# Patient Record
Sex: MALE | Race: WHITE | Employment: OTHER | URBAN - METROPOLITAN AREA
[De-identification: names, ages, dates, MRNs, and addresses within clinical notes are randomized per-mention and may not be internally consistent; named-entity substitution may affect disease eponyms.]

---

## 2017-12-25 PROBLEM — I50.40 COMBINED SYSTOLIC AND DIASTOLIC CONGESTIVE HEART FAILURE (HCC): Status: ACTIVE | Noted: 2017-12-25

## 2017-12-25 PROBLEM — J44.1 COPD EXACERBATION (HCC): Status: ACTIVE | Noted: 2017-12-25

## 2018-09-02 ENCOUNTER — APPOINTMENT (OUTPATIENT)
Dept: GENERAL RADIOLOGY | Age: 78
DRG: 190 | End: 2018-09-02
Payer: MEDICARE

## 2018-09-02 ENCOUNTER — HOSPITAL ENCOUNTER (EMERGENCY)
Age: 78
Discharge: HOME | DRG: 190 | End: 2018-09-02
Attending: EMERGENCY MEDICINE
Payer: MEDICARE

## 2018-09-02 VITALS
DIASTOLIC BLOOD PRESSURE: 57 MMHG | TEMPERATURE: 98.6 F | OXYGEN SATURATION: 90 % | SYSTOLIC BLOOD PRESSURE: 111 MMHG | HEIGHT: 67 IN | WEIGHT: 165 LBS | HEART RATE: 80 BPM | BODY MASS INDEX: 25.9 KG/M2 | RESPIRATION RATE: 20 BRPM

## 2018-09-02 DIAGNOSIS — J44.1 COPD EXACERBATION (HCC): Primary | ICD-10-CM

## 2018-09-02 DIAGNOSIS — I50.9 ACUTE CONGESTIVE HEART FAILURE, UNSPECIFIED HEART FAILURE TYPE (HCC): ICD-10-CM

## 2018-09-02 LAB
ANION GAP SERPL CALCULATED.3IONS-SCNC: 10 MMOL/L (ref 7–16)
BASOPHILS ABSOLUTE: 0.03 E9/L (ref 0–0.2)
BASOPHILS RELATIVE PERCENT: 0.1 % (ref 0–2)
BUN BLDV-MCNC: 15 MG/DL (ref 8–23)
CALCIUM SERPL-MCNC: 9.4 MG/DL (ref 8.6–10.2)
CHLORIDE BLD-SCNC: 96 MMOL/L (ref 98–107)
CO2: 33 MMOL/L (ref 22–29)
CREAT SERPL-MCNC: 0.8 MG/DL (ref 0.7–1.2)
EOSINOPHILS ABSOLUTE: 0.09 E9/L (ref 0.05–0.5)
EOSINOPHILS RELATIVE PERCENT: 0.4 % (ref 0–6)
GFR AFRICAN AMERICAN: >60
GFR NON-AFRICAN AMERICAN: >60 ML/MIN/1.73
GLUCOSE BLD-MCNC: 122 MG/DL (ref 74–109)
HCT VFR BLD CALC: 47.4 % (ref 37–54)
HEMOGLOBIN: 15.6 G/DL (ref 12.5–16.5)
IMMATURE GRANULOCYTES #: 0.21 E9/L
IMMATURE GRANULOCYTES %: 0.8 % (ref 0–5)
INR BLD: 1.5
LYMPHOCYTES ABSOLUTE: 0.93 E9/L (ref 1.5–4)
LYMPHOCYTES RELATIVE PERCENT: 3.7 % (ref 20–42)
MCH RBC QN AUTO: 29.4 PG (ref 26–35)
MCHC RBC AUTO-ENTMCNC: 32.9 % (ref 32–34.5)
MCV RBC AUTO: 89.4 FL (ref 80–99.9)
MONOCYTES ABSOLUTE: 1.78 E9/L (ref 0.1–0.95)
MONOCYTES RELATIVE PERCENT: 7 % (ref 2–12)
NEUTROPHILS ABSOLUTE: 22.24 E9/L (ref 1.8–7.3)
NEUTROPHILS RELATIVE PERCENT: 88 % (ref 43–80)
PDW BLD-RTO: 15.8 FL (ref 11.5–15)
PLATELET # BLD: 177 E9/L (ref 130–450)
PMV BLD AUTO: 9.4 FL (ref 7–12)
POTASSIUM SERPL-SCNC: 3.6 MMOL/L (ref 3.5–5)
PRO-BNP: 1363 PG/ML (ref 0–450)
PROTHROMBIN TIME: 17.1 SEC (ref 9.3–12.4)
RBC # BLD: 5.3 E12/L (ref 3.8–5.8)
SODIUM BLD-SCNC: 139 MMOL/L (ref 132–146)
TROPONIN: <0.01 NG/ML (ref 0–0.03)
WBC # BLD: 25.3 E9/L (ref 4.5–11.5)

## 2018-09-02 PROCEDURE — 84484 ASSAY OF TROPONIN QUANT: CPT

## 2018-09-02 PROCEDURE — 71045 X-RAY EXAM CHEST 1 VIEW: CPT

## 2018-09-02 PROCEDURE — 93005 ELECTROCARDIOGRAM TRACING: CPT

## 2018-09-02 PROCEDURE — 85610 PROTHROMBIN TIME: CPT

## 2018-09-02 PROCEDURE — 85025 COMPLETE CBC W/AUTO DIFF WBC: CPT

## 2018-09-02 PROCEDURE — 83880 ASSAY OF NATRIURETIC PEPTIDE: CPT

## 2018-09-02 PROCEDURE — 99285 EMERGENCY DEPT VISIT HI MDM: CPT

## 2018-09-02 PROCEDURE — 36415 COLL VENOUS BLD VENIPUNCTURE: CPT

## 2018-09-02 PROCEDURE — 96374 THER/PROPH/DIAG INJ IV PUSH: CPT

## 2018-09-02 PROCEDURE — 6360000002 HC RX W HCPCS: Performed by: EMERGENCY MEDICINE

## 2018-09-02 PROCEDURE — 80048 BASIC METABOLIC PNL TOTAL CA: CPT

## 2018-09-02 PROCEDURE — 6370000000 HC RX 637 (ALT 250 FOR IP): Performed by: EMERGENCY MEDICINE

## 2018-09-02 RX ORDER — PREDNISONE 10 MG/1
TABLET ORAL
Qty: 10 TABLET | Refills: 0 | Status: SHIPPED | OUTPATIENT
Start: 2018-09-02 | End: 2018-09-04 | Stop reason: ALTCHOICE

## 2018-09-02 RX ORDER — PREDNISONE 1 MG/1
5 TABLET ORAL DAILY
Status: ON HOLD | COMMUNITY
End: 2018-09-07 | Stop reason: HOSPADM

## 2018-09-02 RX ORDER — IPRATROPIUM BROMIDE AND ALBUTEROL SULFATE 2.5; .5 MG/3ML; MG/3ML
3 SOLUTION RESPIRATORY (INHALATION) ONCE
Status: COMPLETED | OUTPATIENT
Start: 2018-09-02 | End: 2018-09-02

## 2018-09-02 RX ORDER — METHYLPREDNISOLONE SODIUM SUCCINATE 125 MG/2ML
125 INJECTION, POWDER, LYOPHILIZED, FOR SOLUTION INTRAMUSCULAR; INTRAVENOUS ONCE
Status: COMPLETED | OUTPATIENT
Start: 2018-09-02 | End: 2018-09-02

## 2018-09-02 RX ORDER — FUROSEMIDE 10 MG/ML
40 INJECTION INTRAMUSCULAR; INTRAVENOUS ONCE
Status: COMPLETED | OUTPATIENT
Start: 2018-09-02 | End: 2018-09-02

## 2018-09-02 RX ADMIN — IPRATROPIUM BROMIDE AND ALBUTEROL SULFATE 3 AMPULE: .5; 3 SOLUTION RESPIRATORY (INHALATION) at 15:20

## 2018-09-02 RX ADMIN — FUROSEMIDE 40 MG: 10 INJECTION, SOLUTION INTRAMUSCULAR; INTRAVENOUS at 16:36

## 2018-09-02 RX ADMIN — METHYLPREDNISOLONE SODIUM SUCCINATE 125 MG: 125 INJECTION, POWDER, FOR SOLUTION INTRAMUSCULAR; INTRAVENOUS at 15:28

## 2018-09-02 ASSESSMENT — ENCOUNTER SYMPTOMS
RHINORRHEA: 0
SORE THROAT: 0
CONSTIPATION: 0
PHOTOPHOBIA: 0
SINUS PAIN: 0
SHORTNESS OF BREATH: 1
COUGH: 1
ABDOMINAL PAIN: 0
SINUS PRESSURE: 0
WHEEZING: 0
NAUSEA: 0
DIARRHEA: 0
BACK PAIN: 0
VOMITING: 0

## 2018-09-02 NOTE — ED NOTES
Home going instructions with verbalized understanding script given x 111 [facility], RN  09/02/18 2293

## 2018-09-02 NOTE — ED PROVIDER NOTES
Patient is a 21-year-old male who presents the chief complaint of cough and shortness of breath. Patient states that he had onset of shortness of breath 3 days ago, worse with exertion. He admits to history of COPD and CHF. Patient wears nasal cannula oxygen, 3 L as needed. He states that he has had increased requirements for supplemental oxygen. He admits to a nonproductive cough. He denies any lightheadedness, dizziness, chest pain, abdominal pain, nausea, vomiting. He denies any weight gain. He has been trying to take more Lasix at home, but is not getting all the fluid out. Patient is very adamant that he does not want to be admitted, he wants to go home after being examined. Patient states that he has been on steroids recently for his COPD. The history is provided by the patient. No  was used. Review of Systems   Constitutional: Negative for chills, fatigue and fever. HENT: Negative for congestion, rhinorrhea, sinus pain, sinus pressure, sneezing and sore throat. Eyes: Negative for photophobia and visual disturbance. Respiratory: Positive for cough and shortness of breath. Negative for wheezing. Cardiovascular: Negative for chest pain and palpitations. Gastrointestinal: Negative for abdominal pain, constipation, diarrhea, nausea and vomiting. Genitourinary: Negative for dysuria, frequency and hematuria. Musculoskeletal: Negative for back pain, neck pain and neck stiffness. Skin: Negative for rash and wound. Neurological: Negative for dizziness, light-headedness and headaches. Psychiatric/Behavioral: Negative for agitation, behavioral problems and confusion. Physical Exam   Constitutional: He is oriented to person, place, and time. He appears well-developed and well-nourished. No distress. HENT:   Head: Normocephalic and atraumatic. Mouth/Throat: Oropharynx is clear and moist. No oropharyngeal exudate.    Eyes: Pupils are equal, round, and reactive to light. Conjunctivae are normal. Right eye exhibits no discharge. Left eye exhibits no discharge. No scleral icterus. Neck: Normal range of motion. Neck supple. Cardiovascular: Normal rate, regular rhythm and normal heart sounds. Exam reveals no gallop. No murmur heard. Pulmonary/Chest: Effort normal. No respiratory distress. He has decreased breath sounds in the right lower field and the left lower field. He has wheezes. Diminished breath sounds in bilateral lung bases. Scattered expiratory wheezes. Coarse breath sounds in all lung fields. Pulse ox is 95% on 3 L. no acute respiratory distress. Abdominal: Soft. Bowel sounds are normal. He exhibits no distension. There is no tenderness. Musculoskeletal: Normal range of motion. He exhibits no edema or tenderness. No lower extremity edema. Neurological: He is alert and oriented to person, place, and time. No cranial nerve deficit. Coordination normal.   Skin: Skin is warm and dry. No rash noted. He is not diaphoretic. No erythema. Psychiatric: He has a normal mood and affect. His behavior is normal.       Procedures    MDM            EKG Interpretation    Interpreted by emergency department physician    Rhythm: dysrhythmia, irregular  Rate: 100  Axis: normal  Ectopy: premature ventricular contractions (multifocal)  Conduction: right bundle branch block (complete)  ST Segments: nonspecific changes  T Waves: non specific changes  Q Waves: none    Clinical Impression: Irregular rhythm. Complete right bundle-branch block. Diffuse nonspecific ST changes. No previous comparison EKG. Joseph Rodriguez    --------------------------------------------- PAST HISTORY ---------------------------------------------  Past Medical History:  has a past medical history of Amblyopia; Bilateral carotid artery stenosis; CHF (congestive heart failure) ([facility] [address] Colon polyp; COPD (chronic obstructive pulmonary disease) ([facility] Emphysema of lung ([facility]  Hyperlipidemia; Myopia; time the patient is without objective evidence of an acute process requiring hospitalization or inpatient management. They have remained hemodynamically stable throughout their entire ED visit and are stable for discharge with outpatient follow-up. The plan has been discussed in detail and they are aware of the specific conditions for emergent return, as well as the importance of follow-up. Discharge Medication List as of 9/2/2018  4:28 PM      START taking these medications    Details   ! ! predniSONE (DELTASONE) 10 MG tablet Two tablets qd, Disp-10 tablet, R-0Print       !! - Potential duplicate medications found. Please discuss with provider. Diagnosis:  1. COPD exacerbation ([facility]    2. Acute congestive heart failure, unspecified heart failure type ([facility]        Disposition:  Patient's disposition: Discharge to home  Patient's condition is stable.              Viktoria Martino DO  Resident  09/02/18 3555

## 2018-09-04 ENCOUNTER — HOSPITAL ENCOUNTER (INPATIENT)
Age: 78
LOS: 3 days | Discharge: HOME | DRG: 190 | End: 2018-09-07
Attending: EMERGENCY MEDICINE | Admitting: INTERNAL MEDICINE
Payer: MEDICARE

## 2018-09-04 ENCOUNTER — APPOINTMENT (OUTPATIENT)
Dept: GENERAL RADIOLOGY | Age: 78
DRG: 190 | End: 2018-09-04
Payer: MEDICARE

## 2018-09-04 DIAGNOSIS — I47.20 V TACH: ICD-10-CM

## 2018-09-04 DIAGNOSIS — R07.9 CHEST PAIN, UNSPECIFIED TYPE: Primary | ICD-10-CM

## 2018-09-04 DIAGNOSIS — R79.1 SUBTHERAPEUTIC INTERNATIONAL NORMALIZED RATIO (INR): ICD-10-CM

## 2018-09-04 DIAGNOSIS — I50.9 ACUTE ON CHRONIC CONGESTIVE HEART FAILURE, UNSPECIFIED HEART FAILURE TYPE (HCC): ICD-10-CM

## 2018-09-04 DIAGNOSIS — R09.02 HYPOXIA: ICD-10-CM

## 2018-09-04 LAB
ALBUMIN SERPL-MCNC: 3.8 G/DL (ref 3.5–5.2)
ALP BLD-CCNC: 108 U/L (ref 40–129)
ALT SERPL-CCNC: 25 U/L (ref 0–40)
ANION GAP SERPL CALCULATED.3IONS-SCNC: 12 MMOL/L (ref 7–16)
APTT: 43 SEC (ref 24.5–35.1)
AST SERPL-CCNC: 19 U/L (ref 0–39)
BASOPHILS ABSOLUTE: 0 E9/L (ref 0–0.2)
BASOPHILS RELATIVE PERCENT: 0 % (ref 0–2)
BILIRUB SERPL-MCNC: 1.1 MG/DL (ref 0–1.2)
BUN BLDV-MCNC: 18 MG/DL (ref 8–23)
CALCIUM SERPL-MCNC: 8.9 MG/DL (ref 8.6–10.2)
CHLORIDE BLD-SCNC: 96 MMOL/L (ref 98–107)
CO2: 31 MMOL/L (ref 22–29)
CREAT SERPL-MCNC: 0.8 MG/DL (ref 0.7–1.2)
EOSINOPHILS ABSOLUTE: 0.35 E9/L (ref 0.05–0.5)
EOSINOPHILS RELATIVE PERCENT: 1.7 % (ref 0–6)
GFR AFRICAN AMERICAN: >60
GFR NON-AFRICAN AMERICAN: >60 ML/MIN/1.73
GLUCOSE BLD-MCNC: 87 MG/DL (ref 74–109)
HCT VFR BLD CALC: 44.9 % (ref 37–54)
HEMOGLOBIN: 14.6 G/DL (ref 12.5–16.5)
INR BLD: 1.2
LYMPHOCYTES ABSOLUTE: 0.62 E9/L (ref 1.5–4)
LYMPHOCYTES RELATIVE PERCENT: 2.6 % (ref 20–42)
MAGNESIUM: 2.2 MG/DL (ref 1.6–2.6)
MCH RBC QN AUTO: 28.6 PG (ref 26–35)
MCHC RBC AUTO-ENTMCNC: 32.5 % (ref 32–34.5)
MCV RBC AUTO: 88 FL (ref 80–99.9)
MONOCYTES ABSOLUTE: 1.64 E9/L (ref 0.1–0.95)
MONOCYTES RELATIVE PERCENT: 7.8 % (ref 2–12)
NEUTROPHILS ABSOLUTE: 18.04 E9/L (ref 1.8–7.3)
NEUTROPHILS RELATIVE PERCENT: 87.8 % (ref 43–80)
PDW BLD-RTO: 15.6 FL (ref 11.5–15)
PLATELET # BLD: 208 E9/L (ref 130–450)
PMV BLD AUTO: 9.9 FL (ref 7–12)
POTASSIUM SERPL-SCNC: 3.5 MMOL/L (ref 3.5–5)
PRO-BNP: 1739 PG/ML (ref 0–450)
PROTHROMBIN TIME: 14 SEC (ref 9.3–12.4)
RBC # BLD: 5.1 E12/L (ref 3.8–5.8)
SCHISTOCYTES: ABNORMAL
SODIUM BLD-SCNC: 139 MMOL/L (ref 132–146)
TOTAL PROTEIN: 6.3 G/DL (ref 6.4–8.3)
TROPONIN: <0.01 NG/ML (ref 0–0.03)
TROPONIN: <0.01 NG/ML (ref 0–0.03)
WBC # BLD: 20.5 E9/L (ref 4.5–11.5)

## 2018-09-04 PROCEDURE — 83735 ASSAY OF MAGNESIUM: CPT

## 2018-09-04 PROCEDURE — 6370000000 HC RX 637 (ALT 250 FOR IP): Performed by: INTERNAL MEDICINE

## 2018-09-04 PROCEDURE — 71045 X-RAY EXAM CHEST 1 VIEW: CPT

## 2018-09-04 PROCEDURE — 2060000000 HC ICU INTERMEDIATE R&B

## 2018-09-04 PROCEDURE — 6370000000 HC RX 637 (ALT 250 FOR IP): Performed by: EMERGENCY MEDICINE

## 2018-09-04 PROCEDURE — 94640 AIRWAY INHALATION TREATMENT: CPT

## 2018-09-04 PROCEDURE — 94760 N-INVAS EAR/PLS OXIMETRY 1: CPT

## 2018-09-04 PROCEDURE — 87205 SMEAR GRAM STAIN: CPT

## 2018-09-04 PROCEDURE — 99285 EMERGENCY DEPT VISIT HI MDM: CPT

## 2018-09-04 PROCEDURE — 96375 TX/PRO/DX INJ NEW DRUG ADDON: CPT

## 2018-09-04 PROCEDURE — 87040 BLOOD CULTURE FOR BACTERIA: CPT

## 2018-09-04 PROCEDURE — 87070 CULTURE OTHR SPECIMN AEROBIC: CPT

## 2018-09-04 PROCEDURE — 85610 PROTHROMBIN TIME: CPT

## 2018-09-04 PROCEDURE — 6360000002 HC RX W HCPCS: Performed by: EMERGENCY MEDICINE

## 2018-09-04 PROCEDURE — 85025 COMPLETE CBC W/AUTO DIFF WBC: CPT

## 2018-09-04 PROCEDURE — 94664 DEMO&/EVAL PT USE INHALER: CPT

## 2018-09-04 PROCEDURE — 84484 ASSAY OF TROPONIN QUANT: CPT

## 2018-09-04 PROCEDURE — 96372 THER/PROPH/DIAG INJ SC/IM: CPT

## 2018-09-04 PROCEDURE — 96366 THER/PROPH/DIAG IV INF ADDON: CPT

## 2018-09-04 PROCEDURE — 36415 COLL VENOUS BLD VENIPUNCTURE: CPT

## 2018-09-04 PROCEDURE — 83880 ASSAY OF NATRIURETIC PEPTIDE: CPT

## 2018-09-04 PROCEDURE — 2580000003 HC RX 258: Performed by: INTERNAL MEDICINE

## 2018-09-04 PROCEDURE — 80053 COMPREHEN METABOLIC PANEL: CPT

## 2018-09-04 PROCEDURE — 94761 N-INVAS EAR/PLS OXIMETRY MLT: CPT

## 2018-09-04 PROCEDURE — 96365 THER/PROPH/DIAG IV INF INIT: CPT

## 2018-09-04 PROCEDURE — 6360000002 HC RX W HCPCS: Performed by: INTERNAL MEDICINE

## 2018-09-04 PROCEDURE — 85730 THROMBOPLASTIN TIME PARTIAL: CPT

## 2018-09-04 RX ORDER — LEVOFLOXACIN 5 MG/ML
750 INJECTION, SOLUTION INTRAVENOUS EVERY 24 HOURS
Status: DISCONTINUED | OUTPATIENT
Start: 2018-09-04 | End: 2018-09-07 | Stop reason: HOSPADM

## 2018-09-04 RX ORDER — METHYLPREDNISOLONE SODIUM SUCCINATE 40 MG/ML
40 INJECTION, POWDER, LYOPHILIZED, FOR SOLUTION INTRAMUSCULAR; INTRAVENOUS EVERY 6 HOURS
Status: DISCONTINUED | OUTPATIENT
Start: 2018-09-04 | End: 2018-09-05

## 2018-09-04 RX ORDER — ATENOLOL 50 MG/1
50 TABLET ORAL DAILY
Status: DISCONTINUED | OUTPATIENT
Start: 2018-09-04 | End: 2018-09-04 | Stop reason: SDUPTHER

## 2018-09-04 RX ORDER — ZOLPIDEM TARTRATE 5 MG/1
5 TABLET ORAL NIGHTLY
Status: DISCONTINUED | OUTPATIENT
Start: 2018-09-04 | End: 2018-09-07 | Stop reason: HOSPADM

## 2018-09-04 RX ORDER — IPRATROPIUM BROMIDE AND ALBUTEROL SULFATE 2.5; .5 MG/3ML; MG/3ML
1 SOLUTION RESPIRATORY (INHALATION)
Status: COMPLETED | OUTPATIENT
Start: 2018-09-04 | End: 2018-09-04

## 2018-09-04 RX ORDER — SODIUM CHLORIDE 0.9 % (FLUSH) 0.9 %
10 SYRINGE (ML) INJECTION EVERY 12 HOURS SCHEDULED
Status: DISCONTINUED | OUTPATIENT
Start: 2018-09-04 | End: 2018-09-07 | Stop reason: HOSPADM

## 2018-09-04 RX ORDER — IPRATROPIUM BROMIDE AND ALBUTEROL SULFATE 2.5; .5 MG/3ML; MG/3ML
1 SOLUTION RESPIRATORY (INHALATION)
Status: DISCONTINUED | OUTPATIENT
Start: 2018-09-04 | End: 2018-09-06

## 2018-09-04 RX ORDER — FUROSEMIDE 10 MG/ML
40 INJECTION INTRAMUSCULAR; INTRAVENOUS ONCE
Status: COMPLETED | OUTPATIENT
Start: 2018-09-04 | End: 2018-09-04

## 2018-09-04 RX ORDER — SODIUM CHLORIDE 9 MG/ML
INJECTION, SOLUTION INTRAVENOUS CONTINUOUS
Status: DISCONTINUED | OUTPATIENT
Start: 2018-09-04 | End: 2018-09-05

## 2018-09-04 RX ORDER — SODIUM CHLORIDE 0.9 % (FLUSH) 0.9 %
10 SYRINGE (ML) INJECTION PRN
Status: DISCONTINUED | OUTPATIENT
Start: 2018-09-04 | End: 2018-09-07 | Stop reason: HOSPADM

## 2018-09-04 RX ORDER — POTASSIUM CHLORIDE 20 MEQ/1
20 TABLET, EXTENDED RELEASE ORAL 2 TIMES DAILY WITH MEALS
Status: DISCONTINUED | OUTPATIENT
Start: 2018-09-04 | End: 2018-09-07 | Stop reason: HOSPADM

## 2018-09-04 RX ORDER — CLOPIDOGREL BISULFATE 75 MG/1
75 TABLET ORAL DAILY
Status: DISCONTINUED | OUTPATIENT
Start: 2018-09-05 | End: 2018-09-07

## 2018-09-04 RX ORDER — METHYLPREDNISOLONE SODIUM SUCCINATE 125 MG/2ML
125 INJECTION, POWDER, LYOPHILIZED, FOR SOLUTION INTRAMUSCULAR; INTRAVENOUS ONCE
Status: COMPLETED | OUTPATIENT
Start: 2018-09-04 | End: 2018-09-04

## 2018-09-04 RX ORDER — ATENOLOL 25 MG/1
50 TABLET ORAL DAILY
Status: DISCONTINUED | OUTPATIENT
Start: 2018-09-04 | End: 2018-09-05

## 2018-09-04 RX ORDER — WARFARIN SODIUM 4 MG/1
4 TABLET ORAL DAILY
Status: DISCONTINUED | OUTPATIENT
Start: 2018-09-05 | End: 2018-09-07 | Stop reason: HOSPADM

## 2018-09-04 RX ORDER — LIDOCAINE HYDROCHLORIDE ANHYDROUS AND DEXTROSE MONOHYDRATE .4; 5 G/100ML; G/100ML
2 INJECTION, SOLUTION INTRAVENOUS CONTINUOUS
Status: DISCONTINUED | OUTPATIENT
Start: 2018-09-04 | End: 2018-09-04

## 2018-09-04 RX ORDER — ONDANSETRON 2 MG/ML
4 INJECTION INTRAMUSCULAR; INTRAVENOUS EVERY 6 HOURS PRN
Status: DISCONTINUED | OUTPATIENT
Start: 2018-09-04 | End: 2018-09-07 | Stop reason: HOSPADM

## 2018-09-04 RX ORDER — MAGNESIUM SULFATE IN WATER 40 MG/ML
2 INJECTION, SOLUTION INTRAVENOUS ONCE
Status: COMPLETED | OUTPATIENT
Start: 2018-09-04 | End: 2018-09-04

## 2018-09-04 RX ORDER — ASPIRIN 325 MG
325 TABLET ORAL ONCE
Status: COMPLETED | OUTPATIENT
Start: 2018-09-04 | End: 2018-09-04

## 2018-09-04 RX ORDER — ASPIRIN 81 MG/1
81 TABLET ORAL DAILY
Status: DISCONTINUED | OUTPATIENT
Start: 2018-09-05 | End: 2018-09-07 | Stop reason: HOSPADM

## 2018-09-04 RX ADMIN — METHYLPREDNISOLONE SODIUM SUCCINATE 125 MG: 125 INJECTION, POWDER, FOR SOLUTION INTRAMUSCULAR; INTRAVENOUS at 14:05

## 2018-09-04 RX ADMIN — IPRATROPIUM BROMIDE AND ALBUTEROL SULFATE 1 AMPULE: .5; 3 SOLUTION RESPIRATORY (INHALATION) at 20:07

## 2018-09-04 RX ADMIN — IPRATROPIUM BROMIDE AND ALBUTEROL SULFATE 1 AMPULE: 2.5; .5 SOLUTION RESPIRATORY (INHALATION) at 14:10

## 2018-09-04 RX ADMIN — ASPIRIN 325 MG: 325 TABLET, COATED ORAL at 14:04

## 2018-09-04 RX ADMIN — ENOXAPARIN SODIUM 80 MG: 80 INJECTION SUBCUTANEOUS at 15:18

## 2018-09-04 RX ADMIN — MAGNESIUM SULFATE 2 G: 2 INJECTION INTRAVENOUS at 13:22

## 2018-09-04 RX ADMIN — FUROSEMIDE 40 MG: 10 INJECTION, SOLUTION INTRAVENOUS at 15:18

## 2018-09-04 RX ADMIN — LEVOFLOXACIN 750 MG: 5 INJECTION, SOLUTION INTRAVENOUS at 20:38

## 2018-09-04 RX ADMIN — METHYLPREDNISOLONE SODIUM SUCCINATE 40 MG: 40 INJECTION, POWDER, LYOPHILIZED, FOR SOLUTION INTRAMUSCULAR; INTRAVENOUS at 20:36

## 2018-09-04 RX ADMIN — IPRATROPIUM BROMIDE AND ALBUTEROL SULFATE 1 AMPULE: 2.5; .5 SOLUTION RESPIRATORY (INHALATION) at 14:15

## 2018-09-04 RX ADMIN — POTASSIUM CHLORIDE 20 MEQ: 20 TABLET, EXTENDED RELEASE ORAL at 17:28

## 2018-09-04 RX ADMIN — LIDOCAINE HYDROCHLORIDE 100 MG: 20 INJECTION, SOLUTION INTRAVENOUS at 13:20

## 2018-09-04 RX ADMIN — ZOLPIDEM TARTRATE 5 MG: 5 TABLET ORAL at 21:43

## 2018-09-04 RX ADMIN — SODIUM CHLORIDE: 9 INJECTION, SOLUTION INTRAVENOUS at 20:39

## 2018-09-04 RX ADMIN — LIDOCAINE HYDROCHLORIDE ANHYDROUS AND DEXTROSE MONOHYDRATE 2 MG/MIN: .4; 5 INJECTION, SOLUTION INTRAVENOUS at 13:21

## 2018-09-04 RX ADMIN — ATENOLOL 50 MG: 25 TABLET ORAL at 17:28

## 2018-09-04 RX ADMIN — IPRATROPIUM BROMIDE AND ALBUTEROL SULFATE 1 AMPULE: 2.5; .5 SOLUTION RESPIRATORY (INHALATION) at 14:18

## 2018-09-04 ASSESSMENT — PAIN SCALES - GENERAL
PAINLEVEL_OUTOF10: 0

## 2018-09-04 NOTE — ED NOTES
Pt went into vtach at 1319-pt aaox4-lidocaine given and pt converted back to afib. aaox4 at this time. No c/o's or distress noted.       Rony Hutchison RN  09/04/18 20201 [facility] Geraldine Mathias RN  09/04/18 9013

## 2018-09-04 NOTE — CONSULTS
[facility] [address] [place] [address]    Name: Andrea Triana    Age: 78 y.o. Date of Admission: 9/4/2018  1:10 PM    Date of Service: 9/4/2018    Reason for Consultation: VT, CAD SOB, CP    Referring Physician: Reuben Choi  Primary Care Physician: Jessica Gonzales MD    History of Present Illness: The patient is a 78y.o. year old male with severe COPD on home O2 and  extensive cardiac history with chronic RBBB,  CABG 80 L>LAD, redo 92 VG. LAD , VG >RCA, ZELDA ramus and Cx 2005 ZELDA Cx 2009 last echo 3/16 EF 50-55% DDII, LAE, trivial AI,MR, PAF Last seen in office 4/17 - was sinus at the time. Came into [place] ED 9/2 for severe SOB , cough and  Congestion, was producing white and yellow phlegm, but refused admission. He is  back now to [facility] for worsening SOB as well as some chest pain- states that only started today and he did use some NTG at home. He was quite hypoxic mid 80\"s and developed some sustained monomorphic VT treated with lidocaine and magnesium. Now back in atrial fibrillation. Denies CP at this time. Trying to negotiate going home. States has been using O2 at night. Smoking less than a pack /day(>120 pack years Hx). Past Medical History:   Past Medical History:   Diagnosis Date    Amblyopia 1961    corrected    Bilateral carotid artery stenosis 7/3/2014    CHF (congestive heart failure) (HCC)     Colon polyp     COPD (chronic obstructive pulmonary disease) (HCC)     Emphysema of lung (HCC)     Hyperlipidemia     Myopia     Squamous cell carcinoma, face     skin    Tobacco dependence 7/3/2014       Review of Systems:   Constitutional: No fever, chills, sweats  Cardiac: As per HPI  Pulmonary: + cough, wheeze, -hemoptysis  HEENT: No visual disturbances, difficult swallowing  GI: No nausea, vomiting, diarrhea, abdominal pain, rectal bleeding  : No dysuria or hematuria  Endocrine: No excessive thirst, heat or cold intolerance.    Musculoskeletal: No joint chronically ill appearing man. HEENT: Normocephalic and atraumatic, extraocular movements intact, pupils equal and round, moist mucus membranes, sclera anicteric  Neck: No JVD, no carotid bruits, no thyromegaly, no adenopathy  Cardiac: irreg irreg rhythm,  normal S1 and physiologically split S2, no S3, no S4. Apical impulse is nondisplaced. No murmurs, no pericardial rubs, no clicks. Carotid upstrokes brisk. Respiratory:poor air movement, expiratory wheezing  Abdomen: Soft, nontender, nondistended, bowel sounds+, no hepatomegaly, no masses, no abdominal bruits  Extremities: No edema, no cyanosis, no clubbing. Skin: Intact, warm and dry, no rashes, no breakdown, smells of cigarette smoke  Musculoskeletal: normal tone and strength in the upper and lower extremities bilaterally  Neuro: No focal deficits. Moves all extremities appropriately to command. Normal sensation in the upper and lower extremities bilaterally  Psychiatric: Cooperative, and normal affect    Intake/Output:  No intake or output data in the 24 hours ending 09/04/18 1533  No intake/output data recorded.     Laboratory Tests:  Last 3 CBC:  Recent Labs      09/02/18   1535 09/04/18   1320   WBC  25.3*  20.5*   RBC  5.30  5.10   HGB  15.6  14.6   HCT  47.4  44.9   MCV  89.4  88.0   MCH  29.4  28.6   MCHC  32.9  32.5   RDW  15.8*  15.6*   PLT  177  208   MPV  9.4  9.9       Last 3 CMP:    Recent Labs      09/02/18   1535 09/04/18   1320   NA  139  139   K  3.6  3.5   CL  96*  96*   CO2  33*  31*   BUN  15  18   CREATININE  0.8  0.8   GLUCOSE  122*  87   CALCIUM  9.4  8.9   PROT   --   6.3*   LABALBU   --   3.8   BILITOT   --   1.1   ALKPHOS   --   108   AST   --   19   ALT   --   25       Last 3 Mag/Phos:  Recent Labs      09/04/18   1320   MG  2.2       Last 3 CK, CKMB, Troponin  Recent Labs      09/02/18   1535  09/04/18   1320   TROPONINI  <0.01  <0.01       Last 3 Pro-BNP:  Recent Labs      09/02/18   1535  09/04/18   1320   PROBNP  1,363*

## 2018-09-04 NOTE — H&P
10 mg by mouth nightly. Barron Counter aspirin EC 81 MG EC tablet, Take 81 mg by mouth daily. Allergies:    Dye [iodides] and Pcn [penicillins]    Social History:    reports that he has been smoking Cigarettes. He started smoking about 63 years ago. He has a 124.00 pack-year smoking history. He has never used smokeless tobacco. He reports that he does not drink alcohol or use drugs. Family History:   family history includes Cancer in his mother; Heart Disease in his brother and father. REVIEW OF SYSTEMS:  As above in the HPI, otherwise negative    PHYSICAL EXAM:    Vitals:  /64   Pulse 99   Temp 97.8 °F (36.6 °C)   Resp 18   Ht 5' 7\" (1.702 m)   Wt 172 lb 8 oz (78.2 kg)   SpO2 97%   BMI 27.02 kg/m²     General:   Awake, alert, oriented X 3. No acute distress. Moist cough  HEENT:    Normocephalic, atraumatic. Pupils equal, round, reactive to light. No scleral icterus. No conjunctival injection. Oropharynx clear. No nasal discharge. Neck:       Supple. No bruits, adenopathy, masses, neck vein distention. Trachea in the midline. Heart:      irrg, no murmurs, gallops, rubs  Lungs:     Diminished bilaterally, bilat symmetrical expansion, no wheeze, rales, or rhonchi  Abdomen:   Bowel sounds present, soft, nontender, no masses, no organomegaly, no peritoneal signs  Extremities:  No clubbing, cyanosis, or edema  Skin:         Warm and dry, no open lesions or rash  Neuro:     Cranial nerves 2-12 intact, no focal deficits  Rectal:    deferred  Genitalia:  deferred    LABS:    CBC with Differential:    Lab Results   Component Value Date    WBC 12.9 09/05/2018    RBC 4.84 09/05/2018    HGB 13.8 09/05/2018    HCT 43.4 09/05/2018     09/05/2018    MCV 89.7 09/05/2018    MCH 28.5 09/05/2018    MCHC 31.8 09/05/2018    RDW 15.8 09/05/2018    LYMPHOPCT 1.8 09/05/2018    MONOPCT 1.8 09/05/2018    BASOPCT 0.1 09/05/2018    MONOSABS 0.23 09/05/2018    LYMPHSABS 0.23 09/05/2018    EOSABS 0.00 09/05/2018

## 2018-09-05 PROBLEM — I48.91 ATRIAL FIBRILLATION (HCC): Status: ACTIVE | Noted: 2018-09-05

## 2018-09-05 LAB
ANION GAP SERPL CALCULATED.3IONS-SCNC: 8 MMOL/L (ref 7–16)
BASOPHILS ABSOLUTE: 0.01 E9/L (ref 0–0.2)
BASOPHILS RELATIVE PERCENT: 0.1 % (ref 0–2)
BUN BLDV-MCNC: 20 MG/DL (ref 8–23)
BURR CELLS: ABNORMAL
CALCIUM SERPL-MCNC: 8.3 MG/DL (ref 8.6–10.2)
CHLORIDE BLD-SCNC: 101 MMOL/L (ref 98–107)
CO2: 29 MMOL/L (ref 22–29)
CREAT SERPL-MCNC: 0.7 MG/DL (ref 0.7–1.2)
EOSINOPHILS ABSOLUTE: 0 E9/L (ref 0.05–0.5)
EOSINOPHILS RELATIVE PERCENT: 0 % (ref 0–6)
FILM ARRAY ADENOVIRUS: NORMAL
FILM ARRAY BORDETELLA PERTUSSIS: NORMAL
FILM ARRAY CHLAMYDOPHILIA PNEUMONIAE: NORMAL
FILM ARRAY CORONAVIRUS 229E: NORMAL
FILM ARRAY CORONAVIRUS HKU1: NORMAL
FILM ARRAY CORONAVIRUS NL63: NORMAL
FILM ARRAY CORONAVIRUS OC43: NORMAL
FILM ARRAY INFLUENZA A VIRUS 09H1: NORMAL
FILM ARRAY INFLUENZA A VIRUS H1: NORMAL
FILM ARRAY INFLUENZA A VIRUS H3: NORMAL
FILM ARRAY INFLUENZA A VIRUS: NORMAL
FILM ARRAY INFLUENZA B: NORMAL
FILM ARRAY METAPNEUMOVIRUS: NORMAL
FILM ARRAY MYCOPLASMA PNEUMONIAE: NORMAL
FILM ARRAY PARAINFLUENZA VIRUS 1: NORMAL
FILM ARRAY PARAINFLUENZA VIRUS 2: NORMAL
FILM ARRAY PARAINFLUENZA VIRUS 3: NORMAL
FILM ARRAY PARAINFLUENZA VIRUS 4: NORMAL
FILM ARRAY RESPIRATORY SYNCITIAL VIRUS: NORMAL
FILM ARRAY RHINOVIRUS/ENTEROVIRUS: NORMAL
GFR AFRICAN AMERICAN: >60
GFR NON-AFRICAN AMERICAN: >60 ML/MIN/1.73
GLUCOSE BLD-MCNC: 179 MG/DL (ref 74–109)
HCT VFR BLD CALC: 43.4 % (ref 37–54)
HEMOGLOBIN: 13.8 G/DL (ref 12.5–16.5)
IMMATURE GRANULOCYTES #: 0.08 E9/L
IMMATURE GRANULOCYTES %: 0.6 % (ref 0–5)
INR BLD: 1.8
L. PNEUMOPHILA SEROGP 1 UR AG: NORMAL
LV EF: 53 %
LVEF MODALITY: NORMAL
LYMPHOCYTES ABSOLUTE: 0.23 E9/L (ref 1.5–4)
LYMPHOCYTES RELATIVE PERCENT: 1.8 % (ref 20–42)
MAGNESIUM: 2.6 MG/DL (ref 1.6–2.6)
MCH RBC QN AUTO: 28.5 PG (ref 26–35)
MCHC RBC AUTO-ENTMCNC: 31.8 % (ref 32–34.5)
MCV RBC AUTO: 89.7 FL (ref 80–99.9)
MONOCYTES ABSOLUTE: 0.23 E9/L (ref 0.1–0.95)
MONOCYTES RELATIVE PERCENT: 1.8 % (ref 2–12)
NEUTROPHILS ABSOLUTE: 12.33 E9/L (ref 1.8–7.3)
NEUTROPHILS RELATIVE PERCENT: 95.7 % (ref 43–80)
PDW BLD-RTO: 15.8 FL (ref 11.5–15)
PLATELET # BLD: 162 E9/L (ref 130–450)
PMV BLD AUTO: 9.9 FL (ref 7–12)
POIKILOCYTES: ABNORMAL
POTASSIUM REFLEX MAGNESIUM: 3.8 MMOL/L (ref 3.5–5)
POTASSIUM SERPL-SCNC: 3.8 MMOL/L (ref 3.5–5)
PROTHROMBIN TIME: 20 SEC (ref 9.3–12.4)
RBC # BLD: 4.84 E12/L (ref 3.8–5.8)
SODIUM BLD-SCNC: 138 MMOL/L (ref 132–146)
STREP PNEUMONIAE ANTIGEN, URINE: NORMAL
WBC # BLD: 12.9 E9/L (ref 4.5–11.5)

## 2018-09-05 PROCEDURE — 6360000002 HC RX W HCPCS: Performed by: INTERNAL MEDICINE

## 2018-09-05 PROCEDURE — 6370000000 HC RX 637 (ALT 250 FOR IP): Performed by: INTERNAL MEDICINE

## 2018-09-05 PROCEDURE — 87486 CHLMYD PNEUM DNA AMP PROBE: CPT

## 2018-09-05 PROCEDURE — 87503 INFLUENZA DNA AMP PROB ADDL: CPT

## 2018-09-05 PROCEDURE — 87450 HC DIRECT STREP B ANTIGEN: CPT

## 2018-09-05 PROCEDURE — 6360000004 HC RX CONTRAST MEDICATION: Performed by: INTERNAL MEDICINE

## 2018-09-05 PROCEDURE — 93306 TTE W/DOPPLER COMPLETE: CPT

## 2018-09-05 PROCEDURE — 83735 ASSAY OF MAGNESIUM: CPT

## 2018-09-05 PROCEDURE — 93005 ELECTROCARDIOGRAM TRACING: CPT | Performed by: INTERNAL MEDICINE

## 2018-09-05 PROCEDURE — 80048 BASIC METABOLIC PNL TOTAL CA: CPT

## 2018-09-05 PROCEDURE — 2580000003 HC RX 258: Performed by: INTERNAL MEDICINE

## 2018-09-05 PROCEDURE — 2060000000 HC ICU INTERMEDIATE R&B

## 2018-09-05 PROCEDURE — 87502 INFLUENZA DNA AMP PROBE: CPT

## 2018-09-05 PROCEDURE — 85610 PROTHROMBIN TIME: CPT

## 2018-09-05 PROCEDURE — 36415 COLL VENOUS BLD VENIPUNCTURE: CPT

## 2018-09-05 PROCEDURE — 2700000000 HC OXYGEN THERAPY PER DAY

## 2018-09-05 PROCEDURE — 94640 AIRWAY INHALATION TREATMENT: CPT

## 2018-09-05 PROCEDURE — 87581 M.PNEUMON DNA AMP PROBE: CPT

## 2018-09-05 PROCEDURE — 87798 DETECT AGENT NOS DNA AMP: CPT

## 2018-09-05 PROCEDURE — 94760 N-INVAS EAR/PLS OXIMETRY 1: CPT

## 2018-09-05 PROCEDURE — 2500000003 HC RX 250 WO HCPCS: Performed by: INTERNAL MEDICINE

## 2018-09-05 PROCEDURE — 85025 COMPLETE CBC W/AUTO DIFF WBC: CPT

## 2018-09-05 RX ORDER — DILTIAZEM HYDROCHLORIDE 5 MG/ML
10 INJECTION INTRAVENOUS ONCE
Status: COMPLETED | OUTPATIENT
Start: 2018-09-05 | End: 2018-09-05

## 2018-09-05 RX ORDER — METHYLPREDNISOLONE SODIUM SUCCINATE 40 MG/ML
40 INJECTION, POWDER, LYOPHILIZED, FOR SOLUTION INTRAMUSCULAR; INTRAVENOUS EVERY 8 HOURS
Status: COMPLETED | OUTPATIENT
Start: 2018-09-05 | End: 2018-09-06

## 2018-09-05 RX ORDER — FUROSEMIDE 10 MG/ML
40 INJECTION INTRAMUSCULAR; INTRAVENOUS 2 TIMES DAILY
Status: DISCONTINUED | OUTPATIENT
Start: 2018-09-05 | End: 2018-09-07

## 2018-09-05 RX ADMIN — Medication 10 ML: at 20:41

## 2018-09-05 RX ADMIN — METHYLPREDNISOLONE SODIUM SUCCINATE 40 MG: 40 INJECTION, POWDER, LYOPHILIZED, FOR SOLUTION INTRAMUSCULAR; INTRAVENOUS at 01:46

## 2018-09-05 RX ADMIN — Medication 10 ML: at 08:54

## 2018-09-05 RX ADMIN — METHYLPREDNISOLONE SODIUM SUCCINATE 40 MG: 40 INJECTION, POWDER, LYOPHILIZED, FOR SOLUTION INTRAMUSCULAR; INTRAVENOUS at 13:43

## 2018-09-05 RX ADMIN — POTASSIUM CHLORIDE 20 MEQ: 20 TABLET, EXTENDED RELEASE ORAL at 17:16

## 2018-09-05 RX ADMIN — MOMETASONE FUROATE AND FORMOTEROL FUMARATE DIHYDRATE 2 PUFF: 200; 5 AEROSOL RESPIRATORY (INHALATION) at 20:40

## 2018-09-05 RX ADMIN — ATORVASTATIN CALCIUM 60 MG: 40 TABLET, FILM COATED ORAL at 08:54

## 2018-09-05 RX ADMIN — METHYLPREDNISOLONE SODIUM SUCCINATE 40 MG: 40 INJECTION, POWDER, LYOPHILIZED, FOR SOLUTION INTRAMUSCULAR; INTRAVENOUS at 06:07

## 2018-09-05 RX ADMIN — Medication 10 ML: at 18:56

## 2018-09-05 RX ADMIN — ENOXAPARIN SODIUM 80 MG: 80 INJECTION SUBCUTANEOUS at 13:48

## 2018-09-05 RX ADMIN — LEVOFLOXACIN 750 MG: 5 INJECTION, SOLUTION INTRAVENOUS at 18:55

## 2018-09-05 RX ADMIN — IPRATROPIUM BROMIDE AND ALBUTEROL SULFATE 1 AMPULE: .5; 3 SOLUTION RESPIRATORY (INHALATION) at 13:32

## 2018-09-05 RX ADMIN — WARFARIN SODIUM 4 MG: 4 TABLET ORAL at 18:55

## 2018-09-05 RX ADMIN — PERFLUTREN 1.65 MG: 6.52 INJECTION, SUSPENSION INTRAVENOUS at 11:53

## 2018-09-05 RX ADMIN — DILTIAZEM HYDROCHLORIDE 10 MG: 5 INJECTION INTRAVENOUS at 22:28

## 2018-09-05 RX ADMIN — FUROSEMIDE 40 MG: 10 INJECTION, SOLUTION INTRAMUSCULAR; INTRAVENOUS at 18:56

## 2018-09-05 RX ADMIN — IPRATROPIUM BROMIDE AND ALBUTEROL SULFATE 1 AMPULE: .5; 3 SOLUTION RESPIRATORY (INHALATION) at 16:25

## 2018-09-05 RX ADMIN — ZOLPIDEM TARTRATE 5 MG: 5 TABLET ORAL at 20:42

## 2018-09-05 RX ADMIN — METOPROLOL TARTRATE 25 MG: 25 TABLET ORAL at 20:42

## 2018-09-05 RX ADMIN — METOPROLOL TARTRATE 25 MG: 25 TABLET ORAL at 08:55

## 2018-09-05 RX ADMIN — IPRATROPIUM BROMIDE AND ALBUTEROL SULFATE 1 AMPULE: .5; 3 SOLUTION RESPIRATORY (INHALATION) at 19:59

## 2018-09-05 RX ADMIN — DEXTROSE MONOHYDRATE 10 MG/HR: 50 INJECTION, SOLUTION INTRAVENOUS at 22:29

## 2018-09-05 RX ADMIN — FUROSEMIDE 40 MG: 10 INJECTION, SOLUTION INTRAMUSCULAR; INTRAVENOUS at 08:55

## 2018-09-05 RX ADMIN — METHYLPREDNISOLONE SODIUM SUCCINATE 40 MG: 40 INJECTION, POWDER, FOR SOLUTION INTRAMUSCULAR; INTRAVENOUS at 20:41

## 2018-09-05 RX ADMIN — TIOTROPIUM BROMIDE 18 MCG: 18 CAPSULE ORAL; RESPIRATORY (INHALATION) at 17:16

## 2018-09-05 RX ADMIN — CLOPIDOGREL 75 MG: 75 TABLET, FILM COATED ORAL at 08:54

## 2018-09-05 RX ADMIN — ENOXAPARIN SODIUM 80 MG: 80 INJECTION SUBCUTANEOUS at 01:46

## 2018-09-05 RX ADMIN — ASPIRIN 81 MG: 81 TABLET ORAL at 08:54

## 2018-09-05 RX ADMIN — POTASSIUM CHLORIDE 20 MEQ: 20 TABLET, EXTENDED RELEASE ORAL at 08:54

## 2018-09-05 ASSESSMENT — PAIN SCALES - GENERAL
PAINLEVEL_OUTOF10: 0

## 2018-09-05 NOTE — VIRTUAL HEALTH
[facility] [address] [facility]  [address] [facility], [address]  [facility], [address] [facility]    Pulmonary Consultation      Reason for Consult:  sob    Requesting Physician:  Abisai Buenrostro MD    CHIEF COMPLAINT:  Sob    History Obtained From:  patient    HISTORY OF PRESENT ILLNESS:                The patient is a 78 y.o. male with significant past medical history of COPD who presents with increased sob for past 1-1.5 weeks. Claims increased cough also, using MDI occasionally but not sure what he's on (?) EDWARDO. Was seeing pulmonologist in past but not in past 2-3 yrs, has own portable oxygen system (inogen) he bough on internet which he uses from time to time daytime, still smoking. Currently feeling some better with breathing and coughing less, close to baseline, raring to go home as feels better. Mention of chest pain too and (?) being scheduled for stress test by Cardiology.     Past Medical History:        Diagnosis Date    Amblyopia 1961    corrected    Atrial fibrillation ([facility] 9/5/2018    Bilateral carotid artery stenosis 7/3/2014    CHF (congestive heart failure) (HCC)     Colon polyp     COPD (chronic obstructive pulmonary disease) (HCC)     Emphysema of lung (HCC)     Hyperlipidemia     Myopia     Squamous cell carcinoma, face     skin    Tobacco dependence 7/3/2014       Past Surgical History:        Procedure Laterality Date    APPENDECTOMY      BACK SURGERY      lumbar    CARDIAC CATHETERIZATION      heart cath x4, cabg twice    COLONOSCOPY      CORONARY ARTERY BYPASS GRAFT      two     EYE SURGERY  1961    amblyoplia     SKIN CANCER EXCISION         Current Medications:    Current Facility-Administered Medications: metoprolol tartrate (LOPRESSOR) tablet 25 mg, 25 mg, Oral, BID  furosemide (LASIX) injection 40 mg, 40 mg, Intravenous, BID  aspirin EC tablet 81 mg, 81 mg, Oral, Daily  atorvastatin (LIPITOR) tablet 60 mg, 60 mg, Oral, Daily  clopidogrel (PLAVIX) tablet 75 mg, 75 mg, Oral, Daily  warfarin (COUMADIN) tablet 4 mg, 4 mg, Oral, Daily  zolpidem (AMBIEN) tablet 5 mg, 5 mg, Oral, Nightly  sodium chloride flush 0.9 % injection 10 mL, 10 mL, Intravenous, 2 times per day  sodium chloride flush 0.9 % injection 10 mL, 10 mL, Intravenous, PRN  ondansetron (ZOFRAN) injection 4 mg, 4 mg, Intravenous, Q6H PRN  ipratropium-albuterol (DUONEB) nebulizer solution 1 ampule, 1 ampule, Inhalation, Q4H WA  levofloxacin (LEVAQUIN) 750 MG/150ML infusion 750 mg, 750 mg, Intravenous, Q24H  methylPREDNISolone sodium (SOLU-MEDROL) injection 40 mg, 40 mg, Intravenous, Q6H  enoxaparin (LOVENOX) injection 80 mg, 1 mg/kg, Subcutaneous, BID  potassium chloride (KLOR-CON M) extended release tablet 20 mEq, 20 mEq, Oral, BID WC    Allergies:  Dye [iodides] and Pcn [penicillins]    Social History:    TOBACCO:   reports that he has been smoking Cigarettes. He started smoking about 63 years ago. He has a 124.00 pack-year smoking history. He has never used smokeless tobacco.    Family History:   Family History   Problem Relation Age of Onset    Cancer Mother     Heart Disease Father     Heart Disease Brother        REVIEW OF SYSTEMS:    RESPIRATORY:  Sob and cough  Remainder of complete ROS is negative. PHYSICAL EXAM:      Vitals:    /64   Pulse 99   Temp 97.8 °F (36.6 °C)   Resp 18   Ht 5' 7\" (1.702 m)   Wt 172 lb 8 oz (78.2 kg)   SpO2 97%   BMI 27.02 kg/m²     EYES:  Lids and lashes normal, pupils equal, round and reactive to light, extra ocular muscles intact, sclera clear, conjunctiva normal  ENT:  Normocephalic, without obvious abnormality, atraumatic, sinuses nontender on palpation, external ears without lesions, oral pharynx with moist mucus membranes, tonsils without erythema or exudates, gums normal and good dentition.   NECK:  Supple, symmetrical, trachea midline, no adenopathy, thyroid symmetric, not enlarged and no tenderness, skin normal  LUNGS:  Bilateral ronchi and minimal wheezing with cough  CARDIOVASCULAR:  Normal apical impulse, regular rate and rhythm, normal S1 and S2, no S3 or S4, and no murmur noted  ABDOMEN:  No scars, normal bowel sounds, soft, non-distended, non-tender, no masses palpated, no hepatosplenomegally  MUSCULOSKELETAL:  There is no redness, warmth, or swelling of the joints. Full range of motion noted. Motor strength is 5 out of 5 all extremities bilaterally. Tone is normal.  NEUROLOGIC:  Awake, alert, oriented to name, place and time. Cranial nerves II-XII are grossly intact. DATA:    CBC:   Recent Labs      09/02/18 1535 09/04/18   1320 09/05/18   0433   WBC  25.3*  20.5*  12.9*   HGB  15.6  14.6  13.8   HCT  47.4  44.9  43.4   MCV  89.4  88.0  89.7   PLT  177  208  162       BMP:  Recent Labs      09/02/18 1535 09/04/18 1320 09/05/18   0433   NA  139  139  138   K  3.6  3.5  3.8  3.8   CL  96*  96*  101   CO2  33*  31*  29   BUN  15  18  20   CREATININE  0.8  0.8  0.7    ALB:3,BILIDIR:3,BILITOT:3,ALKPHOS:3)@    PT/INR:   Recent Labs      09/02/18 1535 09/04/18   1320 09/05/18   0433   PROTIME  17.1*  14.0*  20.0*   INR  1.5  1.2  1.8       ABG:   No results for input(s): PH, PO2, PCO2, HCO3, BE, O2SAT, METHB, O2HB, COHB, O2CON, HHB, THB in the last 72 hours. Radiology Review:  CXR reviewed with patchy interstitial opacity right lower lung field    IMPRESSION/RECOMMENDATIONS:      AECOPD  Pneumonia  Acute on chronic hypoxic respiratory failure  Tobacco abuse    1. Cont with steroids, taper as tolerated  2. Cont with antibiotics for now, infectious and viral titers (-). May need for about 1 week only if sputum also (-)  3. dulera bid and spiriva daily  4. Cont with oxygen, will need to qualify for home supplementation prior to discharge  5. Ambulate as tolerated  6. Will need PFT and ffup as out-pt          Thanks for letting us see this patient in consultation. Please contact us with any questions.  Office ()

## 2018-09-05 NOTE — PROGRESS NOTES
DAILY PROGRESS NOTE - [facility]    SUBJECTIVE:    The patient is being followed for increasing dyspnea, coronary disease, nonsustained ventricular tachycardia, an atrial fibrillation. 68-year-old male with severe COPD on home oxygen and CABG in 1983 with vein graft to the LAD. Redo CABG 1992 with separate vein grafts to the LAD and right coronary artery. Drug-eluting stents to the ramus and circumflex in 2005 and a drug-eluting stent again to the circumflex in 2009. Preserved ejection fraction and paroxysmal atrial fibrillation. Admitted with increasing dyspnea productive of phlegm, hypoxia, and nonsustained ventricular tachycardia which was monomorphic. Was given lidocaine infusion and is now in borderline rapid atrial fibrillation. Obviously short of breath today with a productive cough, but states that he wants to go home and is trying to get me to agree to let him go home. Chest x-ray shows pulmonary edema and questionable bilateral pneumonia. OBJECTIVE:    His vital signs were reviewed today. Vitals:    09/05/18 0748   BP: 112/64   Pulse: 99   Resp: 18   Temp: 97.8 °F (36.6 °C)   SpO2: 98%       Scheduled Meds:   aspirin EC  81 mg Oral Daily    atorvastatin  60 mg Oral Daily    clopidogrel  75 mg Oral Daily    warfarin  4 mg Oral Daily    zolpidem  5 mg Oral Nightly    sodium chloride flush  10 mL Intravenous 2 times per day    ipratropium-albuterol  1 ampule Inhalation Q4H WA    levofloxacin  750 mg Intravenous Q24H    methylPREDNISolone  40 mg Intravenous Q6H    enoxaparin  1 mg/kg Subcutaneous BID    potassium chloride  20 mEq Oral BID WC    atenolol  50 mg Oral Daily     Continuous Infusions:  PRN Meds:.sodium chloride flush, ondansetron, perflutren lipid microspheres      PHYSICAL EXAM:    General Appearance:  awake, alert, oriented, mildly to moderately dyspneic with intermittent productive cough  Neck:  no bruits  Lungs:  Normal expansion.   Diffuse coarse breath sounds

## 2018-09-06 ENCOUNTER — APPOINTMENT (OUTPATIENT)
Dept: GENERAL RADIOLOGY | Age: 78
DRG: 190 | End: 2018-09-06
Payer: MEDICARE

## 2018-09-06 LAB
ANION GAP SERPL CALCULATED.3IONS-SCNC: 8 MMOL/L (ref 7–16)
BUN BLDV-MCNC: 23 MG/DL (ref 8–23)
CALCIUM SERPL-MCNC: 8.4 MG/DL (ref 8.6–10.2)
CHLORIDE BLD-SCNC: 104 MMOL/L (ref 98–107)
CO2: 29 MMOL/L (ref 22–29)
CREAT SERPL-MCNC: 0.7 MG/DL (ref 0.7–1.2)
EKG ATRIAL RATE: 150 BPM
EKG Q-T INTERVAL: 342 MS
EKG QRS DURATION: 156 MS
EKG QTC CALCULATION (BAZETT): 489 MS
EKG R AXIS: 117 DEGREES
EKG T AXIS: -15 DEGREES
EKG VENTRICULAR RATE: 123 BPM
GFR AFRICAN AMERICAN: >60
GFR NON-AFRICAN AMERICAN: >60 ML/MIN/1.73
GLUCOSE BLD-MCNC: 136 MG/DL (ref 74–109)
INR BLD: 1.5
POTASSIUM SERPL-SCNC: 4.2 MMOL/L (ref 3.5–5)
PROTHROMBIN TIME: 17.4 SEC (ref 9.3–12.4)
SODIUM BLD-SCNC: 141 MMOL/L (ref 132–146)

## 2018-09-06 PROCEDURE — 80048 BASIC METABOLIC PNL TOTAL CA: CPT

## 2018-09-06 PROCEDURE — 94760 N-INVAS EAR/PLS OXIMETRY 1: CPT

## 2018-09-06 PROCEDURE — 71045 X-RAY EXAM CHEST 1 VIEW: CPT

## 2018-09-06 PROCEDURE — 85610 PROTHROMBIN TIME: CPT

## 2018-09-06 PROCEDURE — 2060000000 HC ICU INTERMEDIATE R&B

## 2018-09-06 PROCEDURE — 2700000000 HC OXYGEN THERAPY PER DAY

## 2018-09-06 PROCEDURE — 6360000002 HC RX W HCPCS: Performed by: INTERNAL MEDICINE

## 2018-09-06 PROCEDURE — 2580000003 HC RX 258: Performed by: INTERNAL MEDICINE

## 2018-09-06 PROCEDURE — 36415 COLL VENOUS BLD VENIPUNCTURE: CPT

## 2018-09-06 PROCEDURE — 6370000000 HC RX 637 (ALT 250 FOR IP): Performed by: INTERNAL MEDICINE

## 2018-09-06 PROCEDURE — 2500000003 HC RX 250 WO HCPCS: Performed by: INTERNAL MEDICINE

## 2018-09-06 PROCEDURE — 94640 AIRWAY INHALATION TREATMENT: CPT

## 2018-09-06 RX ORDER — PREDNISONE 10 MG/1
30 TABLET ORAL DAILY
Status: DISCONTINUED | OUTPATIENT
Start: 2018-09-07 | End: 2018-09-07 | Stop reason: HOSPADM

## 2018-09-06 RX ORDER — DIGOXIN 0.25 MG/ML
250 INJECTION INTRAMUSCULAR; INTRAVENOUS
Status: COMPLETED | OUTPATIENT
Start: 2018-09-06 | End: 2018-09-06

## 2018-09-06 RX ORDER — DIGOXIN 0.25 MG/ML
500 INJECTION INTRAMUSCULAR; INTRAVENOUS ONCE
Status: COMPLETED | OUTPATIENT
Start: 2018-09-06 | End: 2018-09-06

## 2018-09-06 RX ORDER — DIGOXIN 0.25 MG/ML
INJECTION INTRAMUSCULAR; INTRAVENOUS
Status: DISPENSED
Start: 2018-09-06 | End: 2018-09-06

## 2018-09-06 RX ORDER — LEVALBUTEROL INHALATION SOLUTION 0.63 MG/3ML
0.63 SOLUTION RESPIRATORY (INHALATION) 4 TIMES DAILY
Status: DISCONTINUED | OUTPATIENT
Start: 2018-09-06 | End: 2018-09-07 | Stop reason: HOSPADM

## 2018-09-06 RX ORDER — SODIUM CHLORIDE 9 MG/ML
INJECTION, SOLUTION INTRAVENOUS CONTINUOUS
Status: ACTIVE | OUTPATIENT
Start: 2018-09-06 | End: 2018-09-06

## 2018-09-06 RX ADMIN — DIGOXIN 500 MCG: 0.25 INJECTION INTRAMUSCULAR; INTRAVENOUS at 06:26

## 2018-09-06 RX ADMIN — LEVOFLOXACIN 750 MG: 5 INJECTION, SOLUTION INTRAVENOUS at 20:03

## 2018-09-06 RX ADMIN — FUROSEMIDE 40 MG: 10 INJECTION, SOLUTION INTRAMUSCULAR; INTRAVENOUS at 18:00

## 2018-09-06 RX ADMIN — MOMETASONE FUROATE AND FORMOTEROL FUMARATE DIHYDRATE 2 PUFF: 200; 5 AEROSOL RESPIRATORY (INHALATION) at 09:59

## 2018-09-06 RX ADMIN — DIGOXIN 250 MCG: 0.25 INJECTION INTRAMUSCULAR; INTRAVENOUS at 02:50

## 2018-09-06 RX ADMIN — METHYLPREDNISOLONE SODIUM SUCCINATE 40 MG: 40 INJECTION, POWDER, FOR SOLUTION INTRAMUSCULAR; INTRAVENOUS at 13:58

## 2018-09-06 RX ADMIN — FUROSEMIDE 40 MG: 10 INJECTION, SOLUTION INTRAMUSCULAR; INTRAVENOUS at 09:55

## 2018-09-06 RX ADMIN — DEXTROSE MONOHYDRATE 10 MG/HR: 50 INJECTION, SOLUTION INTRAVENOUS at 21:48

## 2018-09-06 RX ADMIN — METHYLPREDNISOLONE SODIUM SUCCINATE 40 MG: 40 INJECTION, POWDER, FOR SOLUTION INTRAMUSCULAR; INTRAVENOUS at 21:18

## 2018-09-06 RX ADMIN — DEXTROSE MONOHYDRATE 10 MG/HR: 50 INJECTION, SOLUTION INTRAVENOUS at 11:38

## 2018-09-06 RX ADMIN — SODIUM CHLORIDE: 9 INJECTION, SOLUTION INTRAVENOUS at 00:49

## 2018-09-06 RX ADMIN — LEVALBUTEROL 0.63 MG: 0.63 SOLUTION RESPIRATORY (INHALATION) at 16:34

## 2018-09-06 RX ADMIN — METHYLPREDNISOLONE SODIUM SUCCINATE 40 MG: 40 INJECTION, POWDER, FOR SOLUTION INTRAMUSCULAR; INTRAVENOUS at 05:01

## 2018-09-06 RX ADMIN — DIGOXIN 250 MCG: 0.25 INJECTION INTRAMUSCULAR; INTRAVENOUS at 00:48

## 2018-09-06 RX ADMIN — MOMETASONE FUROATE AND FORMOTEROL FUMARATE DIHYDRATE 2 PUFF: 200; 5 AEROSOL RESPIRATORY (INHALATION) at 21:17

## 2018-09-06 RX ADMIN — METOPROLOL TARTRATE 25 MG: 25 TABLET ORAL at 09:54

## 2018-09-06 RX ADMIN — Medication 10 ML: at 20:03

## 2018-09-06 RX ADMIN — METOPROLOL TARTRATE 25 MG: 25 TABLET ORAL at 21:17

## 2018-09-06 RX ADMIN — POTASSIUM CHLORIDE 20 MEQ: 20 TABLET, EXTENDED RELEASE ORAL at 09:59

## 2018-09-06 RX ADMIN — Medication 10 ML: at 21:18

## 2018-09-06 RX ADMIN — Medication 10 ML: at 00:49

## 2018-09-06 RX ADMIN — CLOPIDOGREL 75 MG: 75 TABLET, FILM COATED ORAL at 09:54

## 2018-09-06 RX ADMIN — ENOXAPARIN SODIUM 80 MG: 80 INJECTION SUBCUTANEOUS at 02:48

## 2018-09-06 RX ADMIN — ATORVASTATIN CALCIUM 60 MG: 40 TABLET, FILM COATED ORAL at 09:54

## 2018-09-06 RX ADMIN — IPRATROPIUM BROMIDE AND ALBUTEROL SULFATE 1 AMPULE: .5; 3 SOLUTION RESPIRATORY (INHALATION) at 09:40

## 2018-09-06 RX ADMIN — ASPIRIN 81 MG: 81 TABLET ORAL at 09:54

## 2018-09-06 RX ADMIN — ENOXAPARIN SODIUM 80 MG: 80 INJECTION SUBCUTANEOUS at 13:59

## 2018-09-06 RX ADMIN — ZOLPIDEM TARTRATE 5 MG: 5 TABLET ORAL at 21:17

## 2018-09-06 RX ADMIN — Medication 10 ML: at 10:00

## 2018-09-06 RX ADMIN — TIOTROPIUM BROMIDE 18 MCG: 18 CAPSULE ORAL; RESPIRATORY (INHALATION) at 09:59

## 2018-09-06 RX ADMIN — IPRATROPIUM BROMIDE AND ALBUTEROL SULFATE 1 AMPULE: .5; 3 SOLUTION RESPIRATORY (INHALATION) at 11:51

## 2018-09-06 RX ADMIN — LEVALBUTEROL 0.63 MG: 0.63 SOLUTION RESPIRATORY (INHALATION) at 21:05

## 2018-09-06 RX ADMIN — POTASSIUM CHLORIDE 20 MEQ: 20 TABLET, EXTENDED RELEASE ORAL at 18:00

## 2018-09-06 RX ADMIN — WARFARIN SODIUM 4 MG: 4 TABLET ORAL at 18:00

## 2018-09-06 ASSESSMENT — PAIN SCALES - GENERAL
PAINLEVEL_OUTOF10: 0
PAINLEVEL_OUTOF10: 0

## 2018-09-06 NOTE — PROGRESS NOTES
Chief Complaint:        Subjective:  Adamant about going home  The patient is alert. No problems overnight. Denies chest pain & less SOB . Denies abdominal pain. Tolerating diet. No nausea or vomiting. Objective:    Vitals:    18 0954   BP: 118/62   Pulse: 95   Resp:    Temp:    SpO2:      A&O x's 3  Heart:  Irrg, no murmurs, gallops, or rubs. Lungs:  CTA bilaterally, no wheeze, rales or rhonchi  Abd: bowel sounds present, nontender, nondistended, no masses  Extrem:  No clubbing, cyanosis, or edema  Tele: AF/RVR    Lab Results   Component Value Date     2018    K 4.2 2018    K 3.8 2018     2018    CO2 29 2018    BUN 23 2018    CREATININE 0.7 2018    CALCIUM 8.4 2018      Lab Results   Component Value Date    WBC 12.9 2018    RBC 4.84 2018    HGB 13.8 2018    HCT 43.4 2018    MCV 89.7 2018    MCH 28.5 2018    MCHC 31.8 2018    RDW 15.8 2018     2018    MPV 9.9 2018     PT/INR:    Lab Results   Component Value Date    PROTIME 17.4 2018    INR 1.5 2018     No results for input(s): POCGLU in the last 72 hours. Recent Labs      18   1320  18   0433  18   0443   NA  139  138  141   K  3.5  3.8  3.8  4.2   CL  96*  101  104   CO2  31*  29  29   BUN  18  20  23   LABALBU  3.8   --    --    CREATININE  0.8  0.7  0.7   CALCIUM  8.9  8.3*  8.4*   GFRAA  >60  >60  >60   LABGLOM  >60  >60  >60   GLUCOSE  87  179*  136*       Xr Chest Portable    Result Date: 2018  Patient MRN:  [id number] : 1940 Age: 78 years Gender: Male Order Date:  2018 1:30 PM EXAM: XR CHEST PORTABLE NUMBER OF IMAGES:  2, VIEWS:  2 INDICATION: Chest Pain COMPARISON: Chest x-ray dated 2018. Findings: There is enlargement of the cardiac silhouette with pulmonary vascular indistinctness and diffuse interstitial infiltrates, particularly over the lower lung zones. Postmedian sternotomy changes and calcific atherosclerotic disease of the thoracic aorta noted. CHF pattern. A superimposed pneumonic infiltrate would be difficult to exclude and should be considered in the appropriate clinical context. Scheduled Meds:   digoxin        metoprolol tartrate  25 mg Oral BID    furosemide  40 mg Intravenous BID    tiotropium  18 mcg Inhalation Daily    mometasone-formoterol  2 puff Inhalation BID    methylPREDNISolone  40 mg Intravenous Q8H    aspirin EC  81 mg Oral Daily    atorvastatin  60 mg Oral Daily    clopidogrel  75 mg Oral Daily    warfarin  4 mg Oral Daily    zolpidem  5 mg Oral Nightly    sodium chloride flush  10 mL Intravenous 2 times per day    ipratropium-albuterol  1 ampule Inhalation Q4H WA    levofloxacin  750 mg Intravenous Q24H    enoxaparin  1 mg/kg Subcutaneous BID    potassium chloride  20 mEq Oral BID WC     Continuous Infusions:   diltiazem 5 mg/hr (09/06/18 0044)     PRN Meds:.sodium chloride flush, ondansetron    I/O last 3 completed shifts: In: 1810.5 [P.O.:940; I.V.:870.5]  Out: 2525 [Urine:2525]  No intake/output data recorded.     Intake/Output Summary (Last 24 hours) at 09/06/18 1018  Last data filed at 09/06/18 5778   Gross per 24 hour   Intake          1810.46 ml   Output             2525 ml   Net          -714.54 ml       Assessment:    Active Hospital Problems    Diagnosis    Atrial fibrillation ([facility] [I48.91]    Hypoxia [R09.02]    Chronic obstructive pulmonary disease with acute exacerbation (HCC) [J44.1]    Combined systolic and diastolic congestive heart failure ([address] [I50.40]    Tobacco dependence [F17.200]       Plan:  Cardiac consult reviewed with tx             Continue diuretics, O2, ATB and anticoagulation              Respiratory panel neg, cultures pending              Condition remains serious but improved        Adele Camarillo DO  10:18 AM  9/6/2018

## 2018-09-06 NOTE — PROGRESS NOTES
Oral BID WC     Continuous Infusions:   sodium chloride 125 mL/hr at 09/06/18 0049    diltiazem 5 mg/hr (09/06/18 0044)     PRN Meds:.sodium chloride flush, ondansetron      PHYSICAL EXAM:    General Appearance:  alert, oriented, a little less dyspneic than yesterday 9/5. Lungs:  Normal expansion. Diffuse coarse breath sounds throughout with decreased breath sounds at the bases bilaterally. No wheezing, accessory muscle use, or retraction. Heart:  Heart sounds are normal.  Irregular and tachycardic and rhythm without murmur, gallop or rub. Abdomen:  Soft, non-tender. Extremities: Extremities warm to touch, pink, with trace bilateral lower extremity edema. Neuro/musculosketal:  Unremarkable. LABS:    Recent Labs      09/04/18   1320  09/05/18   0433  09/06/18   0443   NA  139  138  141   CREATININE  0.8  0.7  0.7       Recent Labs      09/04/18   1320  09/05/18   0433   HGB  14.6  13.8       Recent Labs      09/04/18   1320  09/05/18   0433  09/06/18   0443   INR  1.2  1.8  1.5         IMPRESSION:    1. Nonsustained ventricular tachycardiamay have been caused by profound hypoxia but also should consider underlying obstructive coronary disease. Recommend optimization of pulmonary status prior to considering stress testing. No further nonsustained ventricular tachycardia noted on telemetry. 2. Coronary diseasecontinue dual antiplatelet therapy at this time. 3. Acute on chronic diastolic heart failureContinue intravenous furosemide but would decrease intravenous fluid rate at this time. 4. Persistent atrial fibrillationrate remains rapid despite intravenous diltiazem infusion and intravenous digoxin loading. Have given 1 more dose of intravenous digoxin 0.5 mg this morning, as he received 0.5 mg intravenously overnight. Have recommended to him that he not leave the hospital today as he risks significant danger to his health if he leaves against medical advice.     5. Anticoagulation statusINR is subtherapeutic and he is currently on twice a day Lovenox. 6. Hyperlipidemiacontinue indefinite statin. 7. Continue to follow.

## 2018-09-06 NOTE — PROGRESS NOTES
[facility]  [address] [facility], [address], [address] [facility]      Pulmonary Progress Note      SUBJECTIVE:  Claims doing better with breathing, not much cough/congestion, very insistent on going home prior to weekend, still on cardizem drip    OBJECTIVE    Medications    Continuous Infusions:   diltiazem 10 mg/hr (18 1138)       Scheduled Meds:   metoprolol tartrate  25 mg Oral BID    furosemide  40 mg Intravenous BID    tiotropium  18 mcg Inhalation Daily    mometasone-formoterol  2 puff Inhalation BID    methylPREDNISolone  40 mg Intravenous Q8H    aspirin EC  81 mg Oral Daily    atorvastatin  60 mg Oral Daily    clopidogrel  75 mg Oral Daily    warfarin  4 mg Oral Daily    zolpidem  5 mg Oral Nightly    sodium chloride flush  10 mL Intravenous 2 times per day    ipratropium-albuterol  1 ampule Inhalation Q4H WA    levofloxacin  750 mg Intravenous Q24H    enoxaparin  1 mg/kg Subcutaneous BID    potassium chloride  20 mEq Oral BID WC       PRN Meds:sodium chloride flush, ondansetron    Physical    VITALS:  /62   Pulse 95   Temp 98.7 °F (37.1 °C) (Temporal)   Resp 18   Ht 5' 7\" (1.702 m)   Wt 173 lb 14.4 oz (78.9 kg)   SpO2 98%   BMI 27.24 kg/m²     24HR INTAKE/OUTPUT:      Intake/Output Summary (Last 24 hours) at 18 1534  Last data filed at 18 1401   Gross per 24 hour   Intake          1797.46 ml   Output             3175 ml   Net         -1377.54 ml       24HR PULSE OXIMETRY RANGE:    SpO2  Av.4 %  Min: 97 %  Max: 98 %    General appearance: alert, appears stated age and cooperative  Lungs: diminished breath sounds bilaterally and rhonchi bibasilar  Heart: regular rate and rhythm, S1, S2 normal, no murmur, click, rub or gallop  Abdomen: soft, non-tender; bowel sounds normal; no masses,  no organomegaly  Extremities: extremities normal, atraumatic, no cyanosis or edema  Neurologic: Mental status:

## 2018-09-07 ENCOUNTER — APPOINTMENT (OUTPATIENT)
Dept: GENERAL RADIOLOGY | Age: 78
DRG: 190 | End: 2018-09-07
Payer: MEDICARE

## 2018-09-07 VITALS
DIASTOLIC BLOOD PRESSURE: 50 MMHG | OXYGEN SATURATION: 92 % | RESPIRATION RATE: 27 BRPM | HEART RATE: 128 BPM | TEMPERATURE: 98.2 F | BODY MASS INDEX: 27.23 KG/M2 | HEIGHT: 67 IN | WEIGHT: 173.5 LBS | SYSTOLIC BLOOD PRESSURE: 130 MMHG

## 2018-09-07 LAB
ANION GAP SERPL CALCULATED.3IONS-SCNC: 11 MMOL/L (ref 7–16)
BUN BLDV-MCNC: 24 MG/DL (ref 8–23)
CALCIUM SERPL-MCNC: 8.5 MG/DL (ref 8.6–10.2)
CHLORIDE BLD-SCNC: 102 MMOL/L (ref 98–107)
CO2: 29 MMOL/L (ref 22–29)
CREAT SERPL-MCNC: 0.7 MG/DL (ref 0.7–1.2)
CULTURE, RESPIRATORY: NORMAL
GFR AFRICAN AMERICAN: >60
GFR NON-AFRICAN AMERICAN: >60 ML/MIN/1.73
GLUCOSE BLD-MCNC: 170 MG/DL (ref 74–109)
INR BLD: 1.9
POTASSIUM SERPL-SCNC: 4.5 MMOL/L (ref 3.5–5)
PROTHROMBIN TIME: 20.8 SEC (ref 9.3–12.4)
SMEAR, RESPIRATORY: NORMAL
SODIUM BLD-SCNC: 142 MMOL/L (ref 132–146)

## 2018-09-07 PROCEDURE — 80048 BASIC METABOLIC PNL TOTAL CA: CPT

## 2018-09-07 PROCEDURE — 36415 COLL VENOUS BLD VENIPUNCTURE: CPT

## 2018-09-07 PROCEDURE — 6370000000 HC RX 637 (ALT 250 FOR IP): Performed by: INTERNAL MEDICINE

## 2018-09-07 PROCEDURE — 2700000000 HC OXYGEN THERAPY PER DAY

## 2018-09-07 PROCEDURE — 6360000002 HC RX W HCPCS: Performed by: INTERNAL MEDICINE

## 2018-09-07 PROCEDURE — 71046 X-RAY EXAM CHEST 2 VIEWS: CPT

## 2018-09-07 PROCEDURE — 85610 PROTHROMBIN TIME: CPT

## 2018-09-07 PROCEDURE — 94640 AIRWAY INHALATION TREATMENT: CPT

## 2018-09-07 PROCEDURE — 2580000003 HC RX 258: Performed by: INTERNAL MEDICINE

## 2018-09-07 RX ORDER — FUROSEMIDE 40 MG/1
40 TABLET ORAL 2 TIMES DAILY
Status: DISCONTINUED | OUTPATIENT
Start: 2018-09-07 | End: 2018-09-07 | Stop reason: HOSPADM

## 2018-09-07 RX ORDER — POTASSIUM CHLORIDE 20 MEQ/1
20 TABLET, EXTENDED RELEASE ORAL 2 TIMES DAILY WITH MEALS
Qty: 60 TABLET | Refills: 3 | Status: SHIPPED | OUTPATIENT
Start: 2018-09-07 | End: 2018-09-07

## 2018-09-07 RX ORDER — POTASSIUM CHLORIDE 20 MEQ/1
20 TABLET, EXTENDED RELEASE ORAL 2 TIMES DAILY WITH MEALS
Qty: 60 TABLET | Refills: 3 | Status: SHIPPED | OUTPATIENT
Start: 2018-09-07

## 2018-09-07 RX ORDER — LEVOFLOXACIN 500 MG/1
500 TABLET, FILM COATED ORAL DAILY
Qty: 7 TABLET | Refills: 0 | Status: SHIPPED | OUTPATIENT
Start: 2018-09-07 | End: 2018-09-17

## 2018-09-07 RX ORDER — METOPROLOL TARTRATE 75 MG/1
75 TABLET, FILM COATED ORAL 2 TIMES DAILY
Qty: 60 TABLET | Refills: 0 | Status: SHIPPED | OUTPATIENT
Start: 2018-09-07 | End: 2018-09-07

## 2018-09-07 RX ORDER — PREDNISONE 10 MG/1
TABLET ORAL
Qty: 18 TABLET | Refills: 0 | Status: SHIPPED | OUTPATIENT
Start: 2018-09-07 | End: 2019-06-24 | Stop reason: ALTCHOICE

## 2018-09-07 RX ORDER — METOPROLOL TARTRATE 75 MG/1
75 TABLET, FILM COATED ORAL 2 TIMES DAILY
Qty: 60 TABLET | Refills: 0 | Status: SHIPPED | OUTPATIENT
Start: 2018-09-07 | End: 2019-06-24 | Stop reason: DRUGHIGH

## 2018-09-07 RX ORDER — METOPROLOL TARTRATE 50 MG/1
50 TABLET, FILM COATED ORAL ONCE
Status: COMPLETED | OUTPATIENT
Start: 2018-09-07 | End: 2018-09-07

## 2018-09-07 RX ORDER — LEVOFLOXACIN 500 MG/1
500 TABLET, FILM COATED ORAL DAILY
Qty: 7 TABLET | Refills: 0 | Status: SHIPPED | OUTPATIENT
Start: 2018-09-07 | End: 2018-09-07

## 2018-09-07 RX ADMIN — LEVALBUTEROL 0.63 MG: 0.63 SOLUTION RESPIRATORY (INHALATION) at 09:34

## 2018-09-07 RX ADMIN — METOPROLOL TARTRATE 50 MG: 50 TABLET, FILM COATED ORAL at 10:37

## 2018-09-07 RX ADMIN — ASPIRIN 81 MG: 81 TABLET ORAL at 09:04

## 2018-09-07 RX ADMIN — FUROSEMIDE 40 MG: 10 INJECTION, SOLUTION INTRAMUSCULAR; INTRAVENOUS at 09:00

## 2018-09-07 RX ADMIN — Medication 10 ML: at 09:04

## 2018-09-07 RX ADMIN — METOPROLOL TARTRATE 25 MG: 25 TABLET ORAL at 09:00

## 2018-09-07 RX ADMIN — MOMETASONE FUROATE AND FORMOTEROL FUMARATE DIHYDRATE 2 PUFF: 200; 5 AEROSOL RESPIRATORY (INHALATION) at 09:00

## 2018-09-07 RX ADMIN — ATORVASTATIN CALCIUM 60 MG: 40 TABLET, FILM COATED ORAL at 09:01

## 2018-09-07 RX ADMIN — PREDNISONE 30 MG: 10 TABLET ORAL at 09:00

## 2018-09-07 RX ADMIN — ENOXAPARIN SODIUM 80 MG: 80 INJECTION SUBCUTANEOUS at 06:19

## 2018-09-07 RX ADMIN — TIOTROPIUM BROMIDE 18 MCG: 18 CAPSULE ORAL; RESPIRATORY (INHALATION) at 09:04

## 2018-09-07 RX ADMIN — CLOPIDOGREL 75 MG: 75 TABLET, FILM COATED ORAL at 09:01

## 2018-09-07 RX ADMIN — POTASSIUM CHLORIDE 20 MEQ: 20 TABLET, EXTENDED RELEASE ORAL at 09:00

## 2018-09-07 ASSESSMENT — PAIN SCALES - GENERAL
PAINLEVEL_OUTOF10: 0
PAINLEVEL_OUTOF10: 0

## 2018-09-07 NOTE — PROGRESS NOTES
Additional dose of 50 mg Lopressor given to make up for the increase in dose ordered this AM.  Cardizem drip currently running at 5 mg/hr. Patient heart rate anywhere from 108-124. Will continue to monitor. Patient is insisting on leaving today.

## 2018-09-07 NOTE — PROGRESS NOTES
Anticoagulation statusINR is subtherapeutic and he is currently on twice a day Lovenox. Would recommend discharging him home on his previous dose of warfarin or can change to Eliquis 5 mg twice daily for immediate anticoagulation and the day of discharge and no need for regular INR monitoring. 6. Hyperlipidemiacontinue indefinite statin. 7. Above recommendations to be implemented and will sign off at this time. Please call with questions.

## 2018-09-07 NOTE — PLAN OF CARE
Problem:  Activity:  Goal: Ability to tolerate increased activity will improve  Ability to tolerate increased activity will improve  Outcome: Ongoing      Problem: Gas Exchange - Impaired:  Goal: Levels of oxygenation will improve  Levels of oxygenation will improve  Outcome: Ongoing

## 2018-09-07 NOTE — DISCHARGE SUMMARY
Physician Discharge Summary     Patient ID:  Jackelyn Garcia  [id number]  78 y.o.  1940    Admit date: 9/4/2018    Discharge date and time: 9/7/2018     Admission Diagnoses: Hypoxia [R09.02]  Hypoxia [R09.02]    Discharge Diagnoses: Active Hospital Problems    Diagnosis    Atrial fibrillation ([facility] [I48.91]    Hypoxia [R09.02]    Chronic obstructive pulmonary disease with acute exacerbation (HCC) [J44.1]    Combined systolic and diastolic congestive heart failure ([facility] [I50.40]    Tobacco dependence [F17.200]       Consults: cardiology(Tony) and pulmonary/intensive care(Andre)    Procedures: none    Hospital Course:   Admitted and treated for AECOPD, pneumonia, hypoxemia and rapid AF. The patient responded well to medical tx. He threatened discharge AMA on at least 2 occasions to myself and medical staff. The patient condition is stable on discharge with close f/u with his PCP with ongoing ATB, O2, aerosol tx's, coumadin anticoagulation and tapering steroids. OP f/u as directed.     CBC with Differential:    Lab Results   Component Value Date    WBC 12.9 09/05/2018    RBC 4.84 09/05/2018    HGB 13.8 09/05/2018    HCT 43.4 09/05/2018     09/05/2018    MCV 89.7 09/05/2018    MCH 28.5 09/05/2018    MCHC 31.8 09/05/2018    RDW 15.8 09/05/2018    LYMPHOPCT 1.8 09/05/2018    MONOPCT 1.8 09/05/2018    BASOPCT 0.1 09/05/2018    MONOSABS 0.23 09/05/2018    LYMPHSABS 0.23 09/05/2018    EOSABS 0.00 09/05/2018    BASOSABS 0.01 09/05/2018     CMP:    Lab Results   Component Value Date     09/07/2018    K 4.5 09/07/2018    K 3.8 09/05/2018     09/07/2018    CO2 29 09/07/2018    BUN 24 09/07/2018    CREATININE 0.7 09/07/2018    GFRAA >60 09/07/2018    LABGLOM >60 09/07/2018    GLUCOSE 170 09/07/2018    PROT 6.3 09/04/2018    LABALBU 3.8 09/04/2018    CALCIUM 8.5 09/07/2018    BILITOT 1.1 09/04/2018    ALKPHOS 108 09/04/2018    AST 19 09/04/2018    ALT 25 09/04/2018     BMP:    Lab Results   Component Value Date     09/07/2018    K 4.5 09/07/2018    K 3.8 09/05/2018     09/07/2018    CO2 29 09/07/2018    BUN 24 09/07/2018    LABALBU 3.8 09/04/2018    CREATININE 0.7 09/07/2018    CALCIUM 8.5 09/07/2018    GFRAA >60 09/07/2018    LABGLOM >60 09/07/2018    GLUCOSE 170 09/07/2018     PT/INR:    Lab Results   Component Value Date    PROTIME 20.8 09/07/2018    INR 1.9 09/07/2018       Disposition: home    Patient Instructions:     Medication List      START taking these medications    levofloxacin 500 MG tablet  Commonly known as:  LEVAQUIN  Take 1 tablet by mouth daily for 10 days     Metoprolol Tartrate 75 MG Tabs  Take 75 mg by mouth 2 times daily     potassium chloride 20 MEQ extended release tablet  Commonly known as:  KLOR-CON M  Take 1 tablet by mouth 2 times daily (with meals)        CHANGE how you take these medications    predniSONE 10 MG tablet  Commonly known as:  DELTASONE  Tabs 3 qd for 3 days Tabs 2 qd for 3 days Tabs 1 qd for 3 days  What changed:  · medication strength  · how much to take  · how to take this  · when to take this  · additional instructions  · Another medication with the same name was removed. Continue taking this medication, and follow the directions you see here.         CONTINUE taking these medications    amLODIPine 5 MG tablet  Commonly known as:  NORVASC     aspirin EC 81 MG EC tablet     atorvastatin 80 MG tablet  Commonly known as:  LIPITOR     clopidogrel 75 MG tablet  Commonly known as:  PLAVIX     furosemide 40 MG tablet  Commonly known as:  LASIX     isosorbide mononitrate 30 MG extended release tablet  Commonly known as:  IMDUR     OXYGEN     warfarin 4 MG tablet  Commonly known as:  COUMADIN     zolpidem 10 MG tablet  Commonly known as:  AMBIEN        STOP taking these medications    atenolol 50 MG tablet  Commonly known as:  TENORMIN     ramipril 2.5 MG capsule  Commonly known as:  ALTACE           Where to Get Your Medications      These medications were sent to [address] [facility], [address] [place] [address] [phone]  [address], [address] [facility] [address]    Phone:  [phone]   · levofloxacin 500 MG tablet  · Metoprolol Tartrate 75 MG Tabs  · potassium chloride 20 MEQ extended release tablet  · predniSONE 10 MG tablet          Activity: activity as tolerated   Diet: cardiac diet    Follow-up with Dr. Barraza Began in 5 days.     Note that over 30 minutes was spent in preparing discharge papers, discussing discharge with patient, medication review, etc.    Signed:  Prabhjot Soria DO  9/7/2018  3:37 PM

## 2018-09-08 LAB
EKG ATRIAL RATE: 133 BPM
EKG ATRIAL RATE: 92 BPM
EKG Q-T INTERVAL: 310 MS
EKG Q-T INTERVAL: 360 MS
EKG QRS DURATION: 142 MS
EKG QRS DURATION: 158 MS
EKG QTC CALCULATION (BAZETT): 445 MS
EKG QTC CALCULATION (BAZETT): 464 MS
EKG R AXIS: -176 DEGREES
EKG R AXIS: 132 DEGREES
EKG T AXIS: -22 DEGREES
EKG T AXIS: 6 DEGREES
EKG VENTRICULAR RATE: 100 BPM
EKG VENTRICULAR RATE: 124 BPM

## 2018-09-09 LAB — CULTURE, BLOOD 2: NORMAL

## 2019-05-23 ENCOUNTER — TELEPHONE (OUTPATIENT)
Dept: ADMINISTRATIVE | Age: 79
End: 2019-05-23

## 2019-05-23 NOTE — TELEPHONE ENCOUNTER
Norma Hayden is calling stating Dr. Connie Mazno of Comprehensive Surgery referred him to see Dr. [address] [facility] [address] for a cyst in his left ear, onset 10/18. He states there is clear drainage \"with a lot of stuff\" and that it is creating its own path to drain and that his ear is starting to get deformed. Appointment scheduled for 9/24/19 at 7:30 am and placed on wait list.  Please review to determine if patient can be seen sooner.

## 2019-05-23 NOTE — TELEPHONE ENCOUNTER
We will speak with Dr. Ramón Salazar for advisement on when he would like scheduled tomorrow due to provider being in surgery all day today.

## 2019-06-19 ENCOUNTER — OFFICE VISIT (OUTPATIENT)
Dept: ENT CLINIC | Age: 79
End: 2019-06-19
Payer: MEDICARE

## 2019-06-19 VITALS
HEIGHT: 68 IN | SYSTOLIC BLOOD PRESSURE: 106 MMHG | HEART RATE: 81 BPM | WEIGHT: 175 LBS | BODY MASS INDEX: 26.52 KG/M2 | DIASTOLIC BLOOD PRESSURE: 63 MMHG

## 2019-06-19 DIAGNOSIS — H60.02 ABSCESS OF LEFT EXTERNAL EAR: Primary | ICD-10-CM

## 2019-06-19 PROCEDURE — 4040F PNEUMOC VAC/ADMIN/RCVD: CPT | Performed by: OTOLARYNGOLOGY

## 2019-06-19 PROCEDURE — G8427 DOCREV CUR MEDS BY ELIG CLIN: HCPCS | Performed by: OTOLARYNGOLOGY

## 2019-06-19 PROCEDURE — 99204 OFFICE O/P NEW MOD 45 MIN: CPT | Performed by: OTOLARYNGOLOGY

## 2019-06-19 PROCEDURE — 4004F PT TOBACCO SCREEN RCVD TLK: CPT | Performed by: OTOLARYNGOLOGY

## 2019-06-19 PROCEDURE — 1123F ACP DISCUSS/DSCN MKR DOCD: CPT | Performed by: OTOLARYNGOLOGY

## 2019-06-19 PROCEDURE — 4130F TOPICAL PREP RX AOE: CPT | Performed by: OTOLARYNGOLOGY

## 2019-06-19 PROCEDURE — G8419 CALC BMI OUT NRM PARAM NOF/U: HCPCS | Performed by: OTOLARYNGOLOGY

## 2019-06-19 PROCEDURE — G8598 ASA/ANTIPLAT THER USED: HCPCS | Performed by: OTOLARYNGOLOGY

## 2019-06-19 ASSESSMENT — ENCOUNTER SYMPTOMS
EYES NEGATIVE: 1
COLOR CHANGE: 0
GASTROINTESTINAL NEGATIVE: 1
SHORTNESS OF BREATH: 0
ABDOMINAL PAIN: 0
RESPIRATORY NEGATIVE: 1

## 2019-06-19 NOTE — PROGRESS NOTES
Subjective:      Patient ID:  Sophia Cordero is a 70 y.o. male. HPI:    Patient presents today for Ear cyst. Condition has been present for 10 month(s). Pt was found to have cyst in the ear and was evaluated at the [facility]. He was told it was a cyst and was going to have it removed but wanted to be more local.    PT states it is painful when swollen and drains constantly. Patient's medications, allergies, past medical, surgical, social and family histories were reviewed and updated as appropriate. Review of Systems   Constitutional: Negative. HENT: Positive for ear discharge. Eyes: Negative. Negative for visual disturbance. Respiratory: Negative. Negative for shortness of breath. Cardiovascular: Negative. Negative for chest pain. Gastrointestinal: Negative. Negative for abdominal pain. Genitourinary: Negative. Musculoskeletal: Negative. Skin: Negative. Negative for color change. Neurological: Negative. Psychiatric/Behavioral: Negative. Negative for behavioral problems and hallucinations. All other systems reviewed and are negative. Objective:   Physical Exam   Constitutional: He is oriented to person, place, and time. He appears well-developed and well-nourished. HENT:   Head: Normocephalic and atraumatic. Right Ear: Hearing, tympanic membrane, external ear and ear canal normal.   Left Ear: Hearing, tympanic membrane, external ear and ear canal normal. There is tenderness. Ears:    Nose: Nose normal.   Mouth/Throat: Uvula is midline, oropharynx is clear and moist and mucous membranes are normal.   In the left helix, there is a swollen edematous tissue which is purulent when palpated. There are two areas of expression in the ear. Eyes: Pupils are equal, round, and reactive to light. Conjunctivae and EOM are normal.   Neck: Normal range of motion. Neck supple. Cardiovascular: Normal rate, regular rhythm and normal heart sounds. Pulmonary/Chest: Effort normal and breath sounds normal.   Abdominal: Soft. Bowel sounds are normal.   Neurological: He is alert and oriented to person, place, and time. Skin: Skin is warm and dry. Nursing note and vitals reviewed. Assessment:       Diagnosis Orders   1. Abscess of left external ear                Plan:      I recommend:     I&D Left ear  pinna    The procedure risks and benefits were discussed with the patient and family. Pt and family understood and decided to proceed with the surgery. Risks of any anesthesia are:  Reactions to medicines   Breathing problems  Risks of any surgery are:  Bleeding   Infection     Specific risks of surgery:       Follow up in 2 week(s)

## 2019-06-19 NOTE — PATIENT INSTRUCTIONS
Thank you for choosing our Reanna Mendieta, or ARIEL PRATHER [facility] practice. We are committed to your medical treatment and  care. To prepare you for surgery, the surgery scheduler will be  calling you to confirm a date for both your surgery and follow up  appointment. Please allow at least 72 hours after your office visit to  receive your appointment dates and times. If you need to reschedule or cancel your surgery or follow up  appointment, please call the surgery scheduler at [phone]. INSTRUCTIONS FOR SURGERY    Nothing to eat or drink after midnight the night before surgery unless surgery is at [facility] or otherwise instructed by the hospital.    DO NOT TAKE ANY ASPIRIN PRODUCTS 7 days prior to surgery. Tylenol only. No Advil, Motrin, Aleve, or Ibuprofen    Any illegal drugs in your system (including Marijuana even if legally prescribed) will result in your surgery being cancelled. Please be sure to check with our office or the hospital on time frame for the drugs to be out of your system. Should your insurance change at any time you must contact our office. Failure to do so may result in your surgery being rescheduled. [address] [facility] will call you a couple of days prior to the surgery and will give you further instructions, if any questions call them at [phone] extension # [phone]. [facility] [facility] will call you a couple of days prior to the surgery and will give you further instructions, if any questions call them [address] [facility], [address] [facility] will call you a couple of days prior to surgery and give you further instructions, if any questions call them at [address] [facility], [address] [facility] will call you a couple days prior to your surgery and give you further instructions, if any questions call them at [phone]

## 2019-06-24 ENCOUNTER — TELEPHONE (OUTPATIENT)
Dept: ENT CLINIC | Age: 79
End: 2019-06-24

## 2019-06-24 RX ORDER — METOPROLOL TARTRATE 100 MG/1
100 TABLET ORAL 2 TIMES DAILY
Status: ON HOLD | COMMUNITY
End: 2020-08-07 | Stop reason: HOSPADM

## 2019-06-24 RX ORDER — FUROSEMIDE 10 MG/ML
INJECTION INTRAMUSCULAR; INTRAVENOUS EVERY OTHER DAY
COMMUNITY
Start: 2019-06-10 | End: 2019-09-19 | Stop reason: ALTCHOICE

## 2019-06-24 RX ORDER — HYDROCODONE BITARTRATE AND ACETAMINOPHEN 5; 325 MG/1; MG/1
TABLET ORAL
Status: ON HOLD | COMMUNITY
Start: 2019-05-11 | End: 2019-06-25 | Stop reason: HOSPADM

## 2019-06-24 RX ORDER — DEXAMETHASONE SODIUM PHOSPHATE 4 MG/ML
INJECTION, SOLUTION INTRA-ARTICULAR; INTRALESIONAL; INTRAMUSCULAR; INTRAVENOUS; SOFT TISSUE
COMMUNITY
Start: 2019-06-10 | End: 2019-12-13

## 2019-06-24 NOTE — PROGRESS NOTES
[facility] PRE-ADMISSION TESTING INSTRUCTIONS    The Preadmission Testing patient is instructed accordingly using the following criteria (check applicable):    ARRIVAL INSTRUCTIONS:  [x] Parking the day of Surgery is located in the Main Entrance lot. Upon entering the door, make an immediate right to the surgery reception desk    [] [phone] is available Monday through Friday 6 am to 6 pm    [x] Bring photo ID and insurance card    [] Bring in a copy of Living will or Durable Power of  papers. [x] Please be sure to arrange for responsible adult to provide transportation to and from the hospital    [x] Please arrange for responsible adult to be with you for the 24 hour period post procedure due to having anesthesia      GENERAL INSTRUCTIONS:    [x] Nothing by mouth after midnight, including gum, candy, mints or water    [x] You may brush your teeth, but do not swallow any water    [x] Take medications as instructed with 1-2 oz of water    [] Stop herbal supplements and vitamins 5 days prior to procedure    [x] Follow preop dosing of blood thinners per physician instructions    [] Take 1/2 dose of evening insulin, but no insulin after midnight    [] No oral diabetic medications after midnight    [] If diabetic and have low blood sugar or feel symptomatic, take 1-2oz apple juice only    [x] Bring inhalers day of surgery    [] Bring C-PAP/ Bi-Pap day of surgery    [] Bring urine specimen day of surgery    [x] Shower or bath with soap, lather and rinse well, AM of Surgery, no lotion, powders or creams to surgical site    [] Follow bowel prep as instructed per surgeon    [x] No tobacco products within 24 hours of surgery     [] No alcohol or illegal drug use within 24 hours of surgery.     [x] Jewelry, body piercing's, eyeglasses, contact lenses and dentures are not permitted into surgery (bring cases)      [] Please do not wear any nail polish, make up or hair

## 2019-06-24 NOTE — H&P
Subjective:      Patient ID:  Milka Jerez is a 70 y.o. male.     HPI:     Patient presents today for Ear cyst. Condition has been present for 10 month(s). Pt was found to have cyst in the ear and was evaluated at the [facility] clinic. He was told it was a cyst and was going to have it removed but wanted to be more local.     PT states it is painful when swollen and drains constantly.         Patient's medications, allergies, past medical, surgical, social and family histories were reviewed and updated as appropriate.     Review of Systems   Constitutional: Negative. HENT: Positive for ear discharge. Eyes: Negative. Negative for visual disturbance. Respiratory: Negative. Negative for shortness of breath. Cardiovascular: Negative. Negative for chest pain. Gastrointestinal: Negative. Negative for abdominal pain. Genitourinary: Negative. Musculoskeletal: Negative. Skin: Negative. Negative for color change. Neurological: Negative. Psychiatric/Behavioral: Negative. Negative for behavioral problems and hallucinations. All other systems reviewed and are negative.                    Objective:   Physical Exam   Constitutional: He is oriented to person, place, and time. He appears well-developed and well-nourished. HENT:   Head: Normocephalic and atraumatic. Right Ear: Hearing, tympanic membrane, external ear and ear canal normal.   Left Ear: Hearing, tympanic membrane, external ear and ear canal normal. There is tenderness. Ears:    Nose: Nose normal.   Mouth/Throat: Uvula is midline, oropharynx is clear and moist and mucous membranes are normal.   In the left helix, there is a swollen edematous tissue which is purulent when palpated. There are two areas of expression in the ear. Eyes: Pupils are equal, round, and reactive to light. Conjunctivae and EOM are normal.   Neck: Normal range of motion. Neck supple.    Cardiovascular: Normal rate, regular rhythm and normal heart sounds. Pulmonary/Chest: Effort normal and breath sounds normal.   Abdominal: Soft. Bowel sounds are normal.   Neurological: He is alert and oriented to person, place, and time. Skin: Skin is warm and dry. Nursing note and vitals reviewed.                    Assessment:        Diagnosis Orders   1. Abscess of left external ear                             Plan:      I recommend:      I&D Left ear  pinna     The procedure risks and benefits were discussed with the patient and family.  Pt and family understood and decided to proceed with the surgery.     Risks of any anesthesia are:  · Reactions to medicines   · Breathing problems  Risks of any surgery are:  · Bleeding   · Infection      Specific risks of surgery:        Follow up in 2 week(s)

## 2019-06-24 NOTE — PROGRESS NOTES
Patient states he stopped his ASA 7 days preop, according to instruction sheet from Dr Mian Hawthorne. Per Epic note today 9137, ASA was to be continued per Dr Melissa Gomez. I spoke with [facility] at Dr [facility] office, she will relay this information to Dr Melissa Gomez. Shannon at Dr Harvey's office informed of above information, states [facility]. Cardiac and pulmonary history, OV note 4/16/2019 from Dr Melissa Gomez reviewed with Dr Rasheeda Leiva, informed him that Dr Harvey's office intends to communicate with Dr [facility] office today, no additional information requested for this surgery.

## 2019-06-25 ENCOUNTER — ANESTHESIA EVENT (OUTPATIENT)
Dept: OPERATING ROOM | Age: 79
End: 2019-06-25
Payer: MEDICARE

## 2019-06-25 ENCOUNTER — HOSPITAL ENCOUNTER (OUTPATIENT)
Age: 79
Setting detail: OUTPATIENT SURGERY
Discharge: HOME | End: 2019-06-25
Attending: OTOLARYNGOLOGY | Admitting: OTOLARYNGOLOGY
Payer: MEDICARE

## 2019-06-25 ENCOUNTER — ANESTHESIA (OUTPATIENT)
Dept: OPERATING ROOM | Age: 79
End: 2019-06-25
Payer: MEDICARE

## 2019-06-25 VITALS
WEIGHT: 175 LBS | OXYGEN SATURATION: 92 % | SYSTOLIC BLOOD PRESSURE: 115 MMHG | RESPIRATION RATE: 20 BRPM | DIASTOLIC BLOOD PRESSURE: 60 MMHG | HEART RATE: 68 BPM | HEIGHT: 68 IN | BODY MASS INDEX: 26.52 KG/M2 | TEMPERATURE: 97.2 F

## 2019-06-25 VITALS — SYSTOLIC BLOOD PRESSURE: 111 MMHG | OXYGEN SATURATION: 94 % | DIASTOLIC BLOOD PRESSURE: 62 MMHG

## 2019-06-25 DIAGNOSIS — G89.18 POST-OP PAIN: Primary | ICD-10-CM

## 2019-06-25 LAB
ALBUMIN SERPL-MCNC: 4.1 G/DL (ref 3.5–5.2)
ALP BLD-CCNC: 152 U/L (ref 40–129)
ALT SERPL-CCNC: 18 U/L (ref 0–40)
ANION GAP SERPL CALCULATED.3IONS-SCNC: 13 MMOL/L (ref 7–16)
AST SERPL-CCNC: 15 U/L (ref 0–39)
BILIRUB SERPL-MCNC: 1 MG/DL (ref 0–1.2)
BUN BLDV-MCNC: 13 MG/DL (ref 8–23)
CALCIUM SERPL-MCNC: 9.4 MG/DL (ref 8.6–10.2)
CHLORIDE BLD-SCNC: 100 MMOL/L (ref 98–107)
CO2: 27 MMOL/L (ref 22–29)
CREAT SERPL-MCNC: 0.9 MG/DL (ref 0.7–1.2)
GFR AFRICAN AMERICAN: >60
GFR NON-AFRICAN AMERICAN: >60 ML/MIN/1.73
GLUCOSE BLD-MCNC: 128 MG/DL (ref 74–99)
INR BLD: 1.2
POTASSIUM SERPL-SCNC: 4 MMOL/L (ref 3.5–5)
PROTHROMBIN TIME: 13.4 SEC (ref 9.3–12.4)
SODIUM BLD-SCNC: 140 MMOL/L (ref 132–146)
TOTAL PROTEIN: 6.5 G/DL (ref 6.4–8.3)

## 2019-06-25 PROCEDURE — 87205 SMEAR GRAM STAIN: CPT

## 2019-06-25 PROCEDURE — 87070 CULTURE OTHR SPECIMN AEROBIC: CPT

## 2019-06-25 PROCEDURE — 88304 TISSUE EXAM BY PATHOLOGIST: CPT

## 2019-06-25 PROCEDURE — 7100000011 HC PHASE II RECOVERY - ADDTL 15 MIN: Performed by: OTOLARYNGOLOGY

## 2019-06-25 PROCEDURE — 3700000001 HC ADD 15 MINUTES (ANESTHESIA): Performed by: OTOLARYNGOLOGY

## 2019-06-25 PROCEDURE — 36415 COLL VENOUS BLD VENIPUNCTURE: CPT

## 2019-06-25 PROCEDURE — 7100000010 HC PHASE II RECOVERY - FIRST 15 MIN: Performed by: OTOLARYNGOLOGY

## 2019-06-25 PROCEDURE — 87075 CULTR BACTERIA EXCEPT BLOOD: CPT

## 2019-06-25 PROCEDURE — 85610 PROTHROMBIN TIME: CPT

## 2019-06-25 PROCEDURE — 3600000002 HC SURGERY LEVEL 2 BASE: Performed by: OTOLARYNGOLOGY

## 2019-06-25 PROCEDURE — 3700000000 HC ANESTHESIA ATTENDED CARE: Performed by: OTOLARYNGOLOGY

## 2019-06-25 PROCEDURE — 2500000003 HC RX 250 WO HCPCS: Performed by: OTOLARYNGOLOGY

## 2019-06-25 PROCEDURE — 6370000000 HC RX 637 (ALT 250 FOR IP): Performed by: OTOLARYNGOLOGY

## 2019-06-25 PROCEDURE — 6360000002 HC RX W HCPCS: Performed by: NURSE ANESTHETIST, CERTIFIED REGISTERED

## 2019-06-25 PROCEDURE — 6370000000 HC RX 637 (ALT 250 FOR IP)

## 2019-06-25 PROCEDURE — 80053 COMPREHEN METABOLIC PANEL: CPT

## 2019-06-25 PROCEDURE — 69005 DRG XTRNL EAR ABSC/HEM COMP: CPT | Performed by: OTOLARYNGOLOGY

## 2019-06-25 PROCEDURE — 87186 SC STD MICRODIL/AGAR DIL: CPT

## 2019-06-25 PROCEDURE — 2580000003 HC RX 258: Performed by: NURSE ANESTHETIST, CERTIFIED REGISTERED

## 2019-06-25 PROCEDURE — 2709999900 HC NON-CHARGEABLE SUPPLY: Performed by: OTOLARYNGOLOGY

## 2019-06-25 PROCEDURE — 3600000012 HC SURGERY LEVEL 2 ADDTL 15MIN: Performed by: OTOLARYNGOLOGY

## 2019-06-25 RX ORDER — CLINDAMYCIN HYDROCHLORIDE 300 MG/1
300 CAPSULE ORAL 2 TIMES DAILY
Qty: 14 CAPSULE | Refills: 0 | Status: SHIPPED | OUTPATIENT
Start: 2019-06-25 | End: 2019-07-02

## 2019-06-25 RX ORDER — ONDANSETRON 2 MG/ML
INJECTION INTRAMUSCULAR; INTRAVENOUS PRN
Status: DISCONTINUED | OUTPATIENT
Start: 2019-06-25 | End: 2019-06-25 | Stop reason: SDUPTHER

## 2019-06-25 RX ORDER — HYDROCODONE BITARTRATE AND ACETAMINOPHEN 5; 325 MG/1; MG/1
1 TABLET ORAL EVERY 6 HOURS PRN
Qty: 12 TABLET | Refills: 0 | Status: SHIPPED | OUTPATIENT
Start: 2019-06-25 | End: 2019-06-28

## 2019-06-25 RX ORDER — PROPOFOL 10 MG/ML
INJECTION, EMULSION INTRAVENOUS CONTINUOUS PRN
Status: DISCONTINUED | OUTPATIENT
Start: 2019-06-25 | End: 2019-06-25 | Stop reason: SDUPTHER

## 2019-06-25 RX ORDER — PHENYLEPHRINE HYDROCHLORIDE 10 MG/ML
INJECTION INTRAVENOUS PRN
Status: DISCONTINUED | OUTPATIENT
Start: 2019-06-25 | End: 2019-06-25 | Stop reason: SDUPTHER

## 2019-06-25 RX ORDER — PROPOFOL 10 MG/ML
INJECTION, EMULSION INTRAVENOUS PRN
Status: DISCONTINUED | OUTPATIENT
Start: 2019-06-25 | End: 2019-06-25 | Stop reason: SDUPTHER

## 2019-06-25 RX ORDER — HYDROCODONE BITARTRATE AND ACETAMINOPHEN 5; 325 MG/1; MG/1
1 TABLET ORAL
Status: DISCONTINUED | OUTPATIENT
Start: 2019-06-25 | End: 2019-06-25 | Stop reason: HOSPADM

## 2019-06-25 RX ORDER — BACITRACIN 500 [USP'U]/G
OINTMENT OPHTHALMIC PRN
Status: DISCONTINUED | OUTPATIENT
Start: 2019-06-25 | End: 2019-06-25 | Stop reason: ALTCHOICE

## 2019-06-25 RX ORDER — BACITRACIN 500 [USP'U]/G
OINTMENT OPHTHALMIC ONCE
Status: DISCONTINUED | OUTPATIENT
Start: 2019-06-25 | End: 2019-06-25 | Stop reason: HOSPADM

## 2019-06-25 RX ORDER — SODIUM CHLORIDE 9 MG/ML
INJECTION, SOLUTION INTRAVENOUS CONTINUOUS PRN
Status: DISCONTINUED | OUTPATIENT
Start: 2019-06-25 | End: 2019-06-25 | Stop reason: SDUPTHER

## 2019-06-25 RX ORDER — SODIUM CHLORIDE 0.9 % (FLUSH) 0.9 %
10 SYRINGE (ML) INJECTION EVERY 12 HOURS SCHEDULED
Status: DISCONTINUED | OUTPATIENT
Start: 2019-06-25 | End: 2019-06-25 | Stop reason: HOSPADM

## 2019-06-25 RX ORDER — LIDOCAINE HYDROCHLORIDE AND EPINEPHRINE 10; 10 MG/ML; UG/ML
INJECTION, SOLUTION INFILTRATION; PERINEURAL PRN
Status: DISCONTINUED | OUTPATIENT
Start: 2019-06-25 | End: 2019-06-25 | Stop reason: ALTCHOICE

## 2019-06-25 RX ORDER — SODIUM CHLORIDE 0.9 % (FLUSH) 0.9 %
10 SYRINGE (ML) INJECTION PRN
Status: DISCONTINUED | OUTPATIENT
Start: 2019-06-25 | End: 2019-06-25 | Stop reason: HOSPADM

## 2019-06-25 RX ADMIN — PROPOFOL 50 MCG/KG/MIN: 10 INJECTION, EMULSION INTRAVENOUS at 12:16

## 2019-06-25 RX ADMIN — PHENYLEPHRINE HYDROCHLORIDE 100 MCG: 10 INJECTION INTRAVENOUS at 12:20

## 2019-06-25 RX ADMIN — PROPOFOL 100 MG: 10 INJECTION, EMULSION INTRAVENOUS at 12:16

## 2019-06-25 RX ADMIN — PROPOFOL 50 MG: 10 INJECTION, EMULSION INTRAVENOUS at 12:20

## 2019-06-25 RX ADMIN — SODIUM CHLORIDE: 9 INJECTION, SOLUTION INTRAVENOUS at 12:06

## 2019-06-25 RX ADMIN — PROPOFOL 50 MG: 10 INJECTION, EMULSION INTRAVENOUS at 12:45

## 2019-06-25 RX ADMIN — ONDANSETRON HYDROCHLORIDE 4 MG: 2 INJECTION, SOLUTION INTRAMUSCULAR; INTRAVENOUS at 12:20

## 2019-06-25 ASSESSMENT — PAIN SCALES - GENERAL
PAINLEVEL_OUTOF10: 0

## 2019-06-25 ASSESSMENT — PAIN DESCRIPTION - LOCATION
LOCATION: EAR

## 2019-06-25 ASSESSMENT — PAIN DESCRIPTION - PAIN TYPE
TYPE: SURGICAL PAIN

## 2019-06-25 ASSESSMENT — PULMONARY FUNCTION TESTS
PIF_VALUE: 0
PIF_VALUE: 1
PIF_VALUE: 1

## 2019-06-25 ASSESSMENT — PAIN DESCRIPTION - ORIENTATION
ORIENTATION: LEFT

## 2019-06-25 ASSESSMENT — PAIN - FUNCTIONAL ASSESSMENT: PAIN_FUNCTIONAL_ASSESSMENT: 0-10

## 2019-06-25 ASSESSMENT — LIFESTYLE VARIABLES: SMOKING_STATUS: 1

## 2019-06-25 NOTE — OP NOTE
DATE OF PROCEDURE: 6/25/2019    SURGEON: Wes    ASSISTANT: Deedee     PREOPERATIVE DIAGNOSES: left ear helix abscess     POSTOPERATIVE DIAGNOSES: Same      OPERATION: left ear helix abscess incision and drainage     ANESTHESIA: LMAC    ESTIMATED BLOOD LOSS: 5 mL. COMPLICATIONS: None. Procedure: The patient was then taken to the operating room and positioned supine on the operating table. Pneumatic compression devices were placed on the lower extremities bilaterally. The patient was placed under [facility]. They were prepped with ophthalmic bacitracin and draped in the usual sterile fashion. An oblique excision over the left helical fluctuance was made with a  #15-blade scalpel. Sharp dissection with electrocautery and hemostat and was used to dissect anterior, superior and inferior margins. Demond purulence was encountered, about 5 cc in total. Cultures were taken. Dissection was carried down toe the cartilage. Meticulous hemostasis was achieved at the surgical site using careful electrocautery. Specimen was taken from the middle of the abscess and sent for path. The surgical site was irrigated with sterile saline. A rubber band drain was placed in the inferior portion of the wound and sewed with 4-0 prolene. There was no further bleeding. Deep dermal sutures using 3-0 Vicryl were then placed to close the deep space and to reapproximate the skin. A 4-0 prolenewith a running inturrupted stitch was place to close the skin. The skin was cleaned and dried. The incision was the dressed in ophthalmic bacitracin and covered with a dry sterile dressing. The patient tolerated the procedure well. There were no complications. The patient will be observed in the postanesthesia care unit and then discharged home when criteria are met.      Electronically signed by Evan Rodriguez DO on 6/25/2019 at 3:48 PM

## 2019-06-25 NOTE — PROGRESS NOTES
1350 admitted to stage 2 pacu  Up to recliner chair, tolerating well and taking po well   1430 family member Shanique Isabelle here   [phone] discharge instructions given to pt and indra and they both verbalized understanding of instructions  1440 discharged into the care of indra

## 2019-06-25 NOTE — BRIEF OP NOTE
Brief Postoperative Note  ______________________________________________________________    Patient: Kiah Brown  YOB: 1940  MRN: [id number]  Date of Procedure: 6/25/2019    Pre-Op Diagnosis: LEFT EAR ABSCESS    Post-Op Diagnosis: Same       Procedure(s):  I & D LEFT EAR ABSCESS   +++IODINE ALLERGY+++    Anesthesia: Monitor Anesthesia Care    Surgeon(s):  Rosemarie Bates DO    Assistant: Iza Up     Estimated Blood Loss (mL): less than 50     Complications: None    Specimens:   ID Type Source Tests Collected by Time Destination   1 : LEFT EAR ABSCESS CULTURES Specimen Ear ANAEROBIC CULTURE, GRAM STAIN, SURGICAL CULTURE Andressa Harvey DO 6/25/2019 1229    A : LEFT INNER EAR CONTENTS Specimen Ear SURGICAL PATHOLOGY Andressa Harvey DO 6/25/2019 1236    B : LEFT EAR HELIX SKIN Specimen Ear SURGICAL PATHOLOGY Andressa Harvey DO 6/25/2019 1244        Implants:  * No implants in log *      Drains:   Open Drain Left Other (Comment) (Active)       Findings: see op report     Adia Crawford DO  Date: 6/25/2019  Time: 1:16 PM

## 2019-06-25 NOTE — ANESTHESIA PRE PROCEDURE
Dr Martin Booker. 6/11/14  Yes Historical Provider, MD   atorvastatin (LIPITOR) 80 MG tablet Take 60 mg by mouth daily  6/11/14  Yes Historical Provider, MD   amLODIPine (NORVASC) 5 MG tablet Take 5 mg by mouth daily Instructed to take with sip water am of procedure 6/11/14  Yes Historical Provider, MD   aspirin EC 81 MG EC tablet Take 81 mg by mouth daily. Yes Historical Provider, MD       Current medications:    Current Facility-Administered Medications   Medication Dose Route Frequency Provider Last Rate Last Dose    sodium chloride flush 0.9 % injection 10 mL  10 mL Intravenous 2 times per day Deette Anchors, DO        sodium chloride flush 0.9 % injection 10 mL  10 mL Intravenous PRN Deette Anchors, DO           Allergies:     Allergies   Allergen Reactions    Dye [Iodides]      Heart and kidney dye    Pcn [Penicillins]        Problem List:    Patient Active Problem List   Diagnosis Code    Bilateral carotid artery stenosis I65.23    Tobacco dependence F17.200    Chronic obstructive pulmonary disease with acute exacerbation (HCC) J44.1    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    Hypoxia R09.02    Atrial fibrillation ([facility]) I48.91       Past Medical History:        Diagnosis Date    Amblyopia 1961    corrected    Atrial fibrillation ([facility] 9/5/2018    Bilateral carotid artery stenosis 7/3/2014    CAD (coronary artery disease)     CHF (congestive heart failure) ([facility])     Colon polyp     COPD (chronic obstructive pulmonary disease) [facility]     Emphysema of lung ([facility]     Hyperlipidemia     Myopia     Squamous cell carcinoma, face     skin    Tobacco dependence 7/3/2014       Past Surgical History:        Procedure Laterality Date    APPENDECTOMY      BACK SURGERY      lumbar    CARDIAC CATHETERIZATION      heart cath x4, cabg twice    COLONOSCOPY      CORONARY ANGIOPLASTY WITH STENT PLACEMENT      2000, 2002, 2005, 2009   total of 8 stents    CORONARY ARTERY POCGLU, POCNA, POCK, POCCL, POCBUN, POCHEMO, POCHCT in the last 72 hours. Coags:   Lab Results   Component Value Date    PROTIME 13.4 06/25/2019    INR 1.2 06/25/2019    APTT 43.0 09/04/2018       HCG (If Applicable): No results found for: PREGTESTUR, PREGSERUM, HCG, HCGQUANT     ABGs: No results found for: PHART, PO2ART, AEA0KMH, VKE0AGY, BEART, C3TXYSGO     Type & Screen (If Applicable):  No results found for: LABABO, [address]    Anesthesia Evaluation  Patient summary reviewed no history of anesthetic complications:   Airway: Mallampati: III  TM distance: >3 FB   Neck ROM: full  Mouth opening: > = 3 FB Dental:    (+) upper dentures and lower dentures      Pulmonary:   (+) COPD:  decreased breath sounds,  current smoker                          ROS comment: Chronic cough   Cardiovascular:    (+) CAD:, CABG/stent:, dysrhythmias: atrial fibrillation, CHF:,         Rhythm: regular                      Neuro/Psych:   Negative Neuro/Psych ROS              GI/Hepatic/Renal: Neg GI/Hepatic/Renal ROS            Endo/Other:    (+) malignancy/cancer (skin). Abdominal:           Vascular: negative vascular ROS. Anesthesia Plan      MAC     ASA 3       Induction: intravenous. MIPS: Postoperative opioids intended and Prophylactic antiemetics administered. Anesthetic plan and risks discussed with patient. Plan discussed with CRNA.             [address],    6/25/2019

## 2019-06-25 NOTE — H&P
Lisa Spine was seen and re-examined preoperatively today, June 25, 2019. There was no substantial change in his physical and medical status. Patient is fit for the proposed surgical procedure. All questions were appropriately addressed and had no further questions regarding the risks, benefits, and alternatives of the procedure. Lisa Spine and family wished to proceed.     Sarah Petersen DO  Resident Physician  [facility])  Otolaryngology Residency  6/25/2019  11:53 AM

## 2019-06-26 LAB — GRAM STAIN ORDERABLE: NORMAL

## 2019-06-26 NOTE — ANESTHESIA POSTPROCEDURE EVALUATION
Department of Anesthesiology  Postprocedure Note    Patient: Ada Mancilla  MRN: [id number]  YOB: 1940  Date of evaluation: 6/26/2019  Time:  6:54 AM     Procedure Summary     Date:  06/25/19 Room / Location:  [facility] OR 03 / [facility] OR    Anesthesia Start:  1206 Anesthesia Stop:  1320    Procedure:  I & D LEFT EAR ABSCESS (Left Ear) Diagnosis:  (LEFT EAR ABSCESS)    Surgeon:  Lucie Moreno DO Responsible Provider:  Siomara Hill DO    Anesthesia Type:  MAC ASA Status:  3          Anesthesia Type: MAC    Madhu Phase I: Madhu Score: 10    Madhu Phase II: Madhu Score: 9    Last vitals: Reviewed and per EMR flowsheets.        Anesthesia Post Evaluation    Patient location during evaluation: PACU  Patient participation: complete - patient participated  Level of consciousness: awake and alert  Airway patency: patent  Nausea & Vomiting: no nausea and no vomiting  Complications: no  Cardiovascular status: hemodynamically stable  Respiratory status: acceptable  Hydration status: euvolemic

## 2019-06-27 LAB
ANAEROBIC CULTURE: NORMAL
CULTURE SURGICAL: ABNORMAL
CULTURE SURGICAL: ABNORMAL
GRAM STAIN RESULT: ABNORMAL
ORGANISM: ABNORMAL

## 2019-07-02 ENCOUNTER — OFFICE VISIT (OUTPATIENT)
Dept: ENT CLINIC | Age: 79
End: 2019-07-02

## 2019-07-02 VITALS
SYSTOLIC BLOOD PRESSURE: 88 MMHG | HEART RATE: 59 BPM | HEIGHT: 68 IN | DIASTOLIC BLOOD PRESSURE: 46 MMHG | WEIGHT: 180 LBS | BODY MASS INDEX: 27.28 KG/M2

## 2019-07-02 DIAGNOSIS — H60.02 ABSCESS OF LEFT EXTERNAL EAR: Primary | ICD-10-CM

## 2019-07-02 PROCEDURE — 99024 POSTOP FOLLOW-UP VISIT: CPT | Performed by: OTOLARYNGOLOGY

## 2019-07-02 RX ORDER — SULFAMETHOXAZOLE AND TRIMETHOPRIM 800; 160 MG/1; MG/1
1 TABLET ORAL 2 TIMES DAILY
Qty: 14 TABLET | Refills: 0 | Status: SHIPPED | OUTPATIENT
Start: 2019-07-02 | End: 2019-07-09

## 2019-07-02 ASSESSMENT — ENCOUNTER SYMPTOMS
ABDOMINAL PAIN: 0
COLOR CHANGE: 0
GASTROINTESTINAL NEGATIVE: 1
SHORTNESS OF BREATH: 0
EYES NEGATIVE: 1
RESPIRATORY NEGATIVE: 1

## 2019-07-02 NOTE — PROGRESS NOTES
dry.   Nursing note and vitals reviewed. Culture:  Coagulase negative Staphylococcus (1)     Antibiotic Interpretation JOVAN Status    clindamycin Resistant >=^4 mcg/mL     doxycycline Sensitive =^1 mcg/mL     erythromycin Resistant >=^8 mcg/mL     gentamicin Sensitive <=^0.5 mcg/mL     oxacillin Resistant >=^4 mcg/mL     trimethoprim-sulfamethoxazole Sensitive <=^10 mcg/mL     vancomycin Sensitive =^1 mcg/mL             Assessment:       Diagnosis Orders   1. Abscess of left external ear                Plan:      I will leave the stiches in and start the patient on the Bactrim to help her.    Follow up in 1 week(s)

## 2019-07-09 ENCOUNTER — OFFICE VISIT (OUTPATIENT)
Dept: ENT CLINIC | Age: 79
End: 2019-07-09

## 2019-07-09 VITALS
OXYGEN SATURATION: 92 % | WEIGHT: 178 LBS | BODY MASS INDEX: 26.98 KG/M2 | DIASTOLIC BLOOD PRESSURE: 58 MMHG | HEART RATE: 80 BPM | SYSTOLIC BLOOD PRESSURE: 97 MMHG | HEIGHT: 68 IN

## 2019-07-09 DIAGNOSIS — H60.02 ABSCESS OF LEFT EXTERNAL EAR: Primary | ICD-10-CM

## 2019-07-09 PROCEDURE — 99024 POSTOP FOLLOW-UP VISIT: CPT | Performed by: OTOLARYNGOLOGY

## 2019-07-09 ASSESSMENT — ENCOUNTER SYMPTOMS
COLOR CHANGE: 0
GASTROINTESTINAL NEGATIVE: 1
SHORTNESS OF BREATH: 0
EYES NEGATIVE: 1
RESPIRATORY NEGATIVE: 1
ABDOMINAL PAIN: 0

## 2019-07-30 ENCOUNTER — OFFICE VISIT (OUTPATIENT)
Dept: ENT CLINIC | Age: 79
End: 2019-07-30
Payer: MEDICARE

## 2019-07-30 VITALS
WEIGHT: 180 LBS | OXYGEN SATURATION: 93 % | BODY MASS INDEX: 27.28 KG/M2 | SYSTOLIC BLOOD PRESSURE: 100 MMHG | DIASTOLIC BLOOD PRESSURE: 57 MMHG | HEART RATE: 87 BPM | HEIGHT: 68 IN

## 2019-07-30 DIAGNOSIS — H60.02 ABSCESS OF LEFT EXTERNAL EAR: Primary | ICD-10-CM

## 2019-07-30 PROCEDURE — G8419 CALC BMI OUT NRM PARAM NOF/U: HCPCS | Performed by: OTOLARYNGOLOGY

## 2019-07-30 PROCEDURE — G8427 DOCREV CUR MEDS BY ELIG CLIN: HCPCS | Performed by: OTOLARYNGOLOGY

## 2019-07-30 PROCEDURE — 1123F ACP DISCUSS/DSCN MKR DOCD: CPT | Performed by: OTOLARYNGOLOGY

## 2019-07-30 PROCEDURE — 4130F TOPICAL PREP RX AOE: CPT | Performed by: OTOLARYNGOLOGY

## 2019-07-30 PROCEDURE — 4004F PT TOBACCO SCREEN RCVD TLK: CPT | Performed by: OTOLARYNGOLOGY

## 2019-07-30 PROCEDURE — G8598 ASA/ANTIPLAT THER USED: HCPCS | Performed by: OTOLARYNGOLOGY

## 2019-07-30 PROCEDURE — 99213 OFFICE O/P EST LOW 20 MIN: CPT | Performed by: OTOLARYNGOLOGY

## 2019-07-30 PROCEDURE — 4040F PNEUMOC VAC/ADMIN/RCVD: CPT | Performed by: OTOLARYNGOLOGY

## 2019-07-30 RX ORDER — DOXYCYCLINE HYCLATE 100 MG
100 TABLET ORAL 2 TIMES DAILY
Qty: 28 TABLET | Refills: 1 | Status: SHIPPED | OUTPATIENT
Start: 2019-07-30 | End: 2019-08-13

## 2019-07-30 RX ORDER — DOXYCYCLINE HYCLATE 100 MG
100 TABLET ORAL 2 TIMES DAILY
Qty: 28 TABLET | Refills: 0 | Status: SHIPPED | OUTPATIENT
Start: 2019-07-30 | End: 2019-08-06

## 2019-07-30 ASSESSMENT — ENCOUNTER SYMPTOMS
ABDOMINAL PAIN: 0
COLOR CHANGE: 0
EYES NEGATIVE: 1
GASTROINTESTINAL NEGATIVE: 1
SHORTNESS OF BREATH: 0
RESPIRATORY NEGATIVE: 1

## 2019-07-30 NOTE — PROGRESS NOTES
Subjective:      Patient ID:  Aki Vance is a 70 y.o. male. HPI:    Patient presents today for left ear recheck. He underwent I&D of left ear abscess on 6/25/2019. Pt states the ear is still draining. He was last on Bactrim a couple weeks ago. In review of C&S, it showed sensitivity to doxycycline. He has not tried doxy yet. Patient's medications, allergies, past medical, surgical, social and family histories were reviewed and updated as appropriate. Review of Systems   Constitutional: Negative. HENT: Positive for ear discharge. Eyes: Negative. Negative for visual disturbance. Respiratory: Negative. Negative for shortness of breath. Cardiovascular: Negative. Negative for chest pain. Gastrointestinal: Negative. Negative for abdominal pain. Genitourinary: Negative. Musculoskeletal: Negative. Skin: Negative. Negative for color change. Neurological: Negative. Psychiatric/Behavioral: Negative. Negative for behavioral problems and hallucinations. All other systems reviewed and are negative. Objective:   Physical Exam   Constitutional: He is oriented to person, place, and time. He appears well-developed and well-nourished. HENT:   Head: Normocephalic and atraumatic. Right Ear: Hearing, tympanic membrane, external ear and ear canal normal.   Left Ear: Hearing, tympanic membrane, external ear and ear canal normal. There is tenderness. Ears:    Nose: Nose normal.   Mouth/Throat: Uvula is midline, oropharynx is clear and moist and mucous membranes are normal.   Incision healing well, small area of drainage in the mid portion of the incision when pressure is applied. Mild inflammation. Eyes: Pupils are equal, round, and reactive to light. Conjunctivae and EOM are normal.   Neck: Normal range of motion. Neck supple. Cardiovascular: Normal rate, regular rhythm and normal heart sounds.    Pulmonary/Chest: Effort normal and breath sounds normal.   Abdominal:

## 2019-07-31 ENCOUNTER — TELEPHONE (OUTPATIENT)
Dept: ENT CLINIC | Age: 79
End: 2019-07-31

## 2019-07-31 RX ORDER — DOXYCYCLINE HYCLATE 100 MG
100 TABLET ORAL 2 TIMES DAILY
Qty: 20 TABLET | Refills: 0 | Status: SHIPPED | OUTPATIENT
Start: 2019-07-31 | End: 2019-08-14

## 2019-08-13 ENCOUNTER — OFFICE VISIT (OUTPATIENT)
Dept: ENT CLINIC | Age: 79
End: 2019-08-13
Payer: MEDICARE

## 2019-08-13 VITALS
HEIGHT: 68 IN | DIASTOLIC BLOOD PRESSURE: 62 MMHG | OXYGEN SATURATION: 92 % | SYSTOLIC BLOOD PRESSURE: 105 MMHG | BODY MASS INDEX: 27.74 KG/M2 | HEART RATE: 63 BPM | WEIGHT: 183 LBS

## 2019-08-13 DIAGNOSIS — H60.02 ABSCESS OF LEFT EXTERNAL EAR: Primary | ICD-10-CM

## 2019-08-13 PROCEDURE — 4130F TOPICAL PREP RX AOE: CPT | Performed by: OTOLARYNGOLOGY

## 2019-08-13 PROCEDURE — G8598 ASA/ANTIPLAT THER USED: HCPCS | Performed by: OTOLARYNGOLOGY

## 2019-08-13 PROCEDURE — 1123F ACP DISCUSS/DSCN MKR DOCD: CPT | Performed by: OTOLARYNGOLOGY

## 2019-08-13 PROCEDURE — 4040F PNEUMOC VAC/ADMIN/RCVD: CPT | Performed by: OTOLARYNGOLOGY

## 2019-08-13 PROCEDURE — 4004F PT TOBACCO SCREEN RCVD TLK: CPT | Performed by: OTOLARYNGOLOGY

## 2019-08-13 PROCEDURE — 99213 OFFICE O/P EST LOW 20 MIN: CPT | Performed by: OTOLARYNGOLOGY

## 2019-08-13 PROCEDURE — G8419 CALC BMI OUT NRM PARAM NOF/U: HCPCS | Performed by: OTOLARYNGOLOGY

## 2019-08-13 PROCEDURE — G8427 DOCREV CUR MEDS BY ELIG CLIN: HCPCS | Performed by: OTOLARYNGOLOGY

## 2019-08-13 ASSESSMENT — ENCOUNTER SYMPTOMS
COLOR CHANGE: 0
SHORTNESS OF BREATH: 0
RESPIRATORY NEGATIVE: 1
GASTROINTESTINAL NEGATIVE: 1
ABDOMINAL PAIN: 0
EYES NEGATIVE: 1

## 2019-08-13 NOTE — PROGRESS NOTES
Nursing note and vitals reviewed. Ear was injected today with 70% isopropyl alcohol to the draining space. Pt tolerated well. Assessment:       Diagnosis Orders   1. Abscess of left external ear                Plan:      I will recheck the ear in 1 week. No massage of the ear until then.    Follow up in 1 week(s)

## 2019-08-16 ENCOUNTER — OFFICE VISIT (OUTPATIENT)
Dept: ENT CLINIC | Age: 79
End: 2019-08-16
Payer: MEDICARE

## 2019-08-16 VITALS
SYSTOLIC BLOOD PRESSURE: 95 MMHG | HEIGHT: 68 IN | HEART RATE: 62 BPM | BODY MASS INDEX: 28.64 KG/M2 | WEIGHT: 189 LBS | DIASTOLIC BLOOD PRESSURE: 59 MMHG

## 2019-08-16 DIAGNOSIS — H60.02 ABSCESS OF LEFT EXTERNAL EAR: Primary | ICD-10-CM

## 2019-08-16 PROCEDURE — 4004F PT TOBACCO SCREEN RCVD TLK: CPT | Performed by: OTOLARYNGOLOGY

## 2019-08-16 PROCEDURE — 4130F TOPICAL PREP RX AOE: CPT | Performed by: OTOLARYNGOLOGY

## 2019-08-16 PROCEDURE — 4040F PNEUMOC VAC/ADMIN/RCVD: CPT | Performed by: OTOLARYNGOLOGY

## 2019-08-16 PROCEDURE — G8598 ASA/ANTIPLAT THER USED: HCPCS | Performed by: OTOLARYNGOLOGY

## 2019-08-16 PROCEDURE — G8419 CALC BMI OUT NRM PARAM NOF/U: HCPCS | Performed by: OTOLARYNGOLOGY

## 2019-08-16 PROCEDURE — 99213 OFFICE O/P EST LOW 20 MIN: CPT | Performed by: OTOLARYNGOLOGY

## 2019-08-16 PROCEDURE — 1123F ACP DISCUSS/DSCN MKR DOCD: CPT | Performed by: OTOLARYNGOLOGY

## 2019-08-16 PROCEDURE — G8427 DOCREV CUR MEDS BY ELIG CLIN: HCPCS | Performed by: OTOLARYNGOLOGY

## 2019-08-16 ASSESSMENT — ENCOUNTER SYMPTOMS
COLOR CHANGE: 0
GASTROINTESTINAL NEGATIVE: 1
ABDOMINAL PAIN: 0
RESPIRATORY NEGATIVE: 1
SHORTNESS OF BREATH: 0
EYES NEGATIVE: 1

## 2019-08-20 ENCOUNTER — TELEPHONE (OUTPATIENT)
Dept: ENT CLINIC | Age: 79
End: 2019-08-20

## 2019-08-20 ENCOUNTER — OFFICE VISIT (OUTPATIENT)
Dept: ENT CLINIC | Age: 79
End: 2019-08-20
Payer: MEDICARE

## 2019-08-20 VITALS
DIASTOLIC BLOOD PRESSURE: 64 MMHG | SYSTOLIC BLOOD PRESSURE: 120 MMHG | HEIGHT: 68 IN | WEIGHT: 189 LBS | OXYGEN SATURATION: 93 % | BODY MASS INDEX: 28.64 KG/M2 | HEART RATE: 82 BPM

## 2019-08-20 DIAGNOSIS — H60.02 ABSCESS OF LEFT EXTERNAL EAR: Primary | ICD-10-CM

## 2019-08-20 PROCEDURE — 99213 OFFICE O/P EST LOW 20 MIN: CPT | Performed by: OTOLARYNGOLOGY

## 2019-08-20 PROCEDURE — 4040F PNEUMOC VAC/ADMIN/RCVD: CPT | Performed by: OTOLARYNGOLOGY

## 2019-08-20 PROCEDURE — G8427 DOCREV CUR MEDS BY ELIG CLIN: HCPCS | Performed by: OTOLARYNGOLOGY

## 2019-08-20 PROCEDURE — 4130F TOPICAL PREP RX AOE: CPT | Performed by: OTOLARYNGOLOGY

## 2019-08-20 PROCEDURE — 1123F ACP DISCUSS/DSCN MKR DOCD: CPT | Performed by: OTOLARYNGOLOGY

## 2019-08-20 PROCEDURE — 4004F PT TOBACCO SCREEN RCVD TLK: CPT | Performed by: OTOLARYNGOLOGY

## 2019-08-20 PROCEDURE — G8419 CALC BMI OUT NRM PARAM NOF/U: HCPCS | Performed by: OTOLARYNGOLOGY

## 2019-08-20 PROCEDURE — G8598 ASA/ANTIPLAT THER USED: HCPCS | Performed by: OTOLARYNGOLOGY

## 2019-08-20 ASSESSMENT — ENCOUNTER SYMPTOMS
COLOR CHANGE: 0
GASTROINTESTINAL NEGATIVE: 1
EYES NEGATIVE: 1
RESPIRATORY NEGATIVE: 1
SHORTNESS OF BREATH: 0
ABDOMINAL PAIN: 0

## 2019-08-20 NOTE — PATIENT INSTRUCTIONS
Thank you for choosing our [facility] or ARI[facility] PRATHER [facility] practice. We are committed to your medical treatment and  care. If you need to reschedule or cancel your surgery or follow up  appointment, please call the surgery scheduler at [phone] option 3. INSTRUCTIONS FOR SURGERY 08/26/19    Nothing to eat or drink after midnight the night before surgery unless surgery is at [facility] or otherwise instructed by the hospital.    DO NOT TAKE ANY ASPIRIN PRODUCTS 7 days prior to surgery-unless required by your cardiologist or primary care physician. Tylenol only. No Advil, Motrin, Aleve, or Ibuprofen    Any illegal drugs in your system (including Marijuana even if legally prescribed) will result in your surgery being cancelled. Please be sure to check with our office or the hospital on time frame for the drugs to be out of your system. Should your insurance change at any time you must contact our office. Failure to do so may result in your surgery being rescheduled. If you need paperwork filled out for work, you must give the office 2 weeks to complete and submit the forms.     [address], [address], [facility] will call you a couple days prior to your surgery and give you further instructions, if any questions call them at [phone]

## 2019-08-22 NOTE — PROGRESS NOTES
Reviewed all patient's health hx with Dr. Emre Keane special attention toCad with stents, Copd on O2 at home]. Last office visit with Dr. Belkis Pierre was 8/16/2019, will obtain notes from that appointment,IAware that patient had same procedure 6/2019.   Ok to proceed with surgery, no further needs

## 2019-08-26 ENCOUNTER — ANESTHESIA (OUTPATIENT)
Dept: OPERATING ROOM | Age: 79
End: 2019-08-26
Payer: MEDICARE

## 2019-08-26 ENCOUNTER — ANESTHESIA EVENT (OUTPATIENT)
Dept: OPERATING ROOM | Age: 79
End: 2019-08-26
Payer: MEDICARE

## 2019-08-26 ENCOUNTER — HOSPITAL ENCOUNTER (OUTPATIENT)
Age: 79
Setting detail: OUTPATIENT SURGERY
Discharge: HOME | End: 2019-08-26
Attending: OTOLARYNGOLOGY | Admitting: OTOLARYNGOLOGY
Payer: MEDICARE

## 2019-08-26 VITALS
SYSTOLIC BLOOD PRESSURE: 112 MMHG | HEART RATE: 92 BPM | BODY MASS INDEX: 28.64 KG/M2 | OXYGEN SATURATION: 92 % | WEIGHT: 189 LBS | TEMPERATURE: 98 F | DIASTOLIC BLOOD PRESSURE: 65 MMHG | HEIGHT: 68 IN | RESPIRATION RATE: 21 BRPM

## 2019-08-26 VITALS — DIASTOLIC BLOOD PRESSURE: 58 MMHG | OXYGEN SATURATION: 98 % | TEMPERATURE: 96.8 F | SYSTOLIC BLOOD PRESSURE: 94 MMHG

## 2019-08-26 DIAGNOSIS — H60.02 ABSCESS OF EXTERNAL EAR, LEFT: ICD-10-CM

## 2019-08-26 DIAGNOSIS — G89.18 POST-OP PAIN: Primary | ICD-10-CM

## 2019-08-26 LAB — METER GLUCOSE: 101 MG/DL (ref 74–99)

## 2019-08-26 PROCEDURE — 3700000000 HC ANESTHESIA ATTENDED CARE: Performed by: OTOLARYNGOLOGY

## 2019-08-26 PROCEDURE — 94664 DEMO&/EVAL PT USE INHALER: CPT

## 2019-08-26 PROCEDURE — 6370000000 HC RX 637 (ALT 250 FOR IP)

## 2019-08-26 PROCEDURE — 6360000002 HC RX W HCPCS: Performed by: NURSE ANESTHETIST, CERTIFIED REGISTERED

## 2019-08-26 PROCEDURE — 6360000002 HC RX W HCPCS: Performed by: ANESTHESIOLOGY

## 2019-08-26 PROCEDURE — 3600000012 HC SURGERY LEVEL 2 ADDTL 15MIN: Performed by: OTOLARYNGOLOGY

## 2019-08-26 PROCEDURE — 7100000010 HC PHASE II RECOVERY - FIRST 15 MIN: Performed by: OTOLARYNGOLOGY

## 2019-08-26 PROCEDURE — 6370000000 HC RX 637 (ALT 250 FOR IP): Performed by: OTOLARYNGOLOGY

## 2019-08-26 PROCEDURE — 7100000001 HC PACU RECOVERY - ADDTL 15 MIN: Performed by: OTOLARYNGOLOGY

## 2019-08-26 PROCEDURE — 7100000011 HC PHASE II RECOVERY - ADDTL 15 MIN: Performed by: OTOLARYNGOLOGY

## 2019-08-26 PROCEDURE — 88305 TISSUE EXAM BY PATHOLOGIST: CPT

## 2019-08-26 PROCEDURE — 2580000003 HC RX 258: Performed by: NURSE ANESTHETIST, CERTIFIED REGISTERED

## 2019-08-26 PROCEDURE — 2500000003 HC RX 250 WO HCPCS: Performed by: NURSE ANESTHETIST, CERTIFIED REGISTERED

## 2019-08-26 PROCEDURE — 2500000003 HC RX 250 WO HCPCS: Performed by: OTOLARYNGOLOGY

## 2019-08-26 PROCEDURE — 82962 GLUCOSE BLOOD TEST: CPT

## 2019-08-26 PROCEDURE — 3700000001 HC ADD 15 MINUTES (ANESTHESIA): Performed by: OTOLARYNGOLOGY

## 2019-08-26 PROCEDURE — 7100000000 HC PACU RECOVERY - FIRST 15 MIN: Performed by: OTOLARYNGOLOGY

## 2019-08-26 PROCEDURE — 3600000002 HC SURGERY LEVEL 2 BASE: Performed by: OTOLARYNGOLOGY

## 2019-08-26 PROCEDURE — 2709999900 HC NON-CHARGEABLE SUPPLY: Performed by: OTOLARYNGOLOGY

## 2019-08-26 PROCEDURE — 69110 REMOVE EXTERNAL EAR PARTIAL: CPT | Performed by: OTOLARYNGOLOGY

## 2019-08-26 RX ORDER — DEXAMETHASONE SODIUM PHOSPHATE 4 MG/ML
INJECTION, SOLUTION INTRA-ARTICULAR; INTRALESIONAL; INTRAMUSCULAR; INTRAVENOUS; SOFT TISSUE PRN
Status: DISCONTINUED | OUTPATIENT
Start: 2019-08-26 | End: 2019-08-26 | Stop reason: SDUPTHER

## 2019-08-26 RX ORDER — CLINDAMYCIN HYDROCHLORIDE 300 MG/1
300 CAPSULE ORAL 3 TIMES DAILY
Qty: 21 CAPSULE | Refills: 0 | Status: SHIPPED | OUTPATIENT
Start: 2019-08-26 | End: 2019-09-02

## 2019-08-26 RX ORDER — SODIUM CHLORIDE 9 MG/ML
INJECTION, SOLUTION INTRAVENOUS CONTINUOUS PRN
Status: DISCONTINUED | OUTPATIENT
Start: 2019-08-26 | End: 2019-08-26 | Stop reason: SDUPTHER

## 2019-08-26 RX ORDER — LIDOCAINE HYDROCHLORIDE 20 MG/ML
INJECTION, SOLUTION EPIDURAL; INFILTRATION; INTRACAUDAL; PERINEURAL PRN
Status: DISCONTINUED | OUTPATIENT
Start: 2019-08-26 | End: 2019-08-26 | Stop reason: SDUPTHER

## 2019-08-26 RX ORDER — ONDANSETRON 2 MG/ML
INJECTION INTRAMUSCULAR; INTRAVENOUS PRN
Status: DISCONTINUED | OUTPATIENT
Start: 2019-08-26 | End: 2019-08-26 | Stop reason: SDUPTHER

## 2019-08-26 RX ORDER — GLYCOPYRROLATE 1 MG/5 ML
SYRINGE (ML) INTRAVENOUS PRN
Status: DISCONTINUED | OUTPATIENT
Start: 2019-08-26 | End: 2019-08-26 | Stop reason: SDUPTHER

## 2019-08-26 RX ORDER — FENTANYL CITRATE 50 UG/ML
50 INJECTION, SOLUTION INTRAMUSCULAR; INTRAVENOUS EVERY 5 MIN PRN
Status: DISCONTINUED | OUTPATIENT
Start: 2019-08-26 | End: 2019-08-26 | Stop reason: HOSPADM

## 2019-08-26 RX ORDER — PROPOFOL 10 MG/ML
INJECTION, EMULSION INTRAVENOUS PRN
Status: DISCONTINUED | OUTPATIENT
Start: 2019-08-26 | End: 2019-08-26 | Stop reason: SDUPTHER

## 2019-08-26 RX ORDER — SODIUM CHLORIDE 0.9 % (FLUSH) 0.9 %
10 SYRINGE (ML) INJECTION EVERY 12 HOURS SCHEDULED
Status: DISCONTINUED | OUTPATIENT
Start: 2019-08-26 | End: 2019-08-26 | Stop reason: HOSPADM

## 2019-08-26 RX ORDER — HYDROCODONE BITARTRATE AND ACETAMINOPHEN 5; 325 MG/1; MG/1
1 TABLET ORAL EVERY 6 HOURS PRN
Qty: 12 TABLET | Refills: 0 | Status: SHIPPED | OUTPATIENT
Start: 2019-08-26 | End: 2019-08-29

## 2019-08-26 RX ORDER — FENTANYL CITRATE 50 UG/ML
INJECTION, SOLUTION INTRAMUSCULAR; INTRAVENOUS PRN
Status: DISCONTINUED | OUTPATIENT
Start: 2019-08-26 | End: 2019-08-26 | Stop reason: SDUPTHER

## 2019-08-26 RX ORDER — SODIUM CHLORIDE 0.9 % (FLUSH) 0.9 %
10 SYRINGE (ML) INJECTION PRN
Status: DISCONTINUED | OUTPATIENT
Start: 2019-08-26 | End: 2019-08-26 | Stop reason: HOSPADM

## 2019-08-26 RX ORDER — NEOSTIGMINE METHYLSULFATE 1 MG/ML
INJECTION, SOLUTION INTRAVENOUS PRN
Status: DISCONTINUED | OUTPATIENT
Start: 2019-08-26 | End: 2019-08-26 | Stop reason: SDUPTHER

## 2019-08-26 RX ORDER — ALBUTEROL SULFATE 2.5 MG/3ML
2.5 SOLUTION RESPIRATORY (INHALATION) ONCE
Status: COMPLETED | OUTPATIENT
Start: 2019-08-26 | End: 2019-08-26

## 2019-08-26 RX ORDER — LIDOCAINE HYDROCHLORIDE AND EPINEPHRINE 10; 10 MG/ML; UG/ML
INJECTION, SOLUTION INFILTRATION; PERINEURAL PRN
Status: DISCONTINUED | OUTPATIENT
Start: 2019-08-26 | End: 2019-08-26 | Stop reason: ALTCHOICE

## 2019-08-26 RX ORDER — PHENYLEPHRINE HYDROCHLORIDE 10 MG/ML
INJECTION INTRAVENOUS PRN
Status: DISCONTINUED | OUTPATIENT
Start: 2019-08-26 | End: 2019-08-26 | Stop reason: SDUPTHER

## 2019-08-26 RX ORDER — OXYCODONE HYDROCHLORIDE AND ACETAMINOPHEN 5; 325 MG/1; MG/1
1 TABLET ORAL
Status: DISCONTINUED | OUTPATIENT
Start: 2019-08-26 | End: 2019-08-26 | Stop reason: HOSPADM

## 2019-08-26 RX ORDER — ROCURONIUM BROMIDE 10 MG/ML
INJECTION, SOLUTION INTRAVENOUS PRN
Status: DISCONTINUED | OUTPATIENT
Start: 2019-08-26 | End: 2019-08-26 | Stop reason: SDUPTHER

## 2019-08-26 RX ORDER — GINSENG 100 MG
CAPSULE ORAL PRN
Status: DISCONTINUED | OUTPATIENT
Start: 2019-08-26 | End: 2019-08-26 | Stop reason: ALTCHOICE

## 2019-08-26 RX ORDER — MEPERIDINE HYDROCHLORIDE 25 MG/ML
12.5 INJECTION INTRAMUSCULAR; INTRAVENOUS; SUBCUTANEOUS EVERY 10 MIN PRN
Status: DISCONTINUED | OUTPATIENT
Start: 2019-08-26 | End: 2019-08-26 | Stop reason: HOSPADM

## 2019-08-26 RX ORDER — PROMETHAZINE HYDROCHLORIDE 25 MG/ML
6.25 INJECTION, SOLUTION INTRAMUSCULAR; INTRAVENOUS PRN
Status: DISCONTINUED | OUTPATIENT
Start: 2019-08-26 | End: 2019-08-26 | Stop reason: HOSPADM

## 2019-08-26 RX ADMIN — Medication 0.6 MG: at 16:51

## 2019-08-26 RX ADMIN — PROPOFOL 150 MG: 10 INJECTION, EMULSION INTRAVENOUS at 15:24

## 2019-08-26 RX ADMIN — LIDOCAINE HYDROCHLORIDE 40 MG: 20 INJECTION, SOLUTION EPIDURAL; INFILTRATION; INTRACAUDAL; PERINEURAL at 15:24

## 2019-08-26 RX ADMIN — Medication 3 MG: at 16:51

## 2019-08-26 RX ADMIN — FENTANYL CITRATE 100 MCG: 50 INJECTION, SOLUTION INTRAMUSCULAR; INTRAVENOUS at 15:24

## 2019-08-26 RX ADMIN — ROCURONIUM BROMIDE 40 MG: 10 SOLUTION INTRAVENOUS at 15:24

## 2019-08-26 RX ADMIN — PHENYLEPHRINE HYDROCHLORIDE 100 MCG: 10 INJECTION INTRAVENOUS at 16:02

## 2019-08-26 RX ADMIN — SODIUM CHLORIDE: 9 INJECTION, SOLUTION INTRAVENOUS at 16:17

## 2019-08-26 RX ADMIN — ALBUTEROL SULFATE 2.5 MG: 2.5 SOLUTION RESPIRATORY (INHALATION) at 12:43

## 2019-08-26 RX ADMIN — PHENYLEPHRINE HYDROCHLORIDE 200 MCG: 10 INJECTION INTRAVENOUS at 15:32

## 2019-08-26 RX ADMIN — ONDANSETRON HYDROCHLORIDE 4 MG: 2 INJECTION, SOLUTION INTRAMUSCULAR; INTRAVENOUS at 15:30

## 2019-08-26 RX ADMIN — DEXAMETHASONE SODIUM PHOSPHATE 10 MG: 4 INJECTION, SOLUTION INTRAMUSCULAR; INTRAVENOUS at 15:30

## 2019-08-26 RX ADMIN — PHENYLEPHRINE HYDROCHLORIDE 100 MCG: 10 INJECTION INTRAVENOUS at 15:46

## 2019-08-26 RX ADMIN — SODIUM CHLORIDE: 9 INJECTION, SOLUTION INTRAVENOUS at 15:17

## 2019-08-26 ASSESSMENT — PULMONARY FUNCTION TESTS
PIF_VALUE: 24
PIF_VALUE: 41
PIF_VALUE: 2
PIF_VALUE: 24
PIF_VALUE: 0
PIF_VALUE: 10
PIF_VALUE: 23
PIF_VALUE: 26
PIF_VALUE: 3
PIF_VALUE: 24
PIF_VALUE: 23
PIF_VALUE: 24
PIF_VALUE: 25
PIF_VALUE: 1
PIF_VALUE: 10
PIF_VALUE: 2
PIF_VALUE: 24
PIF_VALUE: 25
PIF_VALUE: 24
PIF_VALUE: 28
PIF_VALUE: 25
PIF_VALUE: 0
PIF_VALUE: 8
PIF_VALUE: 25
PIF_VALUE: 24
PIF_VALUE: 23
PIF_VALUE: 24
PIF_VALUE: 25
PIF_VALUE: 33
PIF_VALUE: 24
PIF_VALUE: 23
PIF_VALUE: 24
PIF_VALUE: 23
PIF_VALUE: 22
PIF_VALUE: 1
PIF_VALUE: 26
PIF_VALUE: 24
PIF_VALUE: 23
PIF_VALUE: 22
PIF_VALUE: 9
PIF_VALUE: 25
PIF_VALUE: 25
PIF_VALUE: 24
PIF_VALUE: 21
PIF_VALUE: 25
PIF_VALUE: 23
PIF_VALUE: 21
PIF_VALUE: 24
PIF_VALUE: 25
PIF_VALUE: 10
PIF_VALUE: 24
PIF_VALUE: 28
PIF_VALUE: 21
PIF_VALUE: 24
PIF_VALUE: 23
PIF_VALUE: 24
PIF_VALUE: 26
PIF_VALUE: 10
PIF_VALUE: 29
PIF_VALUE: 26
PIF_VALUE: 23
PIF_VALUE: 27
PIF_VALUE: 24
PIF_VALUE: 1
PIF_VALUE: 24
PIF_VALUE: 28
PIF_VALUE: 24
PIF_VALUE: 24
PIF_VALUE: 10
PIF_VALUE: 31
PIF_VALUE: 1
PIF_VALUE: 24
PIF_VALUE: 24
PIF_VALUE: 23
PIF_VALUE: 5
PIF_VALUE: 26
PIF_VALUE: 10
PIF_VALUE: 24
PIF_VALUE: 25
PIF_VALUE: 27
PIF_VALUE: 22
PIF_VALUE: 7
PIF_VALUE: 24
PIF_VALUE: 24
PIF_VALUE: 27
PIF_VALUE: 25
PIF_VALUE: 27
PIF_VALUE: 24
PIF_VALUE: 22
PIF_VALUE: 10
PIF_VALUE: 21
PIF_VALUE: 31
PIF_VALUE: 3
PIF_VALUE: 26
PIF_VALUE: 23
PIF_VALUE: 24
PIF_VALUE: 23
PIF_VALUE: 25
PIF_VALUE: 24
PIF_VALUE: 24
PIF_VALUE: 25
PIF_VALUE: 26
PIF_VALUE: 26

## 2019-08-26 ASSESSMENT — PAIN - FUNCTIONAL ASSESSMENT: PAIN_FUNCTIONAL_ASSESSMENT: 0-10

## 2019-08-26 ASSESSMENT — PAIN SCALES - GENERAL: PAINLEVEL_OUTOF10: 0

## 2019-08-26 ASSESSMENT — PAIN DESCRIPTION - ORIENTATION: ORIENTATION: LEFT

## 2019-08-26 ASSESSMENT — PAIN DESCRIPTION - LOCATION: LOCATION: EAR

## 2019-08-26 NOTE — ANESTHESIA PRE PROCEDURE
 CAD (coronary artery disease)     follows with / Zoya Oh every 6 months    CHF (congestive heart failure) ([facility]     last episode 9/2018 ?  Colon polyp     COPD (chronic obstructive pulmonary disease) (HCC)     follows with DR. Andre    Emphysema of lung ([facility]     Full dentures     H/O asbestosis     Hyperlipidemia     Hypertension     Myopia     On home O2     2 Liters/NC at night and during day prn    NICHOLAS (obstructive sleep apnea)     no use cpap    Squamous cell carcinoma, face     skin    Tobacco dependence 7/3/2014       Past Surgical History:        Procedure Laterality Date    APPENDECTOMY      BACK SURGERY  years ago    lumbar    CARDIAC CATHETERIZATION      heart cath x4, cabg twice    COLONOSCOPY     [address]      2000, 2002, 2005, 2009   total of 8 stents   [address]    two     EAR SURGERY Left 6/25/2019    I & D LEFT EAR ABSCESS performed by Franchesca Tinoco DO at [address] [place] [address]    amblyoplia  / multiple surgeries    SKIN CANCER EXCISION         Social History:    Social History     Tobacco Use    Smoking status: Current Every Day Smoker     Packs/day: 1.00     Years: 62.00     Pack years: 62.00     Types: Cigarettes     Start date: 9/5/1955    Smokeless tobacco: Never Used   Substance Use Topics    Alcohol use:  No                                Ready to quit: Not Answered  Counseling given: Not Answered      Vital Signs (Current):   Vitals:    08/22/19 1008   Weight: 189 lb (85.7 kg)   Height: 5' 8\" (1.727 m)                                              BP Readings from Last 3 Encounters:   08/20/19 120/64   08/16/19 (!) 95/59   08/13/19 105/62       NPO Status:                                                                                 BMI:   Wt Readings from Last 3 Encounters:   08/22/19 189 lb (85.7 kg)   08/20/19 189 lb (85.7 kg)   08/16/19 189 lb (85.7 kg)     Body mass index therapy:., .                 Abdominal:           Vascular:   + PE. Department of Anesthesiology  Preprocedure Note       Name:  June Berger   Age:  70 y.o.  :  1940                                          MRN:  [id number]         Date:  2019      Surgeon: Lorena Hope):  Elisa Harvey DO    Procedure: LEFT EAR ABSCESS EXCISION   +++IODINE ALLERGY+++ (Left Ear)    Medications prior to admission:   Prior to Admission medications    Medication Sig Start Date End Date Taking? Authorizing Provider   ALBUTEROL IN Inhale into the lungs as needed Instructed to use am of surgery if needs and bring in    Historical Provider, MD   metoprolol (LOPRESSOR) 100 MG tablet Take 100 mg by mouth 2 times daily Instructed to take with sip water am of procedure    Historical Provider, MD   furosemide (LASIX) 10 MG/ML injection Inject into the muscle every other day  6/10/19   Historical Provider, MD   fluticasone-salmeterol (ADVAIR DISKUS) 500-50 MCG/DOSE diskus inhaler Inhale 1 puff into the lungs 2 times daily Use AM DOS.     Historical Provider, MD   dexamethasone (DECADRON) 4 MG/ML injection Inject into the muscle every 3 days  6/10/19   Historical Provider, MD   potassium chloride (KLOR-CON M) 20 MEQ extended release tablet Take 1 tablet by mouth 2 times daily (with meals) 18   Ritchie Camarillo DO   albuterol (PROVENTIL) (5 MG/ML) 0.5% nebulizer solution Take 1 mL by nebulization 3 times daily as needed for Wheezing 18   Sarahi Osorio MD   OXYGEN Inhale 2 L/min into the lungs nightly Uses prn during day    Historical Provider, MD   furosemide (LASIX) 40 MG tablet Take 80 mg by mouth daily     Historical Provider, MD   warfarin (COUMADIN) 4 MG tablet Take by mouth daily Alternates 3 mg and 4 mg daily  LD 2019 per Dr. Mirtha Reeves Provider, MD   isosorbide mononitrate (IMDUR) 30 MG CR tablet Take 30 mg by mouth daily Instructed to take with sip water am of procedure    Historical Provider, MD   clopidogrel (PLAVIX) 75 MG tablet Take 75 mg by mouth daily LD 8/20/2019 per Dr. Gonzalez/Wes 6/11/14   Historical Provider, MD   atorvastatin (LIPITOR) 80 MG tablet Take 60 mg by mouth daily  6/11/14   Historical Provider, MD   amLODIPine (NORVASC) 5 MG tablet Take 5 mg by mouth daily Instructed to take with sip water am of procedure 6/11/14   Historical Provider, MD   aspirin EC 81 MG EC tablet Take 81 mg by mouth daily. Historical Provider, MD       Current medications:    No current facility-administered medications for this visit. No current outpatient medications on file. Facility-Administered Medications Ordered in Other Visits   Medication Dose Route Frequency Provider Last Rate Last Dose    sodium chloride flush 0.9 % injection 10 mL  10 mL Intravenous 2 times per day Cang T Junnie Gutting        sodium chloride flush 0.9 % injection 10 mL  10 mL Intravenous PRN Cang T Perea        fentaNYL (SUBLIMAZE) injection 50 mcg  50 mcg Intravenous Q5 Min PRN Cassidy Koch MD        HYDROmorphone (DILAUDID) injection 0.25 mg  0.25 mg Intravenous Q5 Min PRN Cassidy Koch MD        HYDROmorphone (DILAUDID) injection 0.5 mg  0.5 mg Intravenous Q5 Min PRN Cassidy Koch MD        oxyCODONE-acetaminophen (PERCOCET) 5-325 MG per tablet 1 tablet  1 tablet Oral Once PRN Cassidy Koch MD        promethazine [facility]) injection 6.25 mg  6.25 mg Intramuscular PRN Cassidy Koch MD        meperidine (DEMEROL) injection 12.5 mg  12.5 mg Intravenous Q10 Min PRN Cassidy Koch MD           Allergies:     Allergies   Allergen Reactions    Pcn [Penicillins] Swelling    Dye [Iodides]      Heart and kidney dye / reaction unknown       Problem List:    Patient Active Problem List   Diagnosis Code    Bilateral carotid artery stenosis I65.23    Tobacco dependence F17.200    [facility] J44.1    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  Colon polyp     COPD (chronic obstructive pulmonary disease) ([facility])     follows with DR. Anrde    Emphysema [facility] lung ([facility]     Full dentures     H/O asbestosis     Hyperlipidemia     Hypertension     Myopia     On home O2     2 Liters/NC at night and during day prn    NICHOLAS (obstructive sleep apnea)     no use cpap    Squamous cell carcinoma, face     skin    Tobacco dependence 7/3/2014       Past Surgical History:        Procedure Laterality Date    APPENDECTOMY      BACK SURGERY  years ago    lumbar    CARDIAC CATHETERIZATION      heart cath x4, cabg twice    [facility]     [facility]      [facility], 2002, 2005, 2009   total of 8 st[facility]   [facility]    [facility]     EAR SURGERY Left 6/25/2019    I & D LEFT EAR ABSCESS performed by Kyle Mireles DO at [address] [place] [address]    amblyoplia  / multiple surgeries    SKIN CANCER EXCISION         Social History:    Social History     Tobacco Use    Smoking status: Current Every Day Smoker     Packs/day: 1.00     Years: 62.00     Pack years: 62.00     Types: Cigarettes     Start date: 9/5/1955    Smokeless tobacco: Never Used   Substance Use Topics    Alcohol use: No                                Ready to quit: Not Answered  Counseling given: Not Answered      Vital Signs (Current):    There were no vitals filed for this fibrillation, CHF:,         Rhythm: regular               Department of Anesthesiology  Preprocedure Note       Name:  Hema Duggan   Age:  70 y.o.  :  1940                                          MRN:  [id number]         Date:  2019      Surgeon: Christinia Bloch):  Josie Harvey DO    Procedure: LEFT EAR ABSCESS EXCISION   +++IODINE ALLERGY+++ (Left Ear)    Medications prior to admission:   Prior to Admission medications    Medication Sig Start Date End Date Taking? Authorizing Provider   ALBUTEROL IN Inhale into the lungs as needed Instructed to use am of surgery if needs and bring in    Historical Provider, MD   metoprolol (LOPRESSOR) 100 MG tablet Take 100 mg by mouth 2 times daily Instructed to take with sip water am of procedure    Historical Provider, MD   furosemide (LASIX) 10 MG/ML injection Inject into the muscle every other day  6/10/19   Historical Provider, MD   fluticasone-salmeterol (ADVAIR DISKUS) 500-50 MCG/DOSE diskus inhaler Inhale 1 puff into the lungs 2 times daily Use AM DOS.     Historical Provider, MD   dexamethasone (DECADRON) 4 MG/ML injection Inject into the muscle every 3 days  6/10/19   Historical Provider, MD   potassium chloride (KLOR-CON M) 20 MEQ extended release tablet Take 1 tablet by mouth 2 times daily (with meals) 18   Ritchie Camarillo DO   albuterol (PROVENTIL) (5 MG/ML) 0.5% nebulizer solution Take 1 mL by nebulization 3 times daily as needed for Wheezing 18   Graeme Marilyns, MD   OXYGEN Inhale 2 L/min into the lungs nightly Uses prn during day    Historical Provider, MD   furosemide (LASIX) 40 MG tablet Take 80 mg by mouth daily     Historical Provider, MD   warfarin (COUMADIN) 4 MG tablet Take by mouth daily Alternates 3 mg and 4 mg daily  LD 2019 per Dr. Anika Blevins Provider, MD   isosorbide mononitrate (IMDUR) 30 MG CR tablet Take 30 mg by mouth daily Instructed to take with sip water am of procedure    Historical Provider,

## 2019-09-03 ENCOUNTER — OFFICE VISIT (OUTPATIENT)
Dept: ENT CLINIC | Age: 79
End: 2019-09-03

## 2019-09-03 VITALS
DIASTOLIC BLOOD PRESSURE: 64 MMHG | HEART RATE: 62 BPM | SYSTOLIC BLOOD PRESSURE: 96 MMHG | OXYGEN SATURATION: 93 % | WEIGHT: 190 LBS | BODY MASS INDEX: 28.79 KG/M2 | HEIGHT: 68 IN

## 2019-09-03 DIAGNOSIS — H60.02 ABSCESS OF LEFT EXTERNAL EAR: Primary | ICD-10-CM

## 2019-09-03 PROCEDURE — 99024 POSTOP FOLLOW-UP VISIT: CPT | Performed by: OTOLARYNGOLOGY

## 2019-09-03 ASSESSMENT — ENCOUNTER SYMPTOMS
RESPIRATORY NEGATIVE: 1
GASTROINTESTINAL NEGATIVE: 1
ALLERGIC/IMMUNOLOGIC NEGATIVE: 1
EYES NEGATIVE: 1

## 2019-09-05 ENCOUNTER — TELEPHONE (OUTPATIENT)
Dept: ENT CLINIC | Age: 79
End: 2019-09-05

## 2019-09-06 ENCOUNTER — OFFICE VISIT (OUTPATIENT)
Dept: ENT CLINIC | Age: 79
End: 2019-09-06

## 2019-09-06 VITALS
DIASTOLIC BLOOD PRESSURE: 62 MMHG | HEART RATE: 80 BPM | HEIGHT: 68 IN | OXYGEN SATURATION: 95 % | SYSTOLIC BLOOD PRESSURE: 105 MMHG | BODY MASS INDEX: 30.16 KG/M2 | WEIGHT: 199 LBS

## 2019-09-06 DIAGNOSIS — H60.02 ABSCESS OF LEFT EXTERNAL EAR: Primary | ICD-10-CM

## 2019-09-06 PROCEDURE — 99024 POSTOP FOLLOW-UP VISIT: CPT | Performed by: OTOLARYNGOLOGY

## 2019-09-06 ASSESSMENT — ENCOUNTER SYMPTOMS
RESPIRATORY NEGATIVE: 1
ALLERGIC/IMMUNOLOGIC NEGATIVE: 1
EYES NEGATIVE: 1
GASTROINTESTINAL NEGATIVE: 1

## 2019-09-11 ENCOUNTER — HOSPITAL ENCOUNTER (OUTPATIENT)
Dept: CT IMAGING | Age: 79
Discharge: HOME | End: 2019-09-13
Payer: MEDICARE

## 2019-09-11 DIAGNOSIS — F17.210 CIGARETTE SMOKER: ICD-10-CM

## 2019-09-11 DIAGNOSIS — F17.200 SMOKER: ICD-10-CM

## 2019-09-11 PROCEDURE — G0297 LDCT FOR LUNG CA SCREEN: HCPCS

## 2019-09-19 PROBLEM — J41.0 SIMPLE CHRONIC BRONCHITIS (HCC): Status: ACTIVE | Noted: 2019-09-19

## 2019-09-25 ENCOUNTER — OFFICE VISIT (OUTPATIENT)
Dept: ENT CLINIC | Age: 79
End: 2019-09-25

## 2019-09-25 VITALS
BODY MASS INDEX: 27.43 KG/M2 | HEART RATE: 82 BPM | SYSTOLIC BLOOD PRESSURE: 106 MMHG | DIASTOLIC BLOOD PRESSURE: 62 MMHG | HEIGHT: 68 IN | WEIGHT: 181 LBS

## 2019-09-25 DIAGNOSIS — H60.02 ABSCESS OF LEFT EXTERNAL EAR: Primary | ICD-10-CM

## 2019-09-25 PROCEDURE — 99024 POSTOP FOLLOW-UP VISIT: CPT | Performed by: OTOLARYNGOLOGY

## 2019-09-25 RX ORDER — CLINDAMYCIN HYDROCHLORIDE 300 MG/1
300 CAPSULE ORAL 3 TIMES DAILY
Qty: 42 CAPSULE | Refills: 0 | Status: SHIPPED | OUTPATIENT
Start: 2019-09-25 | End: 2019-10-09

## 2019-09-25 ASSESSMENT — ENCOUNTER SYMPTOMS
RESPIRATORY NEGATIVE: 1
ALLERGIC/IMMUNOLOGIC NEGATIVE: 1
GASTROINTESTINAL NEGATIVE: 1
EYES NEGATIVE: 1

## 2019-09-25 NOTE — PROGRESS NOTES
Subjective:      Patient ID:  Gio Berry is a 70 y.o. male. HPI:    Patient presents today for recheck left ear. Condition has been present for 3 month(s). Pt is continuing to have drainage from that ear. Patient's medications, allergies, past medical, surgical, social and family histories were reviewed and updated as appropriate. Review of Systems   Constitutional: Negative. HENT: Positive for ear discharge. Eyes: Negative. Respiratory: Negative. Cardiovascular: Negative. Gastrointestinal: Negative. Endocrine: Negative. Musculoskeletal: Negative. Skin: Negative. Allergic/Immunologic: Negative. Neurological: Negative. Hematological: Negative. Psychiatric/Behavioral: Negative. All other systems reviewed and are negative. Objective:     Vitals:    09/25/19 1132   BP: 106/62   Pulse: 82     Physical Exam   Constitutional: He is oriented to person, place, and time. No distress. HENT:   Head: Normocephalic and atraumatic. Right Ear: External ear normal.   Left Ear: External ear normal.   Nose: Nose normal.   Mouth/Throat: Oropharynx is clear and moist. No oropharyngeal exudate. Left ear is swollen and has more purulence coming from the ear. Cultured today   Eyes: Pupils are equal, round, and reactive to light. Conjunctivae and EOM are normal.   Neck: Normal range of motion. Neck supple. Cardiovascular: Normal rate. Pulmonary/Chest: Effort normal. No respiratory distress. Abdominal: He exhibits no distension. Musculoskeletal: Normal range of motion. Lymphadenopathy:     He has no cervical adenopathy. Neurological: He is alert and oriented to person, place, and time. Skin: Skin is warm and dry. He is not diaphoretic. Assessment:       Diagnosis Orders   1. Abscess of left external ear                Plan:      Await culture and start Abx today.  clinda due to swelilng history on PCN  Follow up in 2 week(s)

## 2019-09-26 ENCOUNTER — HOSPITAL ENCOUNTER (OUTPATIENT)
Age: 79
Discharge: HOME | End: 2019-09-28
Payer: MEDICARE

## 2019-09-26 DIAGNOSIS — H60.02 ABSCESS OF LEFT EXTERNAL EAR: ICD-10-CM

## 2019-09-26 PROCEDURE — 87186 SC STD MICRODIL/AGAR DIL: CPT

## 2019-09-26 PROCEDURE — 87070 CULTURE OTHR SPECIMN AEROBIC: CPT

## 2019-09-28 LAB
ORGANISM: ABNORMAL
ORGANISM: ABNORMAL
WOUND/ABSCESS: ABNORMAL
WOUND/ABSCESS: ABNORMAL

## 2019-10-03 ENCOUNTER — TELEPHONE (OUTPATIENT)
Dept: ENT CLINIC | Age: 79
End: 2019-10-03

## 2019-10-03 ENCOUNTER — HOSPITAL ENCOUNTER (OUTPATIENT)
Age: 79
Discharge: HOME | End: 2019-10-03
Payer: MEDICARE

## 2019-10-03 LAB
ALBUMIN SERPL-MCNC: 4.3 G/DL (ref 3.5–5.2)
ALP BLD-CCNC: 165 U/L (ref 40–129)
ALT SERPL-CCNC: 20 U/L (ref 0–40)
ANION GAP SERPL CALCULATED.3IONS-SCNC: 15 MMOL/L (ref 7–16)
AST SERPL-CCNC: 20 U/L (ref 0–39)
BILIRUB SERPL-MCNC: 0.5 MG/DL (ref 0–1.2)
BUN BLDV-MCNC: 12 MG/DL (ref 8–23)
CALCIUM SERPL-MCNC: 10 MG/DL (ref 8.6–10.2)
CHLORIDE BLD-SCNC: 97 MMOL/L (ref 98–107)
CO2: 28 MMOL/L (ref 22–29)
CREAT SERPL-MCNC: 1.1 MG/DL (ref 0.7–1.2)
GFR AFRICAN AMERICAN: >60
GFR NON-AFRICAN AMERICAN: >60 ML/MIN/1.73
GLUCOSE BLD-MCNC: 145 MG/DL (ref 74–99)
HCT VFR BLD CALC: 46.2 % (ref 37–54)
HEMOGLOBIN: 14.8 G/DL (ref 12.5–16.5)
INR BLD: 2.3
MCH RBC QN AUTO: 28.4 PG (ref 26–35)
MCHC RBC AUTO-ENTMCNC: 32 % (ref 32–34.5)
MCV RBC AUTO: 88.7 FL (ref 80–99.9)
PDW BLD-RTO: 17.9 FL (ref 11.5–15)
PLATELET # BLD: 353 E9/L (ref 130–450)
PMV BLD AUTO: 9.1 FL (ref 7–12)
POTASSIUM SERPL-SCNC: 4.5 MMOL/L (ref 3.5–5)
PROTHROMBIN TIME: 28.3 SEC (ref 9.3–12.4)
RBC # BLD: 5.21 E12/L (ref 3.8–5.8)
SODIUM BLD-SCNC: 140 MMOL/L (ref 132–146)
TOTAL PROTEIN: 7.4 G/DL (ref 6.4–8.3)
TROPONIN: <0.01 NG/ML (ref 0–0.03)
WBC # BLD: 13.1 E9/L (ref 4.5–11.5)

## 2019-10-03 PROCEDURE — 80053 COMPREHEN METABOLIC PANEL: CPT

## 2019-10-03 PROCEDURE — 36415 COLL VENOUS BLD VENIPUNCTURE: CPT

## 2019-10-03 PROCEDURE — 85610 PROTHROMBIN TIME: CPT

## 2019-10-03 PROCEDURE — 85027 COMPLETE CBC AUTOMATED: CPT

## 2019-10-03 PROCEDURE — 84484 ASSAY OF TROPONIN QUANT: CPT

## 2019-10-03 RX ORDER — DOXYCYCLINE HYCLATE 100 MG
100 TABLET ORAL 2 TIMES DAILY
Qty: 28 TABLET | Refills: 0 | Status: SHIPPED | OUTPATIENT
Start: 2019-10-03 | End: 2019-10-17

## 2019-10-08 ENCOUNTER — APPOINTMENT (OUTPATIENT)
Dept: GENERAL RADIOLOGY | Age: 79
End: 2019-10-08
Payer: MEDICARE

## 2019-10-08 ENCOUNTER — HOSPITAL ENCOUNTER (EMERGENCY)
Age: 79
Discharge: HOME | End: 2019-10-09
Attending: EMERGENCY MEDICINE
Payer: MEDICARE

## 2019-10-08 DIAGNOSIS — M54.6 ACUTE LEFT-SIDED THORACIC BACK PAIN: Primary | ICD-10-CM

## 2019-10-08 LAB
ALBUMIN SERPL-MCNC: 3.7 G/DL (ref 3.5–5.2)
ALP BLD-CCNC: 145 U/L (ref 40–129)
ALT SERPL-CCNC: 16 U/L (ref 0–40)
ANION GAP SERPL CALCULATED.3IONS-SCNC: 12 MMOL/L (ref 7–16)
APTT: 55.9 SEC (ref 24.5–35.1)
AST SERPL-CCNC: 29 U/L (ref 0–39)
BASOPHILS ABSOLUTE: 0.05 E9/L (ref 0–0.2)
BASOPHILS RELATIVE PERCENT: 0.4 % (ref 0–2)
BILIRUB SERPL-MCNC: 0.6 MG/DL (ref 0–1.2)
BUN BLDV-MCNC: 14 MG/DL (ref 8–23)
CALCIUM SERPL-MCNC: 9.8 MG/DL (ref 8.6–10.2)
CHLORIDE BLD-SCNC: 101 MMOL/L (ref 98–107)
CO2: 31 MMOL/L (ref 22–29)
CREAT SERPL-MCNC: 0.8 MG/DL (ref 0.7–1.2)
EOSINOPHILS ABSOLUTE: 0.08 E9/L (ref 0.05–0.5)
EOSINOPHILS RELATIVE PERCENT: 0.7 % (ref 0–6)
GFR AFRICAN AMERICAN: >60
GFR NON-AFRICAN AMERICAN: >60 ML/MIN/1.73
GLUCOSE BLD-MCNC: 106 MG/DL (ref 74–99)
HCT VFR BLD CALC: 45.8 % (ref 37–54)
HEMOGLOBIN: 14.8 G/DL (ref 12.5–16.5)
IMMATURE GRANULOCYTES #: 0.09 E9/L
IMMATURE GRANULOCYTES %: 0.8 % (ref 0–5)
INR BLD: 3.1
LYMPHOCYTES ABSOLUTE: 1.21 E9/L (ref 1.5–4)
LYMPHOCYTES RELATIVE PERCENT: 10.3 % (ref 20–42)
MCH RBC QN AUTO: 28.4 PG (ref 26–35)
MCHC RBC AUTO-ENTMCNC: 32.3 % (ref 32–34.5)
MCV RBC AUTO: 87.9 FL (ref 80–99.9)
MONOCYTES ABSOLUTE: 1.09 E9/L (ref 0.1–0.95)
MONOCYTES RELATIVE PERCENT: 9.3 % (ref 2–12)
NEUTROPHILS ABSOLUTE: 9.24 E9/L (ref 1.8–7.3)
NEUTROPHILS RELATIVE PERCENT: 78.5 % (ref 43–80)
PDW BLD-RTO: 17.4 FL (ref 11.5–15)
PLATELET # BLD: 323 E9/L (ref 130–450)
PMV BLD AUTO: 9.6 FL (ref 7–12)
POTASSIUM REFLEX MAGNESIUM: 4.9 MMOL/L (ref 3.5–5)
PROTHROMBIN TIME: 35.9 SEC (ref 9.3–12.4)
RBC # BLD: 5.21 E12/L (ref 3.8–5.8)
SODIUM BLD-SCNC: 144 MMOL/L (ref 132–146)
TOTAL PROTEIN: 7 G/DL (ref 6.4–8.3)
TROPONIN: <0.01 NG/ML (ref 0–0.03)
WBC # BLD: 11.8 E9/L (ref 4.5–11.5)

## 2019-10-08 PROCEDURE — 84484 ASSAY OF TROPONIN QUANT: CPT

## 2019-10-08 PROCEDURE — 36415 COLL VENOUS BLD VENIPUNCTURE: CPT

## 2019-10-08 PROCEDURE — 93005 ELECTROCARDIOGRAM TRACING: CPT | Performed by: EMERGENCY MEDICINE

## 2019-10-08 PROCEDURE — 80053 COMPREHEN METABOLIC PANEL: CPT

## 2019-10-08 PROCEDURE — 85025 COMPLETE CBC W/AUTO DIFF WBC: CPT

## 2019-10-08 PROCEDURE — 99284 EMERGENCY DEPT VISIT MOD MDM: CPT

## 2019-10-08 PROCEDURE — 96374 THER/PROPH/DIAG INJ IV PUSH: CPT

## 2019-10-08 PROCEDURE — 71045 X-RAY EXAM CHEST 1 VIEW: CPT

## 2019-10-08 PROCEDURE — 6360000002 HC RX W HCPCS: Performed by: EMERGENCY MEDICINE

## 2019-10-08 PROCEDURE — 85730 THROMBOPLASTIN TIME PARTIAL: CPT

## 2019-10-08 PROCEDURE — 85610 PROTHROMBIN TIME: CPT

## 2019-10-08 RX ORDER — KETOROLAC TROMETHAMINE 30 MG/ML
15 INJECTION, SOLUTION INTRAMUSCULAR; INTRAVENOUS ONCE
Status: COMPLETED | OUTPATIENT
Start: 2019-10-08 | End: 2019-10-08

## 2019-10-08 RX ADMIN — KETOROLAC TROMETHAMINE 15 MG: 30 INJECTION, SOLUTION INTRAMUSCULAR at 22:00

## 2019-10-08 ASSESSMENT — PAIN SCALES - GENERAL
PAINLEVEL_OUTOF10: 8
PAINLEVEL_OUTOF10: 8

## 2019-10-08 ASSESSMENT — PAIN DESCRIPTION - FREQUENCY: FREQUENCY: CONTINUOUS

## 2019-10-08 ASSESSMENT — PAIN DESCRIPTION - LOCATION: LOCATION: BACK

## 2019-10-08 ASSESSMENT — PAIN DESCRIPTION - DESCRIPTORS: DESCRIPTORS: CONSTANT

## 2019-10-08 ASSESSMENT — PAIN DESCRIPTION - PAIN TYPE: TYPE: ACUTE PAIN

## 2019-10-09 VITALS
RESPIRATION RATE: 18 BRPM | OXYGEN SATURATION: 93 % | BODY MASS INDEX: 27.43 KG/M2 | HEART RATE: 94 BPM | HEIGHT: 68 IN | WEIGHT: 181 LBS | DIASTOLIC BLOOD PRESSURE: 84 MMHG | SYSTOLIC BLOOD PRESSURE: 138 MMHG | TEMPERATURE: 97.8 F

## 2019-10-09 LAB
EKG ATRIAL RATE: 83 BPM
EKG Q-T INTERVAL: 380 MS
EKG QRS DURATION: 170 MS
EKG QTC CALCULATION (BAZETT): 477 MS
EKG R AXIS: 126 DEGREES
EKG T AXIS: 5 DEGREES
EKG VENTRICULAR RATE: 95 BPM
TROPONIN: <0.01 NG/ML (ref 0–0.03)

## 2019-10-09 PROCEDURE — 36415 COLL VENOUS BLD VENIPUNCTURE: CPT

## 2019-10-09 PROCEDURE — 96376 TX/PRO/DX INJ SAME DRUG ADON: CPT

## 2019-10-09 PROCEDURE — 6370000000 HC RX 637 (ALT 250 FOR IP): Performed by: EMERGENCY MEDICINE

## 2019-10-09 PROCEDURE — 6360000002 HC RX W HCPCS: Performed by: EMERGENCY MEDICINE

## 2019-10-09 PROCEDURE — 93010 ELECTROCARDIOGRAM REPORT: CPT | Performed by: INTERNAL MEDICINE

## 2019-10-09 PROCEDURE — 84484 ASSAY OF TROPONIN QUANT: CPT

## 2019-10-09 RX ORDER — KETOROLAC TROMETHAMINE 30 MG/ML
15 INJECTION, SOLUTION INTRAMUSCULAR; INTRAVENOUS ONCE
Status: COMPLETED | OUTPATIENT
Start: 2019-10-09 | End: 2019-10-09

## 2019-10-09 RX ORDER — IBUPROFEN 600 MG/1
600 TABLET ORAL EVERY 6 HOURS PRN
Qty: 20 TABLET | Refills: 0 | Status: SHIPPED | OUTPATIENT
Start: 2019-10-09 | End: 2019-12-13

## 2019-10-09 RX ORDER — HYDROCODONE BITARTRATE AND ACETAMINOPHEN 5; 325 MG/1; MG/1
2 TABLET ORAL ONCE
Status: COMPLETED | OUTPATIENT
Start: 2019-10-09 | End: 2019-10-09

## 2019-10-09 RX ADMIN — HYDROCODONE BITARTRATE AND ACETAMINOPHEN 2 TABLET: 5; 325 TABLET ORAL at 03:50

## 2019-10-09 RX ADMIN — KETOROLAC TROMETHAMINE 15 MG: 30 INJECTION, SOLUTION INTRAMUSCULAR at 02:25

## 2019-10-09 ASSESSMENT — PAIN DESCRIPTION - PROGRESSION
CLINICAL_PROGRESSION: NOT CHANGED
CLINICAL_PROGRESSION: GRADUALLY IMPROVING

## 2019-10-09 ASSESSMENT — PAIN DESCRIPTION - DESCRIPTORS: DESCRIPTORS: PATIENT UNABLE TO DESCRIBE

## 2019-10-09 ASSESSMENT — PAIN DESCRIPTION - FREQUENCY: FREQUENCY: CONTINUOUS

## 2019-10-09 ASSESSMENT — PAIN SCALES - GENERAL
PAINLEVEL_OUTOF10: 7
PAINLEVEL_OUTOF10: 6

## 2019-10-09 ASSESSMENT — PAIN DESCRIPTION - PAIN TYPE: TYPE: ACUTE PAIN

## 2019-10-09 ASSESSMENT — PAIN DESCRIPTION - ORIENTATION: ORIENTATION: MID

## 2019-10-09 ASSESSMENT — PAIN DESCRIPTION - LOCATION: LOCATION: BACK

## 2019-10-14 ENCOUNTER — TELEPHONE (OUTPATIENT)
Dept: ENT CLINIC | Age: 79
End: 2019-10-14

## 2019-10-24 ENCOUNTER — OFFICE VISIT (OUTPATIENT)
Dept: ENT CLINIC | Age: 79
End: 2019-10-24

## 2019-10-24 VITALS
HEART RATE: 58 BPM | SYSTOLIC BLOOD PRESSURE: 91 MMHG | DIASTOLIC BLOOD PRESSURE: 57 MMHG | WEIGHT: 175 LBS | BODY MASS INDEX: 26.61 KG/M2

## 2019-10-24 DIAGNOSIS — H60.02 ABSCESS OF LEFT EXTERNAL EAR: Primary | ICD-10-CM

## 2019-10-24 PROCEDURE — G8598 ASA/ANTIPLAT THER USED: HCPCS | Performed by: OTOLARYNGOLOGY

## 2019-10-24 PROCEDURE — G8484 FLU IMMUNIZE NO ADMIN: HCPCS | Performed by: OTOLARYNGOLOGY

## 2019-10-24 PROCEDURE — 4130F TOPICAL PREP RX AOE: CPT | Performed by: OTOLARYNGOLOGY

## 2019-10-24 PROCEDURE — G8417 CALC BMI ABV UP PARAM F/U: HCPCS | Performed by: OTOLARYNGOLOGY

## 2019-10-24 PROCEDURE — 1123F ACP DISCUSS/DSCN MKR DOCD: CPT | Performed by: OTOLARYNGOLOGY

## 2019-10-24 PROCEDURE — G8427 DOCREV CUR MEDS BY ELIG CLIN: HCPCS | Performed by: OTOLARYNGOLOGY

## 2019-10-24 PROCEDURE — 99024 POSTOP FOLLOW-UP VISIT: CPT | Performed by: OTOLARYNGOLOGY

## 2019-10-24 PROCEDURE — 4040F PNEUMOC VAC/ADMIN/RCVD: CPT | Performed by: OTOLARYNGOLOGY

## 2019-10-24 PROCEDURE — 4004F PT TOBACCO SCREEN RCVD TLK: CPT | Performed by: OTOLARYNGOLOGY

## 2019-10-24 RX ORDER — DOXYCYCLINE HYCLATE 100 MG
100 TABLET ORAL 2 TIMES DAILY
Qty: 42 TABLET | Refills: 0 | Status: SHIPPED | OUTPATIENT
Start: 2019-10-24 | End: 2019-11-14

## 2019-10-24 ASSESSMENT — ENCOUNTER SYMPTOMS
GASTROINTESTINAL NEGATIVE: 1
ALLERGIC/IMMUNOLOGIC NEGATIVE: 1
RESPIRATORY NEGATIVE: 1
EYES NEGATIVE: 1

## 2019-12-02 ENCOUNTER — HOSPITAL ENCOUNTER (OUTPATIENT)
Age: 79
Discharge: HOME | End: 2019-12-04
Payer: MEDICARE

## 2019-12-02 DIAGNOSIS — H61.002 PERICHONDRITIS AND CHONDRITIS OF LEFT PINNA: ICD-10-CM

## 2019-12-02 DIAGNOSIS — H61.032 PERICHONDRITIS AND CHONDRITIS OF LEFT PINNA: ICD-10-CM

## 2019-12-02 PROCEDURE — 87205 SMEAR GRAM STAIN: CPT

## 2019-12-02 PROCEDURE — 87070 CULTURE OTHR SPECIMN AEROBIC: CPT

## 2019-12-03 LAB — GRAM STAIN ORDERABLE: NORMAL

## 2019-12-04 LAB
GRAM STAIN RESULT: ABNORMAL
ORGANISM: ABNORMAL
WOUND/ABSCESS: ABNORMAL

## 2019-12-12 DIAGNOSIS — H61.032 PERICHONDRITIS AND CHONDRITIS OF PINNA, LEFT: ICD-10-CM

## 2019-12-12 DIAGNOSIS — H61.002 PERICHONDRITIS AND CHONDRITIS OF PINNA, LEFT: ICD-10-CM

## 2019-12-12 RX ORDER — SODIUM CHLORIDE 0.9 % (FLUSH) 0.9 %
5 SYRINGE (ML) INJECTION PRN
Status: CANCELLED | OUTPATIENT
Start: 2019-12-12

## 2019-12-12 RX ORDER — SODIUM CHLORIDE 0.9 % (FLUSH) 0.9 %
10 SYRINGE (ML) INJECTION PRN
Status: CANCELLED | OUTPATIENT
Start: 2019-12-12

## 2019-12-12 RX ORDER — HEPARIN SODIUM (PORCINE) LOCK FLUSH IV SOLN 100 UNIT/ML 100 UNIT/ML
500 SOLUTION INTRAVENOUS PRN
Status: CANCELLED | OUTPATIENT
Start: 2019-12-12

## 2019-12-13 ENCOUNTER — HOSPITAL ENCOUNTER (OUTPATIENT)
Dept: INFUSION THERAPY | Age: 79
Setting detail: INFUSION SERIES
Discharge: HOME | End: 2019-12-13
Payer: MEDICARE

## 2019-12-13 ENCOUNTER — HOSPITAL ENCOUNTER (OUTPATIENT)
Dept: GENERAL RADIOLOGY | Age: 79
Discharge: HOME | End: 2019-12-15
Payer: MEDICARE

## 2019-12-13 VITALS
OXYGEN SATURATION: 95 % | HEART RATE: 92 BPM | TEMPERATURE: 98.4 F | DIASTOLIC BLOOD PRESSURE: 66 MMHG | SYSTOLIC BLOOD PRESSURE: 94 MMHG | RESPIRATION RATE: 20 BRPM

## 2019-12-13 DIAGNOSIS — H61.032 PERICHONDRITIS AND CHONDRITIS OF PINNA, LEFT: Primary | ICD-10-CM

## 2019-12-13 DIAGNOSIS — H61.002 PERICHONDRITIS AND CHONDRITIS OF PINNA, LEFT: Primary | ICD-10-CM

## 2019-12-13 PROCEDURE — 76937 US GUIDE VASCULAR ACCESS: CPT

## 2019-12-13 PROCEDURE — 6360000002 HC RX W HCPCS: Performed by: CLINICAL NURSE SPECIALIST

## 2019-12-13 PROCEDURE — C1751 CATH, INF, PER/CENT/MIDLINE: HCPCS

## 2019-12-13 PROCEDURE — 96365 THER/PROPH/DIAG IV INF INIT: CPT

## 2019-12-13 PROCEDURE — 71045 X-RAY EXAM CHEST 1 VIEW: CPT

## 2019-12-13 PROCEDURE — 96366 THER/PROPH/DIAG IV INF ADDON: CPT

## 2019-12-13 PROCEDURE — 36569 INSJ PICC 5 YR+ W/O IMAGING: CPT

## 2019-12-13 PROCEDURE — 2580000003 HC RX 258: Performed by: CLINICAL NURSE SPECIALIST

## 2019-12-13 RX ORDER — HEPARIN SODIUM (PORCINE) LOCK FLUSH IV SOLN 100 UNIT/ML 100 UNIT/ML
500 SOLUTION INTRAVENOUS PRN
Status: DISCONTINUED | OUTPATIENT
Start: 2019-12-13 | End: 2019-12-14 | Stop reason: HOSPADM

## 2019-12-13 RX ORDER — SODIUM CHLORIDE 0.9 % (FLUSH) 0.9 %
10 SYRINGE (ML) INJECTION PRN
Status: DISCONTINUED | OUTPATIENT
Start: 2019-12-13 | End: 2019-12-14 | Stop reason: HOSPADM

## 2019-12-13 RX ORDER — SODIUM CHLORIDE 0.9 % (FLUSH) 0.9 %
10 SYRINGE (ML) INJECTION PRN
Status: CANCELLED | OUTPATIENT
Start: 2019-12-13

## 2019-12-13 RX ORDER — HEPARIN SODIUM (PORCINE) LOCK FLUSH IV SOLN 100 UNIT/ML 100 UNIT/ML
500 SOLUTION INTRAVENOUS PRN
Status: CANCELLED | OUTPATIENT
Start: 2019-12-13

## 2019-12-13 RX ORDER — SODIUM CHLORIDE 0.9 % (FLUSH) 0.9 %
5 SYRINGE (ML) INJECTION PRN
Status: CANCELLED | OUTPATIENT
Start: 2019-12-13

## 2019-12-13 RX ORDER — HYDROCODONE BITARTRATE AND ACETAMINOPHEN 5; 325 MG/1; MG/1
1 TABLET ORAL 2 TIMES DAILY PRN
COMMUNITY
End: 2020-03-23 | Stop reason: ALTCHOICE

## 2019-12-13 RX ORDER — SODIUM CHLORIDE 0.9 % (FLUSH) 0.9 %
5 SYRINGE (ML) INJECTION PRN
Status: DISCONTINUED | OUTPATIENT
Start: 2019-12-13 | End: 2019-12-14 | Stop reason: HOSPADM

## 2019-12-13 RX ADMIN — VANCOMYCIN HYDROCHLORIDE 1250 MG: 10 INJECTION, POWDER, LYOPHILIZED, FOR SOLUTION INTRAVENOUS at 14:28

## 2019-12-13 RX ADMIN — HEPARIN 500 UNITS: 100 SYRINGE at 16:06

## 2019-12-13 RX ADMIN — SODIUM CHLORIDE, PRESERVATIVE FREE 10 ML: 5 INJECTION INTRAVENOUS at 16:05

## 2019-12-13 RX ADMIN — SODIUM CHLORIDE, PRESERVATIVE FREE 10 ML: 5 INJECTION INTRAVENOUS at 16:06

## 2019-12-13 RX ADMIN — HEPARIN 500 UNITS: 100 SYRINGE at 16:05

## 2019-12-13 NOTE — PROGRESS NOTES
Pt received his first dose of Vancomycin 1250mg IV. Pt had no issues or symptoms of allergic reaction during the 90min infusion. Pt complained that the infusion was taking too long. I explained to him that we could not run it any faster & he would need to stay for 15-20min post infusion to be monitored. Pt stated that he was not willing to stay to be monitored after the infusion and that he needed to leave. I educated the pt on importance but he declined to stay. He was given d/c instructions and he left without issue.   Hector Shah RN

## 2019-12-13 NOTE — PROGRESS NOTES
POWER PICC LINE  Placement 12/13/2019    Product number: [id number]   Lot Number: [id number]      Ultrasound: YES WITH VPS   Anatomy; iv placement site: RIGHT BRACHIAL VEIN      Upper Arm Circumference: 28 CM    Size: 5 FR DL    Exposed Length: 1 CM    Internal Length: 37 CM   Cut: 12 CM   Vein Measurement: 0.65 CM    Caleb Small  12/13/2019  1:23 PM    PICC PLACED WITH VPS. Amparo Hernandez PT AFIB . Amparo Hernandez CHEST XRAY ORDERED.

## 2019-12-18 ENCOUNTER — HOSPITAL ENCOUNTER (OUTPATIENT)
Age: 79
Discharge: HOME | End: 2019-12-20
Payer: MEDICARE

## 2019-12-18 LAB
ALBUMIN SERPL-MCNC: 3.9 G/DL (ref 3.5–5.2)
ALP BLD-CCNC: 127 U/L (ref 40–129)
ALT SERPL-CCNC: 13 U/L (ref 0–40)
ANION GAP SERPL CALCULATED.3IONS-SCNC: 15 MMOL/L (ref 7–16)
AST SERPL-CCNC: 19 U/L (ref 0–39)
BASOPHILS ABSOLUTE: 0.05 E9/L (ref 0–0.2)
BASOPHILS RELATIVE PERCENT: 0.5 % (ref 0–2)
BILIRUB SERPL-MCNC: 0.6 MG/DL (ref 0–1.2)
BUN BLDV-MCNC: 12 MG/DL (ref 8–23)
C-REACTIVE PROTEIN: 0.6 MG/DL (ref 0–0.4)
CALCIUM SERPL-MCNC: 9.5 MG/DL (ref 8.6–10.2)
CHLORIDE BLD-SCNC: 96 MMOL/L (ref 98–107)
CO2: 30 MMOL/L (ref 22–29)
CREAT SERPL-MCNC: 0.8 MG/DL (ref 0.7–1.2)
EOSINOPHILS ABSOLUTE: 0.21 E9/L (ref 0.05–0.5)
EOSINOPHILS RELATIVE PERCENT: 2.2 % (ref 0–6)
GFR AFRICAN AMERICAN: >60
GFR NON-AFRICAN AMERICAN: >60 ML/MIN/1.73
GLUCOSE BLD-MCNC: 99 MG/DL (ref 74–99)
HCT VFR BLD CALC: 46.8 % (ref 37–54)
HEMOGLOBIN: 14.3 G/DL (ref 12.5–16.5)
IMMATURE GRANULOCYTES #: 0.04 E9/L
IMMATURE GRANULOCYTES %: 0.4 % (ref 0–5)
LYMPHOCYTES ABSOLUTE: 1.34 E9/L (ref 1.5–4)
LYMPHOCYTES RELATIVE PERCENT: 13.8 % (ref 20–42)
MCH RBC QN AUTO: 29.2 PG (ref 26–35)
MCHC RBC AUTO-ENTMCNC: 30.6 % (ref 32–34.5)
MCV RBC AUTO: 95.7 FL (ref 80–99.9)
MONOCYTES ABSOLUTE: 0.78 E9/L (ref 0.1–0.95)
MONOCYTES RELATIVE PERCENT: 8 % (ref 2–12)
NEUTROPHILS ABSOLUTE: 7.29 E9/L (ref 1.8–7.3)
NEUTROPHILS RELATIVE PERCENT: 75.1 % (ref 43–80)
PDW BLD-RTO: 15.4 FL (ref 11.5–15)
PLATELET # BLD: 207 E9/L (ref 130–450)
PMV BLD AUTO: 10 FL (ref 7–12)
POTASSIUM SERPL-SCNC: 3.6 MMOL/L (ref 3.5–5)
RBC # BLD: 4.89 E12/L (ref 3.8–5.8)
SEDIMENTATION RATE, ERYTHROCYTE: 3 MM/HR (ref 0–15)
SODIUM BLD-SCNC: 141 MMOL/L (ref 132–146)
TOTAL PROTEIN: 6.3 G/DL (ref 6.4–8.3)
VANCOMYCIN TROUGH: 13.4 MCG/ML (ref 5–16)
WBC # BLD: 9.7 E9/L (ref 4.5–11.5)

## 2019-12-18 PROCEDURE — 85025 COMPLETE CBC W/AUTO DIFF WBC: CPT

## 2019-12-18 PROCEDURE — 80053 COMPREHEN METABOLIC PANEL: CPT

## 2019-12-18 PROCEDURE — 80202 ASSAY OF VANCOMYCIN: CPT

## 2019-12-18 PROCEDURE — 85651 RBC SED RATE NONAUTOMATED: CPT

## 2019-12-18 PROCEDURE — 86140 C-REACTIVE PROTEIN: CPT

## 2019-12-18 PROCEDURE — 36415 COLL VENOUS BLD VENIPUNCTURE: CPT

## 2019-12-20 ENCOUNTER — HOSPITAL ENCOUNTER (OUTPATIENT)
Age: 79
Discharge: HOME | End: 2019-12-22
Payer: MEDICARE

## 2019-12-20 LAB
ALBUMIN SERPL-MCNC: 3.8 G/DL (ref 3.5–5.2)
ALP BLD-CCNC: 125 U/L (ref 40–129)
ALT SERPL-CCNC: 9 U/L (ref 0–40)
ANION GAP SERPL CALCULATED.3IONS-SCNC: 13 MMOL/L (ref 7–16)
AST SERPL-CCNC: 17 U/L (ref 0–39)
BASOPHILS ABSOLUTE: 0.07 E9/L (ref 0–0.2)
BASOPHILS RELATIVE PERCENT: 0.7 % (ref 0–2)
BILIRUB SERPL-MCNC: 0.4 MG/DL (ref 0–1.2)
BUN BLDV-MCNC: 14 MG/DL (ref 8–23)
CALCIUM SERPL-MCNC: 9.5 MG/DL (ref 8.6–10.2)
CHLORIDE BLD-SCNC: 98 MMOL/L (ref 98–107)
CO2: 30 MMOL/L (ref 22–29)
CREAT SERPL-MCNC: 0.9 MG/DL (ref 0.7–1.2)
EOSINOPHILS ABSOLUTE: 0.19 E9/L (ref 0.05–0.5)
EOSINOPHILS RELATIVE PERCENT: 1.9 % (ref 0–6)
GFR AFRICAN AMERICAN: >60
GFR NON-AFRICAN AMERICAN: >60 ML/MIN/1.73
GLUCOSE BLD-MCNC: 114 MG/DL (ref 74–99)
HCT VFR BLD CALC: 45 % (ref 37–54)
HEMOGLOBIN: 13.8 G/DL (ref 12.5–16.5)
IMMATURE GRANULOCYTES #: 0.04 E9/L
IMMATURE GRANULOCYTES %: 0.4 % (ref 0–5)
LYMPHOCYTES ABSOLUTE: 1.19 E9/L (ref 1.5–4)
LYMPHOCYTES RELATIVE PERCENT: 12.1 % (ref 20–42)
MCH RBC QN AUTO: 29.5 PG (ref 26–35)
MCHC RBC AUTO-ENTMCNC: 30.7 % (ref 32–34.5)
MCV RBC AUTO: 96.2 FL (ref 80–99.9)
MONOCYTES ABSOLUTE: 0.81 E9/L (ref 0.1–0.95)
MONOCYTES RELATIVE PERCENT: 8.3 % (ref 2–12)
NEUTROPHILS ABSOLUTE: 7.51 E9/L (ref 1.8–7.3)
NEUTROPHILS RELATIVE PERCENT: 76.6 % (ref 43–80)
PDW BLD-RTO: 15.4 FL (ref 11.5–15)
PLATELET # BLD: 196 E9/L (ref 130–450)
PMV BLD AUTO: 10 FL (ref 7–12)
POTASSIUM SERPL-SCNC: 3.5 MMOL/L (ref 3.5–5)
RBC # BLD: 4.68 E12/L (ref 3.8–5.8)
SODIUM BLD-SCNC: 141 MMOL/L (ref 132–146)
TOTAL PROTEIN: 6.5 G/DL (ref 6.4–8.3)
VANCOMYCIN TROUGH: 14.3 MCG/ML (ref 5–16)
WBC # BLD: 9.8 E9/L (ref 4.5–11.5)

## 2019-12-20 PROCEDURE — 80053 COMPREHEN METABOLIC PANEL: CPT

## 2019-12-20 PROCEDURE — 85025 COMPLETE CBC W/AUTO DIFF WBC: CPT

## 2019-12-20 PROCEDURE — 80202 ASSAY OF VANCOMYCIN: CPT

## 2019-12-20 PROCEDURE — 36415 COLL VENOUS BLD VENIPUNCTURE: CPT

## 2019-12-23 ENCOUNTER — HOSPITAL ENCOUNTER (OUTPATIENT)
Age: 79
Discharge: HOME | End: 2019-12-25
Payer: MEDICARE

## 2019-12-23 LAB
ALBUMIN SERPL-MCNC: 3.9 G/DL (ref 3.5–5.2)
ALP BLD-CCNC: 128 U/L (ref 40–129)
ALT SERPL-CCNC: 11 U/L (ref 0–40)
ANION GAP SERPL CALCULATED.3IONS-SCNC: 16 MMOL/L (ref 7–16)
AST SERPL-CCNC: 18 U/L (ref 0–39)
BASOPHILS ABSOLUTE: 0.07 E9/L (ref 0–0.2)
BASOPHILS RELATIVE PERCENT: 0.8 % (ref 0–2)
BILIRUB SERPL-MCNC: 0.6 MG/DL (ref 0–1.2)
BUN BLDV-MCNC: 11 MG/DL (ref 8–23)
C-REACTIVE PROTEIN: 1.1 MG/DL (ref 0–0.4)
CALCIUM SERPL-MCNC: 9.5 MG/DL (ref 8.6–10.2)
CHLORIDE BLD-SCNC: 97 MMOL/L (ref 98–107)
CO2: 26 MMOL/L (ref 22–29)
CREAT SERPL-MCNC: 1 MG/DL (ref 0.7–1.2)
EOSINOPHILS ABSOLUTE: 0.18 E9/L (ref 0.05–0.5)
EOSINOPHILS RELATIVE PERCENT: 1.9 % (ref 0–6)
GFR AFRICAN AMERICAN: >60
GFR NON-AFRICAN AMERICAN: >60 ML/MIN/1.73
GLUCOSE BLD-MCNC: 138 MG/DL (ref 74–99)
HCT VFR BLD CALC: 44 % (ref 37–54)
HEMOGLOBIN: 13.5 G/DL (ref 12.5–16.5)
IMMATURE GRANULOCYTES #: 0.03 E9/L
IMMATURE GRANULOCYTES %: 0.3 % (ref 0–5)
LYMPHOCYTES ABSOLUTE: 1.18 E9/L (ref 1.5–4)
LYMPHOCYTES RELATIVE PERCENT: 12.7 % (ref 20–42)
MCH RBC QN AUTO: 29.5 PG (ref 26–35)
MCHC RBC AUTO-ENTMCNC: 30.7 % (ref 32–34.5)
MCV RBC AUTO: 96.3 FL (ref 80–99.9)
MONOCYTES ABSOLUTE: 0.99 E9/L (ref 0.1–0.95)
MONOCYTES RELATIVE PERCENT: 10.7 % (ref 2–12)
NEUTROPHILS ABSOLUTE: 6.81 E9/L (ref 1.8–7.3)
NEUTROPHILS RELATIVE PERCENT: 73.6 % (ref 43–80)
PDW BLD-RTO: 15.5 FL (ref 11.5–15)
PLATELET # BLD: 203 E9/L (ref 130–450)
PMV BLD AUTO: 10.2 FL (ref 7–12)
POTASSIUM SERPL-SCNC: 3.3 MMOL/L (ref 3.5–5)
RBC # BLD: 4.57 E12/L (ref 3.8–5.8)
SEDIMENTATION RATE, ERYTHROCYTE: 4 MM/HR (ref 0–15)
SODIUM BLD-SCNC: 139 MMOL/L (ref 132–146)
TOTAL PROTEIN: 6.6 G/DL (ref 6.4–8.3)
WBC # BLD: 9.3 E9/L (ref 4.5–11.5)

## 2019-12-23 PROCEDURE — 85651 RBC SED RATE NONAUTOMATED: CPT

## 2019-12-23 PROCEDURE — 80053 COMPREHEN METABOLIC PANEL: CPT

## 2019-12-23 PROCEDURE — 85025 COMPLETE CBC W/AUTO DIFF WBC: CPT

## 2019-12-23 PROCEDURE — 36415 COLL VENOUS BLD VENIPUNCTURE: CPT

## 2019-12-23 PROCEDURE — 86140 C-REACTIVE PROTEIN: CPT

## 2019-12-26 ENCOUNTER — HOSPITAL ENCOUNTER (OUTPATIENT)
Age: 79
Discharge: HOME | End: 2019-12-28
Payer: MEDICARE

## 2019-12-26 LAB
ALBUMIN SERPL-MCNC: 3.9 G/DL (ref 3.5–5.2)
ALP BLD-CCNC: 136 U/L (ref 40–129)
ALT SERPL-CCNC: 11 U/L (ref 0–40)
ANION GAP SERPL CALCULATED.3IONS-SCNC: 17 MMOL/L (ref 7–16)
AST SERPL-CCNC: 18 U/L (ref 0–39)
BASOPHILS ABSOLUTE: 0.06 E9/L (ref 0–0.2)
BASOPHILS RELATIVE PERCENT: 0.8 % (ref 0–2)
BILIRUB SERPL-MCNC: 0.5 MG/DL (ref 0–1.2)
BUN BLDV-MCNC: 9 MG/DL (ref 8–23)
CALCIUM SERPL-MCNC: 9.2 MG/DL (ref 8.6–10.2)
CHLORIDE BLD-SCNC: 99 MMOL/L (ref 98–107)
CO2: 28 MMOL/L (ref 22–29)
CREAT SERPL-MCNC: 1 MG/DL (ref 0.7–1.2)
EOSINOPHILS ABSOLUTE: 0.14 E9/L (ref 0.05–0.5)
EOSINOPHILS RELATIVE PERCENT: 1.8 % (ref 0–6)
GFR AFRICAN AMERICAN: >60
GFR NON-AFRICAN AMERICAN: >60 ML/MIN/1.73
GLUCOSE BLD-MCNC: 107 MG/DL (ref 74–99)
HCT VFR BLD CALC: 43.5 % (ref 37–54)
HEMOGLOBIN: 13.7 G/DL (ref 12.5–16.5)
IMMATURE GRANULOCYTES #: 0.03 E9/L
IMMATURE GRANULOCYTES %: 0.4 % (ref 0–5)
LYMPHOCYTES ABSOLUTE: 1.08 E9/L (ref 1.5–4)
LYMPHOCYTES RELATIVE PERCENT: 13.6 % (ref 20–42)
MCH RBC QN AUTO: 29.6 PG (ref 26–35)
MCHC RBC AUTO-ENTMCNC: 31.5 % (ref 32–34.5)
MCV RBC AUTO: 94 FL (ref 80–99.9)
MONOCYTES ABSOLUTE: 0.77 E9/L (ref 0.1–0.95)
MONOCYTES RELATIVE PERCENT: 9.7 % (ref 2–12)
NEUTROPHILS ABSOLUTE: 5.88 E9/L (ref 1.8–7.3)
NEUTROPHILS RELATIVE PERCENT: 73.7 % (ref 43–80)
PDW BLD-RTO: 15.4 FL (ref 11.5–15)
PLATELET # BLD: 206 E9/L (ref 130–450)
PMV BLD AUTO: 10.1 FL (ref 7–12)
POTASSIUM SERPL-SCNC: 2.9 MMOL/L (ref 3.5–5)
RBC # BLD: 4.63 E12/L (ref 3.8–5.8)
SODIUM BLD-SCNC: 144 MMOL/L (ref 132–146)
TOTAL PROTEIN: 6.6 G/DL (ref 6.4–8.3)
WBC # BLD: 8 E9/L (ref 4.5–11.5)

## 2019-12-26 PROCEDURE — 85025 COMPLETE CBC W/AUTO DIFF WBC: CPT

## 2019-12-26 PROCEDURE — 80053 COMPREHEN METABOLIC PANEL: CPT

## 2019-12-26 PROCEDURE — 36415 COLL VENOUS BLD VENIPUNCTURE: CPT

## 2019-12-30 ENCOUNTER — HOSPITAL ENCOUNTER (OUTPATIENT)
Age: 79
Discharge: HOME | End: 2020-01-01
Payer: MEDICARE

## 2019-12-30 LAB
ALBUMIN SERPL-MCNC: 3.6 G/DL (ref 3.5–5.2)
ALP BLD-CCNC: 135 U/L (ref 40–129)
ALT SERPL-CCNC: 6 U/L (ref 0–40)
ANION GAP SERPL CALCULATED.3IONS-SCNC: 14 MMOL/L (ref 7–16)
AST SERPL-CCNC: 16 U/L (ref 0–39)
BASOPHILS ABSOLUTE: 0.06 E9/L (ref 0–0.2)
BASOPHILS RELATIVE PERCENT: 0.7 % (ref 0–2)
BILIRUB SERPL-MCNC: 0.4 MG/DL (ref 0–1.2)
BUN BLDV-MCNC: 14 MG/DL (ref 8–23)
C-REACTIVE PROTEIN: 2.1 MG/DL (ref 0–0.4)
CALCIUM SERPL-MCNC: 8.8 MG/DL (ref 8.6–10.2)
CHLORIDE BLD-SCNC: 100 MMOL/L (ref 98–107)
CO2: 25 MMOL/L (ref 22–29)
CREAT SERPL-MCNC: 2 MG/DL (ref 0.7–1.2)
EOSINOPHILS ABSOLUTE: 0.16 E9/L (ref 0.05–0.5)
EOSINOPHILS RELATIVE PERCENT: 1.9 % (ref 0–6)
GFR AFRICAN AMERICAN: 39
GFR NON-AFRICAN AMERICAN: 33 ML/MIN/1.73
GLUCOSE BLD-MCNC: 98 MG/DL (ref 74–99)
HCT VFR BLD CALC: 41.8 % (ref 37–54)
HEMOGLOBIN: 12.9 G/DL (ref 12.5–16.5)
IMMATURE GRANULOCYTES #: 0.05 E9/L
IMMATURE GRANULOCYTES %: 0.6 % (ref 0–5)
LYMPHOCYTES ABSOLUTE: 1 E9/L (ref 1.5–4)
LYMPHOCYTES RELATIVE PERCENT: 11.7 % (ref 20–42)
MCH RBC QN AUTO: 29.5 PG (ref 26–35)
MCHC RBC AUTO-ENTMCNC: 30.9 % (ref 32–34.5)
MCV RBC AUTO: 95.7 FL (ref 80–99.9)
MONOCYTES ABSOLUTE: 0.97 E9/L (ref 0.1–0.95)
MONOCYTES RELATIVE PERCENT: 11.4 % (ref 2–12)
NEUTROPHILS ABSOLUTE: 6.29 E9/L (ref 1.8–7.3)
NEUTROPHILS RELATIVE PERCENT: 73.7 % (ref 43–80)
PDW BLD-RTO: 15.4 FL (ref 11.5–15)
PLATELET # BLD: 232 E9/L (ref 130–450)
PMV BLD AUTO: 10 FL (ref 7–12)
POTASSIUM SERPL-SCNC: 3.5 MMOL/L (ref 3.5–5)
RBC # BLD: 4.37 E12/L (ref 3.8–5.8)
SEDIMENTATION RATE, ERYTHROCYTE: 11 MM/HR (ref 0–15)
SODIUM BLD-SCNC: 139 MMOL/L (ref 132–146)
TOTAL PROTEIN: 5.9 G/DL (ref 6.4–8.3)
VANCOMYCIN TROUGH: 28.1 MCG/ML (ref 5–16)
WBC # BLD: 8.5 E9/L (ref 4.5–11.5)

## 2019-12-30 PROCEDURE — 85025 COMPLETE CBC W/AUTO DIFF WBC: CPT

## 2019-12-30 PROCEDURE — 36415 COLL VENOUS BLD VENIPUNCTURE: CPT

## 2019-12-30 PROCEDURE — 80202 ASSAY OF VANCOMYCIN: CPT

## 2019-12-30 PROCEDURE — 80053 COMPREHEN METABOLIC PANEL: CPT

## 2019-12-30 PROCEDURE — 86140 C-REACTIVE PROTEIN: CPT

## 2019-12-30 PROCEDURE — 85651 RBC SED RATE NONAUTOMATED: CPT

## 2020-01-01 ENCOUNTER — HOSPITAL ENCOUNTER (OUTPATIENT)
Age: 80
Discharge: HOME | End: 2020-01-03
Payer: MEDICARE

## 2020-01-01 LAB
ALBUMIN SERPL-MCNC: 3.8 G/DL (ref 3.5–5.2)
ALP BLD-CCNC: 162 U/L (ref 40–129)
ALT SERPL-CCNC: 10 U/L (ref 0–40)
ANION GAP SERPL CALCULATED.3IONS-SCNC: 17 MMOL/L (ref 7–16)
AST SERPL-CCNC: 17 U/L (ref 0–39)
BASOPHILS ABSOLUTE: 0.06 E9/L (ref 0–0.2)
BASOPHILS RELATIVE PERCENT: 0.7 % (ref 0–2)
BILIRUB SERPL-MCNC: 0.4 MG/DL (ref 0–1.2)
BUN BLDV-MCNC: 16 MG/DL (ref 8–23)
CALCIUM SERPL-MCNC: 9.1 MG/DL (ref 8.6–10.2)
CHLORIDE BLD-SCNC: 101 MMOL/L (ref 98–107)
CO2: 26 MMOL/L (ref 22–29)
CREAT SERPL-MCNC: 2 MG/DL (ref 0.7–1.2)
EOSINOPHILS ABSOLUTE: 0.17 E9/L (ref 0.05–0.5)
EOSINOPHILS RELATIVE PERCENT: 2 % (ref 0–6)
GFR AFRICAN AMERICAN: 39
GFR NON-AFRICAN AMERICAN: 33 ML/MIN/1.73
GLUCOSE BLD-MCNC: 131 MG/DL (ref 74–99)
HCT VFR BLD CALC: 41.6 % (ref 37–54)
HEMOGLOBIN: 13.3 G/DL (ref 12.5–16.5)
IMMATURE GRANULOCYTES #: 0.03 E9/L
IMMATURE GRANULOCYTES %: 0.4 % (ref 0–5)
LYMPHOCYTES ABSOLUTE: 1.03 E9/L (ref 1.5–4)
LYMPHOCYTES RELATIVE PERCENT: 12.3 % (ref 20–42)
MCH RBC QN AUTO: 30 PG (ref 26–35)
MCHC RBC AUTO-ENTMCNC: 32 % (ref 32–34.5)
MCV RBC AUTO: 93.9 FL (ref 80–99.9)
MONOCYTES ABSOLUTE: 0.92 E9/L (ref 0.1–0.95)
MONOCYTES RELATIVE PERCENT: 11 % (ref 2–12)
NEUTROPHILS ABSOLUTE: 6.19 E9/L (ref 1.8–7.3)
NEUTROPHILS RELATIVE PERCENT: 73.6 % (ref 43–80)
PDW BLD-RTO: 15.3 FL (ref 11.5–15)
PLATELET # BLD: 255 E9/L (ref 130–450)
PMV BLD AUTO: 9.9 FL (ref 7–12)
POTASSIUM SERPL-SCNC: 3.3 MMOL/L (ref 3.5–5)
RBC # BLD: 4.43 E12/L (ref 3.8–5.8)
SODIUM BLD-SCNC: 144 MMOL/L (ref 132–146)
TOTAL PROTEIN: 6.6 G/DL (ref 6.4–8.3)
VANCOMYCIN TROUGH: 27.5 MCG/ML (ref 5–16)
WBC # BLD: 8.4 E9/L (ref 4.5–11.5)

## 2020-01-01 PROCEDURE — 80053 COMPREHEN METABOLIC PANEL: CPT

## 2020-01-01 PROCEDURE — 80202 ASSAY OF VANCOMYCIN: CPT

## 2020-01-01 PROCEDURE — 85025 COMPLETE CBC W/AUTO DIFF WBC: CPT

## 2020-01-02 ENCOUNTER — HOSPITAL ENCOUNTER (OUTPATIENT)
Age: 80
Discharge: HOME | End: 2020-01-04
Payer: MEDICARE

## 2020-01-02 LAB
ANION GAP SERPL CALCULATED.3IONS-SCNC: 13 MMOL/L (ref 7–16)
BUN BLDV-MCNC: 16 MG/DL (ref 8–23)
CALCIUM SERPL-MCNC: 9.1 MG/DL (ref 8.6–10.2)
CHLORIDE BLD-SCNC: 100 MMOL/L (ref 98–107)
CO2: 26 MMOL/L (ref 22–29)
CREAT SERPL-MCNC: 2.2 MG/DL (ref 0.7–1.2)
GFR AFRICAN AMERICAN: 35
GFR NON-AFRICAN AMERICAN: 29 ML/MIN/1.73
GLUCOSE BLD-MCNC: 88 MG/DL (ref 74–99)
POTASSIUM SERPL-SCNC: 3.7 MMOL/L (ref 3.5–5)
SODIUM BLD-SCNC: 139 MMOL/L (ref 132–146)
VANCOMYCIN RANDOM: 19.3 MCG/ML (ref 5–40)

## 2020-01-02 PROCEDURE — 80048 BASIC METABOLIC PNL TOTAL CA: CPT

## 2020-01-02 PROCEDURE — 36415 COLL VENOUS BLD VENIPUNCTURE: CPT

## 2020-01-02 PROCEDURE — 80202 ASSAY OF VANCOMYCIN: CPT

## 2020-01-03 ENCOUNTER — HOSPITAL ENCOUNTER (OUTPATIENT)
Age: 80
Discharge: HOME | End: 2020-01-05
Payer: MEDICARE

## 2020-01-03 PROCEDURE — 87077 CULTURE AEROBIC IDENTIFY: CPT

## 2020-01-03 PROCEDURE — 87205 SMEAR GRAM STAIN: CPT

## 2020-01-03 PROCEDURE — 87070 CULTURE OTHR SPECIMN AEROBIC: CPT

## 2020-01-03 PROCEDURE — 87186 SC STD MICRODIL/AGAR DIL: CPT

## 2020-01-03 PROCEDURE — 87075 CULTR BACTERIA EXCEPT BLOOD: CPT

## 2020-01-04 LAB — GRAM STAIN ORDERABLE: NORMAL

## 2020-01-05 ENCOUNTER — HOSPITAL ENCOUNTER (OUTPATIENT)
Age: 80
Discharge: HOME | End: 2020-01-07
Payer: MEDICARE

## 2020-01-05 LAB
ANAEROBIC CULTURE: NORMAL
ORGANISM: ABNORMAL
ORGANISM: ABNORMAL
WOUND/ABSCESS: ABNORMAL
WOUND/ABSCESS: ABNORMAL

## 2020-01-05 PROCEDURE — 36415 COLL VENOUS BLD VENIPUNCTURE: CPT

## 2020-01-05 PROCEDURE — 80202 ASSAY OF VANCOMYCIN: CPT

## 2020-01-05 PROCEDURE — 86140 C-REACTIVE PROTEIN: CPT

## 2020-01-05 PROCEDURE — 85025 COMPLETE CBC W/AUTO DIFF WBC: CPT

## 2020-01-05 PROCEDURE — 85651 RBC SED RATE NONAUTOMATED: CPT

## 2020-01-05 PROCEDURE — 80053 COMPREHEN METABOLIC PANEL: CPT

## 2020-01-06 LAB
ALBUMIN SERPL-MCNC: 3.2 G/DL (ref 3.5–5.2)
ALP BLD-CCNC: 137 U/L (ref 40–129)
ALT SERPL-CCNC: 6 U/L (ref 0–40)
ANION GAP SERPL CALCULATED.3IONS-SCNC: 14 MMOL/L (ref 7–16)
AST SERPL-CCNC: 14 U/L (ref 0–39)
BASOPHILS ABSOLUTE: 0.07 E9/L (ref 0–0.2)
BASOPHILS RELATIVE PERCENT: 0.9 % (ref 0–2)
BILIRUB SERPL-MCNC: 0.3 MG/DL (ref 0–1.2)
BUN BLDV-MCNC: 14 MG/DL (ref 8–23)
C-REACTIVE PROTEIN: 1.2 MG/DL (ref 0–0.4)
CALCIUM SERPL-MCNC: 8.9 MG/DL (ref 8.6–10.2)
CHLORIDE BLD-SCNC: 102 MMOL/L (ref 98–107)
CO2: 26 MMOL/L (ref 22–29)
CREAT SERPL-MCNC: 2 MG/DL (ref 0.7–1.2)
EOSINOPHILS ABSOLUTE: 0.18 E9/L (ref 0.05–0.5)
EOSINOPHILS RELATIVE PERCENT: 2.3 % (ref 0–6)
GFR AFRICAN AMERICAN: 39
GFR NON-AFRICAN AMERICAN: 33 ML/MIN/1.73
GLUCOSE BLD-MCNC: 100 MG/DL (ref 74–99)
HCT VFR BLD CALC: 41.4 % (ref 37–54)
HEMOGLOBIN: 12.9 G/DL (ref 12.5–16.5)
IMMATURE GRANULOCYTES #: 0.03 E9/L
IMMATURE GRANULOCYTES %: 0.4 % (ref 0–5)
LYMPHOCYTES ABSOLUTE: 0.94 E9/L (ref 1.5–4)
LYMPHOCYTES RELATIVE PERCENT: 12.2 % (ref 20–42)
MCH RBC QN AUTO: 29.3 PG (ref 26–35)
MCHC RBC AUTO-ENTMCNC: 31.2 % (ref 32–34.5)
MCV RBC AUTO: 93.9 FL (ref 80–99.9)
MONOCYTES ABSOLUTE: 0.82 E9/L (ref 0.1–0.95)
MONOCYTES RELATIVE PERCENT: 10.7 % (ref 2–12)
NEUTROPHILS ABSOLUTE: 5.65 E9/L (ref 1.8–7.3)
NEUTROPHILS RELATIVE PERCENT: 73.5 % (ref 43–80)
PDW BLD-RTO: 15.1 FL (ref 11.5–15)
PLATELET # BLD: 279 E9/L (ref 130–450)
PMV BLD AUTO: 10 FL (ref 7–12)
POTASSIUM SERPL-SCNC: 3.4 MMOL/L (ref 3.5–5)
RBC # BLD: 4.41 E12/L (ref 3.8–5.8)
SEDIMENTATION RATE, ERYTHROCYTE: 18 MM/HR (ref 0–15)
SODIUM BLD-SCNC: 142 MMOL/L (ref 132–146)
TOTAL PROTEIN: 5.7 G/DL (ref 6.4–8.3)
VANCOMYCIN RANDOM: 9.7 MCG/ML (ref 5–40)
WBC # BLD: 7.7 E9/L (ref 4.5–11.5)

## 2020-06-29 ENCOUNTER — HOSPITAL ENCOUNTER (OUTPATIENT)
Age: 80
Discharge: HOME | End: 2020-07-01
Payer: MEDICARE

## 2020-06-29 PROCEDURE — 87075 CULTR BACTERIA EXCEPT BLOOD: CPT

## 2020-06-29 PROCEDURE — 87186 SC STD MICRODIL/AGAR DIL: CPT

## 2020-06-29 PROCEDURE — 87205 SMEAR GRAM STAIN: CPT

## 2020-06-29 PROCEDURE — 87070 CULTURE OTHR SPECIMN AEROBIC: CPT

## 2020-06-29 PROCEDURE — 87077 CULTURE AEROBIC IDENTIFY: CPT

## 2020-06-30 LAB — GRAM STAIN ORDERABLE: NORMAL

## 2020-07-02 LAB
GRAM STAIN RESULT: ABNORMAL
ORGANISM: ABNORMAL
WOUND/ABSCESS: ABNORMAL

## 2020-07-03 LAB
ANAEROBIC CULTURE: ABNORMAL
ORGANISM: ABNORMAL
ORGANISM: ABNORMAL

## 2020-07-06 RX ORDER — LINEZOLID 600 MG/1
600 TABLET, FILM COATED ORAL 2 TIMES DAILY
Qty: 56 TABLET | Refills: 0 | Status: ON HOLD
Start: 2020-07-06 | End: 2020-08-07 | Stop reason: HOSPADM

## 2020-07-30 ENCOUNTER — HOSPITAL ENCOUNTER (INPATIENT)
Age: 80
LOS: 8 days | Discharge: HOME | DRG: 380 | End: 2020-08-07
Attending: EMERGENCY MEDICINE | Admitting: INTERNAL MEDICINE
Payer: MEDICARE

## 2020-07-30 ENCOUNTER — APPOINTMENT (OUTPATIENT)
Dept: GENERAL RADIOLOGY | Age: 80
DRG: 380 | End: 2020-07-30
Payer: MEDICARE

## 2020-07-30 PROBLEM — H61.002: Status: RESOLVED | Noted: 2019-12-12 | Resolved: 2020-07-30

## 2020-07-30 PROBLEM — H61.032: Status: RESOLVED | Noted: 2019-12-12 | Resolved: 2020-07-30

## 2020-07-30 PROBLEM — K92.2 GI BLEEDING: Status: ACTIVE | Noted: 2020-07-30

## 2020-07-30 PROBLEM — D69.6 THROMBOCYTOPENIA (HCC): Status: ACTIVE | Noted: 2020-07-30

## 2020-07-30 PROBLEM — R19.7 DIARRHEA: Status: ACTIVE | Noted: 2020-07-30

## 2020-07-30 PROBLEM — N18.30 CKD (CHRONIC KIDNEY DISEASE) STAGE 3, GFR 30-59 ML/MIN (HCC): Status: ACTIVE | Noted: 2020-07-30

## 2020-07-30 PROBLEM — I47.29 NSVT (NONSUSTAINED VENTRICULAR TACHYCARDIA) (HCC): Status: ACTIVE | Noted: 2018-01-01

## 2020-07-30 PROBLEM — R11.10 EMESIS: Status: ACTIVE | Noted: 2020-07-30

## 2020-07-30 PROBLEM — I95.89 HYPOTENSION DUE TO HYPOVOLEMIA: Status: ACTIVE | Noted: 2020-07-30

## 2020-07-30 PROBLEM — E86.1 HYPOTENSION DUE TO HYPOVOLEMIA: Status: ACTIVE | Noted: 2020-07-30

## 2020-07-30 PROBLEM — I95.9 HYPOTENSION: Status: ACTIVE | Noted: 2020-07-30

## 2020-07-30 PROBLEM — J41.0 SIMPLE CHRONIC BRONCHITIS (HCC): Status: RESOLVED | Noted: 2019-09-19 | Resolved: 2020-07-30

## 2020-07-30 PROBLEM — G89.18 POST-OP PAIN: Status: RESOLVED | Noted: 2019-06-25 | Resolved: 2020-07-30

## 2020-07-30 LAB
ABO/RH: NORMAL
ALBUMIN SERPL-MCNC: 3.5 G/DL (ref 3.5–5.2)
ALP BLD-CCNC: 97 U/L (ref 40–129)
ALT SERPL-CCNC: 29 U/L (ref 0–40)
ANION GAP SERPL CALCULATED.3IONS-SCNC: 12 MMOL/L (ref 7–16)
ANION GAP SERPL CALCULATED.3IONS-SCNC: 13 MMOL/L (ref 7–16)
ANISOCYTOSIS: ABNORMAL
ANTIBODY SCREEN: NORMAL
AST SERPL-CCNC: 21 U/L (ref 0–39)
BASOPHILS ABSOLUTE: 0.04 E9/L (ref 0–0.2)
BASOPHILS RELATIVE PERCENT: 0.4 % (ref 0–2)
BILIRUB SERPL-MCNC: 0.4 MG/DL (ref 0–1.2)
BILIRUBIN DIRECT: <0.2 MG/DL (ref 0–0.3)
BILIRUBIN, INDIRECT: ABNORMAL MG/DL (ref 0–1)
BUN BLDV-MCNC: 38 MG/DL (ref 8–23)
BUN BLDV-MCNC: 40 MG/DL (ref 8–23)
CALCIUM SERPL-MCNC: 7.9 MG/DL (ref 8.6–10.2)
CALCIUM SERPL-MCNC: 7.9 MG/DL (ref 8.6–10.2)
CHLORIDE BLD-SCNC: 94 MMOL/L (ref 98–107)
CHLORIDE BLD-SCNC: 96 MMOL/L (ref 98–107)
CO2: 26 MMOL/L (ref 22–29)
CO2: 27 MMOL/L (ref 22–29)
CREAT SERPL-MCNC: 1.1 MG/DL (ref 0.7–1.2)
CREAT SERPL-MCNC: 1.2 MG/DL (ref 0.7–1.2)
EOSINOPHILS ABSOLUTE: 0.15 E9/L (ref 0.05–0.5)
EOSINOPHILS RELATIVE PERCENT: 1.5 % (ref 0–6)
GFR AFRICAN AMERICAN: >60
GFR AFRICAN AMERICAN: >60
GFR NON-AFRICAN AMERICAN: 59 ML/MIN/1.73
GFR NON-AFRICAN AMERICAN: >60 ML/MIN/1.73
GLUCOSE BLD-MCNC: 109 MG/DL (ref 74–99)
GLUCOSE BLD-MCNC: 145 MG/DL (ref 74–99)
HCT VFR BLD CALC: 25.1 % (ref 37–54)
HCT VFR BLD CALC: 26.3 % (ref 37–54)
HEMOGLOBIN: 8.5 G/DL (ref 12.5–16.5)
HEMOGLOBIN: 8.9 G/DL (ref 12.5–16.5)
HYPOCHROMIA: ABNORMAL
IMMATURE GRANULOCYTES #: 0.04 E9/L
IMMATURE GRANULOCYTES %: 0.4 % (ref 0–5)
INR BLD: 9.9
LIPASE: 16 U/L (ref 13–60)
LYMPHOCYTES ABSOLUTE: 1.22 E9/L (ref 1.5–4)
LYMPHOCYTES RELATIVE PERCENT: 12.4 % (ref 20–42)
MAGNESIUM: 1.6 MG/DL (ref 1.6–2.6)
MCH RBC QN AUTO: 30.9 PG (ref 26–35)
MCH RBC QN AUTO: 31.2 PG (ref 26–35)
MCHC RBC AUTO-ENTMCNC: 33.8 % (ref 32–34.5)
MCHC RBC AUTO-ENTMCNC: 33.9 % (ref 32–34.5)
MCV RBC AUTO: 91.3 FL (ref 80–99.9)
MCV RBC AUTO: 92.3 FL (ref 80–99.9)
MONOCYTES ABSOLUTE: 1.47 E9/L (ref 0.1–0.95)
MONOCYTES RELATIVE PERCENT: 15 % (ref 2–12)
NEUTROPHILS ABSOLUTE: 6.89 E9/L (ref 1.8–7.3)
NEUTROPHILS RELATIVE PERCENT: 70.3 % (ref 43–80)
PDW BLD-RTO: 12.9 FL (ref 11.5–15)
PDW BLD-RTO: 13 FL (ref 11.5–15)
PLATELET # BLD: 85 E9/L (ref 130–450)
PLATELET # BLD: 90 E9/L (ref 130–450)
PLATELET CONFIRMATION: NORMAL
PLATELET CONFIRMATION: NORMAL
PMV BLD AUTO: 11.2 FL (ref 7–12)
PMV BLD AUTO: 11.8 FL (ref 7–12)
POTASSIUM REFLEX MAGNESIUM: 2.6 MMOL/L (ref 3.5–5)
POTASSIUM SERPL-SCNC: 2.8 MMOL/L (ref 3.5–5)
PRO-BNP: 1871 PG/ML (ref 0–450)
PROTHROMBIN TIME: >120 SEC (ref 9.3–12.4)
RBC # BLD: 2.75 E12/L (ref 3.8–5.8)
RBC # BLD: 2.85 E12/L (ref 3.8–5.8)
SARS-COV-2, NAAT: NOT DETECTED
SODIUM BLD-SCNC: 133 MMOL/L (ref 132–146)
SODIUM BLD-SCNC: 135 MMOL/L (ref 132–146)
TOTAL PROTEIN: 5.1 G/DL (ref 6.4–8.3)
TROPONIN: <0.01 NG/ML (ref 0–0.03)
WBC # BLD: 11.9 E9/L (ref 4.5–11.5)
WBC # BLD: 9.8 E9/L (ref 4.5–11.5)

## 2020-07-30 PROCEDURE — 71045 X-RAY EXAM CHEST 1 VIEW: CPT

## 2020-07-30 PROCEDURE — 99285 EMERGENCY DEPT VISIT HI MDM: CPT

## 2020-07-30 PROCEDURE — 36430 TRANSFUSION BLD/BLD COMPNT: CPT

## 2020-07-30 PROCEDURE — 83690 ASSAY OF LIPASE: CPT

## 2020-07-30 PROCEDURE — 83735 ASSAY OF MAGNESIUM: CPT

## 2020-07-30 PROCEDURE — 2580000003 HC RX 258: Performed by: EMERGENCY MEDICINE

## 2020-07-30 PROCEDURE — 83880 ASSAY OF NATRIURETIC PEPTIDE: CPT

## 2020-07-30 PROCEDURE — 80048 BASIC METABOLIC PNL TOTAL CA: CPT

## 2020-07-30 PROCEDURE — 6360000002 HC RX W HCPCS: Performed by: EMERGENCY MEDICINE

## 2020-07-30 PROCEDURE — P9035 PLATELET PHERES LEUKOREDUCED: HCPCS

## 2020-07-30 PROCEDURE — 86850 RBC ANTIBODY SCREEN: CPT

## 2020-07-30 PROCEDURE — 85025 COMPLETE CBC W/AUTO DIFF WBC: CPT

## 2020-07-30 PROCEDURE — 85610 PROTHROMBIN TIME: CPT

## 2020-07-30 PROCEDURE — 86920 COMPATIBILITY TEST SPIN: CPT

## 2020-07-30 PROCEDURE — 93005 ELECTROCARDIOGRAM TRACING: CPT | Performed by: EMERGENCY MEDICINE

## 2020-07-30 PROCEDURE — 96365 THER/PROPH/DIAG IV INF INIT: CPT

## 2020-07-30 PROCEDURE — P9016 RBC LEUKOCYTES REDUCED: HCPCS

## 2020-07-30 PROCEDURE — P9059 PLASMA, FRZ BETWEEN 8-24HOUR: HCPCS

## 2020-07-30 PROCEDURE — 86923 COMPATIBILITY TEST ELECTRIC: CPT

## 2020-07-30 PROCEDURE — 85027 COMPLETE CBC AUTOMATED: CPT

## 2020-07-30 PROCEDURE — 96375 TX/PRO/DX INJ NEW DRUG ADDON: CPT

## 2020-07-30 PROCEDURE — 84484 ASSAY OF TROPONIN QUANT: CPT

## 2020-07-30 PROCEDURE — 94640 AIRWAY INHALATION TREATMENT: CPT

## 2020-07-30 PROCEDURE — 6370000000 HC RX 637 (ALT 250 FOR IP): Performed by: EMERGENCY MEDICINE

## 2020-07-30 PROCEDURE — 86901 BLOOD TYPING SEROLOGIC RH(D): CPT

## 2020-07-30 PROCEDURE — U0002 COVID-19 LAB TEST NON-CDC: HCPCS

## 2020-07-30 PROCEDURE — 86900 BLOOD TYPING SEROLOGIC ABO: CPT

## 2020-07-30 PROCEDURE — 80076 HEPATIC FUNCTION PANEL: CPT

## 2020-07-30 PROCEDURE — 2000000000 HC ICU R&B

## 2020-07-30 RX ORDER — ONDANSETRON 2 MG/ML
4 INJECTION INTRAMUSCULAR; INTRAVENOUS ONCE
Status: COMPLETED | OUTPATIENT
Start: 2020-07-30 | End: 2020-07-30

## 2020-07-30 RX ORDER — 0.9 % SODIUM CHLORIDE 0.9 %
1000 INTRAVENOUS SOLUTION INTRAVENOUS ONCE
Status: COMPLETED | OUTPATIENT
Start: 2020-07-30 | End: 2020-07-30

## 2020-07-30 RX ORDER — PHYTONADIONE 10 MG/ML
10 INJECTION, EMULSION INTRAMUSCULAR; INTRAVENOUS; SUBCUTANEOUS ONCE
Status: COMPLETED | OUTPATIENT
Start: 2020-07-30 | End: 2020-07-30

## 2020-07-30 RX ORDER — 0.9 % SODIUM CHLORIDE 0.9 %
20 INTRAVENOUS SOLUTION INTRAVENOUS ONCE
Status: COMPLETED | OUTPATIENT
Start: 2020-07-30 | End: 2020-07-31

## 2020-07-30 RX ORDER — POTASSIUM CHLORIDE 7.45 MG/ML
10 INJECTION INTRAVENOUS
Status: DISPENSED | OUTPATIENT
Start: 2020-07-30 | End: 2020-07-30

## 2020-07-30 RX ORDER — 0.9 % SODIUM CHLORIDE 0.9 %
500 INTRAVENOUS SOLUTION INTRAVENOUS ONCE
Status: COMPLETED | OUTPATIENT
Start: 2020-07-30 | End: 2020-07-30

## 2020-07-30 RX ORDER — POTASSIUM CHLORIDE 7.45 MG/ML
10 INJECTION INTRAVENOUS
Status: COMPLETED | OUTPATIENT
Start: 2020-07-31 | End: 2020-07-31

## 2020-07-30 RX ORDER — IPRATROPIUM BROMIDE AND ALBUTEROL SULFATE 2.5; .5 MG/3ML; MG/3ML
1 SOLUTION RESPIRATORY (INHALATION) ONCE
Status: COMPLETED | OUTPATIENT
Start: 2020-07-30 | End: 2020-07-30

## 2020-07-30 RX ORDER — 0.9 % SODIUM CHLORIDE 0.9 %
250 INTRAVENOUS SOLUTION INTRAVENOUS ONCE
Status: DISCONTINUED | OUTPATIENT
Start: 2020-07-30 | End: 2020-07-31

## 2020-07-30 RX ADMIN — ONDANSETRON 4 MG: 2 INJECTION INTRAMUSCULAR; INTRAVENOUS at 18:32

## 2020-07-30 RX ADMIN — POTASSIUM CHLORIDE 10 MEQ: 10 INJECTION, SOLUTION INTRAVENOUS at 23:29

## 2020-07-30 RX ADMIN — PHYTONADIONE 10 MG: 10 INJECTION, EMULSION INTRAMUSCULAR; INTRAVENOUS; SUBCUTANEOUS at 19:35

## 2020-07-30 RX ADMIN — SODIUM CHLORIDE 20 ML: 9 INJECTION, SOLUTION INTRAVENOUS at 23:26

## 2020-07-30 RX ADMIN — IPRATROPIUM BROMIDE AND ALBUTEROL SULFATE 1 AMPULE: .5; 3 SOLUTION RESPIRATORY (INHALATION) at 19:45

## 2020-07-30 RX ADMIN — SODIUM CHLORIDE 1000 ML: 9 INJECTION, SOLUTION INTRAVENOUS at 19:28

## 2020-07-30 RX ADMIN — POTASSIUM CHLORIDE 10 MEQ: 10 INJECTION, SOLUTION INTRAVENOUS at 20:44

## 2020-07-30 RX ADMIN — SODIUM CHLORIDE 1000 ML: 9 INJECTION, SOLUTION INTRAVENOUS at 21:20

## 2020-07-30 RX ADMIN — SODIUM CHLORIDE 500 ML: 9 INJECTION, SOLUTION INTRAVENOUS at 18:33

## 2020-07-30 RX ADMIN — POTASSIUM CHLORIDE 10 MEQ: 10 INJECTION, SOLUTION INTRAVENOUS at 19:29

## 2020-07-30 RX ADMIN — HYDROCORTISONE SODIUM SUCCINATE 100 MG: 100 INJECTION, POWDER, FOR SOLUTION INTRAMUSCULAR; INTRAVENOUS at 19:31

## 2020-07-30 NOTE — ED NOTES
Bed: 10  Expected date:   Expected time:   Means of arrival:   Comments:  bambi Mendoza RN  07/30/20 1800

## 2020-07-30 NOTE — ED PROVIDER NOTES
catheterization; Coronary angioplasty with stent; ear surgery (Left, 6/25/2019); back surgery (years ago); and ear surgery (Left, 8/26/2019). Social History:  reports that he has been smoking cigarettes. He started smoking about 64 years ago. He has a 62.00 pack-year smoking history. He has never used smokeless tobacco. He reports that he does not drink alcohol or use drugs. Family History: family history includes Cancer in his mother; Heart Disease in his brother and father. . Unless otherwise noted, family history is non contributory    The patients home medications have been reviewed. Allergies: Pcn [penicillins] and Dye [iodides]        ---------------------------------------------------PHYSICAL EXAM--------------------------------------    Constitutional/General: Alert and oriented x3, frail and cachectic  Head: Normocephalic and atraumatic  Eyes: PERRL, EOMI, sclera non icteric  Mouth: Oropharynx clear, handling secretions, no trismus, no asymmetry of the posterior oropharynx or uvular edema  Neck: Supple, full ROM, no stridor, no meningeal signs  Respiratory: Sporadic expiratory wheeze, no significant increase in work of breathing  Cardiovascular:  Regular rate. Regular rhythm. No murmurs, no aortic murmurs, no gallops, or rubs. 2+ distal pulses. Equal extremity pulses. Chest: No chest wall tenderness  GI:  Abdomen Soft, Non tender, Non distended. No rebound, guarding, or rigidity. No pulsatile masses. Musculoskeletal: Moves all extremities x 4. Warm and well perfused, no clubbing, cyanosis, or edema. Capillary refill <3 seconds  Integument: skin warm and dry. No rashes.    Neurologic: GCS 15, no focal deficits, symmetric strength 5/5 in the upper and lower extremities bilaterally  Psychiatric: Normal Affect          -------------------------------------------------- RESULTS -------------------------------------------------  I have personally reviewed all laboratory and imaging results for this patient. Results are listed below.      LABS: (Lab results interpreted by me)  Results for orders placed or performed during the hospital encounter of 07/30/20   Troponin   Result Value Ref Range    Troponin <0.01 0.00 - 0.03 ng/mL   Brain Natriuretic Peptide   Result Value Ref Range    Pro-BNP 1,871 (H) 0 - 450 pg/mL   Protime-INR   Result Value Ref Range    Protime >120.0 (H) 9.3 - 12.4 sec    INR 9.9 (HH)    CBC Auto Differential   Result Value Ref Range    WBC 9.8 4.5 - 11.5 E9/L    RBC 2.75 (L) 3.80 - 5.80 E12/L    Hemoglobin 8.5 (L) 12.5 - 16.5 g/dL    Hematocrit 25.1 (L) 37.0 - 54.0 %    MCV 91.3 80.0 - 99.9 fL    MCH 30.9 26.0 - 35.0 pg    MCHC 33.9 32.0 - 34.5 %    RDW 13.0 11.5 - 15.0 fL    Platelets 85 (L) 078 - 450 E9/L    MPV 11.2 7.0 - 12.0 fL    Neutrophils % 70.3 43.0 - 80.0 %    Immature Granulocytes % 0.4 0.0 - 5.0 %    Lymphocytes % 12.4 (L) 20.0 - 42.0 %    Monocytes % 15.0 (H) 2.0 - 12.0 %    Eosinophils % 1.5 0.0 - 6.0 %    Basophils % 0.4 0.0 - 2.0 %    Neutrophils Absolute 6.89 1.80 - 7.30 E9/L    Immature Granulocytes # 0.04 E9/L    Lymphocytes Absolute 1.22 (L) 1.50 - 4.00 E9/L    Monocytes Absolute 1.47 (H) 0.10 - 0.95 E9/L    Eosinophils Absolute 0.15 0.05 - 0.50 E9/L    Basophils Absolute 0.04 0.00 - 0.20 E9/L    Anisocytosis 1+     Hypochromia 1+    Basic Metabolic Panel w/ Reflex to MG   Result Value Ref Range    Sodium 133 132 - 146 mmol/L    Potassium reflex Magnesium 2.6 (LL) 3.5 - 5.0 mmol/L    Chloride 94 (L) 98 - 107 mmol/L    CO2 26 22 - 29 mmol/L    Anion Gap 13 7 - 16 mmol/L    Glucose 145 (H) 74 - 99 mg/dL    BUN 40 (H) 8 - 23 mg/dL    CREATININE 1.2 0.7 - 1.2 mg/dL    GFR Non-African American 59 >=60 mL/min/1.73    GFR African American >60     Calcium 7.9 (L) 8.6 - 10.2 mg/dL   Hepatic Function Panel   Result Value Ref Range    Total Protein 5.1 (L) 6.4 - 8.3 g/dL    Alb 3.5 3.5 - 5.2 g/dL    Alkaline Phosphatase 97 40 - 129 U/L    ALT 29 0 - 40 U/L    AST 21 0 - 39 U/L Total Bilirubin 0.4 0.0 - 1.2 mg/dL    Bilirubin, Direct <0.2 0.0 - 0.3 mg/dL    Bilirubin, Indirect see below 0.0 - 1.0 mg/dL   Lipase   Result Value Ref Range    Lipase 16 13 - 60 U/L   COVID-19   Result Value Ref Range    SARS-CoV-2, NAAT Not Detected Not Detected   Magnesium   Result Value Ref Range    Magnesium 1.6 1.6 - 2.6 mg/dL   Platelet Confirmation   Result Value Ref Range    Platelet Confirmation CONFIRMED    EKG 12 Lead   Result Value Ref Range    Ventricular Rate 109 BPM    Atrial Rate 90 BPM    QRS Duration 150 ms    Q-T Interval 368 ms    QTc Calculation (Bazett) 495 ms    R Axis 99 degrees    T Axis -16 degrees   ,       RADIOLOGY:  Interpreted by Radiologist unless otherwise specified  XR CHEST PORTABLE   Final Result      Chronic coarsened interstitial lung markings are seen throughout both   lungs. No obvious pneumonia or pleural effusion. EKG Interpretation  Interpreted by emergency department physician, Dr. Scout Barrios    Date of EK20  Time:     Rhythm: atrial fibrillation - controlled  Rate: 109  Axis: normal  Conduction: right bundle branch block (complete)  ST Segments: no acute change  T Waves: no acute change    Clinical Impression: No findings suggestive of acute ischemia or injury  Comparison to prior EKG: stable as compared to patient's most recent EKG      ------------------------- NURSING NOTES AND VITALS REVIEWED ---------------------------   The nursing notes within the ED encounter and vital signs as below have been reviewed by myself  BP (!) 82/51   Pulse 102   Temp 98.7 °F (37.1 °C)   Resp 20   Ht 5' 8\" (1.727 m)   Wt 130 lb (59 kg)   SpO2 100%   BMI 19.77 kg/m²     Oxygen Saturation Interpretation: Normal    The patients available past medical records and past encounters were reviewed.         ------------------------------ ED COURSE/MEDICAL DECISION MAKING----------------------  Medications   potassium chloride 10 mEq/100 mL IVPB (Peripheral Line) (10 mEq Intravenous New Bag 7/30/20 1929)   0.9 % sodium chloride bolus (1,000 mLs Intravenous New Bag 7/30/20 1928)   0.9 % sodium chloride bolus (has no administration in time range)   0.9 % sodium chloride bolus (0 mLs Intravenous Stopped 7/30/20 1914)   ondansetron (ZOFRAN) injection 4 mg (4 mg Intravenous Given 7/30/20 1832)   phytonadione ADULT (VITAMIN K) inj 10 mg/mL (10 mg Subcutaneous Given 7/30/20 1935)   hydrocortisone sodium succinate PF (SOLU-CORTEF) injection 100 mg (100 mg Intravenous Given 7/30/20 1931)   ipratropium-albuterol (DUONEB) nebulizer solution 1 ampule (1 ampule Inhalation Given 7/30/20 1945)           The cardiac monitor revealed atrial fibrillation with controlled rate with a heart rate in the 90s as interpreted by me. The cardiac monitor was ordered secondary to the patient's chest pain and to monitor for patient for dysrhythmia. CPT 76022           Medical Decision Making:     I, Dr. Dana Novoa, am the primary provider of record    72-year-old male with history of COPD, CAD, presenting with vomiting and diarrhea and ongoing fatigue. Patient has had little to no oral intake. Concern for dehydration and electrolyte abnormalities. Also concern for infectious process. He arrives hypotensive, no hypoxia or increased work of breathing on home oxygen. EKG shows no signs of ischemia or injury, chronic atrial fibrillation at baseline. Troponin is negative. Metabolic panel reveals a hypokalemia, hypochloremia and likely contraction alkalosis, prerenal azotemia with normal kidney function. Patient is anemic with hemoglobin of 8 down from baseline of 12, platelets are also decreased. His INR is elevated at 9.9. Rectal exam is guaiac positive. Patient was given vitamin K. With his melena and decreased hemoglobin and platelets we will give FFP as well. He is hypotensive but I feel it is more of a dehydration and volume decreased more so than a hemorrhagic shock.   Chest x-ray shows chronic scarring, no new infiltrate or effusion, no vascular congestion. Patient was given IV fluids as well as Solu-Cortef with improvement in blood pressure, now consistently greater than 90 systolic. He is sitting up and is more comfortable, requesting to eat which will be obliged. Patient's potassium will be repleted intravenous. Patient's coronavirus test is negative. Prior to transfer to the floor patient's blood pressure decreased again below 90 systolic. He was given IV fluids, will be given a unit of PRBC. Patient will be held in the ED during the stat transfusion and further resuscitation. Should his blood pressure not improve he would be a candidate for the ICU. Please note that the withdrawal or failure to initiate urgent interventions for this patient would likely result in a life threatening deterioration or permanent disability. Accordingly this patient received 36 minutes of critical care time, excluding separately billable procedures. Re-Evaluations: This patient's ED course included: a personal history and physicial examination, re-evaluation prior to disposition, multiple bedside re-evaluations, IV medications, cardiac monitoring, continuous pulse oximetry and complex medical decision making and emergency management    This patient has remained hemodynamically stable, improved and been closely monitored during their ED course. Counseling: The emergency provider has spoken with the patient and discussed todays results, in addition to providing specific details for the plan of care and counseling regarding the diagnosis and prognosis. Questions are answered at this time and they are agreeable with the plan.       --------------------------------- IMPRESSION AND DISPOSITION ---------------------------------    IMPRESSION  1. Nausea vomiting and diarrhea    2. Supratherapeutic INR    3.  Gastrointestinal hemorrhage, unspecified gastrointestinal hemorrhage type DISPOSITION  Disposition: Admit to telemetry  Patient condition is stable        NOTE: This report was transcribed using voice recognition software.  Every effort was made to ensure accuracy; however, inadvertent computerized transcription errors may be present        Tereso Callahan, DO  07/30/20 [facility], DO  07/30/20 [id number]

## 2020-07-31 ENCOUNTER — ANESTHESIA (OUTPATIENT)
Dept: ENDOSCOPY | Age: 80
DRG: 380 | End: 2020-07-31
Payer: MEDICARE

## 2020-07-31 ENCOUNTER — APPOINTMENT (OUTPATIENT)
Dept: CT IMAGING | Age: 80
DRG: 380 | End: 2020-07-31
Payer: MEDICARE

## 2020-07-31 ENCOUNTER — ANESTHESIA EVENT (OUTPATIENT)
Dept: ENDOSCOPY | Age: 80
DRG: 380 | End: 2020-07-31
Payer: MEDICARE

## 2020-07-31 PROBLEM — R63.4 UNINTENTIONAL WEIGHT LOSS OF 10% BODY WEIGHT WITHIN 6 MONTHS: Status: ACTIVE | Noted: 2020-07-31

## 2020-07-31 LAB
ADENOVIRUS BY PCR: NOT DETECTED
ALBUMIN SERPL-MCNC: 3.3 G/DL (ref 3.5–5.2)
ALP BLD-CCNC: 89 U/L (ref 40–129)
ALT SERPL-CCNC: 27 U/L (ref 0–40)
ANION GAP SERPL CALCULATED.3IONS-SCNC: 17 MMOL/L (ref 7–16)
AST SERPL-CCNC: 19 U/L (ref 0–39)
BASOPHILS ABSOLUTE: 0.01 E9/L (ref 0–0.2)
BASOPHILS RELATIVE PERCENT: 0.1 % (ref 0–2)
BILIRUB SERPL-MCNC: 0.4 MG/DL (ref 0–1.2)
BILIRUBIN DIRECT: <0.2 MG/DL (ref 0–0.3)
BILIRUBIN, INDIRECT: ABNORMAL MG/DL (ref 0–1)
BLOOD BANK DISPENSE STATUS: NORMAL
BLOOD BANK PRODUCT CODE: NORMAL
BORDETELLA PARAPERTUSSIS BY PCR: NOT DETECTED
BORDETELLA PERTUSSIS BY PCR: NOT DETECTED
BPU ID: NORMAL
BUN BLDV-MCNC: 28 MG/DL (ref 8–23)
C-REACTIVE PROTEIN: 0.2 MG/DL (ref 0–0.4)
CALCIUM SERPL-MCNC: 7.5 MG/DL (ref 8.6–10.2)
CEA: 3.1 NG/ML (ref 0–5.2)
CHLAMYDOPHILIA PNEUMONIAE BY PCR: NOT DETECTED
CHLORIDE BLD-SCNC: 96 MMOL/L (ref 98–107)
CHOLESTEROL, TOTAL: 79 MG/DL (ref 0–199)
CO2: 24 MMOL/L (ref 22–29)
CORONAVIRUS 229E BY PCR: NOT DETECTED
CORONAVIRUS HKU1 BY PCR: NOT DETECTED
CORONAVIRUS NL63 BY PCR: NOT DETECTED
CORONAVIRUS OC43 BY PCR: NOT DETECTED
CREAT SERPL-MCNC: 1 MG/DL (ref 0.7–1.2)
DESCRIPTION BLOOD BANK: NORMAL
EOSINOPHILS ABSOLUTE: 0 E9/L (ref 0.05–0.5)
EOSINOPHILS RELATIVE PERCENT: 0 % (ref 0–6)
FOLATE: 9 NG/ML (ref 4.8–24.2)
GFR AFRICAN AMERICAN: >60
GFR NON-AFRICAN AMERICAN: >60 ML/MIN/1.73
GLUCOSE BLD-MCNC: 198 MG/DL (ref 74–99)
HBA1C MFR BLD: 6.2 % (ref 4–5.6)
HCT VFR BLD CALC: 25.7 % (ref 37–54)
HCT VFR BLD CALC: 33.6 % (ref 37–54)
HDLC SERPL-MCNC: 30 MG/DL
HEMOGLOBIN: 11 G/DL (ref 12.5–16.5)
HEMOGLOBIN: 8.9 G/DL (ref 12.5–16.5)
HUMAN METAPNEUMOVIRUS BY PCR: NOT DETECTED
HUMAN RHINOVIRUS/ENTEROVIRUS BY PCR: NOT DETECTED
IMMATURE GRANULOCYTES #: 0.07 E9/L
IMMATURE GRANULOCYTES %: 0.7 % (ref 0–5)
INFLUENZA A BY PCR: NOT DETECTED
INFLUENZA B BY PCR: NOT DETECTED
INR BLD: 1.8
INR BLD: 2.8
L. PNEUMOPHILA SEROGP 1 UR AG: NORMAL
LACTIC ACID, SEPSIS: 4.1 MMOL/L (ref 0.5–1.9)
LACTIC ACID: 3.8 MMOL/L (ref 0.5–2.2)
LACTIC ACID: 4.9 MMOL/L (ref 0.5–2.2)
LDL CHOLESTEROL CALCULATED: 33 MG/DL (ref 0–99)
LV EF: 55 %
LVEF MODALITY: NORMAL
LYMPHOCYTES ABSOLUTE: 0.35 E9/L (ref 1.5–4)
LYMPHOCYTES RELATIVE PERCENT: 3.6 % (ref 20–42)
MAGNESIUM: 1.7 MG/DL (ref 1.6–2.6)
MCH RBC QN AUTO: 31.1 PG (ref 26–35)
MCHC RBC AUTO-ENTMCNC: 32.7 % (ref 32–34.5)
MCV RBC AUTO: 94.9 FL (ref 80–99.9)
MONOCYTES ABSOLUTE: 0.22 E9/L (ref 0.1–0.95)
MONOCYTES RELATIVE PERCENT: 2.3 % (ref 2–12)
MYCOPLASMA PNEUMONIAE BY PCR: NOT DETECTED
NEUTROPHILS ABSOLUTE: 9.08 E9/L (ref 1.8–7.3)
NEUTROPHILS RELATIVE PERCENT: 93.3 % (ref 43–80)
PARAINFLUENZA VIRUS 1 BY PCR: NOT DETECTED
PARAINFLUENZA VIRUS 2 BY PCR: NOT DETECTED
PARAINFLUENZA VIRUS 3 BY PCR: NOT DETECTED
PARAINFLUENZA VIRUS 4 BY PCR: NOT DETECTED
PDW BLD-RTO: 14.1 FL (ref 11.5–15)
PHOSPHORUS: 4.4 MG/DL (ref 2.5–4.5)
PLATELET # BLD: 69 E9/L (ref 130–450)
PLATELET CONFIRMATION: NORMAL
PMV BLD AUTO: 11.3 FL (ref 7–12)
POTASSIUM SERPL-SCNC: 4.6 MMOL/L (ref 3.5–5)
PRO-BNP: 2500 PG/ML (ref 0–450)
PROCALCITONIN: 0.1 NG/ML (ref 0–0.08)
PROTHROMBIN TIME: 20.5 SEC (ref 9.3–12.4)
PROTHROMBIN TIME: 33.8 SEC (ref 9.3–12.4)
RBC # BLD: 3.54 E12/L (ref 3.8–5.8)
RESPIRATORY SYNCYTIAL VIRUS BY PCR: NOT DETECTED
SEDIMENTATION RATE, ERYTHROCYTE: 1 MM/HR (ref 0–15)
SODIUM BLD-SCNC: 137 MMOL/L (ref 132–146)
STREP PNEUMONIAE ANTIGEN, URINE: NORMAL
TOTAL PROTEIN: 5.1 G/DL (ref 6.4–8.3)
TRIGL SERPL-MCNC: 79 MG/DL (ref 0–149)
TROPONIN: <0.01 NG/ML (ref 0–0.03)
TROPONIN: <0.01 NG/ML (ref 0–0.03)
TSH SERPL DL<=0.05 MIU/L-ACNC: 0.53 UIU/ML (ref 0.27–4.2)
URIC ACID, SERUM: 7.7 MG/DL (ref 3.4–7)
VITAMIN B-12: 351 PG/ML (ref 211–946)
VLDLC SERPL CALC-MCNC: 16 MG/DL
WBC # BLD: 9.7 E9/L (ref 4.5–11.5)

## 2020-07-31 PROCEDURE — 2580000003 HC RX 258: Performed by: NURSE ANESTHETIST, CERTIFIED REGISTERED

## 2020-07-31 PROCEDURE — 6360000002 HC RX W HCPCS: Performed by: INTERNAL MEDICINE

## 2020-07-31 PROCEDURE — 85651 RBC SED RATE NONAUTOMATED: CPT

## 2020-07-31 PROCEDURE — C9113 INJ PANTOPRAZOLE SODIUM, VIA: HCPCS | Performed by: INTERNAL MEDICINE

## 2020-07-31 PROCEDURE — 82378 CARCINOEMBRYONIC ANTIGEN: CPT

## 2020-07-31 PROCEDURE — 2709999900 HC NON-CHARGEABLE SUPPLY: Performed by: INTERNAL MEDICINE

## 2020-07-31 PROCEDURE — 6370000000 HC RX 637 (ALT 250 FOR IP): Performed by: INTERNAL MEDICINE

## 2020-07-31 PROCEDURE — 36430 TRANSFUSION BLD/BLD COMPNT: CPT

## 2020-07-31 PROCEDURE — 80048 BASIC METABOLIC PNL TOTAL CA: CPT

## 2020-07-31 PROCEDURE — 85025 COMPLETE CBC W/AUTO DIFF WBC: CPT

## 2020-07-31 PROCEDURE — 2500000003 HC RX 250 WO HCPCS

## 2020-07-31 PROCEDURE — 83605 ASSAY OF LACTIC ACID: CPT

## 2020-07-31 PROCEDURE — 2580000003 HC RX 258: Performed by: INTERNAL MEDICINE

## 2020-07-31 PROCEDURE — 83880 ASSAY OF NATRIURETIC PEPTIDE: CPT

## 2020-07-31 PROCEDURE — 86140 C-REACTIVE PROTEIN: CPT

## 2020-07-31 PROCEDURE — C1751 CATH, INF, PER/CENT/MIDLINE: HCPCS

## 2020-07-31 PROCEDURE — P9035 PLATELET PHERES LEUKOREDUCED: HCPCS

## 2020-07-31 PROCEDURE — 6360000002 HC RX W HCPCS: Performed by: NURSE ANESTHETIST, CERTIFIED REGISTERED

## 2020-07-31 PROCEDURE — 0DJ08ZZ INSPECTION OF UPPER INTESTINAL TRACT, VIA NATURAL OR ARTIFICIAL OPENING ENDOSCOPIC: ICD-10-PCS | Performed by: INTERNAL MEDICINE

## 2020-07-31 PROCEDURE — 6360000002 HC RX W HCPCS

## 2020-07-31 PROCEDURE — 0100U HC RESPIRPTHGN MULT REV TRANS & AMP PRB TECH 21 TRGT: CPT

## 2020-07-31 PROCEDURE — 82746 ASSAY OF FOLIC ACID SERUM: CPT

## 2020-07-31 PROCEDURE — 83036 HEMOGLOBIN GLYCOSYLATED A1C: CPT

## 2020-07-31 PROCEDURE — 2700000000 HC OXYGEN THERAPY PER DAY

## 2020-07-31 PROCEDURE — 6360000002 HC RX W HCPCS: Performed by: EMERGENCY MEDICINE

## 2020-07-31 PROCEDURE — 37799 UNLISTED PX VASCULAR SURGERY: CPT

## 2020-07-31 PROCEDURE — C9113 INJ PANTOPRAZOLE SODIUM, VIA: HCPCS | Performed by: EMERGENCY MEDICINE

## 2020-07-31 PROCEDURE — 85018 HEMOGLOBIN: CPT

## 2020-07-31 PROCEDURE — P9016 RBC LEUKOCYTES REDUCED: HCPCS

## 2020-07-31 PROCEDURE — 87040 BLOOD CULTURE FOR BACTERIA: CPT

## 2020-07-31 PROCEDURE — 2580000003 HC RX 258

## 2020-07-31 PROCEDURE — 36415 COLL VENOUS BLD VENIPUNCTURE: CPT

## 2020-07-31 PROCEDURE — 84484 ASSAY OF TROPONIN QUANT: CPT

## 2020-07-31 PROCEDURE — 94640 AIRWAY INHALATION TREATMENT: CPT

## 2020-07-31 PROCEDURE — 80076 HEPATIC FUNCTION PANEL: CPT

## 2020-07-31 PROCEDURE — 3700000000 HC ANESTHESIA ATTENDED CARE: Performed by: INTERNAL MEDICINE

## 2020-07-31 PROCEDURE — 2580000003 HC RX 258: Performed by: EMERGENCY MEDICINE

## 2020-07-31 PROCEDURE — 2500000003 HC RX 250 WO HCPCS: Performed by: NURSE ANESTHETIST, CERTIFIED REGISTERED

## 2020-07-31 PROCEDURE — 87205 SMEAR GRAM STAIN: CPT

## 2020-07-31 PROCEDURE — 87186 SC STD MICRODIL/AGAR DIL: CPT

## 2020-07-31 PROCEDURE — 74176 CT ABD & PELVIS W/O CONTRAST: CPT

## 2020-07-31 PROCEDURE — 36556 INSERT NON-TUNNEL CV CATH: CPT

## 2020-07-31 PROCEDURE — 3700000001 HC ADD 15 MINUTES (ANESTHESIA): Performed by: INTERNAL MEDICINE

## 2020-07-31 PROCEDURE — 87081 CULTURE SCREEN ONLY: CPT

## 2020-07-31 PROCEDURE — 3609017100 HC EGD: Performed by: INTERNAL MEDICINE

## 2020-07-31 PROCEDURE — 82607 VITAMIN B-12: CPT

## 2020-07-31 PROCEDURE — 80061 LIPID PANEL: CPT

## 2020-07-31 PROCEDURE — P9059 PLASMA, FRZ BETWEEN 8-24HOUR: HCPCS

## 2020-07-31 PROCEDURE — 84550 ASSAY OF BLOOD/URIC ACID: CPT

## 2020-07-31 PROCEDURE — 2000000000 HC ICU R&B

## 2020-07-31 PROCEDURE — 87088 URINE BACTERIA CULTURE: CPT

## 2020-07-31 PROCEDURE — 86301 IMMUNOASSAY TUMOR CA 19-9: CPT

## 2020-07-31 PROCEDURE — 85610 PROTHROMBIN TIME: CPT

## 2020-07-31 PROCEDURE — 93306 TTE W/DOPPLER COMPLETE: CPT

## 2020-07-31 PROCEDURE — 06HY33Z INSERTION OF INFUSION DEVICE INTO LOWER VEIN, PERCUTANEOUS APPROACH: ICD-10-PCS | Performed by: INTERNAL MEDICINE

## 2020-07-31 PROCEDURE — 85014 HEMATOCRIT: CPT

## 2020-07-31 PROCEDURE — 84100 ASSAY OF PHOSPHORUS: CPT

## 2020-07-31 PROCEDURE — 84145 PROCALCITONIN (PCT): CPT

## 2020-07-31 PROCEDURE — 87450 HC DIRECT STREP B ANTIGEN: CPT

## 2020-07-31 PROCEDURE — 36620 INSERTION CATHETER ARTERY: CPT

## 2020-07-31 PROCEDURE — 87077 CULTURE AEROBIC IDENTIFY: CPT

## 2020-07-31 PROCEDURE — 84443 ASSAY THYROID STIM HORMONE: CPT

## 2020-07-31 PROCEDURE — 83735 ASSAY OF MAGNESIUM: CPT

## 2020-07-31 RX ORDER — ARFORMOTEROL TARTRATE 15 UG/2ML
15 SOLUTION RESPIRATORY (INHALATION) 2 TIMES DAILY
Status: DISCONTINUED | OUTPATIENT
Start: 2020-07-31 | End: 2020-08-07 | Stop reason: HOSPADM

## 2020-07-31 RX ORDER — POTASSIUM CHLORIDE 20 MEQ/1
20 TABLET, EXTENDED RELEASE ORAL 2 TIMES DAILY WITH MEALS
Status: DISCONTINUED | OUTPATIENT
Start: 2020-07-31 | End: 2020-08-03

## 2020-07-31 RX ORDER — MAGNESIUM SULFATE IN WATER 40 MG/ML
2 INJECTION, SOLUTION INTRAVENOUS ONCE
Status: COMPLETED | OUTPATIENT
Start: 2020-07-31 | End: 2020-07-31

## 2020-07-31 RX ORDER — HYDROCODONE BITARTRATE AND ACETAMINOPHEN 7.5; 325 MG/1; MG/1
1 TABLET ORAL EVERY 4 HOURS PRN
Status: DISCONTINUED | OUTPATIENT
Start: 2020-07-31 | End: 2020-08-07 | Stop reason: HOSPADM

## 2020-07-31 RX ORDER — FLUCONAZOLE 100 MG/1
100 TABLET ORAL DAILY
Status: DISCONTINUED | OUTPATIENT
Start: 2020-07-31 | End: 2020-08-02

## 2020-07-31 RX ORDER — POTASSIUM CHLORIDE 20 MEQ/1
40 TABLET, EXTENDED RELEASE ORAL PRN
Status: DISCONTINUED | OUTPATIENT
Start: 2020-07-31 | End: 2020-08-03

## 2020-07-31 RX ORDER — METHYLPREDNISOLONE SODIUM SUCCINATE 40 MG/ML
40 INJECTION, POWDER, LYOPHILIZED, FOR SOLUTION INTRAMUSCULAR; INTRAVENOUS EVERY 8 HOURS
Status: DISCONTINUED | OUTPATIENT
Start: 2020-07-31 | End: 2020-08-02

## 2020-07-31 RX ORDER — SODIUM CHLORIDE 9 MG/ML
INJECTION, SOLUTION INTRAVENOUS CONTINUOUS PRN
Status: DISCONTINUED | OUTPATIENT
Start: 2020-07-31 | End: 2020-07-31 | Stop reason: SDUPTHER

## 2020-07-31 RX ORDER — DOCUSATE SODIUM 100 MG/1
100 CAPSULE, LIQUID FILLED ORAL 2 TIMES DAILY
Status: DISCONTINUED | OUTPATIENT
Start: 2020-07-31 | End: 2020-07-31

## 2020-07-31 RX ORDER — PANTOPRAZOLE SODIUM 40 MG/10ML
40 INJECTION, POWDER, LYOPHILIZED, FOR SOLUTION INTRAVENOUS ONCE
Status: COMPLETED | OUTPATIENT
Start: 2020-07-31 | End: 2020-07-31

## 2020-07-31 RX ORDER — CLOPIDOGREL BISULFATE 75 MG/1
75 TABLET ORAL DAILY
Status: DISCONTINUED | OUTPATIENT
Start: 2020-07-31 | End: 2020-08-06 | Stop reason: SDUPTHER

## 2020-07-31 RX ORDER — SODIUM CHLORIDE 0.9 % (FLUSH) 0.9 %
10 SYRINGE (ML) INJECTION PRN
Status: DISCONTINUED | OUTPATIENT
Start: 2020-07-31 | End: 2020-08-07 | Stop reason: HOSPADM

## 2020-07-31 RX ORDER — ISOSORBIDE MONONITRATE 30 MG/1
30 TABLET, EXTENDED RELEASE ORAL DAILY
Status: DISCONTINUED | OUTPATIENT
Start: 2020-07-31 | End: 2020-08-04

## 2020-07-31 RX ORDER — SODIUM CHLORIDE 9 MG/ML
10 INJECTION INTRAVENOUS 2 TIMES DAILY
Status: DISCONTINUED | OUTPATIENT
Start: 2020-07-31 | End: 2020-08-05

## 2020-07-31 RX ORDER — PROPOFOL 10 MG/ML
INJECTION, EMULSION INTRAVENOUS PRN
Status: DISCONTINUED | OUTPATIENT
Start: 2020-07-31 | End: 2020-07-31 | Stop reason: SDUPTHER

## 2020-07-31 RX ORDER — SODIUM CHLORIDE 9 MG/ML
INJECTION, SOLUTION INTRAVENOUS CONTINUOUS
Status: DISCONTINUED | OUTPATIENT
Start: 2020-07-31 | End: 2020-08-02

## 2020-07-31 RX ORDER — IPRATROPIUM BROMIDE AND ALBUTEROL SULFATE 2.5; .5 MG/3ML; MG/3ML
1 SOLUTION RESPIRATORY (INHALATION)
Status: DISCONTINUED | OUTPATIENT
Start: 2020-07-31 | End: 2020-08-04

## 2020-07-31 RX ORDER — SODIUM CHLORIDE 0.9 % (FLUSH) 0.9 %
10 SYRINGE (ML) INJECTION EVERY 12 HOURS SCHEDULED
Status: DISCONTINUED | OUTPATIENT
Start: 2020-07-31 | End: 2020-08-07 | Stop reason: HOSPADM

## 2020-07-31 RX ORDER — 0.9 % SODIUM CHLORIDE 0.9 %
1000 INTRAVENOUS SOLUTION INTRAVENOUS ONCE
Status: COMPLETED | OUTPATIENT
Start: 2020-07-31 | End: 2020-07-31

## 2020-07-31 RX ORDER — 0.9 % SODIUM CHLORIDE 0.9 %
20 INTRAVENOUS SOLUTION INTRAVENOUS ONCE
Status: DISCONTINUED | OUTPATIENT
Start: 2020-07-31 | End: 2020-07-31

## 2020-07-31 RX ORDER — PANTOPRAZOLE SODIUM 40 MG/1
40 TABLET, DELAYED RELEASE ORAL
Status: DISCONTINUED | OUTPATIENT
Start: 2020-07-31 | End: 2020-07-31

## 2020-07-31 RX ORDER — METOPROLOL TARTRATE 50 MG/1
100 TABLET, FILM COATED ORAL 2 TIMES DAILY
Status: DISCONTINUED | OUTPATIENT
Start: 2020-07-31 | End: 2020-08-02

## 2020-07-31 RX ORDER — POTASSIUM CHLORIDE 7.45 MG/ML
10 INJECTION INTRAVENOUS PRN
Status: DISCONTINUED | OUTPATIENT
Start: 2020-07-31 | End: 2020-08-03

## 2020-07-31 RX ORDER — AMLODIPINE BESYLATE 5 MG/1
5 TABLET ORAL DAILY
Status: DISCONTINUED | OUTPATIENT
Start: 2020-07-31 | End: 2020-08-04

## 2020-07-31 RX ORDER — BUDESONIDE 0.25 MG/2ML
250 INHALANT ORAL 2 TIMES DAILY
Status: DISCONTINUED | OUTPATIENT
Start: 2020-07-31 | End: 2020-08-07 | Stop reason: HOSPADM

## 2020-07-31 RX ORDER — ACETAMINOPHEN 325 MG/1
650 TABLET ORAL EVERY 4 HOURS PRN
Status: DISCONTINUED | OUTPATIENT
Start: 2020-07-31 | End: 2020-08-07 | Stop reason: HOSPADM

## 2020-07-31 RX ORDER — 0.9 % SODIUM CHLORIDE 0.9 %
250 INTRAVENOUS SOLUTION INTRAVENOUS ONCE
Status: DISCONTINUED | OUTPATIENT
Start: 2020-07-31 | End: 2020-07-31

## 2020-07-31 RX ORDER — LIDOCAINE HYDROCHLORIDE 20 MG/ML
INJECTION, SOLUTION INFILTRATION; PERINEURAL PRN
Status: DISCONTINUED | OUTPATIENT
Start: 2020-07-31 | End: 2020-07-31 | Stop reason: SDUPTHER

## 2020-07-31 RX ORDER — OCTREOTIDE ACETATE 100 UG/ML
100 INJECTION, SOLUTION INTRAVENOUS; SUBCUTANEOUS ONCE
Status: COMPLETED | OUTPATIENT
Start: 2020-07-31 | End: 2020-07-31

## 2020-07-31 RX ORDER — PANTOPRAZOLE SODIUM 40 MG/10ML
40 INJECTION, POWDER, LYOPHILIZED, FOR SOLUTION INTRAVENOUS 2 TIMES DAILY
Status: DISCONTINUED | OUTPATIENT
Start: 2020-07-31 | End: 2020-08-05

## 2020-07-31 RX ADMIN — SODIUM CHLORIDE, PRESERVATIVE FREE 10 ML: 5 INJECTION INTRAVENOUS at 20:26

## 2020-07-31 RX ADMIN — METHYLPREDNISOLONE SODIUM SUCCINATE 40 MG: 40 INJECTION, POWDER, LYOPHILIZED, FOR SOLUTION INTRAMUSCULAR; INTRAVENOUS at 17:08

## 2020-07-31 RX ADMIN — IPRATROPIUM BROMIDE AND ALBUTEROL SULFATE 1 AMPULE: 2.5; .5 SOLUTION RESPIRATORY (INHALATION) at 08:39

## 2020-07-31 RX ADMIN — OCTREOTIDE ACETATE 50 MCG/HR: 500 INJECTION, SOLUTION INTRAVENOUS; SUBCUTANEOUS at 06:11

## 2020-07-31 RX ADMIN — PANTOPRAZOLE SODIUM 40 MG: 40 INJECTION, POWDER, FOR SOLUTION INTRAVENOUS at 02:04

## 2020-07-31 RX ADMIN — ARFORMOTEROL TARTRATE 15 MCG: 15 SOLUTION RESPIRATORY (INHALATION) at 19:41

## 2020-07-31 RX ADMIN — IPRATROPIUM BROMIDE AND ALBUTEROL SULFATE 1 AMPULE: 2.5; .5 SOLUTION RESPIRATORY (INHALATION) at 19:41

## 2020-07-31 RX ADMIN — PROPOFOL 100 MG: 10 INJECTION, EMULSION INTRAVENOUS at 14:43

## 2020-07-31 RX ADMIN — POTASSIUM CHLORIDE 10 MEQ: 10 INJECTION, SOLUTION INTRAVENOUS at 02:17

## 2020-07-31 RX ADMIN — IPRATROPIUM BROMIDE AND ALBUTEROL SULFATE 1 AMPULE: 2.5; .5 SOLUTION RESPIRATORY (INHALATION) at 16:14

## 2020-07-31 RX ADMIN — SODIUM CHLORIDE: 9 INJECTION, SOLUTION INTRAVENOUS at 14:32

## 2020-07-31 RX ADMIN — WATER 10 ML: 1 INJECTION INTRAMUSCULAR; INTRAVENOUS; SUBCUTANEOUS at 10:29

## 2020-07-31 RX ADMIN — SODIUM CHLORIDE: 9 INJECTION, SOLUTION INTRAVENOUS at 16:19

## 2020-07-31 RX ADMIN — MAGNESIUM SULFATE 2 G: 2 INJECTION INTRAVENOUS at 10:32

## 2020-07-31 RX ADMIN — PHENYLEPHRINE HYDROCHLORIDE 50 MCG/MIN: 10 INJECTION INTRAVENOUS at 09:16

## 2020-07-31 RX ADMIN — METHYLPREDNISOLONE SODIUM SUCCINATE 40 MG: 40 INJECTION, POWDER, LYOPHILIZED, FOR SOLUTION INTRAMUSCULAR; INTRAVENOUS at 10:29

## 2020-07-31 RX ADMIN — OCTREOTIDE ACETATE 50 MCG/HR: 500 INJECTION, SOLUTION INTRAVENOUS; SUBCUTANEOUS at 01:49

## 2020-07-31 RX ADMIN — ARFORMOTEROL TARTRATE 15 MCG: 15 SOLUTION RESPIRATORY (INHALATION) at 08:39

## 2020-07-31 RX ADMIN — SODIUM CHLORIDE: 9 INJECTION, SOLUTION INTRAVENOUS at 08:58

## 2020-07-31 RX ADMIN — SODIUM CHLORIDE 20 ML: 9 INJECTION, SOLUTION INTRAVENOUS at 02:18

## 2020-07-31 RX ADMIN — FLUCONAZOLE 100 MG: 100 TABLET ORAL at 17:02

## 2020-07-31 RX ADMIN — POTASSIUM CHLORIDE 10 MEQ: 10 INJECTION, SOLUTION INTRAVENOUS at 02:19

## 2020-07-31 RX ADMIN — SODIUM CHLORIDE 1000 ML: 9 INJECTION, SOLUTION INTRAVENOUS at 08:37

## 2020-07-31 RX ADMIN — SODIUM CHLORIDE 20 ML: 9 INJECTION, SOLUTION INTRAVENOUS at 02:15

## 2020-07-31 RX ADMIN — PHENYLEPHRINE HYDROCHLORIDE 100 MCG/MIN: 10 INJECTION INTRAVENOUS at 06:39

## 2020-07-31 RX ADMIN — LIDOCAINE HYDROCHLORIDE 60 MG: 20 INJECTION, SOLUTION INFILTRATION; PERINEURAL at 14:43

## 2020-07-31 RX ADMIN — SODIUM CHLORIDE 8 MG/HR: 9 INJECTION, SOLUTION INTRAVENOUS at 01:44

## 2020-07-31 RX ADMIN — BUDESONIDE 250 MCG: 0.25 SUSPENSION RESPIRATORY (INHALATION) at 08:39

## 2020-07-31 RX ADMIN — SODIUM CHLORIDE 8 MG/HR: 9 INJECTION, SOLUTION INTRAVENOUS at 10:37

## 2020-07-31 RX ADMIN — SODIUM CHLORIDE, PRESERVATIVE FREE 10 ML: 5 INJECTION INTRAVENOUS at 21:08

## 2020-07-31 RX ADMIN — IPRATROPIUM BROMIDE AND ALBUTEROL SULFATE 1 AMPULE: 2.5; .5 SOLUTION RESPIRATORY (INHALATION) at 11:50

## 2020-07-31 RX ADMIN — PANTOPRAZOLE SODIUM 40 MG: 40 INJECTION, POWDER, FOR SOLUTION INTRAVENOUS at 20:26

## 2020-07-31 RX ADMIN — OCTREOTIDE ACETATE 100 MCG: 100 INJECTION, SOLUTION INTRAVENOUS; SUBCUTANEOUS at 01:58

## 2020-07-31 RX ADMIN — SODIUM CHLORIDE 250 ML: 9 INJECTION, SOLUTION INTRAVENOUS at 02:18

## 2020-07-31 RX ADMIN — PHENYLEPHRINE HYDROCHLORIDE 50 MCG/MIN: 10 INJECTION INTRAVENOUS at 17:16

## 2020-07-31 RX ADMIN — BUDESONIDE 250 MCG: 0.25 SUSPENSION RESPIRATORY (INHALATION) at 19:41

## 2020-07-31 RX ADMIN — METHYLPREDNISOLONE SODIUM SUCCINATE 40 MG: 40 INJECTION, POWDER, LYOPHILIZED, FOR SOLUTION INTRAMUSCULAR; INTRAVENOUS at 02:04

## 2020-07-31 ASSESSMENT — PAIN SCALES - GENERAL
PAINLEVEL_OUTOF10: 0

## 2020-07-31 NOTE — CARE COORDINATION
Social Work/Discharge Planning:  Social Work consult noted. Patient informed  that he wears 2.5-4 liters of oxygen at home, but did not know oxygen supplier. Cameron Oswald at [address] [place] [address] (ph: [phone]) and confirmed they are patient oxygen supplier. Sheeba Yang states patient oxygen order is for two liters of oxygen at night ONLY. Notified charge nurse. Will continue to follow.   Electronically signed by HAILEY Meneses on 7/31/2020 at 2:21 PM

## 2020-07-31 NOTE — ANESTHESIA PRE PROCEDURE
Department of Anesthesiology  Preprocedure Note       Name:  Darnell Pope   Age:  70 y.o.  :  1940                                          MRN:  [id number]         Date:  2020      Surgeon: Zainab Monteiro):  Marisela Elise MD    Procedure: Procedure(s):  BEDSIDE EGD ESOPHAGOGASTRODUODENOSCOPY    Medications prior to admission:   Prior to Admission medications    Medication Sig Start Date End Date Taking? Authorizing Provider   linezolid (ZYVOX) 600 MG tablet Take 1 tablet by mouth 2 times daily for 28 days 7/6/20 8/3/20 Yes SEVERINO Castillo - KAREN   HYDROcodone-acetaminophen ([facility]) 7.5-325 MG per tablet TAKE ONE TABLET BY MOUTH EVERY 8 HOURS AS NEEDED FOR PAIN. (DO NOT TAKE MORE THAN 3 TABLETS DAILY) 3/19/20  Yes Historical Provider, MD   torsemide (DEMADEX) 20 MG tablet Take 20 mg by mouth 2 times daily  19  Yes Historical Provider, MD   Revefenacin 175 MCG/3ML SOLN Inhale 1 ampule into the lungs 2 times daily 19  Yes Adia Bain MD   metoprolol (LOPRESSOR) 100 MG tablet Take 100 mg by mouth 2 times daily Instructed to take with sip water am of procedure   Yes Historical Provider, MD   fluticasone-salmeterol (ADVAIR DISKUS) 500-50 MCG/DOSE diskus inhaler Inhale 1 puff into the lungs 2 times daily Use AM DOS. Yes Historical Provider, MD   potassium chloride (KLOR-CON M) 20 MEQ extended release tablet Take 1 tablet by mouth 2 times daily (with meals) 18  Yes Ritchie Camarillo,    OXYGEN Inhale 2 L/min into the lungs nightly Uses prn during day. States he is currently wearing 4L at home   Yes Historical Provider, MD   warfarin (COUMADIN) 4 MG tablet Take by mouth Daily with lunch Alternates 3 mg and 4 mg daily at lunch. Took today at noon but unsure of what does. INR is 9. 9!    Yes Historical Provider, MD   isosorbide mononitrate (IMDUR) 30 MG CR tablet Take 30 mg by mouth daily Instructed to take with sip water am of procedure   Yes Historical Provider, MD   clopidogrel (PLAVIX) 75 MG tablet Take 75 mg by mouth daily LD 8/20/2019 per Dr. Kip Henry 6/11/14  Yes Historical Provider, MD   atorvastatin (LIPITOR) 80 MG tablet Take 60 mg by mouth daily  6/11/14  Yes Historical Provider, MD   amLODIPine (NORVASC) 5 MG tablet Take 5 mg by mouth daily Instructed to take with sip water am of procedure 6/11/14  Yes Historical Provider, MD   albuterol (PROVENTIL) (5 MG/ML) 0.5% nebulizer solution Take 1 mL by nebulization 3 times daily as needed for Wheezing 9/7/18   Susy Fox MD       Current medications:    Current Facility-Administered Medications   Medication Dose Route Frequency Provider Last Rate Last Dose    [Held by provider] amLODIPine (NORVASC) tablet 5 mg  5 mg Oral Daily Dayami Nguyễn DO        atorvastatin (LIPITOR) tablet 60 mg  60 mg Oral Daily Kobi Nguyễn DO        [Held by provider] clopidogrel (PLAVIX) tablet 75 mg  75 mg Oral Daily Kobi Nguyễn DO        HYDROcodone-acetaminophen (Ronita Mettle) 7.5-325 MG per tablet 1 tablet  1 tablet Oral Q4H PRN Dayami Nguyễn DO        potassium chloride (KLOR-CON M) extended release tablet 20 mEq  20 mEq Oral BID WC Kobi Nguyễn DO        [Held by provider] metoprolol tartrate (LOPRESSOR) tablet 100 mg  100 mg Oral BID Dayami Nguyễn, DO        [Held by provider] isosorbide mononitrate (IMDUR) extended release tablet 30 mg  30 mg Oral Daily Kobi Nguyễn DO        0.9 % sodium chloride infusion   Intravenous Continuous Kobi Nguyễn  mL/hr at 07/31/20 0858      Arformoterol Tartrate (BROVANA) nebulizer solution 15 mcg  15 mcg Nebulization BID Dayami Nguyễn, DO   15 mcg at 07/31/20 0839    budesonide (PULMICORT) nebulizer suspension 250 mcg  250 mcg Nebulization BID Dayami Nguyễn, DO   250 mcg at 07/31/20 0839    ipratropium-albuterol (DUONEB) nebulizer solution 1 ampule  1 ampule Inhalation Q4H WA Kobi Nguyễn DO   1 ampule at 07/31/20 1150    methylPREDNISolone sodium (SOLU-MEDROL) injection 40 mg  40 mg Intravenous Q8H Kobi RACHEL Mihok, DO   40 mg at 07/31/20 1029    potassium chloride (KLOR-CON M) extended release tablet 40 mEq  40 mEq Oral PRN Viridiana Yvette Mihok, DO        Or    potassium bicarb-citric acid (EFFER-K) effervescent tablet 40 mEq  40 mEq Oral PRN Viridiana Yvette Mihok, DO        Or    potassium chloride 10 mEq/100 mL IVPB (Peripheral Line)  10 mEq Intravenous PRN Viridiana Yvette Mihok, DO        acetaminophen (TYLENOL) tablet 650 mg  650 mg Oral Q4H PRN Viridiana Yvette Mihok, DO        pantoprazole (PROTONIX) 80 mg in sodium chloride 0.9 % 100 mL infusion  8 mg/hr Intravenous Continuous Conteh Cordia, DO 10 mL/hr at 07/31/20 1037 8 mg/hr at 07/31/20 1037    octreotide (SANDOSTATIN) 500 mcg in sodium chloride 0.9 % 100 mL infusion  50 mcg/hr Intravenous Continuous Conteh Cordia, DO 10 mL/hr at 07/31/20 0611 50 mcg/hr at 07/31/20 0611    phenylephrine (SOFÍA-SYNEPHRINE) 50 mg in dextrose 5 % 250 mL infusion  100 mcg/min Intravenous Continuous Damaris Ivy MD 15 mL/hr at 07/31/20 1201 50 mcg/min at 07/31/20 1201    [START ON 8/1/2020] warfarin (COUMADIN) daily dosing (placeholder)   Other RX Placeholder Negrito Gray MD           Allergies:     Allergies   Allergen Reactions    Pcn [Penicillins] Swelling    Dye [Iodides]      Heart and kidney dye / reaction unknown       Problem List:    Patient Active Problem List   Diagnosis Code    Bilateral carotid artery stenosis I65.23    Tobacco dependence F17.200    Chronic obstructive pulmonary disease with acute exacerbation (HCC) J44.1    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    Hypoxia R09.02    Atrial fibrillation (HCC) I48.91    GI bleeding K92.2    On home O2 Z99.81    Hypertension I10    Hyperlipidemia E78.5    H/O asbestosis Z80.12    CAD (coronary artery disease) I25.10    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    CKD (chronic kidney disease) stage 3, GFR 30-59 ml/min ([facility]) N18.3    Hypokalemia E87.6    Non-rheumatic aortic sclerosis (HCC) I70.0    Hypotension due to hypovolemia I95.89, E86.1    Emesis R11.10    Diarrhea R19.7    Thrombocytopenia (HCC) D69.6    NSVT (nonsustained ventricular tachycardia) ([facility]) I47.2    Hypotension I95.9    Unintentional weight loss of 10% body weight within 6 months R63.4       Past Medical History:        Diagnosis Date    Amblyopia 1961    corrected    Anticoagulated on Coumadin     Atrial fibrillation ([address] 9/5/2018    Bilateral carotid artery stenosis 07/03/2014    CAD (coronary artery disease)     follows with / Klaus Ramirez every 6 months    CHF (congestive heart failure) ([address]     last episode 9/2018 ?  CKD (chronic kidney disease) stage 3, GFR 30-59 ml/min ([facility]) 7/30/2020    Colon polyp     COPD (chronic obstructive pulmonary disease) ([facility])     follows with DR. Yenifer Cantu    Diarrhea 7/30/2020    Emesis 7/30/2020    Emphysema of lung ([address]     Full dentures     H/O asbestosis     Hyperlipidemia     Hypertension     Hypokalemia     Hypoprothrombinemia (HCC)     Hypotension due to hypovolemia 7/30/2020    Myopia     Non-rheumatic aortic sclerosis (HCC)     NSVT (nonsustained ventricular tachycardia) ([address] 2018    On home O2     2 Liters/NC at night and during day prn    NICHOLAS (obstructive sleep apnea)     no use cpap    Squamous cell carcinoma, face     skin    Thrombocytopenia ([address] 7/30/2020    Tobacco dependence 7/3/2014    Unintentional weight loss of 10% body weight within 6 months 7/31/2020       Past Surgical History:        Procedure Laterality Date    APPENDECTOMY      BACK SURGERY  years ago    lumbar    CARDIAC CATHETERIZATION      heart cath x4, cabg twice    COLONOSCOPY      CORONARY ANGIOPLASTY WITH STENT PLACEMENT      2000, 2002, 2005, 2009   total of 8 stents   [address]    two     EAR SURGERY Left 6/25/2019    I & D LEFT EAR [facility]SCESS [facility] D[facility] DO at [facility] OR    EAR SURGERY Left 72 hours. Coags:   Lab Results   Component Value Date    PROTIME 20.5 07/31/2020    INR 1.8 07/31/2020    APTT 55.9 10/08/2019       HCG (If Applicable): No results found for: PREGTESTUR, PREGSERUM, HCG, HCGQUANT     ABGs: No results found for: PHART, PO2ART, HGB6WRU, SEX7PRR, BEART, F0PVKCAB     Type & Screen (If Applicable):  No results found for: LABABO, LABRH    Drug/Infectious Status (If Applicable):  No results found for: HIV, HEPCAB    COVID-19 Screening (If Applicable):   Lab Results   Component Value Date    COVID19 Not Detected 07/30/2020         Anesthesia Evaluation  Patient summary reviewed and Nursing notes reviewed no history of anesthetic complications:   Airway: Mallampati: II  TM distance: >3 FB   Neck ROM: full  Mouth opening: > = 3 FB Dental:    (+) edentulous      Pulmonary:   (+) COPD:  sleep apnea:  decreased breath sounds,                            ROS comment: ON O2   Cardiovascular:  Exercise tolerance: poor (<4 METS),   (+) hypertension:, CAD:, CABG/stent:, CHF:, hyperlipidemia        Rhythm: irregular  Rate: normal           Beta Blocker:  Dose within 24 Hrs      ROS comment: SOFÍA DRIP     Neuro/Psych:   Negative Neuro/Psych ROS              GI/Hepatic/Renal:            ROS comment: DIARRHEA. Endo/Other:    (+) blood dyscrasia: anticoagulation therapy:., .                 Abdominal:           Vascular: negative vascular ROS. Anesthesia Plan      MAC     ASA 4       Induction: intravenous. Anesthetic plan and risks discussed with patient and healthcare power of . Varun Alonso MD   7/31/2020        Spoke with poa regarding DNR status for   NAME:  Mert Glover, AGE:  70 y.o., MRN:  [id number]. After all issues discussed THEY has decided to  Continue DNR status periop.     Intubation:  No    ACLS:  NO CPR; Kedar Cotto MD  Staff Anesthesiologist  July 31, 2020  2:27 PM

## 2020-07-31 NOTE — PROGRESS NOTES
Physical Therapy    Facility/Department: [address] ICU      NAME: Mert Glover  : 1940  MRN: [id number]    Date of Service: 2020    Attempted to see pt for PT session, hold per nursing.   Maggie MOSELEY [id number]

## 2020-07-31 NOTE — CONSULTS
Date    APPENDECTOMY      BACK SURGERY  years ago    lumbar    CARDIAC CATHETERIZATION      heart cath x4, cabg twice    COLONOSCOPY      CORONARY ANGIOPLASTY WITH STENT PLACEMENT      2000, 2002, 2005, 2009   total of 8 stents   [address]    two     EAR SURGERY Left 6/25/2019    I & D LEFT EAR ABSCESS performed by Lydia Grigsby DO at [address] EAR SURGERY Left 8/26/2019    INCISION AND DRAINAGE LEFT EAR WITH EXCISION OF FISTULA  AND RECONSTRUCTION performed by Lydia Grigsby DO at [address] [address]    amblyoplia  / multiple surgeries    SKIN CANCER EXCISION         Review of Systems:   · Constitutional: As noted in the HPI. Denies fever or chills. Substantial unintended weight loss  · Eyes: No visual changes or diplopia. No scleral icterus. · ENT: No headaches, hearing loss or vertigo. No nasal congestion, or sore throat. · Cardiovascular: No chest pain, dyspnea on exertion, or palpitations. · Respiratory: See above. · Gastrointestinal: See above. · Genitourinary: No dysuria, urinary frequency, or incontinence. No hematuria. · Musculoskeletal: No gait disturbance, weakness or joint complaints. · Integumentary: No rash or pruritis. No abnormal pigmentation, hair or nail changes. · Neurological: No headache, diplopia, dizziness, tremor, change in muscle strength, numbness or tingling. No change in gait, balance, coordination, mood, affect, memory, mentation, behavior. · Psychiatric: No anxiety or depression. · Endocrine: No temperature intolerance, excessive thirst, fluid intake, urinary frequency, excessive appetite, or recent weight change. · Hematologic/Lymphatic: No abnormal bruising or bleeding, blood clots or swollen lymph nodes. No anemia, fever, chills, night sweats, or swollen glands. · Allergic/Immunologic: No seasonal or perenial allergies. No history of hives or atopic dermatitis.     Social History:  · Alcohol: 5694.    Please note that voice recognition technology was used in the preparation of this note and make therefore it may contain inadvertent transcription errors. If the patient is a COVID 19 isolation patient, the above physical exam reflects that of the examining physician for the day. Barbi Kaiser M.D., F.C.C.P.     [facility] [address] [facility], [address] [facility], [address] [place] [address], [facility] [place]

## 2020-07-31 NOTE — PROGRESS NOTES
Database complete. Medications reconciled. Care plans and education initiated. Normally wears 2.5L 02 but has been wearing 4L recently at HS and prn. Pt states he is on Zyvox for ear infection. 80lb weight loss inn past 6 months and 20 lbs in past month with decreased appetite. Uses no assistive devices for mobility.

## 2020-07-31 NOTE — CONSULTS
Gastroenterology Consult Note   [facility] Mad[facility]on[facility] M.D. Consult Note        Date of Service: 7/31/2020  Reason for Consult: GI bleed    Requesting Physician: Dr. Cordelia Merchant:   fatigue, nausea, vomiting, diarrhea, shortness of breath, and cough    History Obtained From:  patient, electronic medical record    HISTORY OF PRESENT ILLNESS:       Brett Barksdale is a 70 y.o. male with significant past medical history of colon polyp, COPD, CAD, CHF, A-fib, hypertension, hyperlipidemia, squamous cell carcinoma face, NICHOLAS, tobacco dependence, myopia, amblyopia, asbestosis, CKD, carotid artery stenosis, and dentures admitted via ED for fatigue, nausea, vomiting, diarrhea, shortness of breath, and cough. Pt reports he has essentially been in bed for the past 1.5 weeks with the inability to eat anything due to persistent nausea, vomiting, and diarrhea. He states he is only been drinking Gatorade to try to stay hydrated. He reports nausea and vomiting of anything he eats of a \"very dark brown like chewing tobacco,\" associated with diarrhea of at least 12 bouts a day described as black watery. He states he has lost 90#/6-8 months, unintentional.  He reports chills and fever but states he did not take his temperature however he is certain he had a fever a couple days ago, none since. He states yesterday when he woke up he was so fatigued and dizzy he almost fell and was unable to walk so he laid in bed until he ultimately decided to come to the hospital.  Patient is crying stating he does not want to die. Emotional support given. He states he had colonoscopy with Dr. Darlene Cummins a long time ago, he is unsure when but states he did not have any polyps. His prior colonoscopy did reportedly have a polyp. He denies EGD in the past.  He states his mother \"bled to death\" but he is uncertain if it was her stomach or colon. Patient denies abdominal pain.   He states he has back pain since he fractured his spine, chronic since his fracture, none currently. Patient found to have significant drop in hemoglobin from prior 12.9 on 1/5/2020, on admission 8.5 hemoglobin was 25.1 hematocrit. Patient was new thrombocytopenia, platelets 85. He is anticoagulated with warfarin for A-fib and found to have supratherapeutic INR 9.9 on admission. FOBT positive melanotic stool. Patient received 1 unit PRBCs as well as FFP and vitamin K. He was hypokalemic, hypochloremic. He has been hypotensive 13T-92V systolic, he was admitted to ICU with critical care and pulmonary consultation. A central line was inserted and patient placed on Gerardo-Synephrine drip per ICU. Octreotide and pantoprazole drips started in the ED per Dr Aurora Lopez. Patient made n.p.o. for possible EGD today. Admission labs: H&H 8.5 & 25.1; RBC 2.75; platelet 85; lymphs 66.0%; Mono 15%; absolute lymph 1.22; absolute mono 1.47; PT >120; INR 9.9; SARS-CoV-2 negative; total protein 5.1; proBNP 1871; troponin<0.01; potassium 2.6; chloride 94; BUN 40; glucose 145; calcium 7.9. CXR-Chronic coarsened interstitial lung markings are seen throughout both lungs. No obvious pneumonia or pleural effusion. Consultation for GI bleed. Currently, pt reports he has not had a BM or vomiting since admission. Per bedside RN, Gerardo-Synephrine off, NICOM indicating patient requires fluids-he has normal saline bolus running and BP 94/40. HR 86. He has moist nonproductive cough, denies shortness of breath stating this is his usual with his COPD. Patient reports burning bilateral lower extremities. Patient denies abdominal pain. Labs today: H&H 11 & 33.6; RBC 3.54; platelets 69; neutrophil 93.3%; lymphs 3.6%; absolute neuts 9.08; absolute lymphs 0.35; absolute eos 0.00; sed rate 1; INR 2.8; albumin 3.3; total protein 5.1; proBNP 2500; potassium 4.6; chloride 96; BUN 28; AG 17; lactic acid 4.1; glucose 198; calcium 7.5; uric acid 7.7.     Past Medical History:        Diagnosis Date    Amblyopia 1961    corrected    Anticoagulated on Coumadin     Atrial fibrillation ([facility] 9/5/2018    Bilateral carotid artery stenosis 07/03/2014    CAD (coronary artery disease)     follows with / Booker Spivey every 6 months    CHF (congestive heart failure) ([facility] [address] [facility]     last episode 9/2018 ?  CKD (chronic kidney disease) stage 3, GFR 30-59 ml/min ([facility]) 7/30/2020    Colon polyp     COPD (chronic obstructive pulmonary disease) ([facility])     follows with DR. 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[facility]     Hypotension due to hypovolemia 7/30/2020    Myopia     Non-rheumatic aortic sclerosis ([facility])     NSVT (nonsustained ventricular tachycardia) ([facility] 2018    On home O2     2 Liters/NC at night and during day prn    NICHOLAS (obstructive sleep apnea)     no use cpap    Squamous cell carcinoma, face     skin    Thrombocytopenia ([facility] [address] [facility] 7/30/2020    Tobacco dependence 7/3/2014     Past Surgical History:        Procedure Laterality Date    APPENDECTOMY      BACK SURGERY  years ago    lumbar    CARDIAC CATHETERIZATION      heart cath x4, cabg twice    COLONOSCOPY     [address]      2000, 2002, 2005, 2009   total of 8 stents   [address]    two     EAR SURGERY Left 6/25/2019    I & D LEFT EAR ABSCESS performed by Teri Valles DO at [facility] EAR SURGERY Left 8/26/2019    INCISION AND DRAINAGE LEFT EAR WITH EXCISION OF FISTULA  AND RECONSTRUCTION performed by Teri Valles DO at [address] [place] [address]    amblyoplia  / multiple surgeries    SKIN CANCER EXCISION       Current Medications:    Current Facility-Administered Medications: amLODIPine (NORVASC) tablet 5 mg, 5 mg, Oral, Daily  atorvastatin (LIPITOR) tablet 60 mg, 60 mg, Oral, Daily  clopidogrel (PLAVIX) tablet 75 mg, 75 mg, Oral, Daily  HYDROcodone-acetaminophen (NORCO) 7.5-325 MG per tablet 1 tablet, 1 tablet, Oral, Q4H PRN  potassium chloride (KLOR-CON M) extended release tablet 20 mEq, 20 mEq, Oral, BID WC  metoprolol tartrate (LOPRESSOR) tablet 100 mg, 100 mg, Oral, BID  isosorbide mononitrate (IMDUR) extended release tablet 30 mg, 30 mg, Oral, Daily  0.9 % sodium chloride infusion, , Intravenous, Continuous  Arformoterol Tartrate (BROVANA) nebulizer solution 15 mcg, 15 mcg, Nebulization, BID  budesonide (PULMICORT) nebulizer suspension 250 mcg, 250 mcg, Nebulization, BID  ipratropium-albuterol (DUONEB) nebulizer solution 1 ampule, 1 ampule, Inhalation, Q4H WA  methylPREDNISolone sodium (SOLU-MEDROL) injection 40 mg, 40 mg, Intravenous, Q8H  potassium chloride (KLOR-CON M) extended release tablet 40 mEq, 40 mEq, Oral, PRN **OR** potassium bicarb-citric acid (EFFER-K) effervescent tablet 40 mEq, 40 mEq, Oral, PRN **OR** potassium chloride 10 mEq/100 mL IVPB (Peripheral Line), 10 mEq, Intravenous, PRN  acetaminophen (TYLENOL) tablet 650 mg, 650 mg, Oral, Q4H PRN  pantoprazole (PROTONIX) 80 mg in sodium chloride 0.9 % 100 mL infusion, 8 mg/hr, Intravenous, Continuous  octreotide (SANDOSTATIN) 500 mcg in sodium chloride 0.9 % 100 mL infusion, 50 mcg/hr, Intravenous, Continuous  phenylephrine (SOFÍA-SYNEPHRINE) 50 mg in dextrose 5 % 250 mL infusion, 100 mcg/min, Intravenous, Continuous  0.9 % sodium chloride bolus, 1,000 mL, Intravenous, Once  0.9 % sodium chloride bolus, 20 mL, Intravenous, Once  0.9 % sodium chloride bolus, 20 mL, Intravenous, Once    Allergies:  Pcn [penicillins] and Dye [iodides]    Social History:    Tobacco:  Pt reports he smokes cigarettes of 1-1.5 PPD x64 years, he states over the last 10 days he has only had 12 cigarettes. Alcohol:  Pt denies. Illicit Drugs: Pt denies. Family History: Mother  from hemorrhage of GI source.   Listed in EMR states she had cancer, patient denies. Father  from MI.  1 brother living with CAD, he has a stent. 1 daughter living and reportedly healthy, he has not talked to her for a long time. REVIEW OF SYSTEMS:    Aside from what was mentioned in the PMH and HPI, essentially unremarkable, all others negative. PHYSICAL EXAM:      Vitals:    BP (!) 86/56   Pulse 80   Temp 98.7 °F (37.1 °C) (Oral)   Resp 18   Ht 5' 8\" (1.727 m)   Wt 134 lb 4.2 oz (60.9 kg)   SpO2 100%   BMI 20.41 kg/m²       CONSTITUTIONAL:  awake, fatigued and ill-appearing, pale, cooperative, and appears older than stated age  EYES:  pupils equal, round and reactive to light, sclera anicteric and conjunctiva pale  ENT:  normocephalic, oral pharynx with dry mucous membranes  LUNGS: Rhonchi throughout, moist nonproductive cough. SPO2 100% on 3 L nasal cannula.   Respirations nonlabored and symmetrical  CARDIOVASCULAR:  irregular rate and rhythm, no murmur noted; 2+ pulses; no edema  ABDOMEN: Hypoactive bowel sounds, soft, non-distended, non-tender, no masses palpated, no hepatosplenomegaly noted  MUSCULOSKELETAL:  full range of motion noted  motor strength is 5 out of 5 all extremities bilaterally  NEUROLOGIC:  Mental Status Exam:  Level of Alertness:   awake  Orientation:   person, place, time  Motor Exam:  Motor exam is symmetrical 5 out of 5 all extremities bilaterally  SKIN: Pale skin color    DATA:    CBC with Differential:    Lab Results   Component Value Date    WBC 9.7 2020    RBC 3.54 2020    HGB 11.0 2020    HCT 33.6 2020    PLT 69 2020    MCV 94.9 2020    MCH 31.1 2020    MCHC 32.7 2020    RDW 14.1 2020    LYMPHOPCT 3.6 2020    MONOPCT 2.3 2020    BASOPCT 0.1 2020    MONOSABS 0.22 2020    LYMPHSABS 0.35 2020    EOSABS 0.00 2020    BASOSABS 0.01 2020     CMP:    Lab Results   Component Value Date     2020    K 4.6 2020    K 2.6 2020    CL 96 2020    CO2 24 2020    BUN 28 2020    CREATININE 1.0 2020    GFRAA >60 2020    LABGLOM >60 2020    GLUCOSE 198 2020    PROT 5.1 2020    LABALBU 3.3 2020    CALCIUM 7.5 2020    BILITOT 0.4 2020    ALKPHOS 89 2020    AST 19 2020    ALT 27 2020     Hepatic Function Panel:    Lab Results   Component Value Date    ALKPHOS 89 2020    ALT 27 2020    AST 19 2020    PROT 5.1 2020    BILITOT 0.4 2020    BILIDIR <0.2 2020    IBILI see below 2020    LABALBU 3.3 2020     PT/INR:    Lab Results   Component Value Date    PROTIME 33.8 2020    INR 2.8 2020     PTT:    Lab Results   Component Value Date    APTT 55.9 10/08/2019   [APTT}  Last 3 Troponin:    Lab Results   Component Value Date    TROPONINI <0.01 2020    TROPONINI <0.01 2020    TROPONINI <0.01 10/09/2019     TSH:    Lab Results   Component Value Date    TSH 0.525 2020       No components found for: CHLPL  Lab Results   Component Value    Lab Results   Component Value Date    HDL 30 2020   02  Lab Results   Component Value Date    LDLCALC 33 2020   0  Patient MRN:  [id number] : 1940 Age: 70 years Gender: Male Order Date:  2020 6:15 PM EXAM: XR CHEST PORTABLE COMPARISON: 2019 INDICATION:  SOB SOB FINDINGS: There are median sternotomy wires. There are coarsened interstitial lung markings throughout both lungs. No focal airspace opacity or pleural effusion. No pneumothorax. Chronic coarsened interstitial lung markings are seen throughout both lungs. No obvious pneumonia or pleural effusion.       IMPRESSION:  · Anemia, normocytic, normochromic- suspect upper GI bleed-melanotic FOBT+ stool  · Diarrhea  · Nausea and vomiting   · Thrombocytopenia  · Unintentional weight loss - 90#/6-8 mo  · Supratherapeutic INR  · A-Fib on Warfarin - Warfarin on hold  · Shortness of breath, worse on exertion-defer to pulmonary/ICU  · Hypokalemia, hypochloremia -defer to ICU/PCP  · Hypotension, Gerardo off, currently receiving NSS bolus--defer to ICU    RECOMMENDATIONS:    · Critical care management per ICU   · Sandostatin and Protonix drips as ordered  · EGD today with Dr. Val Badillo. Procedure details for EGD discussed in detail. Complications including but not limited to, perforation, bleeding and infection were discussed in great detail. Risks, benefits, and alternatives explained. Pt has understood the information and has agreed to proceed. · NPO  · Stool cultures, C. difficile, and additional studies as ordered  · DC Colace twice daily  · Medicate for nausea as ordered  · One unit FFP and 1 unit platelet transfusion - D/W ICU, ok per critical care Dr Phill Robert   · Serial H&H, transfuse per ICU  · IV fluids per ICU  · Monitor CBC, CMP, INR daily  · Defer comorbidities to others  · Continue to monitor    Note: This report was completed utilizing computer voice recognition software. Every effort has been made to ensure accuracy, however; inadvertent computerized transcription errors may be present. Thank you very much for your consultation. We will follow closely with you.     Discussed with Dr. Sherice Silverio developed by Dr. Carlisle Covert [id number], FNP-BC 7/31/2020 9:27 AM for Dr. Val Badillo

## 2020-07-31 NOTE — H&P
History and Physical      CHIEF COMPLAINT: Fatigue nausea emesis diarrhea shortness of breath x7 days    History of Present Illness: 61-year-old male patient of Dr. Matias Billingsley am asked admit and follow. He is on chronic oxygen due to COPD; he continues smoking although he states he has smoked only 12 cigarettes in the last 7 days. For the last 1 week he has had increasing amounts of nausea emesis and diarrhea. He has had very little oral intake but continue taking his medications. He has felt more short of breath requiring more home oxygen. EMS was called patient transported to the ED. He states he has had an unintentional weight loss of at least 80 to 90 pounds within the last 6 to 8 months. Diarrhea has been 10-12 episodes per day. Emesis with anything by mouth. He has been literally bedridden for the last 1-1/2 weeks. The day he presented to the ED he felt extremely fatigued dizzy almost fell was unable to walk. --Patient states he is been seeing infectious disease for the last few months due to \"infection of the left external ear. He states he has been on Zyvox for least a few months. He had 2 recent surgeries on the ear due to the infection. The ear surgery occurred in June and August 2019. --Hemoglobin had been 12.9, 1/5/2020; upon presentation 8.5. INR 9.9 stool for occult blood was positive. He was transfused 1 unit of packed red cells and fresh frozen plasma and vitamin K in the ED. He was markedly hypotensive 70 systolic admitted to intensive care for the above. Numerous fluid boluses were given but not much improvement. Central line right groin was inserted. He was placed on intravenous Gerardo-Synephrine for blood pressure control. --SARS-CoV-2 was negative. Viral respiratory panel was negative; Legionella strep antigens negative. INR today 1.8. Troponins have been negative.   Initial potassium 2.6.  --proBNP 2500; he follows with cardiology, Dr. Adeline Cyr  --He began smoking 1955 continues smoking approximately 1 pack a day despite all warnings and all illnesses. --He is on chronic Coumadin for chronic atrial fibrillation  --He underwent coronary artery bypass 1983, 1992. He underwent angioplasty PCI PTCA 2000, 2002, 2005, 2009. --Patient with chronic COPD follows with pulmonary  --Patient had admission in 2018 had episodes of nonsustained ventricular tachycardia. Past Medical History:   Diagnosis Date    Amblyopia 1961    corrected    Anticoagulated on Coumadin     Atrial fibrillation ([facility] 9/5/2018    Bilateral carotid artery stenosis 07/03/2014    CAD (coronary artery disease)     follows with / Tyson Gallagher every 6 months    CHF (congestive heart failure) ([facility]     last episode 9/2018 ?  CKD (chronic kidney disease) stage 3, GFR 30-59 ml/min ([facility]) 7/30/2020    Colon polyp     COPD (chronic obstructive pulmonary disease) ([facility])     follows with DR. 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[facility])     Hypotension due to hypovolemia 7/30/2020    Myopia     Non-rheumatic aortic sclerosis ([facility])     NSVT (nonsustained ventricular tachycardia) ([address] 2018    On home O2     2 Liters/NC at night and during day prn    NICHOLAS (obstructive sleep apnea)     no use cpap    Squamous cell carcinoma, face     skin    Thrombocytopenia ([address] 7/30/2020    Tobacco dependence 7/3/2014         Past Surgical History:   Procedure Laterality Date    APPENDECTOMY      BACK SURGERY  years ago    lumbar    CARDIAC CATHETERIZATION      heart cath x4, cabg twice    COLONOSCOPY     [address]      2000, 2002, 2005, 2009   total of 8 stents   [address]    two     EAR SURGERY Left 6/25/2019    I & D LEFT EAR ABSCESS performed [facility] Clarisa Dillard DO at [facility] OR    EAR SURGERY Left 8/26/2019    INCISION AND DRAINAGE LEFT EAR WITH EXCISION OF FISTULA  AND RECONSTRUCTION performed by Arnel Calderon DO at [address] [address]    amblyoplia  / multiple surgeries    SKIN CANCER EXCISION         Medications Prior to Admission:    Medications Prior to Admission: linezolid (ZYVOX) 600 MG tablet, Take 1 tablet by mouth 2 times daily for 28 days  HYDROcodone-acetaminophen (NORCO) 7.5-325 MG per tablet, TAKE ONE TABLET BY MOUTH EVERY 8 HOURS AS NEEDED FOR PAIN. (DO NOT TAKE MORE THAN 3 TABLETS DAILY)  torsemide (DEMADEX) 20 MG tablet, Take 20 mg by mouth 2 times daily   Revefenacin 175 MCG/3ML SOLN, Inhale 1 ampule into the lungs 2 times daily  metoprolol (LOPRESSOR) 100 MG tablet, Take 100 mg by mouth 2 times daily Instructed to take with sip water am of procedure  fluticasone-salmeterol (ADVAIR DISKUS) 500-50 MCG/DOSE diskus inhaler, Inhale 1 puff into the lungs 2 times daily Use AM DOS. potassium chloride (KLOR-CON M) 20 MEQ extended release tablet, Take 1 tablet by mouth 2 times daily (with meals)  OXYGEN, Inhale 2 L/min into the lungs nightly Uses prn during day. States he is currently wearing 4L at home  warfarin (COUMADIN) 4 MG tablet, Take by mouth Daily with lunch Alternates 3 mg and 4 mg daily at lunch. Took today at noon but unsure of what does. INR is 9. 9!   isosorbide mononitrate (IMDUR) 30 MG CR tablet, Take 30 mg by mouth daily Instructed to take with sip water am of procedure  clopidogrel (PLAVIX) 75 MG tablet, Take 75 mg by mouth daily LD 8/20/2019 per Dr. Gonzalez/Wes  atorvastatin (LIPITOR) 80 MG tablet, Take 60 mg by mouth daily   amLODIPine (NORVASC) 5 MG tablet, Take 5 mg by mouth daily Instructed to take with sip water am of procedure  albuterol (PROVENTIL) (5 MG/ML) 0.5% nebulizer solution, Take 1 mL by nebulization 3 times daily as needed for Wheezing    Allergies:    Pcn [penicillins] and Dye [iodides]    Social History: reports that he has been smoking cigarettes. He started smoking about 64 years ago. He has a 62.00 pack-year smoking history. He has never used smokeless tobacco. He reports that he does not drink alcohol or use drugs. Family History:   family history includes Cancer in his mother; Heart Disease in his brother and father; Other in his mother. REVIEW OF SYSTEMS:  As above in the HPI, otherwise negative    PHYSICAL EXAM:    VS: BP (!) 107/49   Pulse 93   Temp 97.6 °F (36.4 °C) (Axillary)   Resp 19   Ht 5' 8\" (1.727 m)   Wt 134 lb 4.2 oz (60.9 kg)   SpO2 97%   BMI 20.41 kg/m²     General appearance: Alert, Awake, Oriented times 3, no distress; talking on telephone  Skin: Warm and dry ; no rashes  Head: Normocephalic. No masses, lesions or tenderness noted  Eyes: Conjunctivae pink, sclera white. PERRL,EOM-I  Ears: Right external ear normal; left external ear with what looks to be a cauliflower ear however patient states he is able to squeeze and express material.  Equivocal/scant material upper outer portion left ear with severe compression  Nose/Sinuses: Nares normal. Septum midline. Mucosa normal. No drainage  Oropharynx: Oropharynx clear with no exudate seen  Neck: Supple. No jugular venous distension, lymphadenopathy or thyromegaly Trachea midline  Lungs: Soft wheezes mostly right lung  Heart: Irregularly irregular. . No rub, or gallop; grade 1/6 systolic murmur second right intercostal space; grade 1/6 systolic murmur left sternal border  Abdomen: Soft, non-tender. BS normal. No masses, organomegaly; no rebound or guarding  Extremities: No edema, Peripheral pulses palpable  Musculoskeletal: Muscular strength appears intact. Neuro:  No focal motor defects ; II-XII grossly intact .  CORNELL equally  Breast: deferred  Rectal: deferred  Genitalia:  deferred    LABS:  CBC:   Lab Results   Component Value Date    WBC 9.7 07/31/2020    RBC 3.54 07/31/2020    HGB 11.0 07/31/2020    HCT 33.6 07/31/2020    MCV 94.9 07/31/2020    MCH 31.1 07/31/2020    MCHC 32.7 07/31/2020    RDW 14.1 07/31/2020    PLT 69 07/31/2020    MPV 11.3 07/31/2020     CBC with Differential:    Lab Results   Component Value Date    WBC 9.7 07/31/2020    RBC 3.54 07/31/2020    HGB 11.0 07/31/2020    HCT 33.6 07/31/2020    PLT 69 07/31/2020    MCV 94.9 07/31/2020    MCH 31.1 07/31/2020    MCHC 32.7 07/31/2020    RDW 14.1 07/31/2020    LYMPHOPCT 3.6 07/31/2020    MONOPCT 2.3 07/31/2020    BASOPCT 0.1 07/31/2020    MONOSABS 0.22 07/31/2020    LYMPHSABS 0.35 07/31/2020    EOSABS 0.00 07/31/2020    BASOSABS 0.01 07/31/2020     Hemoglobin/Hematocrit:    Lab Results   Component Value Date    HGB 11.0 07/31/2020    HCT 33.6 07/31/2020     CMP:    Lab Results   Component Value Date     07/31/2020    K 4.6 07/31/2020    K 2.6 07/30/2020    CL 96 07/31/2020    CO2 24 07/31/2020    BUN 28 07/31/2020    CREATININE 1.0 07/31/2020    GFRAA >60 07/31/2020    LABGLOM >60 07/31/2020    GLUCOSE 198 07/31/2020    PROT 5.1 07/31/2020    LABALBU 3.3 07/31/2020    CALCIUM 7.5 07/31/2020    BILITOT 0.4 07/31/2020    ALKPHOS 89 07/31/2020    AST 19 07/31/2020    ALT 27 07/31/2020     BMP:    Lab Results   Component Value Date     07/31/2020    K 4.6 07/31/2020    K 2.6 07/30/2020    CL 96 07/31/2020    CO2 24 07/31/2020    BUN 28 07/31/2020    LABALBU 3.3 07/31/2020    CREATININE 1.0 07/31/2020    CALCIUM 7.5 07/31/2020    GFRAA >60 07/31/2020    LABGLOM >60 07/31/2020    GLUCOSE 198 07/31/2020     Hepatic Function Panel:    Lab Results   Component Value Date    ALKPHOS 89 07/31/2020    ALT 27 07/31/2020    AST 19 07/31/2020    PROT 5.1 07/31/2020    BILITOT 0.4 07/31/2020    BILIDIR <0.2 07/31/2020    IBILI see below 07/31/2020    LABALBU 3.3 07/31/2020     Ionized Calcium:  No results found for: IONCA  Magnesium:    Lab Results   Component Value Date    MG 1.7 07/31/2020     Phosphorus:    Lab Results   Component Value Date    PHOS 4.4 07/31/2020     LDH:  No results found for: LDH  Uric Acid:    Lab Results   Component Value Date    LABURIC 7.7 07/31/2020     PT/INR:    Lab Results   Component Value Date    PROTIME 20.5 07/31/2020    INR 1.8 07/31/2020     Warfarin PT/INR:  No components found for: Kp Rajan  PTT:    Lab Results   Component Value Date    APTT 55.9 10/08/2019   [APTT}  Troponin:    Lab Results   Component Value Date    TROPONINI <0.01 07/31/2020     Last 3 Troponin:    Lab Results   Component Value Date    TROPONINI <0.01 07/31/2020    TROPONINI <0.01 07/31/2020    TROPONINI <0.01 07/30/2020     U/A:  No results found for: NITRITE, COLORU, PROTEINU, PHUR, LABCAST, WBCUA, RBCUA, MUCUS, TRICHOMONAS, YEAST, BACTERIA, CLARITYU, SPECGRAV, LEUKOCYTESUR, UROBILINOGEN, BILIRUBINUR, BLOODU, GLUCOSEU, AMORPHOUS  ABG:  No results found for: PH, PCO2, PO2, HCO3, BE, THGB, TCO2, O2SAT  HgBA1c:    Lab Results   Component Value Date    LABA1C 6.2 07/31/2020     FLP:    Lab Results   Component Value Date    TRIG 79 07/31/2020    HDL 30 07/31/2020    LDLCALC 33 07/31/2020    LABVLDL 16 07/31/2020     TSH:    Lab Results   Component Value Date    TSH 0.525 07/31/2020     VITAMIN B12: No components found for: B12  FOLATE:    Lab Results   Component Value Date    FOLATE 9.0 07/31/2020     IRON:  No results found for: IRON  Iron Saturation:  No components found for: PERCENTFE  TIBC:  No results found for: TIBC  FERRITIN:  No results found for: FERRITIN    RADIOLOGY:  XR CHEST PORTABLE   Final Result      Chronic coarsened interstitial lung markings are seen throughout both   lungs. No obvious pneumonia or pleural effusion.       CT ABDOMEN PELVIS WO CONTRAST Additional Contrast? 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  Cannot exclude C. difficile however he has had no bowel movement since admission. Will likely need further work-up for his unexplained weight loss.     PLAN:  Medications discussed with patient  GI prophylaxis  DVT prophylaxis  Consultants notes reviewed  Antihypertensives on hold  IV Gerardo-Synephrine being weaned  Aerosol treatments  Patient states he does not require any nicotine patch at this time  Pulmonary consult  ID consult regarding ear  EGD scheduled for today may need colonoscopy in view of unintentional weight loss  IV Protonix  IV Sandostatin  Monitor labs              Please note that over 50 minutes was spent in evaluating the patient, review of records and results, discussion with staff/family, etc.    [address]  12:54 PM  7/31/2020    Voice recognition software use for dictation

## 2020-07-31 NOTE — ED PROVIDER NOTES
11:40 PM EDT  Patient in the ER having low blood pressure, guaiac positive, history of CHF and is status post 3 L IV fluids, FFP, vitamin K and 1 unit of packed red cells already and admitted, however blood pressure remains low and insetting of current clinical picture and mildly tachycardic and still having hypotension will upgrade to critical care. At this time I spoke with Dr. Fauzia Aguilar and she accepted to the ICU at this time. Patient is currently nontoxic, 98% on his home O2 of 2 L nasal cannula and appears well. Hemoglobin from 8.5-8.9 after 1 unit packed red cells will order another unit packed red cells prior to transfer to the ICU. Wants Kai Mccarthy for GI, consult placed.  Per Dr Reny Paez, would like to add IV protonix, IV octreotide and permit for [facility] EGD with banding signed and call [phone] for surgery time in AM.        [facility], DO  07/30/20 240 [facility], DO  07/31/20 1612 [facility],   07/31/20 [id number]

## 2020-07-31 NOTE — ED NOTES
BP still low, Dr Carolyn Rodriguez notified. Uncrossed RBC ordered.       Maite Madrid, RN  07/30/20 [id number]

## 2020-07-31 NOTE — PATIENT CARE CONFERENCE
Intensive Care Daily Quality Rounding Checklist      ICU Team Members:  Dr. Freedom Paris, Dr. Myrtie Lombard (resident), clinical pharmacist, charge nurse, bedside nurse, respiratory therapist    ICU Day #: NUMBER: 1    Intubation Date: N/A    Ventilator Day #: N/A    Central Line Insertion Date: July 31        Day #: NUMBER: 1     Arterial Line Insertion Date: July 31      Day #: NUMBER: 1    DVT Prophylaxis: PCDs    GI Prophylaxis: Protonix drip    Damon Catheter Insertion Date: N/A       Day #: N/A      Continued need (if yes, reason documented and discussed with physician): N/A    Skin Issues/ Wounds and ordered treatment discussed on rounds: no issues    Goals/ Plans for the Day: Daily labs, serial lactic acids, transfuse blood as ordered, EGD today

## 2020-07-31 NOTE — PROGRESS NOTES
Occupational Therapy  OT order received and chart reviewed. Hold per nursing due to blood pressure issues. Will continue to follow. Thank you.     Mumtaz Franks OTR/L  [id number]

## 2020-07-31 NOTE — BRIEF OP NOTE
Brief Postoperative Note    Kip Mclaughlin  YOB: 1940  [id number]    Procedure: EGD with biopsy    Anesthesia: [facility]    Surgeon:  Chris Fung MD    Findings:       Esophagus:  GERD. severe erosive esophagitis.  Candida infection      Stomach:  Gastritis     Duodenum:  Normal          Complications: None      Estimated blood loss: none      Gavi Cook MD

## 2020-07-31 NOTE — PATIENT CARE CONFERENCE
CI HR MAP TPRI SVI TFC   Baseline 1.9 79 86 3634 24 58.1   Test 2.8 80 76 2200 35 59   % Change 46% 1.5% -11.6% -39.5% 43.9% 1.6%     NICOM w/ PLR

## 2020-07-31 NOTE — CARE COORDINATION
Met w/ patient. Explained role of  and plan of care. Lives alone in an apartment-10 steps to entrance. Independent PTA. Girlfriend is Burak Justice who is a Nurse Practitioner. Has home O2 2.5-4 liters NC-provider unknown-currently on O2 3lNC. Has nebulizer. PCP is Dr. Martha Pollard and pharmacy is [facility]. No Hx HHC or [place]. Currently on iv Sandostatin and Protonix drips. Per pt, plan is to return home w/ family and friend assist- refusing Moon Lovett.  Will follow Bernard Siegel

## 2020-07-31 NOTE — ANESTHESIA PRE PROCEDURE
Department of Anesthesiology  Preprocedure Note       Name:  Luis Alberto Ly   Age:  70 y.o.  :  1940                                          MRN:  [id number]         Date:  2020      Surgeon: Jeanne Dunn):  Crescencio Ash MD    Procedure: Procedure(s):  BEDSIDE EGD ESOPHAGOGASTRODUODENOSCOPY    Medications prior to admission:   Prior to Admission medications    Medication Sig Start Date End Date Taking? Authorizing Provider   linezolid (ZYVOX) 600 MG tablet Take 1 tablet by mouth 2 times daily for 28 days 7/6/20 8/3/20 Yes Elisabet Swain APRN - NP   HYDROcodone-acetaminophen ([facility]) 7.5-325 MG per tablet TAKE ONE TABLET BY MOUTH EVERY 8 HOURS AS NEEDED FOR PAIN. (DO NOT TAKE MORE THAN 3 TABLETS DAILY) 3/19/20  Yes Historical Provider, MD   torsemide (DEMADEX) 20 MG tablet Take 20 mg by mouth 2 times daily  19  Yes Historical Provider, MD   Revefenacin 175 MCG/3ML SOLN Inhale 1 ampule into the lungs 2 times daily 19  Yes Nandini Paz MD   metoprolol (LOPRESSOR) 100 MG tablet Take 100 mg by mouth 2 times daily Instructed to take with sip water am of procedure   Yes Historical Provider, MD   fluticasone-salmeterol (ADVAIR DISKUS) 500-50 MCG/DOSE diskus inhaler Inhale 1 puff into the lungs 2 times daily Use AM DOS. Yes Historical Provider, MD   potassium chloride (KLOR-CON M) 20 MEQ extended release tablet Take 1 tablet by mouth 2 times daily (with meals) 18  Yes Ritchie Camarillo,    OXYGEN Inhale 2 L/min into the lungs nightly Uses prn during day. States he is currently wearing 4L at home   Yes Historical Provider, MD   warfarin (COUMADIN) 4 MG tablet Take by mouth Daily with lunch Alternates 3 mg and 4 mg daily at lunch. Took today at noon but unsure of what does. INR is 9. 9!    Yes Historical Provider, MD   isosorbide mononitrate (IMDUR) 30 MG CR tablet Take 30 mg by mouth daily Instructed to take with sip water am of procedure   Yes Historical Provider, MD   clopidogrel (PLAVIX) 75 MG tablet Take 75 mg by mouth daily LD 8/20/2019 per Dr. Serge Castle 6/11/14  Yes Historical Provider, MD   atorvastatin (LIPITOR) 80 MG tablet Take 60 mg by mouth daily  6/11/14  Yes Historical Provider, MD   amLODIPine (NORVASC) 5 MG tablet Take 5 mg by mouth daily Instructed to take with sip water am of procedure 6/11/14  Yes Historical Provider, MD   albuterol (PROVENTIL) (5 MG/ML) 0.5% nebulizer solution Take 1 mL by nebulization 3 times daily as needed for Wheezing 9/7/18   Lanre Rao MD       Current medications:    Current Facility-Administered Medications   Medication Dose Route Frequency Provider Last Rate Last Dose    [Held by provider] amLODIPine (NORVASC) tablet 5 mg  5 mg Oral Daily [facility] Gopi, DO        atorvastatin (LIPITOR) tablet 60 mg  60 mg Oral Daily Kobi Nguyễn DO        [Held by provider] clopidogrel (PLAVIX) tablet 75 mg  75 mg Oral Daily Kobi Nguyễn DO        HYDROcodone-acetaminophen (Rosezena Reap) 7.5-325 MG per tablet 1 tablet  1 tablet Oral Q4H PRN [facility] Gopi, DO        potassium chloride (KLOR-CON M) extended release tablet 20 mEq  20 mEq Oral BID WC Kobi Nguyễn DO        [Held by provider] metoprolol tartrate (LOPRESSOR) tablet 100 mg  100 mg Oral BID [facility] Gopi, DO        [Held by provider] isosorbide mononitrate (IMDUR) extended release tablet 30 mg  30 mg Oral Daily Kobi Nguyễn, DO        0.9 % sodium chloride infusion   Intravenous Continuous Kobi Nguyễn,  mL/hr at 07/31/20 0858      Arformoterol Tartrate (BROVANA) nebulizer solution 15 mcg  15 mcg Nebulization BID [facility] Gopi, DO   15 mcg at 07/31/20 0839    budesonide (PULMICORT) nebulizer suspension 250 mcg  250 mcg Nebulization BID [facility] Gopi, DO   250 mcg at 07/31/20 0839    ipratropium-albuterol (DUONEB) nebulizer solution 1 ampule  1 ampule Inhalation Q4H WA Kobi A Mihok, DO   1 ampule at 07/31/20 1150    methylPREDNISolone sodium (SOLU-MEDROL) injection 40 mg  40 mg Intravenous Q8H Kobi A Mihok, DO   40 mg at 07/31/20 1029    potassium chloride (KLOR-CON M) extended release tablet 40 mEq  40 mEq Oral PRN Estradakelsie Victor Mihok, DO        Or    potassium bicarb-citric acid (EFFER-K) effervescent tablet 40 mEq  40 mEq Oral PRN Kamilamacy Victor Mihok, DO        Or    potassium chloride 10 mEq/100 mL IVPB (Peripheral Line)  10 mEq Intravenous PRN Lionel Victor Mihok, DO        acetaminophen (TYLENOL) tablet 650 mg  650 mg Oral Q4H PRN Lionel Victor Mihok, DO        pantoprazole (PROTONIX) 80 mg in sodium chloride 0.9 % 100 mL infusion  8 mg/hr Intravenous Continuous Audria Kevin, DO 10 mL/hr at 07/31/20 1037 8 mg/hr at 07/31/20 1037    octreotide (SANDOSTATIN) 500 mcg in sodium chloride 0.9 % 100 mL infusion  50 mcg/hr Intravenous Continuous Audria Kevin, DO 10 mL/hr at 07/31/20 0611 50 mcg/hr at 07/31/20 0611    phenylephrine (SOFÍA-SYNEPHRINE) 50 mg in dextrose 5 % 250 mL infusion  100 mcg/min Intravenous Continuous Zaira Garcia MD 15 mL/hr at 07/31/20 1201 50 mcg/min at 07/31/20 1201    [START ON 8/1/2020] warfarin (COUMADIN) daily dosing (placeholder)   Other RX Placeholder Darek Westbrook MD           Allergies:     Allergies   Allergen Reactions    Pcn [Penicillins] Swelling    Dye [Iodides]      Heart and kidney dye / reaction unknown       Problem List:    Patient Active Problem List   Diagnosis Code    Bilateral carotid artery stenosis I65.23    Tobacco dependence F17.200    Chronic obstructive pulmonary disease with acute exacerbation (HCC) J44.1    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    Hypoxia R09.02    Atrial fibrillation (HCC) I48.91    GI bleeding K92.2    On home O2 Z99.81    Hypertension I10    Hyperlipidemia E78.5    H/O asbestosis Z80.12    CAD (coronary artery disease) I25.10    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    CKD (chronic kidney disease) stage 3, GFR 30-59 ml/min ([facility]) N18.3    Hypokalemia E87.6    Non-rheumatic aortic sclerosis (HCC) I70.0    Hypotension due to hypovolemia I95.89, E86.1    Emesis R11.10    Diarrhea R19.7    Thrombocytopenia (HCC) D69.6    NSVT (nonsustained ventricular tachycardia) ([facility]) I47.2    Hypotension I95.9    Unintentional weight loss of 10% body weight within 6 months R63.4       Past Medical History:        Diagnosis Date    Amblyopia 1961    corrected    Anticoagulated on Coumadin     Atrial fibrillation ([address] 9/5/2018    Bilateral carotid artery stenosis 07/03/2014    CAD (coronary artery disease)     follows with / Anaid Hyatt every 6 months    CHF (congestive heart failure) [facility]     last episode 9/2018 ?  CKD (chronic kidney disease) stage 3, GFR 30-59 ml/min ([facility]) 7/30/2020    Colon polyp     COPD (chronic obstructive pulmonary disease) ([facility])     follows with DR. Laxmi Elkins    Diarrhea 7/30/2020    Emesis 7/30/2020    Emphysema of lung ([facility]     Full dentures     H/O asbestosis     Hyperlipidemia     Hypertension     Hypokalemia     Hypoprothrombinemia (HCC)     Hypotension due to hypovolemia 7/30/2020    Myopia     Non-rheumatic aortic sclerosis (HCC)     NSVT (nonsustained ventricular tachycardia) ([address] 2018    On home O2     2 Liters/NC at night and during day prn    NICHOLAS (obstructive sleep apnea)     no use cpap    Squamous cell carcinoma, face     skin    Thrombocytopenia ([address] 7/30/2020    Tobacco dependence 7/3/2014    Unintentional weight loss of 10% body weight within 6 months 7/31/2020       Past Surgical History:        Procedure Laterality Date    APPENDECTOMY      BACK SURGERY  years ago    lumbar    CARDIAC CATHETERIZATION      heart cath x4, cabg twice    COLONOSCOPY      CORONARY ANGIOPLASTY WITH STENT PLACEMENT      2000, 2002, 2005, 2009   total of 8 stents   [address]    two     EAR SURGERY Left 6/25/2019    I & D LEFT EAR ABSCESS performed by Arnel Calderon DO at [facility] OR    EAR SURGERY Left 8/26/2019    INCISION AND DRAINAGE LEFT EAR WITH EXCISION OF FISTULA  AND RECONSTRUCTION performed by Sandy Isaacs DO at [address] [address]    amblyoplia  / multiple surgeries    SKIN CANCER EXCISION         Social History:    Social History     Tobacco Use    Smoking status: Current Every Day Smoker     Packs/day: 1.00     Years: 62.00     Pack years: 62.00     Types: Cigarettes     Start date: 9/5/1955    Smokeless tobacco: Never Used   Substance Use Topics    Alcohol use: No                                Ready to quit: No  Counseling given: Yes      Vital Signs (Current):   Vitals:    07/31/20 1302 07/31/20 1315 07/31/20 1330 07/31/20 1345   BP:       Pulse: 88 111 96 103   Resp: 18 26 (!) 32 28   Temp:       TempSrc:       SpO2: 97% 95% 96% 98%   Weight:       Height:                                                  BP Readings from Last 3 Encounters:   07/31/20 (!) 107/49   06/29/20 93/60   05/18/20 98/60       NPO Status:                                                                                 BMI:   Wt Readings from Last 3 Encounters:   07/31/20 134 lb 4.2 oz (60.9 kg)   03/25/20 142 lb (64.4 kg)   10/24/19 175 lb (79.4 kg)     Body mass index is 20.41 kg/m².     CBC:   Lab Results   Component Value Date    WBC 9.7 07/31/2020    RBC 3.54 07/31/2020    HGB 11.0 07/31/2020    HCT 33.6 07/31/2020    MCV 94.9 07/31/2020    RDW 14.1 07/31/2020    PLT 69 07/31/2020       CMP:   Lab Results   Component Value Date     07/31/2020    K 4.6 07/31/2020    K 2.6 07/30/2020    CL 96 07/31/2020    CO2 24 07/31/2020    BUN 28 07/31/2020    CREATININE 1.0 07/31/2020    GFRAA >60 07/31/2020    LABGLOM >60 07/31/2020    GLUCOSE 198 07/31/2020    PROT 5.1 07/31/2020    CALCIUM 7.5 07/31/2020    BILITOT 0.4 07/31/2020    ALKPHOS 89 07/31/2020    AST 19 07/31/2020    ALT 27 07/31/2020       POC Tests: No results for input(s): POCGLU, POCNA, POCK, POCCL, POCBUN, POCHEMO, POCHCT in the last 72 hours. Coags:   Lab Results   Component Value Date    PROTIME 20.5 07/31/2020    INR 1.8 07/31/2020    APTT 55.9 10/08/2019       HCG (If Applicable): No results found for: PREGTESTUR, PREGSERUM, HCG, HCGQUANT     ABGs: No results found for: PHART, PO2ART, VIE9YOQ, KGF4MMW, BEART, Q1LIMVUJ     Type & Screen (If Applicable):  No results found for: LABABO, LABRH    Drug/Infectious Status (If Applicable):  No results found for: HIV, HEPCAB    COVID-19 Screening (If Applicable):   Lab Results   Component Value Date    COVID19 Not Detected 07/30/2020         Anesthesia Evaluation    Airway:         Dental:          Pulmonary:   (+) COPD:                             Cardiovascular:    (+) dysrhythmias: atrial fibrillation,                   Neuro/Psych:               GI/Hepatic/Renal:             Endo/Other:    (+) blood dyscrasia: anticoagulation therapy and thrombocytopenia:., malignancy/cancer. Abdominal:           Vascular:   + PVD, aortic or cerebral, .                                      Lyly Ryder MD   7/31/2020

## 2020-07-31 NOTE — CONSULTS
Critical Care Admit/Consult Note         Patient - Sheryl Braden   MRN -  [id number]   [place] # - [de-identified]   - 1940      Date of Admission -  2020  6:00 PM  Date of evaluation -  2020  0204/0204-A   Hospital Day - 1            ADMIT/CONSULT DETAILS     Reason for Admit/Consult   UGIB, hypotension    Consulting Clarice Ware DO  Primary Care Physician - MD BOOGIE Marte   The patient is a 70 y.o. male with significant past medical history of atrial fibrillation, coronary artery disease (status post multiple CABG and stent procedures), congestive heart failure, COPD, chronic kidney disease, hypertension, hyperlipidemia who was admitted to the ICU for upper GI bleed and hypotension. Mickey Gregory states that he has lost approximately 90 pounds of weight over the last 6 to 8 months, and over the last week he has become progressively more weak. Yesterday, he felt so weak that he nearly passed out, and therefore called EMS and was transported to [facility] emergency department for further evaluation and treatment. Over the last week as well he is had multiple episodes of emesis which is been coffee-ground in nature. He was found to be anemic and hypotensive in the emergency department and was therefore admitted to the ICU for further close observation, monitoring and treatment. Past Medical History         Diagnosis Date    Amblyopia     corrected    Anticoagulated on Coumadin     Atrial fibrillation ([facility] [facility] 2018    Bilateral carotid artery stenosis 2014    CAD (coronary artery disease)     follows with / Vee Garrett every 6 months    CHF (congestive heart failure) ([facility] [address] [facility]     last episode 2018 ?  CKD (chronic kidney disease) stage 3, GFR 30-59 ml/min ([facility]) 2020    Colon polyp     COPD (chronic obstructive pulmonary disease) ([facility])     follows with DR. Thai Ayala 2020    Emesis 2020    Emphysema of lung ([facility] [address] [facility]     Full dentures     H/O asbestosis     Hyperlipidemia     Hypertension     Hypokalemia     Hypoprothrombinemia (HCC)     Hypotension due to hypovolemia 7/30/2020    Myopia     Non-rheumatic aortic sclerosis (HCC)     NSVT (nonsustained ventricular tachycardia) ([address] 2018    On home O2     2 Liters/NC at night and during day prn    NICHOLAS (obstructive sleep apnea)     no use cpap    Squamous cell carcinoma, face     skin    Thrombocytopenia ([facility] [address] 7/30/2020    Tobacco dependence 7/3/2014        Past Surgical History           Procedure Laterality Date    APPENDECTOMY      BACK SURGERY  years ago    lumbar    CARDIAC CATHETERIZATION      heart cath x4, cabg twice    COLONOSCOPY     [address]      2000, 2002, 2005, 2009   total of 8 stents   [address]    two     EAR SURGERY Left 6/25/2019    I & D LEFT EAR ABSCESS [facility] Phoebe Pham DO at [facility] EAR SURGERY Left 8/26/2019    INCISION AND DRAINAGE LEFT EAR WITH EXCISION OF FISTULA  AND RECONSTRUCTION performed by Phoebe Pham DO at [address] [place] [address]  / multiple surgeries    SKIN CANCER EXCISION         SOCIAL AND OCCUPATIONAL HEALTH:  The patient is a Current smoker of 2 packs per day. Pack year equal 120 years. There is asbestos or silica dust exposure.         Current Medications   Current Medications    amLODIPine  5 mg Oral Daily    atorvastatin  60 mg Oral Daily    clopidogrel  75 mg Oral Daily    potassium chloride  20 mEq Oral BID WC    metoprolol  100 mg Oral BID    isosorbide mononitrate  30 mg Oral Daily    Arformoterol Tartrate  15 mcg Nebulization BID    budesonide  250 mcg Nebulization BID    ipratropium-albuterol  1 ampule Inhalation Q4H WA    methylPREDNISolone  40 mg Intravenous Q8H    docusate sodium  100 mg Oral BID    sodium chloride  250 mL Intravenous Once    sodium chloride  1,000 mL Intravenous Once    sodium chloride  250 mL Intravenous Once     HYDROcodone-acetaminophen, potassium chloride **OR** potassium alternative oral replacement **OR** potassium chloride, acetaminophen  IV Drips/Infusions   sodium chloride      pantoprozole (PROTONIX) infusion 8 mg/hr (07/31/20 0144)    octreotide (SANDOSTATIN) infusion 50 mcg/hr (07/31/20 8761)    phenylephrine (SOFÍA-SYNEPHRINE) 50mg/250mL infusion       Home Medications  Medications Prior to Admission: linezolid (ZYVOX) 600 MG tablet, Take 1 tablet by mouth 2 times daily for 28 days  HYDROcodone-acetaminophen (NORCO) 7.5-325 MG per tablet, TAKE ONE TABLET BY MOUTH EVERY 8 HOURS AS NEEDED FOR PAIN. (DO NOT TAKE MORE THAN 3 TABLETS DAILY)  torsemide (DEMADEX) 20 MG tablet, Take 20 mg by mouth 2 times daily   Revefenacin 175 MCG/3ML SOLN, Inhale 1 ampule into the lungs 2 times daily  metoprolol (LOPRESSOR) 100 MG tablet, Take 100 mg by mouth 2 times daily Instructed to take with sip water am of procedure  fluticasone-salmeterol (ADVAIR DISKUS) 500-50 MCG/DOSE diskus inhaler, Inhale 1 puff into the lungs 2 times daily Use AM DOS. potassium chloride (KLOR-CON M) 20 MEQ extended release tablet, Take 1 tablet by mouth 2 times daily (with meals)  OXYGEN, Inhale 2 L/min into the lungs nightly Uses prn during day. States he is currently wearing 4L at home  warfarin (COUMADIN) 4 MG tablet, Take by mouth Daily with lunch Alternates 3 mg and 4 mg daily at lunch. Took today at noon but unsure of what does. INR is 9. 9!   isosorbide mononitrate (IMDUR) 30 MG CR tablet, Take 30 mg by mouth daily Instructed to take with sip water am of procedure  clopidogrel (PLAVIX) 75 MG tablet, Take 75 mg by mouth daily LD 8/20/2019 per Dr. Gonzalez/Wes  atorvastatin (LIPITOR) 80 MG tablet, Take 60 mg by mouth daily   amLODIPine (NORVASC) 5 MG tablet, Take 5 mg by mouth daily Instructed to take with sip water am of procedure  albuterol (PROVENTIL) (5 MG/ML) 0.5% nebulizer solution, Take 1 mL by nebulization 3 times daily as needed for Wheezing    Diet/Nutrition   DIET CLEAR LIQUID; Allergies   Pcn [penicillins] and Dye [iodides]    Social History   Tobacco   reports that he has been smoking cigarettes. He started smoking about 64 years ago. He has a 62.00 pack-year smoking history. He has never used smokeless tobacco.    Alcohol     reports no history of alcohol use. Occupational history :    Family History         Problem Relation Age of Onset    Cancer Mother     Heart Disease Father     Heart Disease Brother          ROS     REVIEW OF SYSTEMS:  CONSTITUTIONAL:  positive for  fatigue, malaise, anorexia and weight loss  EYES:  negative for  visual disturbance  HEENT:  negative for  sore mouth and sore throat  RESPIRATORY:  positive for  dry cough  CARDIOVASCULAR:  negative for  chest pain, dyspnea, palpitations  GASTROINTESTINAL:  positive for nausea, vomiting and hematemesis  GENITOURINARY:  negative  HEMATOLOGIC/LYMPHATIC:  negative for lymphadenopathy  ENDOCRINE:  negative  MUSCULOSKELETAL:  positive for  bone pain and back pain  NEUROLOGICAL:  negative for visual disturbance, coordination problems, gait problems and tremor    Lines and Devices    PIV   R Fem triple lumen   R Fem Art Line    Mechanical Ventilation Data   VENT SETTINGS (Comprehensive)  Vent Information  SpO2: 98 %  Additional Respiratory  Assessments  Pulse: 83  Resp: 19  SpO2: 98 %    ABG  No results found for: PH, PCO2, PO2, HCO3, O2SAT  No results found for: IFIO2, MODE, SETTIDVOL, SETPEEP        Vitals    height is 5' 8\" (1.727 m) and weight is 134 lb 4.2 oz (60.9 kg). His oral temperature is 98.8 °F (37.1 °C). His blood pressure is 79/48 (abnormal) and his pulse is 83. His respiration is 19 and oxygen saturation is 98%.        Temperature Range: Temp: 98.8 °F (37.1 °C) Temp  Av.6 °F (37 °C)  Min: 98.3 °F (36.8 °C)  Max: 98.8 °F (37.1 °C)  BP Range:  Systolic (55UAZ), BAR:87 , Min:71 , NME:877     Diastolic (60LTQ), HXV:71, Min:38, Max:64    Pulse Range: Pulse  Av.2  Min: 83  Max: 110  Respiration Range: Resp  Av.6  Min: 12  Max: 30  Current Pulse Ox[de-identified]  SpO2: 98 %  24HR Pulse Ox Range:  SpO2  Av.6 %  Min: 96 %  Max: 100 %  Oxygen Amount and Delivery: O2 Flow Rate (L/min): 3 L/min      I/O (24 Hours)    Patient Vitals for the past 8 hrs:   BP Temp Temp src Pulse Resp SpO2 Weight   20 0500 (!) 79/48 -- -- 83 19 98 % --   20 0400 (!) 71/40 98.8 °F (37.1 °C) Oral 84 27 100 % --   20 0300 (!) 78/44 -- -- 93 19 100 % --   20 0245 (!) 74/43 98.6 °F (37 °C) Oral 99 30 100 % 134 lb 4.2 oz (60.9 kg)   20 0143 105/64 -- -- 91 27 97 % --   20 0100 (!) 88/46 -- -- 100 -- 99 % --   20 0045 (!) 92/46 -- -- 98 -- 99 % --   20 0030 (!) 92/47 -- -- 101 -- 98 % --   20 0015 (!) 86/47 -- -- 103 -- 98 % --   20 0000 (!) 82/44 -- -- 91 -- 99 % --   20 2345 (!) 91/56 -- -- 91 -- 98 % --   20 2330 (!) 80/48 -- -- 100 -- 96 % --   20 2311 (!) 83/47 -- -- 94 18 96 % --   20 2245 (!) 73/42 -- -- 98 -- 96 % --   20 2230 (!) 81/47 98.3 °F (36.8 °C) Oral 92 18 98 % --       Intake/Output Summary (Last 24 hours) at 2020 0622  Last data filed at 2020 3017  Gross per 24 hour   Intake 1154.65 ml   Output --   Net 1154.65 ml     I/O last 3 completed shifts:   In: 349.7 [Blood:349.7]  Out: -    Date 20 0000 - 20 2359   Shift 7732-6128 9481-0317 8229-8461 24 Hour Total   INTAKE   Blood(mL/kg) 805(13.2)   805(13.2)   Shift Total(mL/kg) 059(34.9)   805(13.2)   OUTPUT   Shift Total(mL/kg)       Weight (kg) 60.9 60.9 60.9 60.9     Patient Vitals for the past 96 hrs (Last 3 readings):   Weight   20 0245 134 lb 4.2 oz (60.9 kg)   20 1917 130 lb (59 kg)         Drains/Tubes Outputs  None  Exam     PHYSICAL EXAM:  CONSTITUTIONAL:  awake, alert, cooperative, no apparent distress, and appears stated age  EYES:  Lids and lashes normal, pupils equal, round and reactive to light, extra ocular muscles intact, sclera clear, conjunctiva normal  ENT:  Normocephalic, without obvious abnormality, atraumatic, sinuses nontender on palpation, external ears without lesions, oral pharynx with moist mucus membranes, tonsils without erythema or exudates, gums normal and good dentition. NECK:  Supple, symmetrical, trachea midline, no adenopathy, thyroid symmetric, not enlarged and no tenderness, skin normal  HEMATOLOGIC/LYMPHATICS:  no cervical lymphadenopathy  LUNGS:  wheeze right base  CARDIOVASCULAR:  Normal apical impulse, regular rate and rhythm, normal S1 and S2,  and no murmur noted  ABDOMEN:  No scars, normal bowel sounds, soft, non-distended, non-tender, no masses palpated, no hepatosplenomegally  MUSCULOSKELETAL:  There is no redness, warmth, or swelling of the joints. Full range of motion noted. Motor strength is 5 out of 5 all extremities bilaterally. NEUROLOGIC:  Awake, alert, oriented to name, place and time. Cranial nerves II-XII are grossly intact. Motor is 5 out of 5 bilaterally. Sensory is intact.       Data   Old records and images have been reviewed    Lab Results   CBC     Lab Results   Component Value Date    WBC 9.7 07/31/2020    RBC 3.54 07/31/2020    HGB 11.0 07/31/2020    HCT 33.6 07/31/2020    PLT 69 07/31/2020    MCV 94.9 07/31/2020    MCH 31.1 07/31/2020    MCHC 32.7 07/31/2020    RDW 14.1 07/31/2020    LYMPHOPCT 3.6 07/31/2020    MONOPCT 2.3 07/31/2020    BASOPCT 0.1 07/31/2020    MONOSABS 0.22 07/31/2020    LYMPHSABS 0.35 07/31/2020    EOSABS 0.00 07/31/2020    BASOSABS 0.01 07/31/2020       BMP   Lab Results   Component Value Date     07/31/2020    K 4.6 07/31/2020    K 2.6 07/30/2020    CL 96 07/31/2020    CO2 24 07/31/2020    BUN 28 07/31/2020    CREATININE 1.0 07/31/2020    GLUCOSE 198 07/31/2020    CALCIUM 7.5 07/31/2020       LFTS  Lab Results   Component Value Date ALKPHOS 89 2020    ALT 27 2020    AST 19 2020    PROT 5.1 2020    BILITOT 0.4 2020    BILIDIR <0.2 2020    IBILI see below 2020    LABALBU 3.3 2020       INR  Recent Labs     20  1816 20  0500   PROTIME >120.0* 33.8*   INR 9.9* 2.8       APTT  No results for input(s): APTT in the last 72 hours. Lactic Acid  Lab Results   Component Value Date    LACTA 1.7 2016        BNP   No results for input(s): BNP in the last 72 hours. Cultures     No results for input(s): BC in the last 72 hours. No results for input(s): Carlos Grime in the last 72 hours. No results for input(s): LABURIN in the last 72 hours. Radiology   Xr Chest Portable    Result Date: 2020  Patient MRN:  [id number] : 1940 Age: 70 years Gender: Male Order Date:  2020 6:15 PM EXAM: XR CHEST PORTABLE COMPARISON: 2019 INDICATION:  SOB SOB FINDINGS: There are median sternotomy wires. There are coarsened interstitial lung markings throughout both lungs. No focal airspace opacity or pleural effusion. No pneumothorax. Chronic coarsened interstitial lung markings are seen throughout both lungs. No obvious pneumonia or pleural effusion. SYSTEMS ASSESSMENT    Neuro   No acute issue, no AMS  Avoid sedating agents    Respiratory   Chronic smoker's cough 120 pk/yr hx  COPD exac with active whezing, Asbestosis  Brovana  Pulmicort  Duonebs  Solumedrol 40mg IV Q8h  Wean oxygen as tolerated.  Keep O2 sat 90-92%    Cardiovascular   Hx CAD, A-fib, multiple stents and bypasses  Hypotension likely 2/2 UGIB  Volume replacement includes 2U PRBC, 1U FFP, 250cc NS  Transient responder to fluids, recurrent hypotension with MAPs in the 50's  Gerardo started, good response  R Fem TL placed, may switch to levophed if needed  Hole home plavix, imdur, lopressor  Continue home statin  Monitor on telemetry  EKGs for chest pain or telemetry changes     Gastrointestinal     UGIB with coffee ground emesis  GI Consulted, Dr. Juan Story to scope  Octrotide 50mcg/hr  Protonix 8mg/hr  NPO      Renal   History of CKD, Cr currently 1  Uremia likely 2/2 UGIB  Avoid nephrotoxic agents  Replete electrolytes as needed  K 2.8 on arrival, 4.6 on AM labs     Infectious Disease   No acute infectious pathology  Monitor with AM CBC  Lactic acid 4.1, trending. Hematology/Oncology   Anemia 2/2 UGIB s/p 2U PRBC  AM Hb 11  Thrombocytopenia of 69 this morning despite 1 U FFP given  Continue to monitor, q12H CBC  Avoid anticoagulants    90lbs of unintentional weight loss over ~6 months  Concern for neoplasia, endoscopy may be revealing      Endocrine   No hx of DM  HbA1C 6.2  Solumedrol 40mg IV QD      Social/Spiritual/DNR/Other   Full code      Shilo Agrawal  PGY-2  8:17 AM EDT       Critical Care Attending Addendum:    Patient seen and examined with the house staff. X-rays personally reviewed through the PACS. Family is updated at the bedside as available. Additional findings listed below as necessary. Additional comments:  1. Hypovolemic shock give 1 liter NS and try to wean off phenylephrine  2. Coffee ground emesis for 1 week consistent with ugi bleed   3. Acute blood loss anemia  4. Active wheezing consistent with copd acute exacerbation on IV steroids and nebs  5.  Plan hydrate, gi consult, octreotide and PPI gtt pending endoscopic evaluation    >30 min CCT

## 2020-08-01 PROBLEM — E44.0 MODERATE PROTEIN-CALORIE MALNUTRITION (HCC): Chronic | Status: ACTIVE | Noted: 2020-08-01

## 2020-08-01 PROBLEM — K22.10 EROSIVE ESOPHAGITIS: Status: ACTIVE | Noted: 2020-08-01

## 2020-08-01 PROBLEM — I27.20 PULMONARY HYPERTENSION (HCC): Status: ACTIVE | Noted: 2020-08-01

## 2020-08-01 LAB
ALBUMIN SERPL-MCNC: 3.4 G/DL (ref 3.5–5.2)
ALP BLD-CCNC: 82 U/L (ref 40–129)
ALT SERPL-CCNC: 23 U/L (ref 0–40)
ANION GAP SERPL CALCULATED.3IONS-SCNC: 11 MMOL/L (ref 7–16)
AST SERPL-CCNC: 19 U/L (ref 0–39)
BASOPHILS ABSOLUTE: 0.01 E9/L (ref 0–0.2)
BASOPHILS RELATIVE PERCENT: 0.1 % (ref 0–2)
BILIRUB SERPL-MCNC: 0.3 MG/DL (ref 0–1.2)
BILIRUBIN DIRECT: <0.2 MG/DL (ref 0–0.3)
BILIRUBIN, INDIRECT: ABNORMAL MG/DL (ref 0–1)
BUN BLDV-MCNC: 13 MG/DL (ref 8–23)
C-REACTIVE PROTEIN: <0.1 MG/DL (ref 0–0.4)
CALCIUM SERPL-MCNC: 8.3 MG/DL (ref 8.6–10.2)
CHLORIDE BLD-SCNC: 107 MMOL/L (ref 98–107)
CO2: 24 MMOL/L (ref 22–29)
CREAT SERPL-MCNC: 1 MG/DL (ref 0.7–1.2)
EKG ATRIAL RATE: 90 BPM
EKG Q-T INTERVAL: 368 MS
EKG QRS DURATION: 150 MS
EKG QTC CALCULATION (BAZETT): 495 MS
EKG R AXIS: 99 DEGREES
EKG T AXIS: -16 DEGREES
EKG VENTRICULAR RATE: 109 BPM
EOSINOPHILS ABSOLUTE: 0 E9/L (ref 0.05–0.5)
EOSINOPHILS RELATIVE PERCENT: 0 % (ref 0–6)
GFR AFRICAN AMERICAN: >60
GFR NON-AFRICAN AMERICAN: >60 ML/MIN/1.73
GLUCOSE BLD-MCNC: 103 MG/DL (ref 74–99)
GRAM STAIN ORDERABLE: NORMAL
HCT VFR BLD CALC: 23.7 % (ref 37–54)
HCT VFR BLD CALC: 24.2 % (ref 37–54)
HEMOGLOBIN: 7.9 G/DL (ref 12.5–16.5)
HEMOGLOBIN: 8.1 G/DL (ref 12.5–16.5)
IMMATURE GRANULOCYTES #: 0.21 E9/L
IMMATURE GRANULOCYTES %: 1.3 % (ref 0–5)
INR BLD: 1.4
LACTIC ACID: 2.7 MMOL/L (ref 0.5–2.2)
LACTIC ACID: 3.7 MMOL/L (ref 0.5–2.2)
LACTIC ACID: 3.9 MMOL/L (ref 0.5–2.2)
LACTIC ACID: 5 MMOL/L (ref 0.5–2.2)
LYMPHOCYTES ABSOLUTE: 0.99 E9/L (ref 1.5–4)
LYMPHOCYTES RELATIVE PERCENT: 6 % (ref 20–42)
MAGNESIUM: 1.9 MG/DL (ref 1.6–2.6)
MCH RBC QN AUTO: 30.9 PG (ref 26–35)
MCHC RBC AUTO-ENTMCNC: 33.5 % (ref 32–34.5)
MCV RBC AUTO: 92.4 FL (ref 80–99.9)
MONOCYTES ABSOLUTE: 2.21 E9/L (ref 0.1–0.95)
MONOCYTES RELATIVE PERCENT: 13.5 % (ref 2–12)
MRSA CULTURE ONLY: NORMAL
NEUTROPHILS ABSOLUTE: 12.97 E9/L (ref 1.8–7.3)
NEUTROPHILS RELATIVE PERCENT: 79.1 % (ref 43–80)
OVALOCYTES: ABNORMAL
PDW BLD-RTO: 14.3 FL (ref 11.5–15)
PHOSPHORUS: 1.8 MG/DL (ref 2.5–4.5)
PLATELET # BLD: 147 E9/L (ref 130–450)
PMV BLD AUTO: 10.4 FL (ref 7–12)
POIKILOCYTES: ABNORMAL
POTASSIUM SERPL-SCNC: 3.1 MMOL/L (ref 3.5–5)
PROTHROMBIN TIME: 16.5 SEC (ref 9.3–12.4)
RBC # BLD: 2.62 E12/L (ref 3.8–5.8)
SEDIMENTATION RATE, ERYTHROCYTE: 4 MM/HR (ref 0–15)
SODIUM BLD-SCNC: 142 MMOL/L (ref 132–146)
TOTAL PROTEIN: 4.9 G/DL (ref 6.4–8.3)
WBC # BLD: 16.4 E9/L (ref 4.5–11.5)

## 2020-08-01 PROCEDURE — 6370000000 HC RX 637 (ALT 250 FOR IP): Performed by: INTERNAL MEDICINE

## 2020-08-01 PROCEDURE — 87425 ROTAVIRUS AG IA: CPT

## 2020-08-01 PROCEDURE — 86140 C-REACTIVE PROTEIN: CPT

## 2020-08-01 PROCEDURE — 83605 ASSAY OF LACTIC ACID: CPT

## 2020-08-01 PROCEDURE — 6360000002 HC RX W HCPCS: Performed by: INTERNAL MEDICINE

## 2020-08-01 PROCEDURE — 2580000003 HC RX 258: Performed by: INTERNAL MEDICINE

## 2020-08-01 PROCEDURE — 85014 HEMATOCRIT: CPT

## 2020-08-01 PROCEDURE — G0328 FECAL BLOOD SCRN IMMUNOASSAY: HCPCS

## 2020-08-01 PROCEDURE — 87329 GIARDIA AG IA: CPT

## 2020-08-01 PROCEDURE — 85610 PROTHROMBIN TIME: CPT

## 2020-08-01 PROCEDURE — 94640 AIRWAY INHALATION TREATMENT: CPT

## 2020-08-01 PROCEDURE — 85025 COMPLETE CBC W/AUTO DIFF WBC: CPT

## 2020-08-01 PROCEDURE — 80076 HEPATIC FUNCTION PANEL: CPT

## 2020-08-01 PROCEDURE — 2000000000 HC ICU R&B

## 2020-08-01 PROCEDURE — 87045 FECES CULTURE AEROBIC BACT: CPT

## 2020-08-01 PROCEDURE — 37799 UNLISTED PX VASCULAR SURGERY: CPT

## 2020-08-01 PROCEDURE — 87328 CRYPTOSPORIDIUM AG IA: CPT

## 2020-08-01 PROCEDURE — C9113 INJ PANTOPRAZOLE SODIUM, VIA: HCPCS | Performed by: INTERNAL MEDICINE

## 2020-08-01 PROCEDURE — 83520 IMMUNOASSAY QUANT NOS NONAB: CPT

## 2020-08-01 PROCEDURE — 2500000003 HC RX 250 WO HCPCS: Performed by: INTERNAL MEDICINE

## 2020-08-01 PROCEDURE — 93010 ELECTROCARDIOGRAM REPORT: CPT | Performed by: INTERNAL MEDICINE

## 2020-08-01 PROCEDURE — 36415 COLL VENOUS BLD VENIPUNCTURE: CPT

## 2020-08-01 PROCEDURE — 85651 RBC SED RATE NONAUTOMATED: CPT

## 2020-08-01 PROCEDURE — 80048 BASIC METABOLIC PNL TOTAL CA: CPT

## 2020-08-01 PROCEDURE — 2700000000 HC OXYGEN THERAPY PER DAY

## 2020-08-01 PROCEDURE — 83735 ASSAY OF MAGNESIUM: CPT

## 2020-08-01 PROCEDURE — 85018 HEMOGLOBIN: CPT

## 2020-08-01 PROCEDURE — 84100 ASSAY OF PHOSPHORUS: CPT

## 2020-08-01 RX ORDER — ZOLPIDEM TARTRATE 5 MG/1
5 TABLET ORAL NIGHTLY PRN
Status: DISCONTINUED | OUTPATIENT
Start: 2020-08-01 | End: 2020-08-01

## 2020-08-01 RX ORDER — ZOLPIDEM TARTRATE 5 MG/1
5 TABLET ORAL NIGHTLY
Status: DISCONTINUED | OUTPATIENT
Start: 2020-08-01 | End: 2020-08-02

## 2020-08-01 RX ADMIN — SODIUM CHLORIDE: 9 INJECTION, SOLUTION INTRAVENOUS at 13:31

## 2020-08-01 RX ADMIN — SODIUM CHLORIDE, PRESERVATIVE FREE 10 ML: 5 INJECTION INTRAVENOUS at 22:30

## 2020-08-01 RX ADMIN — IPRATROPIUM BROMIDE AND ALBUTEROL SULFATE 1 AMPULE: 2.5; .5 SOLUTION RESPIRATORY (INHALATION) at 19:17

## 2020-08-01 RX ADMIN — IPRATROPIUM BROMIDE AND ALBUTEROL SULFATE 1 AMPULE: 2.5; .5 SOLUTION RESPIRATORY (INHALATION) at 12:54

## 2020-08-01 RX ADMIN — SODIUM CHLORIDE, PRESERVATIVE FREE 10 ML: 5 INJECTION INTRAVENOUS at 08:40

## 2020-08-01 RX ADMIN — IPRATROPIUM BROMIDE AND ALBUTEROL SULFATE 1 AMPULE: 2.5; .5 SOLUTION RESPIRATORY (INHALATION) at 16:04

## 2020-08-01 RX ADMIN — ZOLPIDEM TARTRATE 5 MG: 5 TABLET ORAL at 22:27

## 2020-08-01 RX ADMIN — IPRATROPIUM BROMIDE AND ALBUTEROL SULFATE 1 AMPULE: 2.5; .5 SOLUTION RESPIRATORY (INHALATION) at 07:25

## 2020-08-01 RX ADMIN — PANTOPRAZOLE SODIUM 40 MG: 40 INJECTION, POWDER, FOR SOLUTION INTRAVENOUS at 22:27

## 2020-08-01 RX ADMIN — METHYLPREDNISOLONE SODIUM SUCCINATE 40 MG: 40 INJECTION, POWDER, LYOPHILIZED, FOR SOLUTION INTRAMUSCULAR; INTRAVENOUS at 16:32

## 2020-08-01 RX ADMIN — SODIUM CHLORIDE, PRESERVATIVE FREE 10 ML: 5 INJECTION INTRAVENOUS at 22:27

## 2020-08-01 RX ADMIN — SODIUM CHLORIDE, PRESERVATIVE FREE 10 ML: 5 INJECTION INTRAVENOUS at 08:50

## 2020-08-01 RX ADMIN — ARFORMOTEROL TARTRATE 15 MCG: 15 SOLUTION RESPIRATORY (INHALATION) at 07:26

## 2020-08-01 RX ADMIN — SODIUM CHLORIDE: 9 INJECTION, SOLUTION INTRAVENOUS at 02:24

## 2020-08-01 RX ADMIN — METHYLPREDNISOLONE SODIUM SUCCINATE 40 MG: 40 INJECTION, POWDER, LYOPHILIZED, FOR SOLUTION INTRAMUSCULAR; INTRAVENOUS at 08:40

## 2020-08-01 RX ADMIN — FLUCONAZOLE 100 MG: 100 TABLET ORAL at 08:42

## 2020-08-01 RX ADMIN — PHENYLEPHRINE HYDROCHLORIDE 20 MCG/MIN: 10 INJECTION INTRAVENOUS at 18:22

## 2020-08-01 RX ADMIN — ATORVASTATIN CALCIUM 60 MG: 20 TABLET, FILM COATED ORAL at 08:40

## 2020-08-01 RX ADMIN — BUDESONIDE 250 MCG: 0.25 SUSPENSION RESPIRATORY (INHALATION) at 19:17

## 2020-08-01 RX ADMIN — POTASSIUM CHLORIDE 20 MEQ: 20 TABLET, EXTENDED RELEASE ORAL at 08:41

## 2020-08-01 RX ADMIN — POTASSIUM BICARBONATE 40 MEQ: 782 TABLET, EFFERVESCENT ORAL at 10:51

## 2020-08-01 RX ADMIN — METHYLPREDNISOLONE SODIUM SUCCINATE 40 MG: 40 INJECTION, POWDER, LYOPHILIZED, FOR SOLUTION INTRAMUSCULAR; INTRAVENOUS at 02:24

## 2020-08-01 RX ADMIN — POTASSIUM CHLORIDE 20 MEQ: 20 TABLET, EXTENDED RELEASE ORAL at 16:32

## 2020-08-01 RX ADMIN — SODIUM CHLORIDE: 9 INJECTION, SOLUTION INTRAVENOUS at 23:29

## 2020-08-01 RX ADMIN — PANTOPRAZOLE SODIUM 40 MG: 40 INJECTION, POWDER, FOR SOLUTION INTRAVENOUS at 08:40

## 2020-08-01 RX ADMIN — ARFORMOTEROL TARTRATE 15 MCG: 15 SOLUTION RESPIRATORY (INHALATION) at 19:17

## 2020-08-01 RX ADMIN — METOPROLOL TARTRATE 12.5 MG: 25 TABLET, FILM COATED ORAL at 10:51

## 2020-08-01 RX ADMIN — POTASSIUM PHOSPHATE, MONOBASIC AND POTASSIUM PHOSPHATE, DIBASIC 15 MMOL: 224; 236 INJECTION, SOLUTION, CONCENTRATE INTRAVENOUS at 16:32

## 2020-08-01 RX ADMIN — BUDESONIDE 250 MCG: 0.25 SUSPENSION RESPIRATORY (INHALATION) at 07:26

## 2020-08-01 RX ADMIN — PHENYLEPHRINE HYDROCHLORIDE 25 MCG/MIN: 10 INJECTION INTRAVENOUS at 20:55

## 2020-08-01 ASSESSMENT — PAIN SCALES - GENERAL
PAINLEVEL_OUTOF10: 0

## 2020-08-01 NOTE — PLAN OF CARE
Problem: Falls - Risk of:  Goal: Will remain free from falls  Description: Will remain free from falls  Outcome: Met This Shift  Goal: Absence of physical injury  Description: Absence of physical injury  Outcome: Met This Shift     Problem: Pain:  Goal: Pain level will decrease  Description: Pain level will decrease  Outcome: Met This Shift     Problem: Nausea/Vomiting  Goal: Absence of nausea/vomiting  Outcome: Met This Shift     Problem: Diarrhea:  Goal: Occurrences of diarrhea will decrease  Description: Occurrences of diarrhea will decrease  Outcome: Met This Shift     Problem: HH FLUID RETENTION-CHF  Goal: Absence of fluid overload signs and symptoms  Outcome: Met This Shift     Problem: Gas Exchange - Impaired:  Goal: Levels of oxygenation will improve  Description: Levels of oxygenation will improve  Outcome: Met This Shift     Problem: Cardiac Output - Decreased:  Goal: Cardiac rhythm stable  Outcome: Met This Shift     Problem: Skin Integrity:  Goal: Will show no infection signs and symptoms  Description: Will show no infection signs and symptoms  Outcome: Met This Shift  Goal: Absence of new skin breakdown  Description: Absence of new skin breakdown  Outcome: Met This Shift

## 2020-08-01 NOTE — PROGRESS NOTES
Pulmonary Progress Note    Admit Date: 2020  Hospital day                               PCP: Bhanu Pierce MD    Chief Complaint (s):  Patient Active Problem List   Diagnosis    Bilateral carotid artery stenosis    Tobacco dependence    Chronic obstructive pulmonary disease with acute exacerbation (HCC)    Combined systolic and diastolic congestive heart failure (HCC)    Hypoxia    Atrial fibrillation (HCC)    GI bleeding    On home O2    Hypertension    Hyperlipidemia   Authur Nikkie H/O asbestosis    CAD (coronary artery disease)    Hypoprothrombinemia ([address]    CKD (chronic kidney disease) stage 3, GFR 30-59 ml/min (HCC)    Hypokalemia    Non-rheumatic aortic sclerosis (HCC)    Hypotension due to hypovolemia    Emesis    Diarrhea    Thrombocytopenia (HCC)    NSVT (nonsustained ventricular tachycardia) (HCC)    Hypotension    Unintentional weight loss of 10% body weight within 6 months    Moderate protein-calorie malnutrition (HCC)       Subjective:  ·  Sleeping, results of endoscopy noted.       Vitals:  VITALS:  BP (!) 99/49   Pulse 105   Temp 98.2 °F (36.8 °C) (Oral)   Resp 28   Ht 5' 8\" (1.727 m)   Wt 140 lb 3.2 oz (63.6 kg)   SpO2 95%   BMI 21.32 kg/m²     24HR INTAKE/OUTPUT:      Intake/Output Summary (Last 24 hours) at 2020 1316  Last data filed at 2020 1200  Gross per 24 hour   Intake 4702 ml   Output 1300 ml   Net 3402 ml       24HR PULSE OXIMETRY RANGE:    SpO2  Av.2 %  Min: 86 %  Max: 100 %    Medications:  IV:   sodium chloride 100 mL/hr at 20 0224    phenylephrine (SOFÍA-SYNEPHRINE) 50mg/250mL infusion 20 mcg/min (20 1137)       Scheduled Meds:   [Held by provider] amLODIPine  5 mg Oral Daily    atorvastatin  60 mg Oral Daily    [Held by provider] clopidogrel  75 mg Oral Daily    potassium chloride  20 mEq Oral BID WC    [Held by provider] metoprolol  100 mg Oral BID    [Held by provider] isosorbide mononitrate  30 mg Oral Daily  Arformoterol Tartrate  15 mcg Nebulization BID    budesonide  250 mcg Nebulization BID    ipratropium-albuterol  1 ampule Inhalation Q4H WA    methylPREDNISolone  40 mg Intravenous Q8H    warfarin (COUMADIN) daily dosing (placeholder)   Other RX Placeholder    fluconazole  100 mg Oral Daily    pantoprazole  40 mg Intravenous BID    And    sodium chloride (PF)  10 mL Intravenous BID    sodium chloride flush  10 mL Intravenous 2 times per day       Diet:   Dietary Nutrition Supplements: Clear Liquid Oral Supplement  DIET LOW FAT;     EXAM:  General: No distress. Eyes: PERRL. No sclera icterus. No conjunctival injection. ENT: No discharge. Pharynx clear. Neck: Trachea midline. Normal thyroid. Resp: No accessory muscle use. No rales. No wheezing. No rhonchi. CV: Regular rate. Regular rhythm. No murmur or rub. Abd: Non-tender. Non-distended. No masses. No organomegaly. Normal bowel sounds. Skin: Warm and dry. No nodule on exposed extremities. No rash on exposed extremities. Ext: No cyanosis, clubbing, edema  Lymph: No cervical LAD. No supraclavicular LAD. M/S: No cyanosis. No joint deformity. No clubbing. Neuro: Positive pupils/gag/corneals. Normal pain response.        Results:  CBC:   Recent Labs     07/30/20 2028 07/31/20  0500 07/31/20  1715 08/01/20  0630   WBC 11.9* 9.7  --  16.4*   HGB 8.9* 11.0* 8.9* 8.1*   HCT 26.3* 33.6* 25.7* 24.2*   MCV 92.3 94.9  --  92.4   PLT 90* 69*  --  147     BMP:   Recent Labs     07/30/20 2028 07/31/20  0500 08/01/20  0630    137 142   K 2.8* 4.6 3.1*   CL 96* 96* 107   CO2 27 24 24   PHOS  --  4.4 1.8*   BUN 38* 28* 13   CREATININE 1.1 1.0 1.0     LIVER PROFILE:   Recent Labs     07/30/20 1816 07/31/20  0500 08/01/20  0630   AST 21 19 19   ALT 29 27 23   LIPASE 16  --   --    BILIDIR <0.2 <0.2 <0.2   BILITOT 0.4 0.4 0.3   ALKPHOS 97 89 82     PT/INR:   Recent Labs     07/31/20  0500 07/31/20  1300 08/01/20  0630   PROTIME 33.8* 20.5* 16.5*   INR 2. 8 1.8 1.4     APTT: No results for input(s): APTT in the last 72 hours. Pathology:  1. N/A      Microbiology:  1. None    Recent ABG:   No results for input(s): PH, PO2, PCO2, HCO3, BE, O2SAT, METHB, O2HB, COHB, O2CON, HHB, THB in the last 72 hours. Recent Films:  CT ABDOMEN PELVIS WO CONTRAST Additional Contrast? None   Final Result      1. No evidence of abdominal or pelvic mass or lymphadenopathy. 2. Multiple gas-filled distended loops of small bowel are present   within the anterior aspect of the abdomen which could indicate   adynamic ileus versus partial or early small bowel obstruction. Stool   and gas is seen within the colon. Short-term follow-up may be helpful   for further evaluation. 3. Diverticulosis without evidence of acute diverticulitis. 4. Bilateral perinephric fat stranding could suggest chronic medical   renal disease with appropriate clinical history. 5. View of the lung bases show bibasilar opacities related to   pneumonia and atelectasis with small bilateral pleural effusions. 6. Fusiform infrarenal abdominal aortic aneurysm is demonstrated   measuring up to 3 cm in diameter. XR CHEST PORTABLE   Final Result      Chronic coarsened interstitial lung markings are seen throughout both   lungs. No obvious pneumonia or pleural effusion. Assessment:  1. 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 Ongoing tobacco abuse  3. GI bleed, rule out underlying malignancy with associated unintentional weight loss        Plan:  1. Continue empiric treatment for COPD    Time at the bedside, reviewing labs and radiographs, reviewing updated notes and consultations, discussing with staff and family was more than 35 minutes. Please note that voice recognition technology was used in the preparation of this note and make therefore it may contain inadvertent transcription errors.   If the patient is a COVID 19 isolation patient, the above physical exam reflects that of the examining physician for the day. Nina Blevins M.D., F.C.C.P.     [facility], [address] [facility], [address] [place] [address], [facility] - [facility]

## 2020-08-01 NOTE — PROGRESS NOTES
Critical Care Team - Daily Progress Note         Date and time: 2020 8:48 AM  Patient's name:  Luis Alberto Ly  Medical Record Number: [id number]  Patient's account/billing number: [de-identified]  Patient's YOB: 1940  Age: 70 y.o. Date of Admission: 2020  6:00 PM  Length of stay during current admission: 2      Primary Care Physician: Kavya Cavazos MD  ICU Attending Physician: Dr. Jerel Lin    Code Status: Limited    Reason for ICU admission: Acute blood loss anemia      SUBJECTIVE:     OVERNIGHT EVENTS:       Still on Gerardo at 40, could not sleep. Hg drifting down.       CURRENT VENTILATION STATUS:     [] Ventilator  [] BIPAP  [x] Nasal Cannula [] Room Air      IF INTUBATED, ET TUBE MARKING AT LOWER LIP:       cms    SECRETIONS Amount:  [] Small [] Moderate  [] Large  [x] None  Color:     [] White [] Colored  [] Bloody    SEDATION:  RAAS Score:  [] Propofol gtt  [] Versed gtt  [] Ativan gtt   [x] No Sedation    PARALYZED:  [] No    [] Yes      VASOPRESSORS:  [x] No    [] Yes    If yes -   [] Levophed       [] Dopamine     [] Vasopressin       [] Dobutamine  [] Phenylephrine         [] Epinephrine    CENTRAL LINES:     [] No   [x] Yes   (Date of Insertion:   )           If yes -     [] Right IJ     [] Left IJ [x] Right Femoral [] Left Femoral                   [] Right Subclavian [] Left Subclavian       THEODORE'S CATHETER:   [x] No   [] Yes  (Date of Insertion:   )     URINE OUTPUT:            [x] Good   [] Low              [] Anuric      OBJECTIVE:     VITAL SIGNS:  BP (!) 107/49   Pulse 100   Temp 98 °F (36.7 °C) (Infrared)   Resp 25   Ht 5' 8\" (1.727 m)   Wt 140 lb 3.2 oz (63.6 kg)   SpO2 100%   BMI 21.32 kg/m²   Tmax over 24 hours:  Temp (24hrs), Av °F (36.7 °C), Min:97.6 °F (36.4 °C), Max:98.5 °F (36.9 °C)      Patient Vitals for the past 6 hrs:   Temp Temp src Pulse Resp SpO2 Weight   20 0728 -- -- -- 25 100 % --   20 0727 -- -- -- 13 100 % --   20 1281 -- -- -- (!) 35 99 % --   08/01/20 0700 -- -- 100 21 98 % --   08/01/20 0600 -- -- 92 20 98 % 140 lb 3.2 oz (63.6 kg)   08/01/20 0500 -- -- 85 20 97 % --   08/01/20 0400 98 °F (36.7 °C) Infrared 99 27 91 % --   08/01/20 0300 -- -- 93 22 96 % --         Intake/Output Summary (Last 24 hours) at 8/1/2020 0848  Last data filed at 8/1/2020 0600  Gross per 24 hour   Intake 5812 ml   Output 1075 ml   Net 4737 ml     Wt Readings from Last 2 Encounters:   08/01/20 140 lb 3.2 oz (63.6 kg)   03/25/20 142 lb (64.4 kg)     Body mass index is 21.32 kg/m².         PHYSICAL EXAMINATION:    General appearance - alert, well appearing, and in no distress  Mental status - alert, oriented to person, place, and time  Eyes - clear sclera  Ears -external ears normal  Nose - normal nares  Mouth -edentulous, moist mucus membranes  Neck - supple, no significant adenopathy  Chest - wheezing noted bilaterally diffusely  Heart - normal rate, regular rhythm, normal S1, S2, no murmurs, rubs, clicks or gallops  Abdomen - soft, nontender, nondistended, no masses or organomegaly  Neurological - alert, oriented, normal speech, no focal findings or movement disorder noted}  Extremities - peripheral pulses normal, no pedal edema, no clubbing or cyanosis  Skin - normal coloration and turgor, no rashes, no suspicious skin lesions noted      Any additional physical findings:    MEDICATIONS:    Scheduled Meds:   [Held by provider] amLODIPine  5 mg Oral Daily    atorvastatin  60 mg Oral Daily    [Held by provider] clopidogrel  75 mg Oral Daily    potassium chloride  20 mEq Oral BID WC    [Held by provider] metoprolol  100 mg Oral BID    [Held by provider] isosorbide mononitrate  30 mg Oral Daily    Arformoterol Tartrate  15 mcg Nebulization BID    budesonide  250 mcg Nebulization BID    ipratropium-albuterol  1 ampule Inhalation Q4H WA    methylPREDNISolone  40 mg Intravenous Q8H    warfarin (COUMADIN) daily dosing (placeholder)   Other RX Placeholder    fluconazole  100 mg Oral Daily    pantoprazole  40 mg Intravenous BID    And    sodium chloride (PF)  10 mL Intravenous BID    sodium chloride flush  10 mL Intravenous 2 times per day     Continuous Infusions:   sodium chloride 100 mL/hr at 08/01/20 0224    phenylephrine (SOFÍA-SYNEPHRINE) 50mg/250mL infusion 40 mcg/min (08/01/20 0224)     PRN Meds:   HYDROcodone-acetaminophen, 1 tablet, Q4H PRN  potassium chloride, 40 mEq, PRN    Or  potassium alternative oral replacement, 40 mEq, PRN    Or  potassium chloride, 10 mEq, PRN  acetaminophen, 650 mg, Q4H PRN  sodium chloride flush, 10 mL, PRN          VENT SETTINGS (Comprehensive) (if applicable):  Vent Information  SpO2: 100 %  Additional Respiratory  Assessments  Pulse: 100  Resp: 25  SpO2: 100 %  Oral Care: Mouth swabbed    ABGs:   No results for input(s): PH, PCO2, PO2, HCO3, BE, O2SAT in the last 72 hours.     Laboratory findings:    Complete Blood Count:   Recent Labs     07/30/20 2028 07/31/20  0500 07/31/20  1715 08/01/20  0630   WBC 11.9* 9.7  --  16.4*   HGB 8.9* 11.0* 8.9* 8.1*   HCT 26.3* 33.6* 25.7* 24.2*   PLT 90* 69*  --  147        Last 3 Blood Glucose:   Recent Labs     07/30/20 2028 07/31/20  0500 08/01/20  0630   GLUCOSE 109* 198* 103*        PT/INR:    Lab Results   Component Value Date    PROTIME 16.5 08/01/2020    INR 1.4 08/01/2020     PTT:    Lab Results   Component Value Date    APTT 55.9 10/08/2019       Comprehensive Metabolic Profile:   Recent Labs     07/30/20 2028 07/31/20  0500 08/01/20  0630    137 142   K 2.8* 4.6 3.1*   CL 96* 96* 107   CO2 27 24 24   BUN 38* 28* 13   CREATININE 1.1 1.0 1.0   GLUCOSE 109* 198* 103*   CALCIUM 7.9* 7.5* 8.3*   PROT  --  5.1* 4.9*   LABALBU  --  3.3* 3.4*   BILITOT  --  0.4 0.3   ALKPHOS  --  89 82   AST  --  19 19   ALT  --  27 23      Magnesium:   Lab Results   Component Value Date    MG 1.9 08/01/2020     Phosphorus:   Lab Results   Component Value Date    PHOS 1.8 08/01/2020 Ionized Calcium: No results found for: CAION     Urinalysis:     Troponin:   Recent Labs     07/30/20  1816 07/31/20  0500 07/31/20  0855   TROPONINI <0.01 <0.01 <0.01       Microbiology:    Cultures during this admission:     Blood cultures:                 [] None drawn      [x] Negative             []  Positive (Details:  )  Urine Culture:                   [x] None drawn      [] Negative             []  Positive (Details:  )  Sputum Culture:               [] None drawn       [] Negative             [x]  Positive (Details: abundant GNR )   Endotracheal aspirate:     [x] None drawn       [] Negative             []  Positive (Details:  )     Other pertinent Labs:       Radiology/Imaging:     CT abdomen viewed and shows diverticulosis, small effusions and minimal bibasilar atelectasis        ASSESSMENT:     Principal Problem:    GI bleeding  Active Problems:    Bilateral carotid artery stenosis    Tobacco dependence    Chronic obstructive pulmonary disease with acute exacerbation (HCC)    Combined systolic and diastolic congestive heart failure (HCC)    Atrial fibrillation (HCC)    On home O2    Hypertension    Hyperlipidemia    CAD (coronary artery disease)    Hypoprothrombinemia (HCC)    CKD (chronic kidney disease) stage 3, GFR 30-59 ml/min (HCC)    Hypokalemia    Non-rheumatic aortic sclerosis (HCC)    Hypotension due to hypovolemia    Emesis    Diarrhea    Thrombocytopenia (HCC)    Hypotension    Unintentional weight loss of 10% body weight within 6 months  Resolved Problems:    * No resolved hospital problems. *      Additional assessment:    · GI bleed with esophagitis  · COPD exacerbation still with active wheezing  · Hypokalemia replete  · Hypovolemic shock resolving and will wean off phenylephrine today.         PLAN:     WEAN PER PROTOCOL:  [] No   [] Yes  [x] N/A    DISCONTINUE ANY LABS:   [x] No   [] Yes    ICU PROPHYLAXIS:  Stress ulcer:  [x] PPI Agent  [] [id number] [] Sucralfate  [] Other:  VTE:   [] Enoxaparin  [] Unfract. Heparin Subcut  [x] EPC Cuffs    NUTRITION:  [] NPO [] Tube Feeding (Specify: ) [] TPN  [x] PO (Diet: DIET CLEAR LIQUID;)    HOME MEDICATIONS RECONCILED: [] No  [x] Yes    INSULIN DRIP:   [x] No   [] Yes    CONSULTATION NEEDED:  [x] No   [] Yes    FAMILY UPDATED:    [x] No   [] Yes    TRANSFER OUT OF ICU:   [x] No   [] Yes    ADDITIONAL PLAN:  1. Wean Gerardo  2. Continue steroids and nebs  3. Ambien prn to sleep        MAGALY Solis                     8/1/2020, 8:48 AM

## 2020-08-01 NOTE — PROGRESS NOTES
PROGRESS NOTE    Patient Presents with/Seen in Consultation For      *Reason for Consult: GI bleed     CHIEF COMPLAINT:   fatigue, nausea, vomiting, diarrhea, shortness of breath, and cough    Subjective:     Patient seen laying in bed, in NAD. States he is feeling better. Requesting to eat. Tolerated clears. Denies any N/V, or any discomfort at this time. States his breathing is improved today. Had a softly formed brown stool today- specimen sent. EGD report reviewed with the patient, all questions answered. BP 90/50s    Review of Systems  Aside from what was mentioned in the PMH and HPI, essentially unremarkable, all others negative. Objective:     Patient Vitals for the past 8 hrs:   BP Temp Temp src Pulse Resp SpO2 Height Weight   08/01/20 1300 -- -- -- 89 21 97 % -- --   08/01/20 1254 -- -- -- -- 28 95 % -- --   08/01/20 1200 (!) 99/49 98.2 °F (36.8 °C) Oral 105 30 96 % -- --   08/01/20 1159 -- -- -- -- -- -- 5' 8\" (1.727 m) --   08/01/20 1100 -- -- -- 99 24 97 % -- --   08/01/20 1000 -- -- -- 130 (!) 31 93 % -- --   08/01/20 0900 -- -- -- 128 24 96 % -- --   08/01/20 0849 (!) 110/55 -- -- 117 26 -- -- --   08/01/20 0800 (!) 112/57 98.2 °F (36.8 °C) Oral 111 20 96 % -- --   08/01/20 0728 -- -- -- -- 25 100 % -- --   08/01/20 0727 -- -- -- -- 13 100 % -- --   08/01/20 0726 -- -- -- -- (!) 35 99 % -- --   08/01/20 0700 -- -- -- 100 21 98 % -- --   08/01/20 0600 -- -- -- 92 20 98 % -- 140 lb 3.2 oz (63.6 kg)       CONSTITUTIONAL:  awake, fatigued and ill-appearing, pale, cooperative, and appears older than stated age  EYES:  pupils equal, round and reactive to light, sclera anicteric and conjunctiva pale  ENT:  normocephalic, oral pharynx with dry mucous membranes  LUNGS: Rhonchi throughout, diminished.  O2 per high flow NC  CARDIOVASCULAR:  irregular rate and rhythm, no murmur noted; 2+ pulses; Trace BLE edema  ABDOMEN: normal bowel sounds, softly distended, non-tender  MUSCULOSKELETAL:  full range of motion noted  motor strength is 5 out of 5 all extremities bilaterally  NEUROLOGIC:  Mental Status Exam:  Level of Alertness:   awake  Orientation:   person, place, time  Motor Exam:  Motor exam is symmetrical 5 out of 5 all extremities bilaterally    zolpidem (AMBIEN) tablet 5 mg, Nightly PRN  [Held by provider] amLODIPine (NORVASC) tablet 5 mg, Daily  atorvastatin (LIPITOR) tablet 60 mg, Daily  [Held by provider] clopidogrel (PLAVIX) tablet 75 mg, Daily  HYDROcodone-acetaminophen (NORCO) 7.5-325 MG per tablet 1 tablet, Q4H PRN  potassium chloride (KLOR-CON M) extended release tablet 20 mEq, BID WC  [Held by provider] metoprolol tartrate (LOPRESSOR) tablet 100 mg, BID  [Held by provider] isosorbide mononitrate (IMDUR) extended release tablet 30 mg, Daily  0.9 % sodium chloride infusion, Continuous  Arformoterol Tartrate (BROVANA) nebulizer solution 15 mcg, BID  budesonide (PULMICORT) nebulizer suspension 250 mcg, BID  ipratropium-albuterol (DUONEB) nebulizer solution 1 ampule, Q4H WA  methylPREDNISolone sodium (SOLU-MEDROL) injection 40 mg, Q8H  potassium chloride (KLOR-CON M) extended release tablet 40 mEq, PRN    Or  potassium bicarb-citric acid (EFFER-K) effervescent tablet 40 mEq, PRN    Or  potassium chloride 10 mEq/100 mL IVPB (Peripheral Line), PRN  acetaminophen (TYLENOL) tablet 650 mg, Q4H PRN  phenylephrine (SOFÍA-SYNEPHRINE) 50 mg in dextrose 5 % 250 mL infusion, Continuous  warfarin (COUMADIN) daily dosing (placeholder), RX Placeholder  fluconazole (DIFLUCAN) tablet 100 mg, Daily  pantoprazole (PROTONIX) injection 40 mg, BID    And  sodium chloride (PF) 0.9 % injection 10 mL, BID  sodium chloride flush 0.9 % injection 10 mL, 2 times per day  sodium chloride flush 0.9 % injection 10 mL, PRN         Data Review  CBC:   Lab Results   Component Value Date    WBC 16.4 08/01/2020    RBC 2.62 08/01/2020    HGB 8.1 08/01/2020    HCT 24.2 08/01/2020    MCV 92.4 08/01/2020    MCH 30.9 08/01/2020    MCHC 33.5 08/01/2020 RDW 14.3 08/01/2020     08/01/2020    MPV 10.4 08/01/2020     CMP:    Lab Results   Component Value Date     08/01/2020    K 3.1 08/01/2020    K 2.6 07/30/2020     08/01/2020    CO2 24 08/01/2020    BUN 13 08/01/2020    CREATININE 1.0 08/01/2020    GFRAA >60 08/01/2020    LABGLOM >60 08/01/2020    GLUCOSE 103 08/01/2020    PROT 4.9 08/01/2020    LABALBU 3.4 08/01/2020    CALCIUM 8.3 08/01/2020    BILITOT 0.3 08/01/2020    ALKPHOS 82 08/01/2020    AST 19 08/01/2020    ALT 23 08/01/2020     Hepatic Function Panel:    Lab Results   Component Value Date    ALKPHOS 82 08/01/2020    ALT 23 08/01/2020    AST 19 08/01/2020    PROT 4.9 08/01/2020    BILITOT 0.3 08/01/2020    BILIDIR <0.2 08/01/2020    IBILI see below 08/01/2020    LABALBU 3.4 08/01/2020     No components found for: CHLPL  Lab Results   Component Value Date    TRIG 79 07/31/2020     Lab Results   Component Value Date    HDL 30 07/31/2020     Lab Results   Component Value Date    LDLCALC 33 07/31/2020     Lab Results   Component Value Date    LABVLDL 16 07/31/2020      PT/INR:    Lab Results   Component Value Date    PROTIME 16.5 08/01/2020    INR 1.4 08/01/2020     IRON:  No results found for: IRON  Iron Saturation:  No components found for: PERCENTFE  FERRITIN:  No results found for: FERRITIN      Assessment:     Principal Problem:  ? Anemia, normocytic, normochromic- suspect upper GI bleed-melanotic FOBT+ stool  ? Diarrhea  ? Nausea and vomiting   ? Thrombocytopenia  ? Unintentional weight loss - 90#/6-8 mo  ? Supratherapeutic INR  ? A-Fib on Warfarin - Warfarin on hold  ? Shortness of breath, worse on exertion-defer to pulmonary/ICU  ? Hypokalemia, hypochloremia -defer to ICU/PCP  ? Hypotension, Gerardo infusing  ? EGD 7/31/20: Severe erosive esophagitis, Candida esophagitis, but no varices seen. Stomach showed gastritis and biopsies were not taken in view of the patient's thrombocytopenia. Duodenum appeared unremarkable.   There was no evidence of old or active bleeding. ? +UTI    Plan:     ? Critical care management per ICU   ? Low fat diet, as tolerated  ? Continue Diflucan as ordered  ? Stool studies- pending  ? Medicate for nausea as ordered  ? Serial H&H, transfuse per ICU  ? Monitor CBC, CMP, INR daily  ? Defer comorbidities to others  ?  Continue to monitor    Discussed with Dr. Mechelle Arriaga per Dr. Eric Asif, NP-C 8/1/2020 1:00 PM For Dr. Roma Greene

## 2020-08-01 NOTE — PROGRESS NOTES
NPO or clear liquid status due to medical condition, poor intake prior to admission      Nutrition Interventions:   Food and/or Nutrient Delivery:  Continue Current Diet, Start Oral Nutrition Supplement(ADAT, Ensure Clear TID)  Nutrition Education/Counseling:  No recommendation at this time   Coordination of Nutrition Care:  Continued Inpatient Monitoring    Goals:  nutrition progression, consume >75% meals/ONS       Nutrition Monitoring and Evaluation:   Food/Nutrient Intake Outcomes:  Diet Advancement/Tolerance, Food and Nutrient Intake, Supplement Intake  Physical Signs/Symptoms Outcomes:  Biochemical Data, Diarrhea, GI Status, Nausea or Vomiting, Fluid Status or Edema, Nutrition Focused Physical Findings, Skin, Weight     Discharge Planning:     Too soon to determine     Electronically signed by Gianluca Munoz MS, RD, LD on 8/1/20 at 12:20 PM EDT    Contact: [phone]

## 2020-08-01 NOTE — PROGRESS NOTES
PROGRESS  NOTE --                                                          INTERNAL  MEDICINE                                                                              I  PERSONALLY SAW , EXAMINED, AND CARED Rubens, 8/1/2020     LABS, XRAY ,CHART, AND MEDICATIONS  REVIEWED BY ME .        8/1/2020-sUBJECTIVE: Rubina Joyner is alert awake and cooperative; oriented ×3. Denies any chest pain dyspnea nausea emesis. Tolerating diet. No abdominal pain. Had his first bowel movement today since admission. Unlikely to be C. difficile. Last evening he did have liquid diet and enjoyed it he would like something more substantial today. States he has hard time sleeping without Ambien request bedtime dose of same. I spoke with nursing, still having a hard time weaning from intravenous phenylephrine. Blood pressure keeps dropping without it. He remains afebrile. Most recent blood pressure 90/40.  97% saturation 2 L nasal cannula. Respiratory rate 21. Intake and output +5392 cc. Potassium 3.1; lactic acid finally dropping, 2.7. Glucose 103 calcium 8.3 phosphorus 1.8 protein 4.9 albumin 3.4 remainder liver functions normal.  Hemoglobin admission 11.0; today hemoglobin 8.1; WBC 16.4, INR 1.4. Platelets 076, remarkable improvement since admission, would seem likely caused by Zyvox. proBNP on admission 2500. CRP 0.2. Procalcitonin 0.10. A1c 6.2.  2D echo completed ejection fraction 55%; septal motion consistent with previous bypass surgery. Markedly large right atrium moderate tricuspid regurgitation moderate to severe pulmonary hypertension. Aortic sclerosis; pulmonary valve is sclerotic. No pleural effusion no pericardial effusion. Urine culture less than 25,000 gram-negative rods. EGD completed yesterday with following results--    Esophagus:  GERD. severe erosive esophagitis.  Candida infection        Stomach: Gastritis      Duodenum:  Normal         Pulmonary note from today appreciated; continue to wean Gerardo-Synephrine as able; Ambien as needed for sleep. Dietary consult appreciated, moderate malnutrition. Oral nutritional supplement 3 times a day has been added.         Objective:     PHYSICAL EXAM:    VS: BP (!) 99/49   Pulse 89   Temp 98.2 °F (36.8 °C) (Oral)   Resp 21   Ht 5' 8\" (1.727 m)   Wt 140 lb 3.2 oz (63.6 kg)   SpO2 97%   BMI 21.32 kg/m²     Labs:   CBC:   Lab Results   Component Value Date    WBC 16.4 08/01/2020    RBC 2.62 08/01/2020    HGB 8.1 08/01/2020    HCT 24.2 08/01/2020    MCV 92.4 08/01/2020    MCH 30.9 08/01/2020    MCHC 33.5 08/01/2020    RDW 14.3 08/01/2020     08/01/2020    MPV 10.4 08/01/2020     CBC with Differential:    Lab Results   Component Value Date    WBC 16.4 08/01/2020    RBC 2.62 08/01/2020    HGB 8.1 08/01/2020    HCT 24.2 08/01/2020     08/01/2020    MCV 92.4 08/01/2020    MCH 30.9 08/01/2020    MCHC 33.5 08/01/2020    RDW 14.3 08/01/2020    LYMPHOPCT 6.0 08/01/2020    MONOPCT 13.5 08/01/2020    BASOPCT 0.1 08/01/2020    MONOSABS 2.21 08/01/2020    LYMPHSABS 0.99 08/01/2020    EOSABS 0.00 08/01/2020    BASOSABS 0.01 08/01/2020     Hemoglobin/Hematocrit:    Lab Results   Component Value Date    HGB 8.1 08/01/2020    HCT 24.2 08/01/2020     CMP:    Lab Results   Component Value Date     08/01/2020    K 3.1 08/01/2020    K 2.6 07/30/2020     08/01/2020    CO2 24 08/01/2020    BUN 13 08/01/2020    CREATININE 1.0 08/01/2020    GFRAA >60 08/01/2020    LABGLOM >60 08/01/2020    GLUCOSE 103 08/01/2020    PROT 4.9 08/01/2020    LABALBU 3.4 08/01/2020    CALCIUM 8.3 08/01/2020    BILITOT 0.3 08/01/2020    ALKPHOS 82 08/01/2020    AST 19 08/01/2020    ALT 23 08/01/2020     BMP:    Lab Results   Component Value Date     08/01/2020    K 3.1 08/01/2020    K 2.6 07/30/2020     08/01/2020    CO2 24 08/01/2020    BUN 13 08/01/2020    LABALBU 3.4 08/01/2020    CREATININE 1.0 08/01/2020    CALCIUM 8.3 08/01/2020    GFRAA >60 08/01/2020    LABGLOM >60 08/01/2020    GLUCOSE 103 08/01/2020     Hepatic Function Panel:    Lab Results   Component Value Date    ALKPHOS 82 08/01/2020    ALT 23 08/01/2020    AST 19 08/01/2020    PROT 4.9 08/01/2020    BILITOT 0.3 08/01/2020    BILIDIR <0.2 08/01/2020    IBILI see below 08/01/2020    LABALBU 3.4 08/01/2020     Ionized Calcium:  No results found for: IONCA  Magnesium:    Lab Results   Component Value Date    MG 1.9 08/01/2020     Phosphorus:    Lab Results   Component Value Date    PHOS 1.8 08/01/2020     LDH:  No results found for: LDH  Uric Acid:    Lab Results   Component Value Date    LABURIC 7.7 07/31/2020     PT/INR:    Lab Results   Component Value Date    PROTIME 16.5 08/01/2020    INR 1.4 08/01/2020     Warfarin PT/INR:  No components found for: PTPATWAR, PTINRWAR  PTT:    Lab Results   Component Value Date    APTT 55.9 10/08/2019   [APTT}  Troponin:    Lab Results   Component Value Date    TROPONINI <0.01 07/31/2020     Last 3 Troponin:    Lab Results   Component Value Date    TROPONINI <0.01 07/31/2020    TROPONINI <0.01 07/31/2020    TROPONINI <0.01 07/30/2020     U/A:  No results found for: NITRITE, COLORU, PROTEINU, PHUR, LABCAST, WBCUA, RBCUA, MUCUS, TRICHOMONAS, YEAST, BACTERIA, CLARITYU, SPECGRAV, LEUKOCYTESUR, UROBILINOGEN, BILIRUBINUR, BLOODU, GLUCOSEU, AMORPHOUS  ABG:  No results found for: PH, PCO2, PO2, HCO3, BE, THGB, TCO2, O2SAT  HgBA1c:    Lab Results   Component Value Date    LABA1C 6.2 07/31/2020     FLP:    Lab Results   Component Value Date    TRIG 79 07/31/2020    HDL 30 07/31/2020    LDLCALC 33 07/31/2020    LABVLDL 16 07/31/2020     TSH:    Lab Results   Component Value Date    TSH 0.525 07/31/2020     VITAMIN B12: No components found for: B12  FOLATE:    Lab Results   Component Value Date    FOLATE 9.0 07/31/2020     IRON:  No results found for: IRON  Iron Saturation:  No components found for: PERCENTFE  TIBC:  No results found for: TIBC  FERRITIN:  No results found for: FERRITIN  PSA: No results found for: PSA     General appearance: Alert, Awake, Oriented times 3, no distress; nasal cannula oxygen in place  Skin: Warm and dry ; no rashes  Head: Normocephalic. No masses, lesions or tenderness noted  Eyes: Conjunctivae pale, sclera white. PERRL,EOM-I  Ears: Right external ear normal left external ear seems to have cauliflower ear despite patient's complaint of drainage from ear. Nose/Sinuses: Nares normal. Septum midline. Mucosa normal. No drainage  Oropharynx: Oropharynx clear with no exudate seen  Neck: Supple. No jugular venous distension, lymphadenopathy or thyromegaly Trachea midline  Lungs: Soft wheezes bilaterally mostly right  Heart: S1 S2  Regular rate and rhythm. No rub or gallop; grade 1/6 systolic murmur second right intercostal space grade 1/6 systolic murmur left sternal border  Abdomen: Soft, non-tender. BS normal. No masses, organomegaly; no rebound or guarding  Extremities: No edema, Peripheral pulses palpable  Musculoskeletal: Muscular strength appears intact. Neuro:  No focal motor defects ; II-XII grossly intact .  CORNELL equally    TELEMETRY: REVIEWED--Telemetry: Atrial fibrillation    ASSESSMENT:   Principal Problem:    GI bleeding  Active Problems:    Bilateral carotid artery stenosis    Tobacco dependence    Chronic obstructive pulmonary disease with acute exacerbation (HCC)    Combined systolic and diastolic congestive heart failure (HCC)    Atrial fibrillation (HCC)    On home O2    Hypertension    Hyperlipidemia    CAD (coronary artery disease)    Hypoprothrombinemia (HCC)    CKD (chronic kidney disease) stage 3, GFR 30-59 ml/min (HCC)    Hypokalemia    Non-rheumatic aortic sclerosis (HCC)    Hypotension due to hypovolemia    Emesis    Diarrhea    Thrombocytopenia (HCC)    Hypotension    Unintentional weight loss of 10% body weight within 6 months    Moderate protein-calorie malnutrition ([facility] [address]  Resolved Problems:    * No resolved hospital problems. *      PLAN:  SEE ORDERS      RE  CHANGES AND FINDINGS   Medications reviewed with patient  GI prophylaxis  DVT prophylaxis  Consultants notes reviewed   Continue oral potassium  Continue potassium protocol  Add potassium phosphate 20 mmol IV today  Monitor labs  I discussed the above with infectious disease regarding long-term Zyvox, equivocal left ear infection  Low-fat diet  Ambien for sleep 5 mg  Wean Gerardo-Synephrine as possible  Hold anticoagulation in view of continuing drop in hemoglobin despite atrial fibrillation  Sequential compression device  PT OT  Continue nasal cannula oxygen  Transfuse as needed, keep hemoglobin greater than 7.0  Continue aerosol treatments  Plavix isosorbide mononitrate metoprolol tartrate amlodipine all on hold      Discussed with patient and nursing. Stephen Nguyễn  DO     1:34 PM     8/1/2020    TIME > 35 MINUTES    >  50 %  OF  TIME  DISCUSSION               ------------  INFORMATION  -----------      DIET:Dietary Nutrition Supplements: Clear Liquid Oral Supplement  DIET LOW FAT;         Allergies   Allergen Reactions    Pcn [Penicillins] Swelling    Dye [Iodides]      Heart and kidney dye / reaction unknown         MEDICATION SIDE EFFECTS:none       SCHEDULED MEDS:                                 Scheduled Meds:   [Held by provider] amLODIPine  5 mg Oral Daily    atorvastatin  60 mg Oral Daily    [Held by provider] clopidogrel  75 mg Oral Daily    potassium chloride  20 mEq Oral BID WC    [Held by provider] metoprolol  100 mg Oral BID    [Held by provider] isosorbide mononitrate  30 mg Oral Daily    Arformoterol Tartrate  15 mcg Nebulization BID    budesonide  250 mcg Nebulization BID    ipratropium-albuterol  1 ampule Inhalation Q4H WA    methylPREDNISolone  40 mg Intravenous Q8H    warfarin (COUMADIN) daily dosing (placeholder)   Other RX Placeholder    fluconazole  100 mg Oral Daily    pantoprazole  40 mg Intravenous BID    And    sodium chloride (PF)  10 mL Intravenous BID    sodium chloride flush  10 mL Intravenous 2 times per day       Continuous Infusions:   sodium chloride 100 mL/hr at 08/01/20 1331    phenylephrine (SOFÍA-SYNEPHRINE) 50mg/250mL infusion 20 mcg/min (08/01/20 1137)         Data:       Intake/Output Summary (Last 24 hours) at 8/1/2020 1334  Last data filed at 8/1/2020 1200  Gross per 24 hour   Intake 4702 ml   Output 1300 ml   Net 3402 ml       Wt Readings from Last 3 Encounters:   08/01/20 140 lb 3.2 oz (63.6 kg)   03/25/20 142 lb (64.4 kg)   10/24/19 175 lb (79.4 kg)       Labs: Additional    GLUCOSE:No results for input(s): POCGLU in the last 72 hours. BNP:No results found for: BNP    CRP:   Recent Labs     07/31/20  0500 08/01/20  0630   CRP 0.2 <0.1       ESR:  Recent Labs     07/31/20  0500 08/01/20  0630   SEDRATE 1 4       RADIOLOGY: REVIEWED AVAILABLE REPORT  CT ABDOMEN PELVIS WO CONTRAST Additional Contrast? None   Final Result      1. No evidence of abdominal or pelvic mass or lymphadenopathy. 2. Multiple gas-filled distended loops of small bowel are present   within the anterior aspect of the abdomen which could indicate   adynamic ileus versus partial or early small bowel obstruction. Stool   and gas is seen within the colon. Short-term follow-up may be helpful   for further evaluation. 3. Diverticulosis without evidence of acute diverticulitis. 4. Bilateral perinephric fat stranding could suggest chronic medical   renal disease with appropriate clinical history. 5. View of the lung bases show bibasilar opacities related to   pneumonia and atelectasis with small bilateral pleural effusions. 6. Fusiform infrarenal abdominal aortic aneurysm is demonstrated   measuring up to 3 cm in diameter.       XR CHEST PORTABLE   Final Result      Chronic coarsened interstitial lung markings are seen throughout both

## 2020-08-01 NOTE — CONSULTS
[address] [place] [address] [facility]    Consultation Note     Admit Date: 7/30/2020  6:00 PM    Reason for Consult:   Evaluation of the left ear infection    Attending Physician:  Donn Rojas DO     Chief Complaint: Nausea, anorexia, loose bowel movements on antibiotics    HISTORY OF PRESENT ILLNESS:   The patient is a 70 y.o.  man known to the Infectious Diseases service. The patient is followed by Dr. Pierre Coad has had a chronic left ear problem ever since surgery in 2019. More recently infectious disease been evaluating the patient and over the last several months he has been on a few courses of antimicrobials. In early July he was started on Zyvox since July 6, 2020. Prior to that he was on other antimicrobials including Keflex and Levaquin. Patient was brought in by EMS to the emergency room having lost a significant amount of weight over the last 6 months up to 80 pounds. The loose bowel movements have been as many as 10-12 a day and his p.o. intake is been very little as well as him being bedridden the last week or so. On January 2020 his hemoglobin is 12.8 and today it is 8.5. His INR is 9.9 stools for occult blood are positive. He was transfused. He is now in intensive care because of systolic pressures in the 70s in spite of fluid boluses and is on Gerardo-Synephrine. His BUN and creatinine on admission were 38 and 1.1 and his platelets have been as low as 69,000. Currently his white count is 16,000. Current work-up shows that the blood cultures have been negative sputum cultures polymicrobial and urine for Legionella and strep pneumo negative as is the respiratory viral panel in the COVID-19. There is no acute infiltrates on the chest x-ray but patient does have COPD.   His CRP is less than 0 point and pro calcitonin is 0.1L  Historically this patient works for [employer], when asked about physical contact he did not officially box but he did a lot of street fighting        Microbiology    6/29/2020 abscess Candida species  6/29/2020 abscess coag negative staph beta-lactamase negative anaerobic strep    1/3/2020 face coag negative staph and Klebsiella    12/2019 abscess coag negative staph alpha strep and gram-positive rods  9/26/2019 coag negative staph and corynebacterium  6/25/2019 coag negative staph here  6/25/2019 ear biopsy granulation tissue/scar tissue focal generalized cells squamous epithelium and keratin debris as well as pseudo-epitheliomatous hyperplasia focal solar elastosis              Past Medical History:        Diagnosis Date    Amblyopia 1961    corrected    Anticoagulated on Coumadin     Atrial fibrillation ([address] 9/5/2018    Bilateral carotid artery stenosis 07/03/2014    CAD (coronary artery disease)     follows with / Kathia Gibson every 6 months    CHF (congestive heart failure) ([facility] [address]     last episode 9/2018 ?  CKD (chronic kidney disease) stage 3, GFR 30-59 ml/min ([facility]) 7/30/2020    Colon polyp     COPD (chronic obstructive pulmonary disease) ([facility])     follows with DR. Andre    Diarrhea 7/30/2020    Emesis 7/30/2020    Emphysema of lung ([facility] [address]     Erosive esophagitis 8/1/2020    Full dentures     H/O asbestosis     Hyperlipidemia     Hypertension     Hypokalemia     Hypoprothrombinemia ([facility])     Hypotension due to hypovolemia 7/30/2020    Myopia     Non-rheumatic aortic sclerosis ([facility])     NSVT (nonsustained ventricular tachycardia) ([address] 2018    On home O2     2 Liters/NC at night and during day prn    NICHOLAS (obstructive sleep apnea)     no use cpap    Pulmonary hypertension ([address] 8/1/2020    To severe 2020    Squamous cell carcinoma, face     skin    Thrombocytopenia ([address] 7/30/2020    Tobacco dependence 7/3/2014    Unintentional weight loss of 10% body weight within 6 months 7/31/2020     Past Surgical History:        Procedure Laterality Date    APPENDECTOMY      BACK SURGERY  years ago    lumbar    CARDIAC [penicillins] and Dye [iodides]    Social History:   Social History     Socioeconomic History    Marital status:      Spouse name: None    Number of children: None    Years of education: None    Highest education level: None   Occupational History    None   Social Needs    Financial resource strain: None    Food insecurity     Worry: None     Inability: None    Transportation needs     Medical: None     Non-medical: None   Tobacco Use    Smoking status: Current Every Day Smoker     Packs/day: 1.00     Years: 62.00     Pack years: 62.00     Types: Cigarettes     Start date: 9/5/1955    Smokeless tobacco: Never Used   Substance and Sexual Activity    Alcohol use: No    Drug use: No    Sexual activity: None   Lifestyle    Physical activity     Days per week: None     Minutes per session: None    Stress: None   Relationships    Social connections     Talks on phone: None     Gets together: None     Attends Sikh service: None     Active member of club or organization: None     Attends meetings of clubs or organizations: None     Relationship status: None    Intimate partner violence     Fear of current or ex partner: None     Emotionally abused: None     Physically abused: None     Forced sexual activity: None   Other Topics Concern    None   Social History Narrative    None     Tobacco: Positive  Alcohol: No  Pets: No  Travel: No    Family History:       Problem Relation Age of Onset    Cancer Mother         ?  Other Mother         Hemorrhage    Heart Disease Father     Heart Disease Brother    . Otherwise non-pertinent to the chief complaint. REVIEW OF SYSTEMS:    CONSTITUTIONAL:  No chills, fevers or night sweats. No loss of weight. EYES:  No double vision or drainage from eyes, ears or throat. HEENT:  No neck stiffness. No dysphagia. No drainage from eyes, ears or throat; current drainage from the left ear  RESPIRATORY:  No cough, productive sputum or hemoptysis. CARDIOVASCULAR:  No chest pain, palpitations, orthopnea or dyspnea on exertion. GASTROINTESTINAL: See history of present illness  GENITOURINARY:  No frequency burning dysuria or hematuria. INTEGUMENT/BREAST:  No rash or breast masses. HEMATOLOGIC/LYMPHATIC:  No lymphadenopathy or blood dyscrasics. ALLERGIC/IMMUNOLOGIC:  No anaphylaxis. ENDOCRINE:  No polyuria or polydipsia or temperature intolerance. MUSCULOSKELETAL:  No myalgia or arthralgia. Full ROM. NEUROLOGICAL:  No focal motor sensory deficit. BEHAVIOR/PSYCH:  No psychosis. PHYSICAL EXAM:    Vitals:    BP (!) 99/49   Pulse 89   Temp 98.2 °F (36.8 °C) (Oral)   Resp 21   Ht 5' 8\" (1.727 m)   Wt 140 lb 3.2 oz (63.6 kg)   SpO2 97%   BMI 21.32 kg/m²   Constitutional: The patient is awake, alert, and oriented. Skin: Warm and dry. No rashes were noted. No jaundice. HEENT: Eyes show round, and reactive pupils. Moist mucous membranes, no ulcerations, no thrush. Left ear is can corded then scarred deformed but no active cellulitis or drainage that I can perceive. Neck: Supple to movements. No lymphadenopathy. Chest: No use of accessory muscles to breathe. Symmetrical expansion. Auscultation reveals no wheezing, crackles, or rhonchi. Cardiovascular: S1 and S2 are rhythmic and regular. No murmurs appreciated. Abdomen: Positive bowel sounds to auscultation. Benign to palpation. No masses felt. No hepatosplenomegaly. Genitourinary: Male  Extremities: No clubbing, no cyanosis, no edema. Musculoskeletal: Equal and symmetrical  Neurological: No focal  Lines: peripheral      CBC+dif:  Recent Labs     07/30/20 2028 07/31/20  0500  08/01/20  0630   WBC 11.9* 9.7  --  16.4*   HGB 8.9* 11.0*   < > 8.1*   HCT 26.3* 33.6*   < > 24.2*   MCV 92.3 94.9  --  92.4   PLT 90* 69*  --  147   NEUTROABS  --  9.08*  --  12.97*    < > = values in this interval not displayed.      Lab Results   Component Value Date    CRP <0.1 08/01/2020    CRP 0.2 07/31/2020    CRP 1.2 (H) 01/05/2020     No results found for: CRPHS  Lab Results   Component Value Date    SEDRATE 4 08/01/2020    SEDRATE 1 07/31/2020    SEDRATE 18 (H) 01/05/2020     Lab Results   Component Value Date    ALT 23 08/01/2020    AST 19 08/01/2020    ALKPHOS 82 08/01/2020    BILITOT 0.3 08/01/2020     Lab Results   Component Value Date     08/01/2020    K 3.1 08/01/2020    K 2.6 07/30/2020     08/01/2020    CO2 24 08/01/2020    BUN 13 08/01/2020    CREATININE 1.0 08/01/2020    GFRAA >60 08/01/2020    LABGLOM >60 08/01/2020    GLUCOSE 103 08/01/2020    PROT 4.9 08/01/2020    LABALBU 3.4 08/01/2020    CALCIUM 8.3 08/01/2020    BILITOT 0.3 08/01/2020    ALKPHOS 82 08/01/2020    AST 19 08/01/2020    ALT 23 08/01/2020       Lab Results   Component Value Date    PROTIME 16.5 08/01/2020    INR 1.4 08/01/2020       Lab Results   Component Value Date    TSH 0.525 07/31/2020       No results found for: NITRITE, COLORU, PHUR, LABCAST, WBCUA, RBCUA, MUCUS, TRICHOMONAS, YEAST, BACTERIA, CLARITYU, SPECGRAV, LEUKOCYTESUR, UROBILINOGEN, BILIRUBINUR, BLOODU, GLUCOSEU, AMORPHOUS    No results found for: WYX8NSC, BEART, C3AKMPJI, PHART, THGBART, DFA6IMA, PO2ART, ZZP9EAV  Radiology:  CT ABDOMEN PELVIS WO CONTRAST Additional Contrast? None   Final Result      1. No evidence of abdominal or pelvic mass or lymphadenopathy. 2. Multiple gas-filled distended loops of small bowel are present   within the anterior aspect of the abdomen which could indicate   adynamic ileus versus partial or early small bowel obstruction. Stool   and gas is seen within the colon. Short-term follow-up may be helpful   for further evaluation. 3. Diverticulosis without evidence of acute diverticulitis. 4. Bilateral perinephric fat stranding could suggest chronic medical   renal disease with appropriate clinical history.       5. View of the lung bases show bibasilar opacities related to   pneumonia and atelectasis with small bilateral pleural effusions. 6. Fusiform infrarenal abdominal aortic aneurysm is demonstrated   measuring up to 3 cm in diameter. XR CHEST PORTABLE   Final Result      Chronic coarsened interstitial lung markings are seen throughout both   lungs. No obvious pneumonia or pleural effusion. Microbiology:  Pending  Recent Labs     07/31/20  0500   BC 24 Hours no growth     Recent Labs     07/31/20  0615   ORG Gram negative luci*     Recent Labs     07/31/20  0500   BLOODCULT2 24 Hours no growth     Recent Labs     07/31/20  0615   STREPNEUMAGU Presumptive negative- suggests no current or recent  pneumococcal infection. Infection due to Strep pneumoniae cannot be  ruled out since the antigen present in the sample  may be below the detection limit of the test.  Normal Range:Presumptive Negative       Recent Labs     07/31/20  0615   LP1UAG Presumptive Negative -suggesting no recent or current infections  with Legionella pneumophila serogroup 1. Infection to Legionella cannot be ruled out since other serogroups  and species may cause infection, antigen may not be present in  early infection, or level of antigen may be below the  detection limit. Normal Range: Presumptive Negative       No results for input(s): ASO in the last 72 hours. No results for input(s): CULTRESP in the last 72 hours. Assessment:  · Adverse drug reaction to Zyvox  · Left ear previous inflammatory process is quiesced sent and may even be improved on the steroids if this is polychondritis  · Erosive esophagitis on Diflucan    Plan:    · Cont off antibiotics; continue Diflucan  · Check stool for C. difficile  · Check cultures  · Baseline ESR, CRP  · Monitor labs  · Will follow with you    Thank you for having us see this patient in consultation. I will be discussing this case with the treating physicians.       Electronically signed by Elizabeth Meier MD on 8/1/2020 at 2:12 PM

## 2020-08-01 NOTE — OP NOTE
[id number] [address]                                OPERATIVE REPORT    PATIENT NAME: Bar May                    :        1940  MED REC NO:   [id number]                            ROOM:       0609  ACCOUNT NO:   [de-identified]                           ADMIT DATE: 2020  PROVIDER:     Lazaro Carney MD    DATE OF PROCEDURE:  2020    PROCEDURE PERFORMED:  Upper endoscopy. PREOPERATIVE DIAGNOSES:  Elevated liver enzymes _____ anemia, rule out  variceal bleed in a patient with liver cirrhosis, likely cardiac and  with a remote history of alcohol abuse. POSTOPERATIVE DIAGNOSES:  Severe erosive esophagitis, Candida  esophagitis, but no varices seen. Stomach showed gastritis and biopsies  were not taken in view of the patient's thrombocytopenia. Duodenum  appeared unremarkable. There was no evidence of old or active bleeding. ANESTHESIA:  LMAC. NOTE:  Prior to the procedure an informed consent was obtained from the  patient after explaining the benefits as well as the risks,  alternatives, and complications of the procedure to the patient, who  understood and agreed. PROCEDURE:  With the patient in the left lateral decubitus position, the  Olympus GIF-100 forward-viewing videoscope was introduced into the  esophagus, the evaluation of which showed severe erosive esophagitis as  well as Candida esophagitis and no hiatal hernia was seen. The scope was then advanced through the gastroesophageal junction into  the gastric body, along the greater curvature. Evaluation of the body  of the stomach showed gastritis without any active bleeding. The scope was then advanced through the pylorus into the duodenal bulb  and second portion of the duodenum, both of which appeared to be  unremarkable. Duodenum was unremarkable.   The scope was then retrieved  and retroflexed in the prepyloric antrum, with thorough evaluation of  the cardiac and fundal portions of the stomach, which appeared to be  within normal limits. The scope was then straightened, the area deflated, and the procedure  was terminated by withdrawing the scope and conducting a second look on  the way out, which was essentially the same. The patient tolerated the procedure well.         Mandi Martin MD    D: 07/31/2020 15:23:10       T: 07/31/2020 15:33:07     [email or web address]  Job#: [id number]     Doc#: [id number]    CC:  Jodi Velásquez MD

## 2020-08-02 ENCOUNTER — APPOINTMENT (OUTPATIENT)
Dept: GENERAL RADIOLOGY | Age: 80
DRG: 380 | End: 2020-08-02
Payer: MEDICARE

## 2020-08-02 LAB
AADO2: 229.8 MMHG
ALBUMIN SERPL-MCNC: 3.1 G/DL (ref 3.5–5.2)
ALP BLD-CCNC: 80 U/L (ref 40–129)
ALT SERPL-CCNC: 25 U/L (ref 0–40)
ANION GAP SERPL CALCULATED.3IONS-SCNC: 10 MMOL/L (ref 7–16)
ANION GAP SERPL CALCULATED.3IONS-SCNC: 10 MMOL/L (ref 7–16)
AST SERPL-CCNC: 23 U/L (ref 0–39)
B.E.: -10.6 MMOL/L (ref -3–3)
B.E.: -4.2 MMOL/L (ref -3–3)
BASOPHILS ABSOLUTE: 0.02 E9/L (ref 0–0.2)
BASOPHILS RELATIVE PERCENT: 0.1 % (ref 0–2)
BILIRUB SERPL-MCNC: 0.3 MG/DL (ref 0–1.2)
BILIRUBIN DIRECT: <0.2 MG/DL (ref 0–0.3)
BILIRUBIN, INDIRECT: ABNORMAL MG/DL (ref 0–1)
BUN BLDV-MCNC: 10 MG/DL (ref 8–23)
BUN BLDV-MCNC: 15 MG/DL (ref 8–23)
C-REACTIVE PROTEIN: <0.1 MG/DL (ref 0–0.4)
CALCIUM SERPL-MCNC: 8.3 MG/DL (ref 8.6–10.2)
CALCIUM SERPL-MCNC: 8.4 MG/DL (ref 8.6–10.2)
CHLORIDE BLD-SCNC: 106 MMOL/L (ref 98–107)
CHLORIDE BLD-SCNC: 108 MMOL/L (ref 98–107)
CO2: 22 MMOL/L (ref 22–29)
CO2: 23 MMOL/L (ref 22–29)
COHB: 0.3 % (ref 0–1.5)
COHB: 0.3 % (ref 0–1.5)
CREAT SERPL-MCNC: 1 MG/DL (ref 0.7–1.2)
CREAT SERPL-MCNC: 1 MG/DL (ref 0.7–1.2)
CRITICAL: ABNORMAL
CRITICAL: ABNORMAL
DATE ANALYZED: ABNORMAL
DATE ANALYZED: ABNORMAL
DATE OF COLLECTION: ABNORMAL
DATE OF COLLECTION: ABNORMAL
EOSINOPHILS ABSOLUTE: 0 E9/L (ref 0.05–0.5)
EOSINOPHILS RELATIVE PERCENT: 0 % (ref 0–6)
FIO2: 50 %
GFR AFRICAN AMERICAN: >60
GFR AFRICAN AMERICAN: >60
GFR NON-AFRICAN AMERICAN: >60 ML/MIN/1.73
GFR NON-AFRICAN AMERICAN: >60 ML/MIN/1.73
GLUCOSE BLD-MCNC: 139 MG/DL (ref 74–99)
GLUCOSE BLD-MCNC: 152 MG/DL (ref 74–99)
HCO3: 15.2 MMOL/L (ref 22–26)
HCO3: 20.4 MMOL/L (ref 22–26)
HCT VFR BLD CALC: 25 % (ref 37–54)
HCT VFR BLD CALC: 26.7 % (ref 37–54)
HEMOGLOBIN: 8.1 G/DL (ref 12.5–16.5)
HEMOGLOBIN: 8.7 G/DL (ref 12.5–16.5)
HHB: 1.2 % (ref 0–5)
HHB: 5.3 % (ref 0–5)
IMMATURE GRANULOCYTES #: 0.34 E9/L
IMMATURE GRANULOCYTES %: 1.9 % (ref 0–5)
INR BLD: 1.3
LAB: ABNORMAL
LAB: ABNORMAL
LACTIC ACID: 3.1 MMOL/L (ref 0.5–2.2)
LACTIC ACID: 3.4 MMOL/L (ref 0.5–2.2)
LACTIC ACID: 3.5 MMOL/L (ref 0.5–2.2)
LACTIC ACID: 3.9 MMOL/L (ref 0.5–2.2)
LYMPHOCYTES ABSOLUTE: 0.65 E9/L (ref 1.5–4)
LYMPHOCYTES RELATIVE PERCENT: 3.6 % (ref 20–42)
Lab: ABNORMAL
Lab: ABNORMAL
MAGNESIUM: 1.8 MG/DL (ref 1.6–2.6)
MAGNESIUM: 2 MG/DL (ref 1.6–2.6)
MCH RBC QN AUTO: 30.3 PG (ref 26–35)
MCHC RBC AUTO-ENTMCNC: 32.4 % (ref 32–34.5)
MCV RBC AUTO: 93.6 FL (ref 80–99.9)
METHB: 0.2 % (ref 0–1.5)
METHB: 0.3 % (ref 0–1.5)
MODE: ABNORMAL
MODE: ABNORMAL
MONOCYTES ABSOLUTE: 0.76 E9/L (ref 0.1–0.95)
MONOCYTES RELATIVE PERCENT: 4.2 % (ref 2–12)
NEUTROPHILS ABSOLUTE: 16.19 E9/L (ref 1.8–7.3)
NEUTROPHILS RELATIVE PERCENT: 90.2 % (ref 43–80)
O2 CONTENT: 12.1 ML/DL
O2 CONTENT: 14 ML/DL
O2 SATURATION: 94.7 % (ref 92–98.5)
O2 SATURATION: 98.8 % (ref 92–98.5)
O2HB: 94.2 % (ref 94–97)
O2HB: 98.2 % (ref 94–97)
OCCULT BLOOD SCREENING: NORMAL
OPERATOR ID: 2485
OPERATOR ID: 914
ORGANISM: ABNORMAL
PATIENT TEMP: 37 C
PATIENT TEMP: 37 C
PCO2: 33.2 MMHG (ref 35–45)
PCO2: 35.2 MMHG (ref 35–45)
PDW BLD-RTO: 14.5 FL (ref 11.5–15)
PEEP/CPAP: 8 CMH2O
PFO2: 1.49 MMHG/%
PH BLOOD GAS: 7.28 (ref 7.35–7.45)
PH BLOOD GAS: 7.38 (ref 7.35–7.45)
PHOSPHORUS: 1.9 MG/DL (ref 2.5–4.5)
PHOSPHORUS: 2.7 MG/DL (ref 2.5–4.5)
PLATELET # BLD: 169 E9/L (ref 130–450)
PMV BLD AUTO: 10.4 FL (ref 7–12)
PO2: 195.6 MMHG (ref 75–100)
PO2: 74.6 MMHG (ref 75–100)
POTASSIUM SERPL-SCNC: 4.3 MMOL/L (ref 3.5–5)
POTASSIUM SERPL-SCNC: 4.8 MMOL/L (ref 3.5–5)
PROTHROMBIN TIME: 15.2 SEC (ref 9.3–12.4)
PS: 15 CMH20
RBC # BLD: 2.67 E12/L (ref 3.8–5.8)
RI(T): 308 %
ROTAVIRUS ANTIGEN: NORMAL
SEDIMENTATION RATE, ERYTHROCYTE: 4 MM/HR (ref 0–15)
SODIUM BLD-SCNC: 138 MMOL/L (ref 132–146)
SODIUM BLD-SCNC: 141 MMOL/L (ref 132–146)
SOURCE, BLOOD GAS: ABNORMAL
SOURCE, BLOOD GAS: ABNORMAL
THB: 9.1 G/DL (ref 11.5–16.5)
THB: 9.8 G/DL (ref 11.5–16.5)
TIME ANALYZED: 1936
TIME ANALYZED: 2115
TOTAL PROTEIN: 4.6 G/DL (ref 6.4–8.3)
URINE CULTURE, ROUTINE: ABNORMAL
URINE CULTURE, ROUTINE: ABNORMAL
WBC # BLD: 18 E9/L (ref 4.5–11.5)

## 2020-08-02 PROCEDURE — 85025 COMPLETE CBC W/AUTO DIFF WBC: CPT

## 2020-08-02 PROCEDURE — 83605 ASSAY OF LACTIC ACID: CPT

## 2020-08-02 PROCEDURE — 36415 COLL VENOUS BLD VENIPUNCTURE: CPT

## 2020-08-02 PROCEDURE — 80048 BASIC METABOLIC PNL TOTAL CA: CPT

## 2020-08-02 PROCEDURE — C9113 INJ PANTOPRAZOLE SODIUM, VIA: HCPCS | Performed by: INTERNAL MEDICINE

## 2020-08-02 PROCEDURE — 2700000000 HC OXYGEN THERAPY PER DAY

## 2020-08-02 PROCEDURE — 71045 X-RAY EXAM CHEST 1 VIEW: CPT

## 2020-08-02 PROCEDURE — 6360000002 HC RX W HCPCS: Performed by: INTERNAL MEDICINE

## 2020-08-02 PROCEDURE — 6370000000 HC RX 637 (ALT 250 FOR IP): Performed by: INTERNAL MEDICINE

## 2020-08-02 PROCEDURE — 2580000003 HC RX 258: Performed by: INTERNAL MEDICINE

## 2020-08-02 PROCEDURE — 85651 RBC SED RATE NONAUTOMATED: CPT

## 2020-08-02 PROCEDURE — 2000000000 HC ICU R&B

## 2020-08-02 PROCEDURE — 82805 BLOOD GASES W/O2 SATURATION: CPT

## 2020-08-02 PROCEDURE — 85610 PROTHROMBIN TIME: CPT

## 2020-08-02 PROCEDURE — 80076 HEPATIC FUNCTION PANEL: CPT

## 2020-08-02 PROCEDURE — 87205 SMEAR GRAM STAIN: CPT

## 2020-08-02 PROCEDURE — 94640 AIRWAY INHALATION TREATMENT: CPT

## 2020-08-02 PROCEDURE — 2500000003 HC RX 250 WO HCPCS

## 2020-08-02 PROCEDURE — 85014 HEMATOCRIT: CPT

## 2020-08-02 PROCEDURE — 2500000003 HC RX 250 WO HCPCS: Performed by: INTERNAL MEDICINE

## 2020-08-02 PROCEDURE — 83735 ASSAY OF MAGNESIUM: CPT

## 2020-08-02 PROCEDURE — 37799 UNLISTED PX VASCULAR SURGERY: CPT

## 2020-08-02 PROCEDURE — 6360000002 HC RX W HCPCS: Performed by: SPECIALIST

## 2020-08-02 PROCEDURE — 84100 ASSAY OF PHOSPHORUS: CPT

## 2020-08-02 PROCEDURE — 85018 HEMOGLOBIN: CPT

## 2020-08-02 PROCEDURE — 94660 CPAP INITIATION&MGMT: CPT

## 2020-08-02 PROCEDURE — 86140 C-REACTIVE PROTEIN: CPT

## 2020-08-02 RX ORDER — ZOLPIDEM TARTRATE 5 MG/1
10 TABLET ORAL NIGHTLY
Status: DISCONTINUED | OUTPATIENT
Start: 2020-08-02 | End: 2020-08-04

## 2020-08-02 RX ORDER — DILTIAZEM HYDROCHLORIDE 5 MG/ML
10 INJECTION INTRAVENOUS ONCE
Status: DISCONTINUED | OUTPATIENT
Start: 2020-08-02 | End: 2020-08-03

## 2020-08-02 RX ORDER — BENZONATATE 100 MG/1
100 CAPSULE ORAL 3 TIMES DAILY
Status: DISCONTINUED | OUTPATIENT
Start: 2020-08-02 | End: 2020-08-07 | Stop reason: HOSPADM

## 2020-08-02 RX ORDER — DIGOXIN 0.25 MG/ML
125 INJECTION INTRAMUSCULAR; INTRAVENOUS DAILY
Status: DISCONTINUED | OUTPATIENT
Start: 2020-08-02 | End: 2020-08-07

## 2020-08-02 RX ORDER — METOPROLOL TARTRATE 5 MG/5ML
INJECTION INTRAVENOUS
Status: COMPLETED
Start: 2020-08-02 | End: 2020-08-02

## 2020-08-02 RX ORDER — METHYLPREDNISOLONE SODIUM SUCCINATE 125 MG/2ML
60 INJECTION, POWDER, LYOPHILIZED, FOR SOLUTION INTRAMUSCULAR; INTRAVENOUS EVERY 8 HOURS
Status: DISCONTINUED | OUTPATIENT
Start: 2020-08-02 | End: 2020-08-04

## 2020-08-02 RX ORDER — FLUCONAZOLE 2 MG/ML
400 INJECTION, SOLUTION INTRAVENOUS EVERY 24 HOURS
Status: DISCONTINUED | OUTPATIENT
Start: 2020-08-02 | End: 2020-08-04

## 2020-08-02 RX ORDER — METHYLPREDNISOLONE SODIUM SUCCINATE 40 MG/ML
40 INJECTION, POWDER, LYOPHILIZED, FOR SOLUTION INTRAMUSCULAR; INTRAVENOUS ONCE
Status: COMPLETED | OUTPATIENT
Start: 2020-08-02 | End: 2020-08-02

## 2020-08-02 RX ORDER — METHYLPREDNISOLONE SODIUM SUCCINATE 40 MG/ML
40 INJECTION, POWDER, LYOPHILIZED, FOR SOLUTION INTRAMUSCULAR; INTRAVENOUS EVERY 12 HOURS
Status: DISCONTINUED | OUTPATIENT
Start: 2020-08-02 | End: 2020-08-02

## 2020-08-02 RX ORDER — METOPROLOL TARTRATE 5 MG/5ML
5 INJECTION INTRAVENOUS ONCE
Status: COMPLETED | OUTPATIENT
Start: 2020-08-02 | End: 2020-08-02

## 2020-08-02 RX ORDER — FLUCONAZOLE 2 MG/ML
400 INJECTION, SOLUTION INTRAVENOUS EVERY 24 HOURS
Status: DISCONTINUED | OUTPATIENT
Start: 2020-08-02 | End: 2020-08-02

## 2020-08-02 RX ORDER — GUAIFENESIN 400 MG/1
400 TABLET ORAL 4 TIMES DAILY PRN
Status: DISCONTINUED | OUTPATIENT
Start: 2020-08-02 | End: 2020-08-07 | Stop reason: HOSPADM

## 2020-08-02 RX ADMIN — SODIUM CHLORIDE, PRESERVATIVE FREE 10 ML: 5 INJECTION INTRAVENOUS at 08:42

## 2020-08-02 RX ADMIN — METHYLPREDNISOLONE SODIUM SUCCINATE 60 MG: 125 INJECTION, POWDER, LYOPHILIZED, FOR SOLUTION INTRAMUSCULAR; INTRAVENOUS at 16:55

## 2020-08-02 RX ADMIN — BENZONATATE 100 MG: 100 CAPSULE ORAL at 14:29

## 2020-08-02 RX ADMIN — IPRATROPIUM BROMIDE AND ALBUTEROL SULFATE 1 AMPULE: 2.5; .5 SOLUTION RESPIRATORY (INHALATION) at 12:16

## 2020-08-02 RX ADMIN — ATORVASTATIN CALCIUM 60 MG: 20 TABLET, FILM COATED ORAL at 08:41

## 2020-08-02 RX ADMIN — SODIUM CHLORIDE, PRESERVATIVE FREE 10 ML: 5 INJECTION INTRAVENOUS at 21:02

## 2020-08-02 RX ADMIN — IPRATROPIUM BROMIDE AND ALBUTEROL SULFATE 1 AMPULE: 2.5; .5 SOLUTION RESPIRATORY (INHALATION) at 14:37

## 2020-08-02 RX ADMIN — BUDESONIDE 250 MCG: 0.25 SUSPENSION RESPIRATORY (INHALATION) at 08:12

## 2020-08-02 RX ADMIN — METOPROLOL TARTRATE 25 MG: 25 TABLET, FILM COATED ORAL at 08:41

## 2020-08-02 RX ADMIN — DEXTROSE MONOHYDRATE 5 MG/HR: 50 INJECTION, SOLUTION INTRAVENOUS at 19:41

## 2020-08-02 RX ADMIN — METHYLPREDNISOLONE SODIUM SUCCINATE 40 MG: 40 INJECTION, POWDER, LYOPHILIZED, FOR SOLUTION INTRAMUSCULAR; INTRAVENOUS at 02:09

## 2020-08-02 RX ADMIN — PANTOPRAZOLE SODIUM 40 MG: 40 INJECTION, POWDER, FOR SOLUTION INTRAVENOUS at 21:02

## 2020-08-02 RX ADMIN — FLUCONAZOLE 100 MG: 100 TABLET ORAL at 08:41

## 2020-08-02 RX ADMIN — METHYLPREDNISOLONE SODIUM SUCCINATE 40 MG: 40 INJECTION, POWDER, LYOPHILIZED, FOR SOLUTION INTRAMUSCULAR; INTRAVENOUS at 21:02

## 2020-08-02 RX ADMIN — POTASSIUM CHLORIDE 20 MEQ: 20 TABLET, EXTENDED RELEASE ORAL at 16:55

## 2020-08-02 RX ADMIN — SODIUM CHLORIDE, PRESERVATIVE FREE 10 ML: 5 INJECTION INTRAVENOUS at 08:46

## 2020-08-02 RX ADMIN — IPRATROPIUM BROMIDE AND ALBUTEROL SULFATE 1 AMPULE: 2.5; .5 SOLUTION RESPIRATORY (INHALATION) at 08:12

## 2020-08-02 RX ADMIN — METOROPROLOL TARTRATE 5 MG: 5 INJECTION, SOLUTION INTRAVENOUS at 19:20

## 2020-08-02 RX ADMIN — DIGOXIN 125 MCG: 0.25 INJECTION INTRAMUSCULAR; INTRAVENOUS at 12:30

## 2020-08-02 RX ADMIN — ARFORMOTEROL TARTRATE 15 MCG: 15 SOLUTION RESPIRATORY (INHALATION) at 19:25

## 2020-08-02 RX ADMIN — POTASSIUM CHLORIDE 20 MEQ: 20 TABLET, EXTENDED RELEASE ORAL at 08:41

## 2020-08-02 RX ADMIN — METOPROLOL TARTRATE 5 MG: 5 INJECTION INTRAVENOUS at 19:20

## 2020-08-02 RX ADMIN — BUDESONIDE 250 MCG: 0.25 SUSPENSION RESPIRATORY (INHALATION) at 19:25

## 2020-08-02 RX ADMIN — IPRATROPIUM BROMIDE AND ALBUTEROL SULFATE 1 AMPULE: 2.5; .5 SOLUTION RESPIRATORY (INHALATION) at 02:04

## 2020-08-02 RX ADMIN — BENZONATATE 100 MG: 100 CAPSULE ORAL at 21:02

## 2020-08-02 RX ADMIN — PANTOPRAZOLE SODIUM 40 MG: 40 INJECTION, POWDER, FOR SOLUTION INTRAVENOUS at 08:41

## 2020-08-02 RX ADMIN — ARFORMOTEROL TARTRATE 15 MCG: 15 SOLUTION RESPIRATORY (INHALATION) at 08:12

## 2020-08-02 RX ADMIN — FLUCONAZOLE 400 MG: 2 INJECTION, SOLUTION INTRAVENOUS at 17:56

## 2020-08-02 RX ADMIN — POTASSIUM PHOSPHATE, MONOBASIC AND POTASSIUM PHOSPHATE, DIBASIC 30 MMOL: 224; 236 INJECTION, SOLUTION, CONCENTRATE INTRAVENOUS at 12:30

## 2020-08-02 RX ADMIN — ZOLPIDEM TARTRATE 10 MG: 5 TABLET ORAL at 21:02

## 2020-08-02 RX ADMIN — METHYLPREDNISOLONE SODIUM SUCCINATE 40 MG: 40 INJECTION, POWDER, LYOPHILIZED, FOR SOLUTION INTRAMUSCULAR; INTRAVENOUS at 08:41

## 2020-08-02 RX ADMIN — IPRATROPIUM BROMIDE AND ALBUTEROL SULFATE 1 AMPULE: 2.5; .5 SOLUTION RESPIRATORY (INHALATION) at 19:25

## 2020-08-02 ASSESSMENT — PAIN SCALES - GENERAL
PAINLEVEL_OUTOF10: 0

## 2020-08-02 NOTE — PROGRESS NOTES
Q24H    potassium phosphate IVPB  30 mmol Intravenous Once    digoxin  125 mcg Intravenous Daily    benzonatate  100 mg Oral TID    methylPREDNISolone  60 mg Intravenous Q8H    [Held by provider] amLODIPine  5 mg Oral Daily    atorvastatin  60 mg Oral Daily    [Held by provider] clopidogrel  75 mg Oral Daily    potassium chloride  20 mEq Oral BID WC    [Held by provider] isosorbide mononitrate  30 mg Oral Daily    Arformoterol Tartrate  15 mcg Nebulization BID    budesonide  250 mcg Nebulization BID    ipratropium-albuterol  1 ampule Inhalation Q4H WA    warfarin (COUMADIN) daily dosing (placeholder)   Other RX Placeholder    pantoprazole  40 mg Intravenous BID    And    sodium chloride (PF)  10 mL Intravenous BID    sodium chloride flush  10 mL Intravenous 2 times per day       Diet:   Dietary Nutrition Supplements: Clear Liquid Oral Supplement  DIET LOW FAT;     EXAM:  General: No distress. Eyes: PERRL. No sclera icterus. No conjunctival injection. ENT: No discharge. Pharynx clear. Neck: Trachea midline. Normal thyroid. Resp: No accessory muscle use. No rales. No wheezing. No rhonchi. CV: Regular rate. Regular rhythm. No murmur or rub. Abd: Non-tender. Non-distended. No masses. No organomegaly. Normal bowel sounds. Skin: Warm and dry. No nodule on exposed extremities. No rash on exposed extremities. Ext: No cyanosis, clubbing, edema  Lymph: No cervical LAD. No supraclavicular LAD. M/S: No cyanosis. No joint deformity. No clubbing. Neuro: Positive pupils/gag/corneals. Normal pain response. Results:  CBC:   Recent Labs     07/31/20  0500  08/01/20  0630 08/01/20  1820 08/02/20  0610   WBC 9.7  --  16.4*  --  18.0*   HGB 11.0*   < > 8.1* 7.9* 8.1*   HCT 33.6*   < > 24.2* 23.7* 25.0*   MCV 94.9  --  92.4  --  93.6   PLT 69*  --  147  --  169    < > = values in this interval not displayed.      BMP:   Recent Labs     07/31/20  0500 08/01/20  0630 08/02/20  0610    142 141   K 4.6 3.1* 4.3   CL 96* 107 108*   CO2 24 24 23   PHOS 4.4 1.8* 1.9*   BUN 28* 13 10   CREATININE 1.0 1.0 1.0     LIVER PROFILE:   Recent Labs     07/30/20  1816 07/31/20  0500 08/01/20  0630 08/02/20  0610   AST 21 19 19 23   ALT 29 27 23 25   LIPASE 16  --   --   --    BILIDIR <0.2 <0.2 <0.2 <0.2   BILITOT 0.4 0.4 0.3 0.3   ALKPHOS 97 89 82 80     PT/INR:   Recent Labs     07/31/20  1300 08/01/20  0630 08/02/20  0610   PROTIME 20.5* 16.5* 15.2*   INR 1.8 1.4 1.3     APTT: No results for input(s): APTT in the last 72 hours. Pathology:  1. N/A      Microbiology:  1. None    Recent ABG:   No results for input(s): PH, PO2, PCO2, HCO3, BE, O2SAT, METHB, O2HB, COHB, O2CON, HHB, THB in the last 72 hours. Recent Films:  XR CHEST PORTABLE   Final Result   1. Acute right basilar pneumonia. 2. Remainder of findings described as above. This report has been electronically signed by Oziel Motley MD.      [address] [facility] Additional Contrast? None   Final Result      1. No evidence of abdominal or pelvic mass or lymphadenopathy. 2. Multiple gas-filled distended loops of small bowel are present   within the anterior aspect of the abdomen which could indicate   adynamic ileus versus partial or early small bowel obstruction. Stool   and gas is seen within the colon. Short-term follow-up may be helpful   for further evaluation. 3. Diverticulosis without evidence of acute diverticulitis. 4. Bilateral perinephric fat stranding could suggest chronic medical   renal disease with appropriate clinical history. 5. View of the lung bases show bibasilar opacities related to   pneumonia and atelectasis with small bilateral pleural effusions. 6. Fusiform infrarenal abdominal aortic aneurysm is demonstrated   measuring up to 3 cm in diameter. XR CHEST PORTABLE   Final Result      Chronic coarsened interstitial lung markings are seen throughout both   lungs.  No obvious pneumonia or pleural effusion. CT CHEST WO CONTRAST    (Results Pending)     Assessment:  1. 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 Ongoing tobacco abuse  3. GI bleed, rule out underlying malignancy with associated unintentional weight loss        Plan:  1. Continue empiric treatment for COPD  2. Await CT scan of the chest    Time at the bedside, reviewing labs and radiographs, reviewing updated notes and consultations, discussing with staff and family was more than 35 minutes. Please note that voice recognition technology was used in the preparation of this note and make therefore it may contain inadvertent transcription errors. If the patient is a COVID 19 isolation patient, the above physical exam reflects that of the examining physician for the day. Beck Smith M.D., F.C.C.P.     [facility], [address] [place] [address], [address] [place] [address]

## 2020-08-02 NOTE — PROGRESS NOTES
[address] [place] [address] [facility]  Progress Note      Chief Complaint   Patient presents with    Emesis     N/V with dizziness and fatigue     Fatigue    Dizziness       SUBJECTIVE:  Patient is tolerating medications. No reported adverse drug reactions. No nausea, vomiting, diarrhea. Awake and alert  Looks short of breath in bed and tacky  2 L is 94% blood pressure hovering around the 616 systolic  Still on sofía-  Review of systems:  As stated above in the chief complaint, otherwise negative. Medications:  Scheduled Meds:   zolpidem  10 mg Oral Nightly    metoprolol tartrate  25 mg Oral BID    [Held by provider] amLODIPine  5 mg Oral Daily    atorvastatin  60 mg Oral Daily    [Held by provider] clopidogrel  75 mg Oral Daily    potassium chloride  20 mEq Oral BID WC    [Held by provider] isosorbide mononitrate  30 mg Oral Daily    Arformoterol Tartrate  15 mcg Nebulization BID    budesonide  250 mcg Nebulization BID    ipratropium-albuterol  1 ampule Inhalation Q4H WA    methylPREDNISolone  40 mg Intravenous Q8H    warfarin (COUMADIN) daily dosing (placeholder)   Other RX Placeholder    fluconazole  100 mg Oral Daily    pantoprazole  40 mg Intravenous BID    And    sodium chloride (PF)  10 mL Intravenous BID    sodium chloride flush  10 mL Intravenous 2 times per day     Continuous Infusions:   phenylephrine (SOFÍA-SYNEPHRINE) 50mg/250mL infusion Stopped (20 0100)     PRN Meds:HYDROcodone-acetaminophen, potassium chloride **OR** potassium alternative oral replacement **OR** potassium chloride, acetaminophen, sodium chloride flush    OBJECTIVE:  /60   Pulse 129   Temp 97.6 °F (36.4 °C) (Oral)   Resp 14   Ht 5' 8\" (1.727 m)   Wt 140 lb 3.2 oz (63.6 kg)   SpO2 94%   BMI 21.32 kg/m²   Temp  Av.3 °F (36.8 °C)  Min: 97.6 °F (36.4 °C)  Max: 98.9 °F (37.2 °C)  Constitutional: The patient is awake, alert, and oriented. Skin: Warm and dry. No rashes were noted. HEENT: Round and reactive pupils. Moist mucous membranes. No ulcerations or thrush. Neck: Supple to movements. Chest: No use of accessory muscles to breathe. Symmetrical expansion. No wheezing, crackles or rhonchi. Cardiovascular: S1 and S2 irregularly irregular. No murmurs appreciated. Abdomen: Positive bowel sounds to auscultation. Benign to palpation. No masses felt. No hepatosplenomegaly. Genitourinary: Male  Extremities: No clubbing, no cyanosis, no edema.   Lines: peripheral    Laboratory and Tests Review:  Lab Results   Component Value Date    WBC 18.0 (H) 08/02/2020    WBC 16.4 (H) 08/01/2020    WBC 9.7 07/31/2020    HGB 8.1 (L) 08/02/2020    HCT 25.0 (L) 08/02/2020    MCV 93.6 08/02/2020     08/02/2020     Lab Results   Component Value Date    NEUTROABS 16.19 (H) 08/02/2020    NEUTROABS 12.97 (H) 08/01/2020    NEUTROABS 9.08 (H) 07/31/2020     No results found for: [facility]  Lab Results   Component Value Date    ALT 25 08/02/2020    AST 23 08/02/2020    ALKPHOS 80 08/02/2020    BILITOT 0.3 08/02/2020     Lab Results   Component Value Date     08/02/2020    K 4.3 08/02/2020    K 2.6 07/30/2020     08/02/2020    CO2 23 08/02/2020    BUN 10 08/02/2020    CREATININE 1.0 08/02/2020    CREATININE 1.0 08/01/2020    CREATININE 1.0 07/31/2020    GFRAA >60 08/02/2020    LABGLOM >60 08/02/2020    GLUCOSE 152 08/02/2020    PROT 4.6 08/02/2020    LABALBU 3.1 08/02/2020    CALCIUM 8.4 08/02/2020    BILITOT 0.3 08/02/2020    ALKPHOS 80 08/02/2020    AST 23 08/02/2020    ALT 25 08/02/2020     Lab Results   Component Value Date    CRP <0.1 08/02/2020    CRP <0.1 08/01/2020    CRP 0.2 07/31/2020     Lab Results   Component Value Date    SEDRATE 4 08/02/2020    SEDRATE 4 08/01/2020    SEDRATE 1 07/31/2020     Radiology:      Microbiology:   Lab Results   Component Value Date    BC 24 Hours no growth 07/31/2020    BC 5 Days- no growth 02/21/2016    ORG Klebsiella pneumoniae ssp pneumoniae 07/31/2020 ORG Anaerobic gram positive cocci 06/29/2020    ORG Staphylococcus epidermidis 06/29/2020    ORG Candida species 06/29/2020     Lab Results   Component Value Date    BLOODCULT2 24 Hours no growth 07/31/2020    BLOODCULT2 5 Days- no growth 09/04/2018    BLOODCULT2 5 Days- no growth 02/21/2016    ORG Klebsiella pneumoniae ssp pneumoniae 07/31/2020    ORG Anaerobic gram positive cocci 06/29/2020    ORG Staphylococcus epidermidis 06/29/2020    ORG Candida species 06/29/2020     WOUND/ABSCESS   Date Value Ref Range Status   06/29/2020 Light growth  Final   01/03/2020 Light growth  Final   01/03/2020 Rare growth  Final     Smear, Respiratory   Date Value Ref Range Status   09/04/2018   Final    Group 5: >25 PMN's/LPF and <10 Epithelial cells/LPF  Abundant Polymorphonuclear leukocytes  Epithelial cells not seen  Moderate Gram negative rods  Rare Gram positive cocci in pairs       No results found for: MPNEUMO, CLAMYDCU, LABLEGI, AFBCX, FUNGSM, LABFUNG  CULTURE, RESPIRATORY   Date Value Ref Range Status   09/04/2018 Oral Pharyngeal Marysol present  Final     No results found for: CXCATHTIP  No results found for: BFCS  Culture Surgical   Date Value Ref Range Status   06/25/2019 Light growth  Final     Urine Culture, Routine   Date Value Ref Range Status   07/31/2020 (A)  Final    <10,000 CFU/mL  Oxidase positive gram negative rods     07/31/2020 <25,000 CFU/ml  Final     MRSA Culture Only   Date Value Ref Range Status   07/31/2020 Methicillin resistant Staph aureus not isolated  Final       ASSESSMENT:  · Reactive leukocytosis secondary to bleed  · A.  Fib  · Left ear issue is quiesced sent  · Candida esophagitis on a low dose of Diflucan down with the steroids I think this should be increased  · Platelets improved    PLAN:  · Continue fluconazole 400 a day  · Check final cultures  · Monitor labs    Divya Ren  10:10 AM  8/2/2020

## 2020-08-02 NOTE — FLOWSHEET NOTE
Pt complaining of SOB . sp02  94%/  Heart rate unchanged but pt c/o not being able to catch his breath. Pt sat up and emotional support given. Resp therapist called to give a treatment. Chest x-ray ordered. Inspiratory and expiratory wheezing heard. Moist cough present.

## 2020-08-02 NOTE — PATIENT CARE CONFERENCE
Intensive Care Daily Quality Rounding Checklist      ICU Team Members:rt/tl/bedside nurse/bernardino    ICU Day #: 3    Intubation Date: N/A    Ventilator Day #: N/A    Central Line Insertion Date: July 31        Day #: 3     Arterial Line Insertion Date: July 31      Day #: 3    DVT Prophylaxis: PCD's    GI Prophylaxis: protonix    Damon Catheter Insertion Date:  N/A       Day #: N/A      Continued need (if yes, reason documented and discussed with physician):     Skin Issues/ Wounds and ordered treatment discussed on rounds:     Goals/ Plans for the Day: restart metoprolol, reviewed labs, will monitor in icu for one more day

## 2020-08-02 NOTE — SIGNIFICANT EVENT
RRT note  Called for afib with rvr and shortness of breath  HR initially up to 190s, improved to 130s with 5 mg iv lopressor. Recently received 100 mg po. Start cardizem drip. Obtain labs including electrolytes  Copd exacerbation had signficant dyspnea when seen. Given a breathing treatment, bipap initiated. One dose 40 mg iv solumedrol.  abg and cxr    Estrella Gilbert 7:46 PM 08/02/20

## 2020-08-02 NOTE — PROGRESS NOTES
Critical Care Team - Daily Progress Note         Date and time: 2020 8:11 AM  Patient's name:  Fabrizio Davidson  Medical Record Number: [id number]  Patient's account/billing number: [de-identified]  Patient's YOB: 1940  Age: 70 y.o. Date of Admission: 2020  6:00 PM  Length of stay during current admission: 3      Primary Care Physician: Eligio Grover MD  ICU Attending Physician: Dr. Kashif Corral    Code Status: Limited    Reason for ICU admission: Acute blood loss anemia      SUBJECTIVE:     OVERNIGHT EVENTS:       Off Gerardo, very dyspneic overnight. Tachycardiac overnight. Slept only briefly on zolpidem.       CURRENT VENTILATION STATUS:     [] Ventilator  [] BIPAP  [x] Nasal Cannula [] Room Air      IF INTUBATED, ET TUBE MARKING AT LOWER LIP:       cms    SECRETIONS Amount:  [] Small [] Moderate  [] Large  [x] None  Color:     [] White [] Colored  [] Bloody    SEDATION:  RAAS Score:  [] Propofol gtt  [] Versed gtt  [] Ativan gtt   [x] No Sedation    PARALYZED:  [] No    [] Yes      VASOPRESSORS:  [x] No    [] Yes    If yes -   [] Levophed       [] Dopamine     [] Vasopressin       [] Dobutamine  [] Phenylephrine         [] Epinephrine    CENTRAL LINES:     [] No   [x] Yes   (Date of Insertion:   )           If yes -     [] Right IJ     [] Left IJ [x] Right Femoral [] Left Femoral                   [] Right Subclavian [] Left Subclavian       THEODORE'S CATHETER:   [x] No   [] Yes  (Date of Insertion:   )     URINE OUTPUT:            [x] Good   [] Low              [] Anuric      OBJECTIVE:     VITAL SIGNS:  BP (!) 108/49   Pulse 119   Temp 98.7 °F (37.1 °C) (Oral)   Resp 28   Ht 5' 8\" (1.727 m)   Wt 140 lb 3.2 oz (63.6 kg)   SpO2 96%   BMI 21.32 kg/m²   Tmax over 24 hours:  Temp (24hrs), Av.4 °F (36.9 °C), Min:98 °F (36.7 °C), Max:98.9 °F (37.2 °C)      Patient Vitals for the past 6 hrs:   Temp Temp src Pulse Resp SpO2   20 -- -- 119 28 96 %   20 -- -- 111 22 95 %   08/02/20 0500 -- -- 116 23 93 %   08/02/20 0400 98.7 °F (37.1 °C) Oral 113 22 94 %   08/02/20 0300 -- -- 110 28 --   08/02/20 0237 -- -- -- (!) 34 93 %   08/02/20 0230 -- -- 115 25 94 %         Intake/Output Summary (Last 24 hours) at 8/2/2020 0811  Last data filed at 8/1/2020 2200  Gross per 24 hour   Intake 2138 ml   Output 900 ml   Net 1238 ml     Wt Readings from Last 2 Encounters:   08/01/20 140 lb 3.2 oz (63.6 kg)   03/25/20 142 lb (64.4 kg)     Body mass index is 21.32 kg/m².         PHYSICAL EXAMINATION:    General appearance - alert, well appearing, and in no distress  Mental status - alert, oriented to person, place, and time  Chest - rhonchi and decreased breath sounds noted bilaterally diffusely, symmetric expansion and no accessory muscle use  Heart - tachycardiac rate, irregular rhythm, normal S1, S2, no murmurs, rubs, clicks or gallops  Abdomen - soft, nontender, nondistended, no masses or organomegaly  Neurological - alert, oriented, normal speech, no focal findings or movement disorder noted}  Extremities - peripheral pulses normal, no pedal edema, no clubbing or cyanosis  Skin - normal coloration and turgor, no rashes, no suspicious skin lesions noted      Any additional physical findings:    MEDICATIONS:    Scheduled Meds:   zolpidem  10 mg Oral Nightly    metoprolol tartrate  25 mg Oral BID    [Held by provider] amLODIPine  5 mg Oral Daily    atorvastatin  60 mg Oral Daily    [Held by provider] clopidogrel  75 mg Oral Daily    potassium chloride  20 mEq Oral BID WC    [Held by provider] isosorbide mononitrate  30 mg Oral Daily    Arformoterol Tartrate  15 mcg Nebulization BID    budesonide  250 mcg Nebulization BID    ipratropium-albuterol  1 ampule Inhalation Q4H WA    methylPREDNISolone  40 mg Intravenous Q8H    warfarin (COUMADIN) daily dosing (placeholder)   Other RX Placeholder    fluconazole  100 mg Oral Daily    pantoprazole  40 mg Intravenous BID    And    sodium chloride (PF)  10 mL Intravenous BID    sodium chloride flush  10 mL Intravenous 2 times per day     Continuous Infusions:   phenylephrine (SOFÍA-SYNEPHRINE) 50mg/250mL infusion Stopped (08/02/20 0100)     PRN Meds:   HYDROcodone-acetaminophen, 1 tablet, Q4H PRN  potassium chloride, 40 mEq, PRN    Or  potassium alternative oral replacement, 40 mEq, PRN    Or  potassium chloride, 10 mEq, PRN  acetaminophen, 650 mg, Q4H PRN  sodium chloride flush, 10 mL, PRN          VENT SETTINGS (Comprehensive) (if applicable):  Vent Information  SpO2: 96 %  Additional Respiratory  Assessments  Pulse: 119  Resp: 28  SpO2: 96 %  Oral Care: Mouth swabbed    ABGs:   No results for input(s): PH, PCO2, PO2, HCO3, BE, O2SAT in the last 72 hours. Laboratory findings:    Complete Blood Count:   Recent Labs     07/31/20  0500  08/01/20  0630 08/01/20  1820 08/02/20  0610   WBC 9.7  --  16.4*  --  18.0*   HGB 11.0*   < > 8.1* 7.9* 8.1*   HCT 33.6*   < > 24.2* 23.7* 25.0*   PLT 69*  --  147  --  169    < > = values in this interval not displayed.         Last 3 Blood Glucose:   Recent Labs     07/31/20  0500 08/01/20  0630 08/02/20  0610   GLUCOSE 198* 103* 152*        PT/INR:    Lab Results   Component Value Date    PROTIME 15.2 08/02/2020    INR 1.3 08/02/2020     PTT:    Lab Results   Component Value Date    APTT 55.9 10/08/2019       Comprehensive Metabolic Profile:   Recent Labs     07/31/20  0500 08/01/20  0630 08/02/20  0610    142 141   K 4.6 3.1* 4.3   CL 96* 107 108*   CO2 24 24 23   BUN 28* 13 10   CREATININE 1.0 1.0 1.0   GLUCOSE 198* 103* 152*   CALCIUM 7.5* 8.3* 8.4*   PROT 5.1* 4.9* 4.6*   LABALBU 3.3* 3.4* 3.1*   BILITOT 0.4 0.3 0.3   ALKPHOS 89 82 80   AST 19 19 23   ALT 27 23 25      Magnesium:   Lab Results   Component Value Date    MG 2.0 08/02/2020     Phosphorus:   Lab Results   Component Value Date    PHOS 1.9 08/02/2020     Ionized Calcium: No results found for: MACHO     Urinalysis:     Troponin:   Recent Labs 07/30/20  1816 07/31/20  0500 07/31/20  0855   TROPONINI <0.01 <0.01 <0.01       Microbiology:    Cultures during this admission:     Blood cultures:                 [] None drawn      [x] Negative             []  Positive (Details:  )  Urine Culture:                   [x] None drawn      [] Negative             []  Positive (Details:  )  Sputum Culture:               [] None drawn       [] Negative             [x]  Positive (Details: abundant GNR )   Endotracheal aspirate:     [x] None drawn       [] Negative             []  Positive (Details:  )     Other pertinent Labs:       Radiology/Imaging:     CXR viewed new small right lower lobe infiltrate      ASSESSMENT:     Principal Problem:    GI bleeding  Active Problems:    Bilateral carotid artery stenosis    Tobacco dependence    Chronic obstructive pulmonary disease with acute exacerbation (HCC)    Combined systolic and diastolic congestive heart failure (HCC)    Atrial fibrillation (HCC)    On home O2    Hypertension    Hyperlipidemia    CAD (coronary artery disease)    Hypoprothrombinemia (HCC)    CKD (chronic kidney disease) stage 3, GFR 30-59 ml/min (HCC)    Hypokalemia    Non-rheumatic aortic sclerosis (HCC)    Hypotension due to hypovolemia    Emesis    Diarrhea    Thrombocytopenia (HCC)    Hypotension    Unintentional weight loss of 10% body weight within 6 months    Moderate protein-calorie malnutrition (HCC)    Pulmonary hypertension (HCC)    Erosive esophagitis  Resolved Problems:    * No resolved hospital problems. *      Additional assessment:    · GI bleed with esophagitis  · COPD exacerbation less active wheezing  · Afib with RVR will resume metoprolol at 25 bid and increase as bp allows.   · Hypovolemic shock resolved but still with mild lactic acidosis    PLAN:     WEAN PER PROTOCOL:  [] No   [] Yes  [x] N/A    DISCONTINUE ANY LABS:   [x] No   [] Yes    ICU PROPHYLAXIS:  Stress ulcer:  [x] PPI Agent  [] H1Sronq [] Sucralfate  [] Other:  VTE: [] Enoxaparin  [] Unfract. Heparin Subcut  [x] EPC Cuffs    NUTRITION:  [] NPO [] Tube Feeding (Specify: ) [] TPN  [x] PO (Diet: Dietary Nutrition Supplements: Clear Liquid Oral Supplement  DIET LOW FAT;)    HOME MEDICATIONS RECONCILED: [] No  [x] Yes    INSULIN DRIP:   [x] No   [] Yes    CONSULTATION NEEDED:  [x] No   [] Yes    FAMILY UPDATED:    [x] No   [] Yes    TRANSFER OUT OF ICU:   [] No   [x] Yes    ADDITIONAL PLAN:  1. Resume metoprolol at 25 and increase for tachycardia as bp allows  2. Continue steroids and nebs  3. Increase Ambien prn to sleep  4. Stop IV fluids   5. Probable transfer if HR slows  6. Discussed with Dr. Esperanza Roach about RLL infiltrate WBC minimally higher and afebrile        Lázaro Gerard M.D. Karin Brochure. EYAD. P                     8/2/2020, 8:11 AM

## 2020-08-02 NOTE — PROGRESS NOTES
PROGRESS  NOTE --                                                          INTERNAL  MEDICINE                                                                              I  PERSONALLY SAW , EXAMINED, AND CARED Rubens, 8/2/2020     LABS, XRAY ,CHART, AND MEDICATIONS  REVIEWED BY ME .        8/1/2020-sUBJECTIVE: Sade Palafox is alert awake and cooperative; oriented ×3. Denies any chest pain dyspnea nausea emesis. Tolerating diet. No abdominal pain. Had his first bowel movement today since admission. Unlikely to be C. difficile. Last evening he did have liquid diet and enjoyed it he would like something more substantial today. States he has hard time sleeping without Ambien request bedtime dose of same. I spoke with nursing, still having a hard time weaning from intravenous phenylephrine. Blood pressure keeps dropping without it. He remains afebrile. Most recent blood pressure 90/40.  97% saturation 2 L nasal cannula. Respiratory rate 21. Intake and output +5392 cc. Potassium 3.1; lactic acid finally dropping, 2.7. Glucose 103 calcium 8.3 phosphorus 1.8 protein 4.9 albumin 3.4 remainder liver functions normal.  Hemoglobin admission 11.0; today hemoglobin 8.1; WBC 16.4, INR 1.4. Platelets 107, remarkable improvement since admission, would seem likely caused by Zyvox. proBNP on admission 2500. CRP 0.2. Procalcitonin 0.10. A1c 6.2.  2D echo completed ejection fraction 55%; septal motion consistent with previous bypass surgery. Markedly large right atrium moderate tricuspid regurgitation moderate to severe pulmonary hypertension. Aortic sclerosis; pulmonary valve is sclerotic. No pleural effusion no pericardial effusion. Urine culture less than 25,000 gram-negative rods. EGD completed yesterday with following results--    Esophagus:  GERD. severe erosive esophagitis.  Candida infection        Stomach: Gastritis      Duodenum:  Normal         Pulmonary note from today appreciated; continue to wean Gerardo-Synephrine as able; Ambien as needed for sleep. Dietary consult appreciated, moderate malnutrition. Oral nutritional supplement 3 times a day has been added. 8/2/2020-patient sitting in bed finishing his lunch at this time. Currently no significant dyspnea; he has had no chest pain at all. Last night he became very short of breath; hard time sleeping because of it. Ambien 5 mg did not help much. He still had only one bowel movement since admission. He should be taken out of isolation, likelihood of C. Difficile zero. Most likely Zyvox induced diarrhea. Last evening he did have chest x-ray performed with following results--    Impression:          1. Acute right basilar pneumonia. 2. Remainder of findings described as above. He has been seen by pulmonary as well as ID today. They are not sure if he does have a pneumonia. Patient still at 95% saturation on 2 L nasal cannula. Blood pressure still borderline, 88/45 earlier today 102/50 currently. Overnight his heart rate got as high as 150 with a respiratory rate of 31. Currently heart rate 118 respirations 18. He remains afebrile. His major complaint today, difficult time expectorating mucus. His phosphorus remains low at 1.9; calcium slightly low 8.4. CRP less than 0.1. WBC 18.0 hemoglobin 8.1 platelets 281. INR 1.3 sed rate 4. ID consult from yesterday appreciated; they agree unlikely patient has infection of left ear and more likely polychondritis. Erosive esophagitis, Diflucan has been started. ID note from today appreciated; fluconazole 400 mg daily; increase dose of steroids. Stool for occult blood was positive. Rotavirus negative in stool; enteric gurjit continues in stool. C. difficile was not performed because of formed stool.   Sputum Gram stain as follows--    Group 3: >25 PMN's/LPF and >10 Epithelial cells/LPF   Moderate Polymorphonuclear leukocytes   Moderate Epithelial cells   Abundant Mononuclear leukocytes   Abundant Gram negative rods   Few yeast     Urine culture as follows--    Organism Abnormal    07/31/2020  6:15 AM   - [facility]    Klebsiella pneumoniae ssp pneumoniae    Urine Culture, Routine  07/31/2020  6:15 AM   - [facility]    <25,000 CFU/ml    Testing Performed By     Lab - [address].  Name  Director  Address  Valid Date Range    06-PB - [address] [facility]  [address] [facility]  Naina Gabriel MD  [address] [place] [address].    Anna Bryant [id number]  12/03/19 1502-Present    Narrative   Performed by: [address] [place] [address] Lab   Source: URV       Site: Urine&Urine              Susceptibility     Klebsiella pneumoniae ssp pneumoniae (1)     Antibiotic  Interpretation  JOVAN  Status     ampicillin  Resistant  >=^32  mcg/mL      ceFAZolin  Sensitive  <=^4  mcg/mL      cefepime  Sensitive  <=^0.12  mcg/mL      cefTRIAXone  Sensitive  <=^0.25  mcg/mL      Confirmatory Extended Spectrum Beta-Lactamase   Neg  mcg/mL      ertapenem  Sensitive  <=^0.12  mcg/mL      gentamicin  Sensitive  <=^1  mcg/mL      levofloxacin  Sensitive  ^1  mcg/mL      nitrofurantoin  Resistant  ^128  mcg/mL      piperacillin-tazobactam  Sensitive  ^16  mcg/mL      trimethoprim-sulfamethoxazole  Sensitive  <=^20  mcg/mL           Objective:     PHYSICAL EXAM:    VS: /60   Pulse 118   Temp 97.6 °F (36.4 °C) (Oral)   Resp 18   Ht 5' 8\" (1.727 m)   Wt 140 lb 3.2 oz (63.6 kg)   SpO2 95%   BMI 21.32 kg/m²     Labs:   CBC:   Lab Results   Component Value Date    WBC 18.0 08/02/2020    RBC 2.67 08/02/2020    HGB 8.1 08/02/2020    HCT 25.0 08/02/2020    MCV 93.6 08/02/2020    MCH 30.3 08/02/2020    MCHC 32.4 08/02/2020    RDW 14.5 08/02/2020     08/02/2020    MPV 10.4 08/02/2020     CBC with Differential:    Lab Results   Component Value Date    WBC 18.0 08/02/2020    RBC 2.67 08/02/2020    HGB 8.1 08/02/2020    HCT 25.0 08/02/2020     08/02/2020    MCV 93.6 08/02/2020    MCH 30.3 08/02/2020    MCHC 32.4 08/02/2020    RDW 14.5 08/02/2020    LYMPHOPCT 3.6 08/02/2020    MONOPCT 4.2 08/02/2020    BASOPCT 0.1 08/02/2020    MONOSABS 0.76 08/02/2020    LYMPHSABS 0.65 08/02/2020    EOSABS 0.00 08/02/2020    BASOSABS 0.02 08/02/2020     Hemoglobin/Hematocrit:    Lab Results   Component Value Date    HGB 8.1 08/02/2020    HCT 25.0 08/02/2020     CMP:    Lab Results   Component Value Date     08/02/2020    K 4.3 08/02/2020    K 2.6 07/30/2020     08/02/2020    CO2 23 08/02/2020    BUN 10 08/02/2020    CREATININE 1.0 08/02/2020    GFRAA >60 08/02/2020    LABGLOM >60 08/02/2020    GLUCOSE 152 08/02/2020    PROT 4.6 08/02/2020    LABALBU 3.1 08/02/2020    CALCIUM 8.4 08/02/2020    BILITOT 0.3 08/02/2020    ALKPHOS 80 08/02/2020    AST 23 08/02/2020    ALT 25 08/02/2020     BMP:    Lab Results   Component Value Date     08/02/2020    K 4.3 08/02/2020    K 2.6 07/30/2020     08/02/2020    CO2 23 08/02/2020    BUN 10 08/02/2020    LABALBU 3.1 08/02/2020    CREATININE 1.0 08/02/2020    CALCIUM 8.4 08/02/2020    GFRAA >60 08/02/2020    LABGLOM >60 08/02/2020    GLUCOSE 152 08/02/2020     Hepatic Function Panel:    Lab Results   Component Value Date    ALKPHOS 80 08/02/2020    ALT 25 08/02/2020    AST 23 08/02/2020    PROT 4.6 08/02/2020    BILITOT 0.3 08/02/2020    BILIDIR <0.2 08/02/2020    IBILI see below 08/02/2020    LABALBU 3.1 08/02/2020     Ionized Calcium:  No results found for: IONCA  Magnesium:    Lab Results   Component Value Date    MG 2.0 08/02/2020     Phosphorus:    Lab Results   Component Value Date    PHOS 1.9 08/02/2020     LDH:  No results found for: LDH  Uric Acid:    Lab Results   Component Value Date    LABURIC 7.7 07/31/2020     PT/INR:    Lab Results   Component Value Date    PROTIME 15.2 08/02/2020    INR 1.3 08/02/2020     Warfarin PT/INR:  No components found for: Imtiaz Diaz  PTT:    Lab Results   Component Value Date    APTT 55.9 10/08/2019   [APTT}  Troponin:    Lab Results   Component Value Date    TROPONINI <0.01 07/31/2020     Last 3 Troponin:    Lab Results   Component Value Date    TROPONINI <0.01 07/31/2020    TROPONINI <0.01 07/31/2020    TROPONINI <0.01 07/30/2020     U/A:  No results found for: NITRITE, COLORU, PROTEINU, PHUR, LABCAST, WBCUA, RBCUA, MUCUS, TRICHOMONAS, YEAST, BACTERIA, CLARITYU, SPECGRAV, LEUKOCYTESUR, UROBILINOGEN, BILIRUBINUR, BLOODU, GLUCOSEU, AMORPHOUS  ABG:  No results found for: PH, PCO2, PO2, HCO3, BE, THGB, TCO2, O2SAT  HgBA1c:    Lab Results   Component Value Date    LABA1C 6.2 07/31/2020     FLP:    Lab Results   Component Value Date    TRIG 79 07/31/2020    HDL 30 07/31/2020    LDLCALC 33 07/31/2020    LABVLDL 16 07/31/2020     TSH:    Lab Results   Component Value Date    TSH 0.525 07/31/2020     VITAMIN B12: No components found for: B12  FOLATE:    Lab Results   Component Value Date    FOLATE 9.0 07/31/2020     IRON:  No results found for: IRON  Iron Saturation:  No components found for: PERCENTFE  TIBC:  No results found for: TIBC  FERRITIN:  No results found for: FERRITIN  PSA: No results found for: PSA     General appearance: Alert, Awake, Oriented times 3, no distress; nasal cannula oxygen in place  Skin: Warm and dry ; no rashes  Head: Normocephalic. No masses, lesions or tenderness noted  Eyes: Conjunctivae pale, sclera white. PERRL,EOM-I  Ears: Right external ear normal left external ear seems to have cauliflower ear despite patient's complaint of drainage from ear. Nose/Sinuses: Nares normal. Septum midline. Mucosa normal. No drainage  Oropharynx: Oropharynx clear with no exudate seen  Neck: Supple. No jugular venous distension, lymphadenopathy or thyromegaly Trachea midline  Lungs: Wheezes occasional rhonchi more on the right  Heart: Irregularly irregular at 130/min.  No rub or gallop; grade 1/6 systolic murmur second right intercostal space grade 1/6 systolic murmur left sternal border  Abdomen: Soft, non-tender. BS normal. No masses, organomegaly; no rebound or guarding  Extremities: No edema, Peripheral pulses palpable  Musculoskeletal: Muscular strength appears intact. Neuro:  No focal motor defects ; II-XII grossly intact . CORNELL equally    TELEMETRY: REVIEWED--Telemetry: Atrial fibrillation    ASSESSMENT:   Principal Problem:    GI bleeding  Active Problems:    Bilateral carotid artery stenosis    Tobacco dependence    Chronic obstructive pulmonary disease with acute exacerbation (HCC)    Combined systolic and diastolic congestive heart failure (HCC)    Atrial fibrillation (HCC)    On home O2    Hypertension    Hyperlipidemia    CAD (coronary artery disease)    Hypoprothrombinemia (HCC)    CKD (chronic kidney disease) stage 3, GFR 30-59 ml/min (HCC)    Hypokalemia    Non-rheumatic aortic sclerosis (HCC)    Hypotension due to hypovolemia    Emesis    Diarrhea    Thrombocytopenia (HCC)    Hypotension    Unintentional weight loss of 10% body weight within 6 months    Moderate protein-calorie malnutrition (HCC)    Pulmonary hypertension (HCC)    Erosive esophagitis  Resolved Problems:    * No resolved hospital problems.  *      PLAN:  SEE ORDERS      RE  CHANGES AND FINDINGS   Medications reviewed with patient  GI prophylaxis  DVT prophylaxis  Consultants notes reviewed   Continue oral potassium  Continue potassium protocol  Add potassium phosphate 20 mmol IV today  Monitor labs  I discussed the above with infectious disease regarding long-term Zyvox, equivocal left ear infection  Low-fat diet  Ambien for sleep 5 mg  Wean Gerardo-Synephrine as possible  Hold anticoagulation in view of continuing drop in hemoglobin despite atrial fibrillation  Sequential compression device  PT OT  Continue nasal cannula oxygen  Transfuse as needed, keep hemoglobin greater than 7.0  Continue aerosol treatments  Plavix isosorbide mononitrate metoprolol tartrate amlodipine all on hold  Intensivist has restarted metoprolol tartrate 25 mg twice daily; patient had been on 100 mg twice daily prehospital.  Because of low blood pressure smaller dose initiated  Digoxin 125 mcg IV daily to help control heart rate  CT of chest, pneumonia  Wean Gerardo-Synephrine as able  Fluconazole 400 mg IV every 24 hours  Tessalon Perles  Guaifenesin 400 mg 4 times daily  Increase methylprednisolone 60 mg every 8 hours  Increase Ambien 10 mg at bedtime  Potassium phosphate 30 mmol IV today  Monitor labs        Discussed with patient and nursing. Mari Nguyễn       12:05 PM     8/2/2020    TIME > 35 MINUTES    >  50 %  OF  TIME  DISCUSSION               ------------  INFORMATION  -----------      DIET:Dietary Nutrition Supplements: Clear Liquid Oral Supplement  DIET LOW FAT;         Allergies   Allergen Reactions    Pcn [Penicillins] Swelling    Dye [Iodides]      Heart and kidney dye / reaction unknown         MEDICATION SIDE EFFECTS:none       SCHEDULED MEDS:                                 Scheduled Meds:   zolpidem  10 mg Oral Nightly    metoprolol tartrate  25 mg Oral BID    fluconazole  400 mg Intravenous Q24H    potassium phosphate IVPB  30 mmol Intravenous Once    digoxin  125 mcg Intravenous Daily    methylPREDNISolone  40 mg Intravenous Q12H    benzonatate  100 mg Oral TID    [Held by provider] amLODIPine  5 mg Oral Daily    atorvastatin  60 mg Oral Daily    [Held by provider] clopidogrel  75 mg Oral Daily    potassium chloride  20 mEq Oral BID WC    [Held by provider] isosorbide mononitrate  30 mg Oral Daily    Arformoterol Tartrate  15 mcg Nebulization BID    budesonide  250 mcg Nebulization BID    ipratropium-albuterol  1 ampule Inhalation Q4H WA    warfarin (COUMADIN) daily dosing (placeholder)   Other RX Placeholder    pantoprazole  40 mg Intravenous BID    And    sodium chloride (PF)  10 mL Intravenous BID    sodium chloride flush  10 mL Intravenous 2 times per day       Continuous Infusions:   phenylephrine (SOFAÍ-SYNEPHRINE) 50mg/250mL infusion Stopped (08/02/20 0100)         Data:       Intake/Output Summary (Last 24 hours) at 8/2/2020 1205  Last data filed at 8/2/2020 0830  Gross per 24 hour   Intake 2138 ml   Output 600 ml   Net 1538 ml       Wt Readings from Last 3 Encounters:   08/01/20 140 lb 3.2 oz (63.6 kg)   03/25/20 142 lb (64.4 kg)   10/24/19 175 lb (79.4 kg)       Labs: Additional    GLUCOSE:No results for input(s): POCGLU in the last 72 hours. BNP:No results found for: BNP    CRP:   Recent Labs     07/31/20  0500 08/01/20  0630 08/02/20  0610   CRP 0.2 <0.1 <0.1       ESR:  Recent Labs     07/31/20  0500 08/01/20  0630 08/02/20  0610   SEDRATE 1 4 4       RADIOLOGY: REVIEWED AVAILABLE REPORT  XR CHEST PORTABLE   Final Result   1. Acute right basilar pneumonia. 2. Remainder of findings described as above. This report has been electronically signed by Arminda Tse MD.      [address] [facility] [address] Additional Contrast? None   Final Result      1. No evidence of abdominal or pelvic mass or lymphadenopathy. 2. Multiple gas-filled distended loops of small bowel are present   within the anterior aspect of the abdomen which could indicate   adynamic ileus versus partial or early small bowel obstruction. Stool   and gas is seen within the colon. Short-term follow-up may be helpful   for further evaluation. 3. Diverticulosis without evidence of acute diverticulitis. 4. Bilateral perinephric fat stranding could suggest chronic medical   renal disease with appropriate clinical history. 5. View of the lung bases show bibasilar opacities related to   pneumonia and atelectasis with small bilateral pleural effusions. 6. Fusiform infrarenal abdominal aortic aneurysm is demonstrated   measuring up to 3 cm in diameter.       XR CHEST PORTABLE   Final Result      Chronic coarsened

## 2020-08-02 NOTE — PROGRESS NOTES
PROGRESS NOTE    Patient Presents with/Seen in Consultation For      *Reason for Consult: GI bleed     CHIEF COMPLAINT:   fatigue, nausea, vomiting, diarrhea, shortness of breath, and cough    Subjective:     Patient seen sitting up in chair, SOB. Denies any worsening SOB, states he is \"winded from getting up\". Tolerating meals. Denies any N/V, or abdominal pain. Had a soft black stool today. Last colon 8 years ago with Dr. Florence Reardon, reports it too be normal at that time. POC reviewed with the patient, all questions answered. BP 90/50s    Review of Systems  Aside from what was mentioned in the PMH and HPI, essentially unremarkable, all others negative. Objective:     Patient Vitals for the past 8 hrs:   BP Temp Temp src Pulse Resp SpO2   08/02/20 1437 -- -- -- -- 22 100 %   08/02/20 1400 -- -- -- 120 24 95 %   08/02/20 1300 -- -- -- 127 24 96 %   08/02/20 1216 -- -- -- -- 27 96 %   08/02/20 1200 123/63 97.6 °F (36.4 °C) Oral 125 26 98 %   08/02/20 1100 -- -- -- 118 18 95 %   08/02/20 1000 -- -- -- 129 14 94 %   08/02/20 0900 -- -- -- 139 27 94 %   08/02/20 0815 -- -- -- -- 23 99 %   08/02/20 0814 -- -- -- -- (!) 31 100 %   08/02/20 0800 113/60 97.6 °F (36.4 °C) Oral 150 27 94 %   08/02/20 0705 -- -- -- 119 28 96 %       CONSTITUTIONAL:  awake, fatigued and ill-appearing, pale, cooperative, and appears older than stated age, up in chair  EYES:  pupils equal, round and reactive to light, sclera anicteric and conjunctiva pale  ENT:  normocephalic, oral pharynx with dry mucous membranes  LUNGS: Rhonchi throughout, diminished.  O2 per high flow NC  CARDIOVASCULAR:  irregular rate and rhythm, no murmur noted; 2+ pulses; Trace BLE edema  ABDOMEN: normal bowel sounds, softly distended, non-tender  MUSCULOSKELETAL:  full range of motion noted  motor strength is 5 out of 5 all extremities bilaterally  NEUROLOGIC:  Mental Status Exam:  Level of Alertness:   awake  Orientation:   person, place, time  Motor Exam:  Motor exam is symmetrical 5 out of 5 all extremities bilaterally    zolpidem (AMBIEN) tablet 10 mg, Nightly  metoprolol tartrate (LOPRESSOR) tablet 25 mg, BID  fluconazole (DIFLUCAN) in 0.9 % sodium chloride IVPB 400 mg, Q24H  potassium phosphate 30 mmol in dextrose 5 % 250 mL IVPB, Once  digoxin (LANOXIN) injection 125 mcg, Daily  benzonatate (TESSALON) capsule 100 mg, TID  guaiFENesin tablet 400 mg, 4x Daily PRN  methylPREDNISolone sodium (SOLU-MEDROL) injection 60 mg, Q8H  [Held by provider] amLODIPine (NORVASC) tablet 5 mg, Daily  atorvastatin (LIPITOR) tablet 60 mg, Daily  [Held by provider] clopidogrel (PLAVIX) tablet 75 mg, Daily  HYDROcodone-acetaminophen (NORCO) 7.5-325 MG per tablet 1 tablet, Q4H PRN  potassium chloride (KLOR-CON M) extended release tablet 20 mEq, BID WC  [Held by provider] isosorbide mononitrate (IMDUR) extended release tablet 30 mg, Daily  Arformoterol Tartrate (BROVANA) nebulizer solution 15 mcg, BID  budesonide (PULMICORT) nebulizer suspension 250 mcg, BID  ipratropium-albuterol (DUONEB) nebulizer solution 1 ampule, Q4H WA  potassium chloride (KLOR-CON M) extended release tablet 40 mEq, PRN    Or  potassium bicarb-citric acid (EFFER-K) effervescent tablet 40 mEq, PRN    Or  potassium chloride 10 mEq/100 mL IVPB (Peripheral Line), PRN  acetaminophen (TYLENOL) tablet 650 mg, Q4H PRN  phenylephrine (SOFÍA-SYNEPHRINE) 50 mg in dextrose 5 % 250 mL infusion, Continuous  warfarin (COUMADIN) daily dosing (placeholder), RX Placeholder  pantoprazole (PROTONIX) injection 40 mg, BID    And  sodium chloride (PF) 0.9 % injection 10 mL, BID  sodium chloride flush 0.9 % injection 10 mL, 2 times per day  sodium chloride flush 0.9 % injection 10 mL, PRN         Data Review  CBC:   Lab Results   Component Value Date    WBC 18.0 08/02/2020    RBC 2.67 08/02/2020    HGB 8.1 08/02/2020    HCT 25.0 08/02/2020    MCV 93.6 08/02/2020    MCH 30.3 08/02/2020    MCHC 32.4 08/02/2020    RDW 14.5 08/02/2020     08/02/2020 or active bleeding. ? +UTI    Plan:     ? Bleeding scan today  ? Critical care management per ICU   ? Low fat diet, as tolerated  ? Continue Diflucan as ordered  ? Stool studies- pending  ? Medicate for nausea as ordered  ? Serial H&H, transfuse per ICU  ? Monitor CBC, CMP, INR daily  ? Defer comorbidities to others  ?  Continue to monitor    Discussed with Dr. Neisha Young per Dr. Enid Rasheed, NP-C 8/2/2020 2:30 PM For Dr. Luis Bourgeois

## 2020-08-02 NOTE — FLOWSHEET NOTE
Pt feeling better after resp treatment but still has a cough that sounds moist. Waiting for c x-ray. V.s . Stable.

## 2020-08-03 ENCOUNTER — APPOINTMENT (OUTPATIENT)
Dept: CT IMAGING | Age: 80
DRG: 380 | End: 2020-08-03
Payer: MEDICARE

## 2020-08-03 ENCOUNTER — APPOINTMENT (OUTPATIENT)
Dept: NUCLEAR MEDICINE | Age: 80
DRG: 380 | End: 2020-08-03
Payer: MEDICARE

## 2020-08-03 LAB
ALBUMIN SERPL-MCNC: 3.2 G/DL (ref 3.5–5.2)
ALP BLD-CCNC: 95 U/L (ref 40–129)
ALT SERPL-CCNC: 60 U/L (ref 0–40)
ANION GAP SERPL CALCULATED.3IONS-SCNC: 8 MMOL/L (ref 7–16)
AST SERPL-CCNC: 44 U/L (ref 0–39)
BASOPHILS ABSOLUTE: 0.02 E9/L (ref 0–0.2)
BASOPHILS RELATIVE PERCENT: 0.1 % (ref 0–2)
BILIRUB SERPL-MCNC: 0.3 MG/DL (ref 0–1.2)
BILIRUBIN DIRECT: <0.2 MG/DL (ref 0–0.3)
BILIRUBIN, INDIRECT: ABNORMAL MG/DL (ref 0–1)
BUN BLDV-MCNC: 16 MG/DL (ref 8–23)
CA 19-9: 13 U/ML (ref 0–37)
CALCIUM SERPL-MCNC: 8.5 MG/DL (ref 8.6–10.2)
CHLORIDE BLD-SCNC: 106 MMOL/L (ref 98–107)
CO2: 23 MMOL/L (ref 22–29)
CREAT SERPL-MCNC: 1 MG/DL (ref 0.7–1.2)
CRYPTOSPORIDIUM ANTIGEN STOOL: NORMAL
CULTURE, STOOL: NORMAL
EOSINOPHILS ABSOLUTE: 0 E9/L (ref 0.05–0.5)
EOSINOPHILS RELATIVE PERCENT: 0 % (ref 0–6)
GFR AFRICAN AMERICAN: >60
GFR NON-AFRICAN AMERICAN: >60 ML/MIN/1.73
GIARDIA ANTIGEN STOOL: NORMAL
GLUCOSE BLD-MCNC: 148 MG/DL (ref 74–99)
GRAM STAIN ORDERABLE: NORMAL
HCT VFR BLD CALC: 25.8 % (ref 37–54)
HEMOGLOBIN: 8.4 G/DL (ref 12.5–16.5)
IMMATURE GRANULOCYTES #: 0.7 E9/L
IMMATURE GRANULOCYTES %: 3.6 % (ref 0–5)
INR BLD: 1.1
LACTIC ACID: 2.4 MMOL/L (ref 0.5–2.2)
LACTIC ACID: 2.7 MMOL/L (ref 0.5–2.2)
LACTIC ACID: 3.2 MMOL/L (ref 0.5–2.2)
LACTIC ACID: 3.4 MMOL/L (ref 0.5–2.2)
LACTIC ACID: 3.6 MMOL/L (ref 0.5–2.2)
LYMPHOCYTES ABSOLUTE: 0.68 E9/L (ref 1.5–4)
LYMPHOCYTES RELATIVE PERCENT: 3.5 % (ref 20–42)
MAGNESIUM: 1.8 MG/DL (ref 1.6–2.6)
MCH RBC QN AUTO: 30.5 PG (ref 26–35)
MCHC RBC AUTO-ENTMCNC: 32.6 % (ref 32–34.5)
MCV RBC AUTO: 93.8 FL (ref 80–99.9)
MONOCYTES ABSOLUTE: 0.78 E9/L (ref 0.1–0.95)
MONOCYTES RELATIVE PERCENT: 4.1 % (ref 2–12)
NEUTROPHILS ABSOLUTE: 17.01 E9/L (ref 1.8–7.3)
NEUTROPHILS RELATIVE PERCENT: 88.7 % (ref 43–80)
PDW BLD-RTO: 14.6 FL (ref 11.5–15)
PHOSPHORUS: 2.7 MG/DL (ref 2.5–4.5)
PLATELET # BLD: 206 E9/L (ref 130–450)
PMV BLD AUTO: 10.7 FL (ref 7–12)
POTASSIUM SERPL-SCNC: 4.8 MMOL/L (ref 3.5–5)
PROTHROMBIN TIME: 13 SEC (ref 9.3–12.4)
RBC # BLD: 2.75 E12/L (ref 3.8–5.8)
SEDIMENTATION RATE, ERYTHROCYTE: 3 MM/HR (ref 0–15)
SODIUM BLD-SCNC: 137 MMOL/L (ref 132–146)
TOTAL PROTEIN: 4.7 G/DL (ref 6.4–8.3)
WBC # BLD: 19.2 E9/L (ref 4.5–11.5)

## 2020-08-03 PROCEDURE — 37799 UNLISTED PX VASCULAR SURGERY: CPT

## 2020-08-03 PROCEDURE — 6370000000 HC RX 637 (ALT 250 FOR IP): Performed by: INTERNAL MEDICINE

## 2020-08-03 PROCEDURE — 6360000002 HC RX W HCPCS

## 2020-08-03 PROCEDURE — 6360000002 HC RX W HCPCS: Performed by: INTERNAL MEDICINE

## 2020-08-03 PROCEDURE — 83605 ASSAY OF LACTIC ACID: CPT

## 2020-08-03 PROCEDURE — 6360000002 HC RX W HCPCS: Performed by: SPECIALIST

## 2020-08-03 PROCEDURE — 80048 BASIC METABOLIC PNL TOTAL CA: CPT

## 2020-08-03 PROCEDURE — 2700000000 HC OXYGEN THERAPY PER DAY

## 2020-08-03 PROCEDURE — 2000000000 HC ICU R&B

## 2020-08-03 PROCEDURE — 85610 PROTHROMBIN TIME: CPT

## 2020-08-03 PROCEDURE — 36415 COLL VENOUS BLD VENIPUNCTURE: CPT

## 2020-08-03 PROCEDURE — C9113 INJ PANTOPRAZOLE SODIUM, VIA: HCPCS | Performed by: INTERNAL MEDICINE

## 2020-08-03 PROCEDURE — A9560 TC99M LABELED RBC: HCPCS | Performed by: RADIOLOGY

## 2020-08-03 PROCEDURE — 94640 AIRWAY INHALATION TREATMENT: CPT

## 2020-08-03 PROCEDURE — 78278 ACUTE GI BLOOD LOSS IMAGING: CPT

## 2020-08-03 PROCEDURE — 2580000003 HC RX 258: Performed by: INTERNAL MEDICINE

## 2020-08-03 PROCEDURE — 71250 CT THORAX DX C-: CPT

## 2020-08-03 PROCEDURE — 80076 HEPATIC FUNCTION PANEL: CPT

## 2020-08-03 PROCEDURE — 84100 ASSAY OF PHOSPHORUS: CPT

## 2020-08-03 PROCEDURE — 2580000003 HC RX 258

## 2020-08-03 PROCEDURE — 2500000003 HC RX 250 WO HCPCS: Performed by: INTERNAL MEDICINE

## 2020-08-03 PROCEDURE — 83735 ASSAY OF MAGNESIUM: CPT

## 2020-08-03 PROCEDURE — 3430000000 HC RX DIAGNOSTIC RADIOPHARMACEUTICAL: Performed by: RADIOLOGY

## 2020-08-03 PROCEDURE — 85651 RBC SED RATE NONAUTOMATED: CPT

## 2020-08-03 PROCEDURE — 85025 COMPLETE CBC W/AUTO DIFF WBC: CPT

## 2020-08-03 PROCEDURE — 94660 CPAP INITIATION&MGMT: CPT

## 2020-08-03 RX ORDER — BUMETANIDE 0.25 MG/ML
1 INJECTION, SOLUTION INTRAMUSCULAR; INTRAVENOUS 2 TIMES DAILY
Status: DISCONTINUED | OUTPATIENT
Start: 2020-08-03 | End: 2020-08-07

## 2020-08-03 RX ORDER — POTASSIUM CHLORIDE 29.8 MG/ML
20 INJECTION INTRAVENOUS PRN
Status: DISCONTINUED | OUTPATIENT
Start: 2020-08-03 | End: 2020-08-05

## 2020-08-03 RX ORDER — METOPROLOL SUCCINATE 25 MG/1
25 TABLET, EXTENDED RELEASE ORAL 2 TIMES DAILY
Status: DISCONTINUED | OUTPATIENT
Start: 2020-08-03 | End: 2020-08-07 | Stop reason: HOSPADM

## 2020-08-03 RX ORDER — DIGOXIN 0.25 MG/ML
500 INJECTION INTRAMUSCULAR; INTRAVENOUS ONCE
Status: DISCONTINUED | OUTPATIENT
Start: 2020-08-03 | End: 2020-08-03

## 2020-08-03 RX ORDER — HEPARIN SODIUM (PORCINE) LOCK FLUSH IV SOLN 100 UNIT/ML 100 UNIT/ML
3 SOLUTION INTRAVENOUS EVERY 12 HOURS
Status: DISCONTINUED | OUTPATIENT
Start: 2020-08-03 | End: 2020-08-07 | Stop reason: HOSPADM

## 2020-08-03 RX ORDER — MAGNESIUM SULFATE IN WATER 40 MG/ML
2 INJECTION, SOLUTION INTRAVENOUS ONCE
Status: COMPLETED | OUTPATIENT
Start: 2020-08-03 | End: 2020-08-03

## 2020-08-03 RX ORDER — MAGNESIUM SULFATE IN WATER 40 MG/ML
2 INJECTION, SOLUTION INTRAVENOUS PRN
Status: DISCONTINUED | OUTPATIENT
Start: 2020-08-03 | End: 2020-08-07 | Stop reason: HOSPADM

## 2020-08-03 RX ORDER — DIGOXIN 0.25 MG/ML
250 INJECTION INTRAMUSCULAR; INTRAVENOUS ONCE
Status: COMPLETED | OUTPATIENT
Start: 2020-08-03 | End: 2020-08-03

## 2020-08-03 RX ORDER — DIGOXIN 0.25 MG/ML
375 INJECTION INTRAMUSCULAR; INTRAVENOUS ONCE
Status: COMPLETED | OUTPATIENT
Start: 2020-08-03 | End: 2020-08-03

## 2020-08-03 RX ORDER — HEPARIN SODIUM (PORCINE) LOCK FLUSH IV SOLN 100 UNIT/ML 100 UNIT/ML
3 SOLUTION INTRAVENOUS PRN
Status: DISCONTINUED | OUTPATIENT
Start: 2020-08-03 | End: 2020-08-07 | Stop reason: HOSPADM

## 2020-08-03 RX ADMIN — WATER 10 ML: 1 INJECTION INTRAMUSCULAR; INTRAVENOUS; SUBCUTANEOUS at 16:32

## 2020-08-03 RX ADMIN — DIGOXIN 250 MCG: 0.25 INJECTION INTRAMUSCULAR; INTRAVENOUS at 13:17

## 2020-08-03 RX ADMIN — METOPROLOL SUCCINATE 25 MG: 25 TABLET, EXTENDED RELEASE ORAL at 20:30

## 2020-08-03 RX ADMIN — METHYLPREDNISOLONE SODIUM SUCCINATE 60 MG: 125 INJECTION, POWDER, LYOPHILIZED, FOR SOLUTION INTRAMUSCULAR; INTRAVENOUS at 07:48

## 2020-08-03 RX ADMIN — SODIUM CHLORIDE, PRESERVATIVE FREE 10 ML: 5 INJECTION INTRAVENOUS at 11:13

## 2020-08-03 RX ADMIN — IPRATROPIUM BROMIDE AND ALBUTEROL SULFATE 1 AMPULE: 2.5; .5 SOLUTION RESPIRATORY (INHALATION) at 20:56

## 2020-08-03 RX ADMIN — IPRATROPIUM BROMIDE AND ALBUTEROL SULFATE 1 AMPULE: 2.5; .5 SOLUTION RESPIRATORY (INHALATION) at 16:07

## 2020-08-03 RX ADMIN — IPRATROPIUM BROMIDE AND ALBUTEROL SULFATE 1 AMPULE: 2.5; .5 SOLUTION RESPIRATORY (INHALATION) at 12:00

## 2020-08-03 RX ADMIN — BUMETANIDE 1 MG: 0.25 INJECTION INTRAMUSCULAR; INTRAVENOUS at 20:30

## 2020-08-03 RX ADMIN — BENZONATATE 100 MG: 100 CAPSULE ORAL at 16:28

## 2020-08-03 RX ADMIN — FLUCONAZOLE 400 MG: 2 INJECTION, SOLUTION INTRAVENOUS at 17:06

## 2020-08-03 RX ADMIN — PANTOPRAZOLE SODIUM 40 MG: 40 INJECTION, POWDER, FOR SOLUTION INTRAVENOUS at 20:30

## 2020-08-03 RX ADMIN — Medication 300 UNITS: at 20:46

## 2020-08-03 RX ADMIN — DIGOXIN 125 MCG: 0.25 INJECTION INTRAMUSCULAR; INTRAVENOUS at 07:52

## 2020-08-03 RX ADMIN — SODIUM CHLORIDE, PRESERVATIVE FREE 10 ML: 5 INJECTION INTRAVENOUS at 20:30

## 2020-08-03 RX ADMIN — METHYLPREDNISOLONE SODIUM SUCCINATE 60 MG: 125 INJECTION, POWDER, LYOPHILIZED, FOR SOLUTION INTRAMUSCULAR; INTRAVENOUS at 00:06

## 2020-08-03 RX ADMIN — DIGOXIN 250 MCG: 0.25 INJECTION INTRAMUSCULAR; INTRAVENOUS at 16:54

## 2020-08-03 RX ADMIN — PHENYLEPHRINE HYDROCHLORIDE 100 MCG/MIN: 10 INJECTION INTRAVENOUS at 21:44

## 2020-08-03 RX ADMIN — GUAIFENESIN 400 MG: 400 TABLET, FILM COATED ORAL at 20:00

## 2020-08-03 RX ADMIN — ARFORMOTEROL TARTRATE 15 MCG: 15 SOLUTION RESPIRATORY (INHALATION) at 08:07

## 2020-08-03 RX ADMIN — BENZONATATE 100 MG: 100 CAPSULE ORAL at 11:17

## 2020-08-03 RX ADMIN — IPRATROPIUM BROMIDE AND ALBUTEROL SULFATE 1 AMPULE: 2.5; .5 SOLUTION RESPIRATORY (INHALATION) at 08:07

## 2020-08-03 RX ADMIN — BUDESONIDE 250 MCG: 0.25 SUSPENSION RESPIRATORY (INHALATION) at 08:07

## 2020-08-03 RX ADMIN — METHYLPREDNISOLONE SODIUM SUCCINATE 60 MG: 125 INJECTION, POWDER, LYOPHILIZED, FOR SOLUTION INTRAMUSCULAR; INTRAVENOUS at 16:33

## 2020-08-03 RX ADMIN — MAGNESIUM SULFATE 2 G: 2 INJECTION INTRAVENOUS at 11:17

## 2020-08-03 RX ADMIN — BUMETANIDE 1 MG: 0.25 INJECTION INTRAMUSCULAR; INTRAVENOUS at 12:22

## 2020-08-03 RX ADMIN — ATORVASTATIN CALCIUM 60 MG: 20 TABLET, FILM COATED ORAL at 11:16

## 2020-08-03 RX ADMIN — PANTOPRAZOLE SODIUM 40 MG: 40 INJECTION, POWDER, FOR SOLUTION INTRAVENOUS at 11:09

## 2020-08-03 RX ADMIN — DEXTROSE MONOHYDRATE 7.5 MG/HR: 50 INJECTION, SOLUTION INTRAVENOUS at 19:17

## 2020-08-03 RX ADMIN — DEXTROSE MONOHYDRATE 5 MG/HR: 50 INJECTION, SOLUTION INTRAVENOUS at 07:44

## 2020-08-03 RX ADMIN — BUDESONIDE 250 MCG: 0.25 SUSPENSION RESPIRATORY (INHALATION) at 20:57

## 2020-08-03 RX ADMIN — METOPROLOL TARTRATE 25 MG: 25 TABLET, FILM COATED ORAL at 08:24

## 2020-08-03 RX ADMIN — Medication 20 MILLICURIE: at 09:18

## 2020-08-03 RX ADMIN — DIGOXIN 375 MCG: 0.25 INJECTION INTRAMUSCULAR; INTRAVENOUS at 10:28

## 2020-08-03 RX ADMIN — BENZONATATE 100 MG: 100 CAPSULE ORAL at 20:30

## 2020-08-03 RX ADMIN — ARFORMOTEROL TARTRATE 15 MCG: 15 SOLUTION RESPIRATORY (INHALATION) at 20:56

## 2020-08-03 RX ADMIN — METHYLPREDNISOLONE SODIUM SUCCINATE 60 MG: 125 INJECTION, POWDER, LYOPHILIZED, FOR SOLUTION INTRAMUSCULAR; INTRAVENOUS at 23:30

## 2020-08-03 ASSESSMENT — PAIN SCALES - GENERAL
PAINLEVEL_OUTOF10: 0

## 2020-08-03 NOTE — PROGRESS NOTES
Date: 8/2/2020    Time: 2320    Patient Placed On BIPAP/CPAP/ Non-Invasive Ventilation? Yes    If no must comment. Facial area red/color change? No           If YES are Blister/Lesion present? No   If yes must notify nursing staff  BIPAP/CPAP skin barrier?   Yes    Skin barrier type:mepilex       Comments:    Red outlet placed back on for sob    Yvonne PeaJaclyn

## 2020-08-03 NOTE — PROGRESS NOTES
Critical Care Team - Daily Progress Note         Date and time: 8/3/2020 8:10 AM  Patient's name:  Brit Livingston  Medical Record Number: [id number]  Patient's account/billing number: [de-identified]  Patient's YOB: 1940  Age: 70 y.o.   Date of Admission: 2020  6:00 PM  Length of stay during current admission: 4    Primary Care Physician: Bashir Aguilar MD  ICU Attending Physician: Dr. Eladio Gary Status: Limited    Reason for ICU admission: COPD exacerbation    SUBJECTIVE:       Patient says he is coughing very little    OVERNIGHT EVENTS:    Pressors weaned off    CURRENT VENTILATION STATUS: 4 liters   [] Ventilator  [] BIPAP  [x] Nasal Cannula [] Room Air    IF INTUBATED, ET TUBE MARKING AT LOWER LIP:       cms  SECRETIONS Amount:  [] Small [] Moderate  [] Large  [x] None  Color:     [] White [] Colored  [] Bloody  SEDATION:  RAAS Score:  [] Propofol gtt  [] Versed gtt  [] Ativan gtt   [x] No Sedation  PARALYZED:  [x] No    [] Yes  VASOPRESSORS:  [x] No    [] Yes    If yes - [] Levophed       [] Dopamine     [] Vasopressin       [] Dobutamine  [] Phenylephrine         [] Epinephrine  CENTRAL LINES:     [] No   [] Yes   (Date of Insertion:   )  And Rabia          If yes -     [] Right IJ     [] Left IJ [x] Right Femoral [] Left Femoral                   [] Right Subclavian [] Left Subclavian   [] PICC Line  THEODORE'S CATHETER:   [x] No   [] Yes  (Date of Insertion:   )   URINE OUTPUT:            [x] Good   [] Low              [] Anuric      OBJECTIVE:     VITAL SIGNS:  BP (!) 93/59   Pulse 109   Temp 97.6 °F (36.4 °C) (Axillary)   Resp 30   Ht 5' 8\" (1.727 m)   Wt 140 lb 3.2 oz (63.6 kg)   SpO2 95%   BMI 21.32 kg/m²   Tmax over 24 hours:  Temp (24hrs), Av.6 °F (36.4 °C), Min:97 °F (36.1 °C), Max:98.2 °F (36.8 °C)      Patient Vitals for the past 6 hrs:   BP Temp Temp src Pulse Resp SpO2   20 0700 (!) 93/59 -- -- 109 30 95 %   20 0600 (!) 86/45 -- -- 126 (!) 32 93 % 08/03/20 0500 (!) 100/53 -- -- 121 26 99 %   08/03/20 0400 (!) 90/46 97.6 °F (36.4 °C) Axillary 117 23 98 %   08/03/20 0355 -- -- -- -- 21 98 %   08/03/20 0337 -- -- -- -- 24 --   08/03/20 0300 (!) 93/46 -- -- 131 15 97 %         Intake/Output Summary (Last 24 hours) at 8/3/2020 0810  Last data filed at 8/3/2020 0600  Gross per 24 hour   Intake 50 ml   Output 800 ml   Net -750 ml     Wt Readings from Last 2 Encounters:   08/01/20 140 lb 3.2 oz (63.6 kg)   03/25/20 142 lb (64.4 kg)     Body mass index is 21.32 kg/m².         PHYSICAL EXAMINATION:    General appearance - alert, well appearing, and in no distress  Mental status - alert, oriented to person, place, and time  Eyes - pupils equal and reactive, extraocular eye movements intact  Ears - external ear canals normal  Nose - normal and patent,, discharge   Mouth - mucous membranes moist, pharynx normal without lesions  Neck - supple, no significant adenopathy, JVD  Chest - clear to auscultation forced expiratory wheezing symmetric air entry  Heart - normal rate, regular rhythm, normal S1, S2, no murmurs, rubs, clicks or gallops  Abdomen - soft, nontender, nondistended, no masses or organomegaly  Neurological - alert, oriented, normal speech, no focal findings or movement disorder noted  Extremities - peripheral pulses normal, no pedal edema, no clubbing or cyanosis  Skin - normal coloration and turgor, no rashes, no suspicious skin lesions noted        MEDICATIONS:    Scheduled Meds:   zolpidem  10 mg Oral Nightly    metoprolol tartrate  25 mg Oral BID    fluconazole  400 mg Intravenous Q24H    digoxin  125 mcg Intravenous Daily    benzonatate  100 mg Oral TID    methylPREDNISolone  60 mg Intravenous Q8H    dilTIAZem  10 mg Intravenous Once    [Held by provider] amLODIPine  5 mg Oral Daily    atorvastatin  60 mg Oral Daily    [Held by provider] clopidogrel  75 mg Oral Daily    potassium chloride  20 mEq Oral BID WC    [Held by provider] isosorbide 4.8   * 106 106   CO2 23 22 23   BUN 10 15 16   CREATININE 1.0 1.0 1.0   GLUCOSE 152* 139* 148*   CALCIUM 8.4* 8.3* 8.5*   PROT 4.6*  --  4.7*   LABALBU 3.1*  --  3.2*   BILITOT 0.3  --  0.3   ALKPHOS 80  --  95   AST 23  --  44*   ALT 25  --  60*      Magnesium:   Lab Results   Component Value Date    MG 1.8 08/03/2020     Phosphorus:   Lab Results   Component Value Date    PHOS 2.7 08/03/2020     Ionized Calcium: No results found for: MACHO     Urinalysis:     Troponin:   Recent Labs     07/31/20  0855   TROPONINI <0.01       Microbiology:    Cultures during this admission:       Blood cultures negative  Urine Culture:       Less than 10,000 colonies of Klebsiella               Other pertinent Labs:       Radiology/Imaging:       ASSESSMENT:     Principal Problem:    GI bleeding  Active Problems:    Bilateral carotid artery stenosis    Tobacco dependence    Chronic obstructive pulmonary disease with acute exacerbation (HCC)    Combined systolic and diastolic congestive heart failure (HCC)    Atrial fibrillation (HCC)    On home O2    Hypertension    Hyperlipidemia    CAD (coronary artery disease)    Hypoprothrombinemia (HCC)    CKD (chronic kidney disease) stage 3, GFR 30-59 ml/min (HCC)    Hypokalemia    Non-rheumatic aortic sclerosis (HCC)    Hypotension due to hypovolemia    Emesis    Diarrhea    Thrombocytopenia (HCC)    Hypotension    Unintentional weight loss of 10% body weight within 6 months    Moderate protein-calorie malnutrition (HCC)    Pulmonary hypertension (HCC)    Erosive esophagitis  Resolved Problems:    * No resolved hospital problems. *      Additional assessment:    61-year-old male 80 py continued smoker PMH of CHF, CAD, CKD, HTN, HLD with hypotension and GI bleed   to ICU from ER 7/30  INR found to be elevated 10 which was reversed. Patient required Gerardo-Synephrine octreotide pantoprazole drip  Chest x-ray with increased congestion  7/31 CT chest no mass or lymphadenopathy.   Small bowel is distended with adynamic ileus diverticulosis. EGD erosive esophagitis with GERD   8/2 chest film right basilar pneumonia  8/3 on BiPAP when sleeping  8/3 CT chest showing moderate effusions with atelectasis advanced COPD 90% compression deformity of T6    1. hypovolemia causing shock now off pressors  2. GI bleed s/p 3 units PRBC, 2 units FFP, 1 unit platelets  3. Erosive esophagitis   4. Candida esophagitis on fluconazole  5. Echo showing EF 55% 8/3/2020  6. A. Fib  with RVR requiring Lopressor and Cardizem drip  7. Anemia  8. Thrombocytopenia  9. COPD (follows Dr. Dangelo Salcido) in exacerbation  10. Asbestosis  11. NICHOLAS noncompliant  12. Chronic hypoxic respiratory failure on 2 L  13. CAD s/p CABG 1983, 1992 and stents 2000, 2002, 2005, 2009 on Plavix  14. CKD stage III  15. Left ear I&D status post I&D with excision of fistula and reconstruction 8/26/2019 on was on chronic antibiotics. Cultures 1/5/2020 revealed Klebsiella. Vancomycin was DC'd PICC line was removed and patient was started on Levaquin for 6 weeks and Keflex for 1 month. Patient was recultured and started on Roxi nasal lid 7/6  16. Nicotine addiction  17. HLD  18. HTN  19. History of weight loss  20. CODE STATUS: Meds only  21.  Previous admission 2018 nonsustained V. tach      PLAN:   For bleeding scan today  Seen by cardiology started on digoxin   Will eventually need to be started on anticoagulation  Anticoagulation on hold for now will require Eliquis when stable  Restarted with potassium  On Solu-Medrol 60 every 8 this is a very high dose will drop to 40 every 8-patient has forced expiratory wheezing  Being diuresed with Bumex  Given this patient's age should not be on Ambien  And if he has sleep apnea this will make his obstruction worse  Following hemoglobin    WEAN PER PROTOCOL:  [x] No   [] Yes  [] N/A  DISCONTINUE ANY LABS:   [x] No   [] Yes  ICU PROPHYLAXIS:  Stress ulcer:  [x] PPI Agent  [] T3Odgpv [] Sucralfate  [] Other:  VTE:   [] Enoxaparin  [] Unfract. Heparin Subcut  [x] EPC Cuffs  NUTRITION:  [] NPO [] Tube Feeding (Specify: ) [] TPN  [] PO (Diet: Dietary Nutrition Supplements: Clear Liquid Oral Supplement  DIET LOW FAT;)  HOME MEDICATIONS RECONCILED: [x] No   [] Yes  INSULIN DRIP:    [x] No   [] Yes  CONSULTATION NEEDED:    [x] No   [] Yes  FAMILY UPDATED:     [x] No   [] Yes  TRANSFER OUT OF ICU:    [x] No   [] Yes      MAGALY Sanchez personally saw, examined and provided care for the patient. Radiographs, labs and medication list were reviewed by me independently. I spoke with bedside nursing, therapists and consultants. Critical care services and times documented are independent of procedures and multidisciplinary rounds with were conducted with the MICU team. The case was discussed in detail and plans for care were established.     8/3/2020, 8:10 AM    > 30 min CCT

## 2020-08-03 NOTE — PROGRESS NOTES
Date: 8/2/2020    Time: 1935    Patient Placed On BIPAP/CPAP/ Non-Invasive Ventilation? Yes    If no must comment. Facial area red/color change? No           If YES are Blister/Lesion present? No   If yes must notify nursing staff  BIPAP/CPAP skin barrier?   Yes    Skin barrier type:mepilex       Comments:    Red outlet     Yvonne Chu

## 2020-08-03 NOTE — PATIENT CARE CONFERENCE
Intensive Care Daily Quality Rounding Checklist      ICU Team Members: Dr. Aaron Brown, charge nurse, bedside nurse, respiratory therapist, clinical pharmacist    ICU Day #: NUMBER: 4    Intubation Date: N/A    Ventilator Day #: N/A    Central Line Insertion Date: July 31        Day #: NUMBER: 4     Arterial Line Insertion Date: July 31      Day #: NUMBER: 4    DVT Prophylaxis: PCD'S    GI Prophylaxis: PROTONIX     Damon Catheter Insertion Date:  N/A       Day #: N/A      Continued need (if yes, reason documented and discussed with physician): N/A    Skin Issues/ Wounds and ordered treatment discussed on rounds: NO    Goals/ Plans for the Day: Daily labs, wean o2 as able, Bipap as needed, nuclear bleeding scan/ CT chest today, titrate Diltiazem for HR <100 and keep BP >100

## 2020-08-03 NOTE — PROGRESS NOTES
PROGRESS NOTE    Patient Presents with/Seen in Consultation For      *gi bleed   CHIEF COMPLAINT:   fatigue, nausea, vomiting, diarrhea, shortness of breath, and cough    Subjective:     Patient seen in nuclear med dept, having bleeding scan. Pt on BIPAP, stating he had a bad night. BS RN with pt. Pt on Cardizem gtt, BP 10-07'E systolic per a-line reading with . Pt denies abd pain, n/v, or diarrhea. Last BM yesterday, black soft, medium size. Review of Systems  Aside from what was mentioned in the PMH and HPI, essentially unremarkable, all others negative. Objective:     BP (!) 93/59   Pulse 109   Temp 97.6 °F (36.4 °C) (Axillary)   Resp 30   Ht 5' 8\" (1.727 m)   Wt 140 lb 3.2 oz (63.6 kg)   SpO2 95%   BMI 21.32 kg/m²     General appearance: alert, awake, fatigued and ill appearing laying on nuc med table on BIPAP with BS RN present on monitor with a-line waveform, pale, laying in bed, and cooperative  Eyes: conjunctiva pale, sclera anicteric. PERRL. Lungs: rhonchi with diffuse wheezing to auscultation bilaterally.  O2 per NC  Heart: irregular rapid rate and rhythm, no murmur, 2+ pulses; trace BLE edema  Abdomen: soft, non-tender; bowel sounds normal; no masses,  no organomegaly  Extremities: extremities with trace BLE edema  Pulses: 2+ and symmetric  Skin: Skin color pale, texture, turgor normal.   Neurologic: Grossly normal    zolpidem (AMBIEN) tablet 10 mg, Nightly  metoprolol tartrate (LOPRESSOR) tablet 25 mg, BID  fluconazole (DIFLUCAN) in 0.9 % sodium chloride IVPB 400 mg, Q24H  digoxin (LANOXIN) injection 125 mcg, Daily  benzonatate (TESSALON) capsule 100 mg, TID  guaiFENesin tablet 400 mg, 4x Daily PRN  methylPREDNISolone sodium (SOLU-MEDROL) injection 60 mg, Q8H  dilTIAZem injection 10 mg, Once  dilTIAZem 100 mg in dextrose 5 % 100 mL infusion (ADD-Maben), Continuous  [Held by provider] amLODIPine (NORVASC) tablet 5 mg, Daily  atorvastatin (LIPITOR) tablet 60 mg, Daily  [Held by provider] clopidogrel (PLAVIX) tablet 75 mg, Daily  HYDROcodone-acetaminophen (NORCO) 7.5-325 MG per tablet 1 tablet, Q4H PRN  potassium chloride (KLOR-CON M) extended release tablet 20 mEq, BID WC  [Held by provider] isosorbide mononitrate (IMDUR) extended release tablet 30 mg, Daily  Arformoterol Tartrate (BROVANA) nebulizer solution 15 mcg, BID  budesonide (PULMICORT) nebulizer suspension 250 mcg, BID  ipratropium-albuterol (DUONEB) nebulizer solution 1 ampule, Q4H WA  potassium chloride (KLOR-CON M) extended release tablet 40 mEq, PRN    Or  potassium bicarb-citric acid (EFFER-K) effervescent tablet 40 mEq, PRN    Or  potassium chloride 10 mEq/100 mL IVPB (Peripheral Line), PRN  acetaminophen (TYLENOL) tablet 650 mg, Q4H PRN  phenylephrine (SOFÍA-SYNEPHRINE) 50 mg in dextrose 5 % 250 mL infusion, Continuous  warfarin (COUMADIN) daily dosing (placeholder), RX Placeholder  pantoprazole (PROTONIX) injection 40 mg, BID    And  sodium chloride (PF) 0.9 % injection 10 mL, BID  sodium chloride flush 0.9 % injection 10 mL, 2 times per day  sodium chloride flush 0.9 % injection 10 mL, PRN         Data Review  CBC:   Lab Results   Component Value Date    WBC 19.2 08/03/2020    RBC 2.75 08/03/2020    HGB 8.4 08/03/2020    HCT 25.8 08/03/2020    MCV 93.8 08/03/2020    MCH 30.5 08/03/2020    MCHC 32.6 08/03/2020    RDW 14.6 08/03/2020     08/03/2020    MPV 10.7 08/03/2020     CMP:    Lab Results   Component Value Date     08/03/2020    K 4.8 08/03/2020    K 2.6 07/30/2020     08/03/2020    CO2 23 08/03/2020    BUN 16 08/03/2020    CREATININE 1.0 08/03/2020    GFRAA >60 08/03/2020    LABGLOM >60 08/03/2020    GLUCOSE 148 08/03/2020    PROT 4.7 08/03/2020    LABALBU 3.2 08/03/2020    CALCIUM 8.5 08/03/2020    BILITOT 0.3 08/03/2020    ALKPHOS 95 08/03/2020    AST 44 08/03/2020    ALT 60 08/03/2020     Hepatic Function Panel:    Lab Results   Component Value Date    ALKPHOS 95 08/03/2020    ALT 60 08/03/2020 AST 44 08/03/2020    PROT 4.7 08/03/2020    BILITOT 0.3 08/03/2020    BILIDIR <0.2 08/03/2020    IBILI see below 08/03/2020    LABALBU 3.2 08/03/2020     No components found for: CHLPL  Lab Results   Component Value Date    TRIG 79 07/31/2020     Lab Results   Component Value Date    HDL 30 07/31/2020     Lab Results   Component Value Date    LDLCALC 33 07/31/2020     Lab Results   Component Value Date    LABVLDL 16 07/31/2020      PT/INR:    Lab Results   Component Value Date    PROTIME 13.0 08/03/2020    INR 1.1 08/03/2020     NIHLIVERTOX. GOV:    DIFLUCAN:    Hepatotoxicity  Transient mild-to-moderate elevations in serum aminotransferase levels occur in up to 5% of patients treated with fluconazole, but these abnormalities are usually asymptomatic and resolve even with continuation of the medication. ALT elevations above 8 times the upper limit of normal are reported to occur in 1% of patients taking fluconazole and to represent the most common adverse event leading to early discontinuation of treatment. Clinically apparent hepatotoxicity due to fluconazole is rare, but well described. The liver injury is typically hepatocellular, arises within the first few weeks of therapy and can be accompanied by signs of hypersensitivity such as fever, rash and eosinophilia. Fatal instances of fluconazole induced liver injury have been reported (Case 1), but most cases are self-limited, although recovery may be delayed for several weeks after stopping fluconazole and may be slow requiring 2 to 3 months. Likelihood score: B (likely cause of clinically apparent liver injury). Mechanism of Injury  The cause of clinically apparent hepatotoxicity from fluconazole is unknown; however, it may relate to the ability of fluconazole to alter sterol synthesis.  Fluconazole is a potent inhibitor of the cytochrome P450 enzyme CYP 3A4, and can lead to significant increases in plasma levels and serious toxicity from medications that are ordinarily metabolized by CY, particularly the statins and cyclosporine. Assessment:     Principal Problem:    GI bleeding  Active Problems:  ? Anemia, normocytic, normochromic-melanotic FOBT+ stool - EGD erosive esophagitis, candida, and gastritsi  ? Elevated LFTs, likely 2/2 Diflucan vs shock liver  ? Diarrhea - resolved  ? Nausea and vomiting - resolved  ? Thrombocytopenia - resolved  ? Unintentional weight loss - 90#/6-8 mo  ? Supratherapeutic INR - resolved  ? Leukocytosis - ID following  ? A-Fib on Warfarin - Warfarin on hold  ? Shortness of breath, worse on exertion-defer to pulmonary/ICU  ? Hypokalemia, hypochloremia -defer to ICU/PCP  ? Hypotension, Gerardo stopped BP 90/50s currently   ? EGD 20: Severe erosive esophagitis, Candida esophagitis, but no varices seen.  Stomach showed gastritis and biopsies were not taken in view of the patient's thrombocytopenia.  Duodenum appeared unremarkable. Levada Munoz was no evidence of old or active bleeding. Plan:     ? Bleeding scan in progress  ? CT Chest done per pulmonary   ? Critical care management per ICU   ? Low fat diet, as tolerated  ? Fluconazole increased per ID  ? Stool studies- pending  ? Medicate for nausea as ordered  ? Serial H&H, transfuse per ICU  ? Monitor CBC, CMP, INR daily  ? Defer comorbidities to others  ? Colonoscopy to be done once pt stabilized  ? Continue to monitor      Note: This report was completed utilizing computer voice recognition software. Every effort has been made to ensure accuracy, however; inadvertent computerized transcription errors may be present. Discussed with Dr. Dave Cantu per Dr. Sukumar Crowder [id number], FNP-BC 8/3/2020 9:29 AM For Dr. Karol Crystal. TO ROOM TO SEE PT, CARDIOLOGY IN WITH PT. CARDIOLOGY WILL AND MANAGEMENT IS UNDERWAY. PATIENT UNDERGOING BLEEDING SCAN. STILL UNSTABLE FOR COLON. WILL FOLLOW AND MONITOR CLOSELY.

## 2020-08-03 NOTE — PROGRESS NOTES
Physical Therapy  PT jacobo attempted. Held by [place]. Will re-attempt at later date.

## 2020-08-03 NOTE — PROGRESS NOTES
[address] [place] [address] [facility]  [facility]  Progress Note      Chief Complaint   Patient presents with    Emesis     N/V with dizziness and fatigue     Fatigue    Dizziness       SUBJECTIVE:  Patient is tolerating medications. No reported adverse drug reactions. No nausea, vomiting, diarrhea. Awake and alert  Events of last night noted  Now on Cardizem drip  5 L is 94% blood pressure hovering around the 122 systolic    Review of systems:  As stated above in the chief complaint, otherwise negative. Medications:  Scheduled Meds:   metoprolol succinate  25 mg Oral BID    digoxin  250 mcg Intravenous Once    bumetanide  1 mg Intravenous BID    zolpidem  10 mg Oral Nightly    fluconazole  400 mg Intravenous Q24H    digoxin  125 mcg Intravenous Daily    benzonatate  100 mg Oral TID    methylPREDNISolone  60 mg Intravenous Q8H    [Held by provider] amLODIPine  5 mg Oral Daily    atorvastatin  60 mg Oral Daily    [Held by provider] clopidogrel  75 mg Oral Daily    [Held by provider] isosorbide mononitrate  30 mg Oral Daily    Arformoterol Tartrate  15 mcg Nebulization BID    budesonide  250 mcg Nebulization BID    ipratropium-albuterol  1 ampule Inhalation Q4H WA    warfarin (COUMADIN) daily dosing (placeholder)   Other RX Placeholder    pantoprazole  40 mg Intravenous BID    And    sodium chloride (PF)  10 mL Intravenous BID    sodium chloride flush  10 mL Intravenous 2 times per day     Continuous Infusions:   dilTIAZem 7.5 mg/hr (20 1213)     PRN Meds:potassium chloride, magnesium sulfate, guaiFENesin, HYDROcodone-acetaminophen, acetaminophen, sodium chloride flush    OBJECTIVE:  BP (!) 105/55   Pulse 128   Temp 97.7 °F (36.5 °C) (Oral)   Resp 20   Ht 5' 8\" (1.727 m)   Wt 140 lb 3.2 oz (63.6 kg)   SpO2 94%   BMI 21.32 kg/m²   Temp  Av.5 °F (36.4 °C)  Min: 97 °F (36.1 °C)  Max: 98.2 °F (36.8 °C)  Constitutional: The patient is awake, alert, and oriented.    Skin: Warm and dry. No rashes were noted. HEENT: Round and reactive pupils. Moist mucous membranes. No ulcerations or thrush. Neck: Supple to movements. Chest: No use of accessory muscles to breathe. Symmetrical expansion. No wheezing, crackles or rhonchi. Decreased breath sounds in the bases  Cardiovascular: S1 and S2 irregularly irregular. No murmurs appreciated. Abdomen: Positive bowel sounds to auscultation. Benign to palpation. No masses felt. No hepatosplenomegaly. Genitourinary: Male  Extremities: No clubbing, no cyanosis, no edema.   Lines: peripheral    Laboratory and Tests Review:  Lab Results   Component Value Date    WBC 19.2 (H) 08/03/2020    WBC 18.0 (H) 08/02/2020    WBC 16.4 (H) 08/01/2020    HGB 8.4 (L) 08/03/2020    HCT 25.8 (L) 08/03/2020    MCV 93.8 08/03/2020     08/03/2020     Lab Results   Component Value Date    NEUTROABS 17.01 (H) 08/03/2020    NEUTROABS 16.19 (H) 08/02/2020    NEUTROABS 12.97 (H) 08/01/2020     No results found for: CRPHS  Lab Results   Component Value Date    ALT 60 (H) 08/03/2020    AST 44 (H) 08/03/2020    ALKPHOS 95 08/03/2020    BILITOT 0.3 08/03/2020     Lab Results   Component Value Date     08/03/2020    K 4.8 08/03/2020    K 2.6 07/30/2020     08/03/2020    CO2 23 08/03/2020    BUN 16 08/03/2020    CREATININE 1.0 08/03/2020    CREATININE 1.0 08/02/2020    CREATININE 1.0 08/02/2020    GFRAA >60 08/03/2020    LABGLOM >60 08/03/2020    GLUCOSE 148 08/03/2020    PROT 4.7 08/03/2020    LABALBU 3.2 08/03/2020    CALCIUM 8.5 08/03/2020    BILITOT 0.3 08/03/2020    ALKPHOS 95 08/03/2020    AST 44 08/03/2020    ALT 60 08/03/2020     Lab Results   Component Value Date    CRP <0.1 08/02/2020    CRP <0.1 08/01/2020    CRP 0.2 07/31/2020     Lab Results   Component Value Date    SEDRATE 3 08/03/2020    SEDRATE 4 08/02/2020    SEDRATE 4 08/01/2020     Radiology:  Reviewed    Microbiology:   Lab Results   Component Value Date    BC 24 Hours no growth 07/31/2020 BC 5 Days- no growth 02/21/2016    ORG Klebsiella pneumoniae ssp pneumoniae 07/31/2020    ORG Anaerobic gram positive cocci 06/29/2020    ORG Staphylococcus epidermidis 06/29/2020    ORG Candida species 06/29/2020     Lab Results   Component Value Date    BLOODCULT2 24 Hours no growth 07/31/2020    BLOODCULT2 5 Days- no growth 09/04/2018    BLOODCULT2 5 Days- no growth 02/21/2016    ORG Klebsiella pneumoniae ssp pneumoniae 07/31/2020    ORG Anaerobic gram positive cocci 06/29/2020    ORG Staphylococcus epidermidis 06/29/2020    ORG Candida species 06/29/2020     WOUND/ABSCESS   Date Value Ref Range Status   06/29/2020 Light growth  Final   01/03/2020 Light growth  Final   01/03/2020 Rare growth  Final     Smear, Respiratory   Date Value Ref Range Status   09/04/2018   Final    Group 5: >25 PMN's/LPF and <10 Epithelial cells/LPF  Abundant Polymorphonuclear leukocytes  Epithelial cells not seen  Moderate Gram negative rods  Rare Gram positive cocci in pairs       No results found for: MPNEUMO, CLAMYDCU, LABLEGI, AFBCX, FUNGSM, LABFUNG  CULTURE, RESPIRATORY   Date Value Ref Range Status   09/04/2018 Oral Pharyngeal Marysol present  Final     No results found for: CXCATHTIP  No results found for: BFCS  Culture Surgical   Date Value Ref Range Status   06/25/2019 Light growth  Final     Urine Culture, Routine   Date Value Ref Range Status   07/31/2020 (A)  Final    <10,000 CFU/mL  Oxidase positive gram negative rods     07/31/2020 <25,000 CFU/ml  Final     MRSA Culture Only   Date Value Ref Range Status   07/31/2020 Methicillin resistant Staph aureus not isolated  Final       ASSESSMENT:  · Reactive leukocytosis secondary to bleed  · A.  Fib  · Left ear issue is quiesced sent  · Candida esophagitis on a low dose of Diflucan down with the steroids I think this should be increased  · Platelets improved    PLAN:  · Continue fluconazole 400 a day  · Patient has been on antibiotics orally since January more less and loath to give him any more necessarily  · Check final cultures  · Monitor labs    Natanael Hernandez  4:54 PM  8/3/2020

## 2020-08-03 NOTE — PROGRESS NOTES
PROGRESS  NOTE --                                                          INTERNAL  MEDICINE                                                                              I  PERSONALLY SAW , EXAMINED, AND CARED Rubens, 8/3/2020     LABS, XRAY ,CHART, AND MEDICATIONS  REVIEWED BY ME .        8/1/2020-sUBJECTIVE: Ruthann Ba is alert awake and cooperative; oriented ×3. Denies any chest pain dyspnea nausea emesis. Tolerating diet. No abdominal pain. Had his first bowel movement today since admission. Unlikely to be C. difficile. Last evening he did have liquid diet and enjoyed it he would like something more substantial today. States he has hard time sleeping without Ambien request bedtime dose of same. I spoke with nursing, still having a hard time weaning from intravenous phenylephrine. Blood pressure keeps dropping without it. He remains afebrile. Most recent blood pressure 90/40.  97% saturation 2 L nasal cannula. Respiratory rate 21. Intake and output +5392 cc. Potassium 3.1; lactic acid finally dropping, 2.7. Glucose 103 calcium 8.3 phosphorus 1.8 protein 4.9 albumin 3.4 remainder liver functions normal.  Hemoglobin admission 11.0; today hemoglobin 8.1; WBC 16.4, INR 1.4. Platelets 102, remarkable improvement since admission, would seem likely caused by Zyvox. proBNP on admission 2500. CRP 0.2. Procalcitonin 0.10. A1c 6.2.  2D echo completed ejection fraction 55%; septal motion consistent with previous bypass surgery. Markedly large right atrium moderate tricuspid regurgitation moderate to severe pulmonary hypertension. Aortic sclerosis; pulmonary valve is sclerotic. No pleural effusion no pericardial effusion. Urine culture less than 25,000 gram-negative rods. EGD completed yesterday with following results--    Esophagus:  GERD. severe erosive esophagitis.  Candida infection        Stomach: Gastritis      Duodenum:  Normal         Pulmonary note from today appreciated; continue to wean Gerardo-Synephrine as able; Ambien as needed for sleep. Dietary consult appreciated, moderate malnutrition. Oral nutritional supplement 3 times a day has been added. 8/2/2020-patient sitting in bed finishing his lunch at this time. Currently no significant dyspnea; he has had no chest pain at all. Last night he became very short of breath; hard time sleeping because of it. Ambien 5 mg did not help much. He still had only one bowel movement since admission. He should be taken out of isolation, likelihood of C. Difficile zero. Most likely Zyvox induced diarrhea. Last evening he did have chest x-ray performed with following results--    Impression:          1. Acute right basilar pneumonia. 2. Remainder of findings described as above. He has been seen by pulmonary as well as ID today. They are not sure if he does have a pneumonia. Patient still at 95% saturation on 2 L nasal cannula. Blood pressure still borderline, 88/45 earlier today 102/50 currently. Overnight his heart rate got as high as 150 with a respiratory rate of 31. Currently heart rate 118 respirations 18. He remains afebrile. His major complaint today, difficult time expectorating mucus. His phosphorus remains low at 1.9; calcium slightly low 8.4. CRP less than 0.1. WBC 18.0 hemoglobin 8.1 platelets 919. INR 1.3 sed rate 4. ID consult from yesterday appreciated; they agree unlikely patient has infection of left ear and more likely polychondritis. Erosive esophagitis, Diflucan has been started. ID note from today appreciated; fluconazole 400 mg daily; increase dose of steroids. Stool for occult blood was positive. Rotavirus negative in stool; enteric gurjit continues in stool. C. difficile was not performed because of formed stool.   Sputum Gram stain as follows--    Group 3: >25 PMN's/LPF and >10 Epithelial cells/LPF   Moderate platelets 177. Sed rate 3. ABG from yesterday PO2 74.6 PCO2 35.2 pH 7.380. The above was done while patient on BiPAP. Gerardo-Synephrine IV has been discontinued, blood pressure stable. Heart rate was still averaging 120 most of the night; patient was then given dose of digoxin 375 mcg IV to be followed by 250 mcg. Patient has been started on diltiazem 5 mg/h IV. Gram stain of sputum with following results--     Group 2: 10-25 PMN's/LPF and >10 Epithelial cells/LPF   Few Polymorphonuclear leukocytes   Few Epithelial cells   Abundant Gram negative rods      CT of chest done yesterday with following results--    FINDINGS:   There is cardiomegaly with the calcified pericarditis. There is   coronary artery calcifications. Small to moderate lymph nodes are   identified in the mediastinum. The trachea and major bronchi are   patent. Moderate pleural effusions with atelectasis/infiltrates are   identified in the lower lobes bilaterally with  minimal atelectasis in   the right middle lobe. There is advanced COPD. Liver is of decreased   attenuation likely fatty infiltrated. There is diffuse vascular   calcification. There is 90% compression deformity of T6 with kyphosis   and mild compression deformity of the inferior endplate of T5. .        Impression:         Patchy bibasilar infiltrates/atelectasis and pleural effusion likely   CHF and/or pneumonia. Surveillance recommended. Cardiomegaly with coronary artery calcification. Severe compression deformity of T6 and mild compression deformity of   the inferior endplate of T5. Patient's pulmonologist was consulted; yesterday's note appreciated await CT of the chest results. GI note from yesterday, in view of ongoing positive stool for occult blood nuclear bleeding scan was ordered. Patient had RRT called at 1945 hrs. yesterday due to A. fib RVR and worsening shortness of breath. Heart rate has been up in the 190s.   He was given 5 mg of Lopressor IV and Cardizem drip was started. GI note from day appreciated, no changes noted. Patient was again placed on BiPAP this morning at 0838 hrs. due to dyspnea. Nuclear bleeding scan pending.         Objective:     PHYSICAL EXAM:    VS: BP (!) 105/55   Pulse 82   Temp 97.2 °F (36.2 °C) (Axillary)   Resp 17   Ht 5' 8\" (1.727 m)   Wt 140 lb 3.2 oz (63.6 kg)   SpO2 93%   BMI 21.32 kg/m²     Labs:   CBC:   Lab Results   Component Value Date    WBC 19.2 08/03/2020    RBC 2.75 08/03/2020    HGB 8.4 08/03/2020    HCT 25.8 08/03/2020    MCV 93.8 08/03/2020    MCH 30.5 08/03/2020    MCHC 32.6 08/03/2020    RDW 14.6 08/03/2020     08/03/2020    MPV 10.7 08/03/2020     CBC with Differential:    Lab Results   Component Value Date    WBC 19.2 08/03/2020    RBC 2.75 08/03/2020    HGB 8.4 08/03/2020    HCT 25.8 08/03/2020     08/03/2020    MCV 93.8 08/03/2020    MCH 30.5 08/03/2020    MCHC 32.6 08/03/2020    RDW 14.6 08/03/2020    LYMPHOPCT 3.5 08/03/2020    MONOPCT 4.1 08/03/2020    BASOPCT 0.1 08/03/2020    MONOSABS 0.78 08/03/2020    LYMPHSABS 0.68 08/03/2020    EOSABS 0.00 08/03/2020    BASOSABS 0.02 08/03/2020     Hemoglobin/Hematocrit:    Lab Results   Component Value Date    HGB 8.4 08/03/2020    HCT 25.8 08/03/2020     CMP:    Lab Results   Component Value Date     08/03/2020    K 4.8 08/03/2020    K 2.6 07/30/2020     08/03/2020    CO2 23 08/03/2020    BUN 16 08/03/2020    CREATININE 1.0 08/03/2020    GFRAA >60 08/03/2020    LABGLOM >60 08/03/2020    GLUCOSE 148 08/03/2020    PROT 4.7 08/03/2020    LABALBU 3.2 08/03/2020    CALCIUM 8.5 08/03/2020    BILITOT 0.3 08/03/2020    ALKPHOS 95 08/03/2020    AST 44 08/03/2020    ALT 60 08/03/2020     BMP:    Lab Results   Component Value Date     08/03/2020    K 4.8 08/03/2020    K 2.6 07/30/2020     08/03/2020    CO2 23 08/03/2020    BUN 16 08/03/2020    LABALBU 3.2 08/03/2020    CREATININE 1.0 08/03/2020    CALCIUM 8.5 08/03/2020    GFRAA >60 08/03/2020    LABGLOM >60 08/03/2020    GLUCOSE 148 08/03/2020     Hepatic Function Panel:    Lab Results   Component Value Date    ALKPHOS 95 08/03/2020    ALT 60 08/03/2020    AST 44 08/03/2020    PROT 4.7 08/03/2020    BILITOT 0.3 08/03/2020    BILIDIR <0.2 08/03/2020    IBILI see below 08/03/2020    LABALBU 3.2 08/03/2020     Ionized Calcium:  No results found for: IONCA  Magnesium:    Lab Results   Component Value Date    MG 1.8 08/03/2020     Phosphorus:    Lab Results   Component Value Date    PHOS 2.7 08/03/2020     LDH:  No results found for: LDH  Uric Acid:    Lab Results   Component Value Date    LABURIC 7.7 07/31/2020     PT/INR:    Lab Results   Component Value Date    PROTIME 13.0 08/03/2020    INR 1.1 08/03/2020     Warfarin PT/INR:  No components found for: PTPATWAR, PTINRWAR  PTT:    Lab Results   Component Value Date    APTT 55.9 10/08/2019   [APTT}  Troponin:    Lab Results   Component Value Date    TROPONINI <0.01 07/31/2020     Last 3 Troponin:    Lab Results   Component Value Date    TROPONINI <0.01 07/31/2020    TROPONINI <0.01 07/31/2020    TROPONINI <0.01 07/30/2020     U/A:  No results found for: NITRITE, COLORU, PROTEINU, PHUR, LABCAST, WBCUA, RBCUA, MUCUS, TRICHOMONAS, YEAST, BACTERIA, CLARITYU, SPECGRAV, LEUKOCYTESUR, UROBILINOGEN, BILIRUBINUR, BLOODU, GLUCOSEU, AMORPHOUS  ABG:    Lab Results   Component Value Date    PH 7.380 08/02/2020    PCO2 35.2 08/02/2020    PO2 74.6 08/02/2020    HCO3 20.4 08/02/2020    BE -4.2 08/02/2020    O2SAT 94.7 08/02/2020     HgBA1c:    Lab Results   Component Value Date    LABA1C 6.2 07/31/2020     FLP:    Lab Results   Component Value Date    TRIG 79 07/31/2020    HDL 30 07/31/2020    LDLCALC 33 07/31/2020    LABVLDL 16 07/31/2020     TSH:    Lab Results   Component Value Date    TSH 0.525 07/31/2020     VITAMIN B12: No components found for: B12  FOLATE:    Lab Results   Component Value Date    FOLATE 9.0 07/31/2020     IRON:  No results found for: IRON  Iron Saturation:  No components found for: PERCENTFE  TIBC:  No results found for: TIBC  FERRITIN:  No results found for: FERRITIN  PSA: No results found for: PSA     General appearance: Alert, Awake, Oriented times 3, no distress; nasal cannula oxygen in place  Skin: Warm and dry ; no rashes  Head: Normocephalic. No masses, lesions or tenderness noted  Eyes: Conjunctivae pale, sclera white. PERRL,EOM-I  Ears: Right external ear normal left external ear seems to have cauliflower ear despite patient's complaint of drainage from ear. Nose/Sinuses: Nares normal. Septum midline. Mucosa normal. No drainage  Oropharynx: Oropharynx clear with no exudate seen  Neck: Supple. No jugular venous distension, lymphadenopathy or thyromegaly Trachea midline  Lungs: Bilateral wheezes, occasional rhonchi bibasilar rales  Heart: Irregularly irregular at 130/min. No rub or gallop; grade 1/6 systolic murmur second right intercostal space grade 1/6 systolic murmur left sternal border  Abdomen: Soft, non-tender. BS normal. No masses, organomegaly; no rebound or guarding  Extremities: No edema, Peripheral pulses palpable  Musculoskeletal: Muscular strength appears intact. Neuro:  No focal motor defects ; II-XII grossly intact .  CORNELL equally    TELEMETRY: REVIEWED--Telemetry: Atrial fibrillation    ASSESSMENT:   Principal Problem:    GI bleeding  Active Problems:    Bilateral carotid artery stenosis    Tobacco dependence    Chronic obstructive pulmonary disease with acute exacerbation (HCC)    Combined systolic and diastolic congestive heart failure (HCC)    Atrial fibrillation (HCC)    On home O2    Hypertension    Hyperlipidemia    CAD (coronary artery disease)    Hypoprothrombinemia (HCC)    CKD (chronic kidney disease) stage 3, GFR 30-59 ml/min (HCC)    Hypokalemia    Non-rheumatic aortic sclerosis (HCC)    Hypotension due to hypovolemia    Emesis    Diarrhea    Thrombocytopenia (HCC)    Hypotension    Unintentional weight loss input(s): POCGLU in the last 72 hours. BNP:No results found for: BNP    CRP:   Recent Labs     08/01/20  0630 08/02/20  0610   CRP <0.1 <0.1       ESR:  Recent Labs     08/01/20  0630 08/02/20  0610 08/03/20  0545   SEDRATE 4 4 3       RADIOLOGY: REVIEWED AVAILABLE REPORT  CT CHEST WO CONTRAST   Final Result   Patchy bibasilar infiltrates/atelectasis and pleural effusion likely   CHF and/or pneumonia. Surveillance recommended. Cardiomegaly with coronary artery calcification. Severe compression deformity of T6 and mild compression deformity of   the inferior endplate of T5. XR CHEST PORTABLE   Final Result      Superimposed upon bilateral interstitial infiltrates is increasing   consolidation and effusion of the right lung base. XR CHEST PORTABLE   Final Result   1. Acute right basilar pneumonia. 2. Remainder of findings described as above. This report has been electronically signed by Ephraim Stafford MD.      [facility] Additional Contrast? None   Final Result      1. No evidence of abdominal or pelvic mass or lymphadenopathy. 2. Multiple gas-filled distended loops of small bowel are present   within the anterior aspect of the abdomen which could indicate   adynamic ileus versus partial or early small bowel obstruction. Stool   and gas is seen within the colon. Short-term follow-up may be helpful   for further evaluation. 3. Diverticulosis without evidence of acute diverticulitis. 4. Bilateral perinephric fat stranding could suggest chronic medical   renal disease with appropriate clinical history. 5. View of the lung bases show bibasilar opacities related to   pneumonia and atelectasis with small bilateral pleural effusions. 6. Fusiform infrarenal abdominal aortic aneurysm is demonstrated   measuring up to 3 cm in diameter.       XR CHEST PORTABLE   Final Result      Chronic coarsened interstitial lung markings are seen throughout both

## 2020-08-03 NOTE — PROGRESS NOTES
Pulmonary Progress Note    Admit Date: 2020  Hospital day                               PCP: Kashif Jean MD    Chief Complaint (s):  Patient Active Problem List   Diagnosis    Bilateral carotid artery stenosis    Tobacco dependence    Chronic obstructive pulmonary disease with acute exacerbation (HCC)    Combined systolic and diastolic congestive heart failure (HCC)    Hypoxia    Atrial fibrillation (HCC)    GI bleeding    On home O2    Hypertension    Hyperlipidemia   Zadie Bud H/O asbestosis    CAD (coronary artery disease)    Hypoprothrombinemia ([address]    CKD (chronic kidney disease) stage 3, GFR 30-59 ml/min (HCC)    Hypokalemia    Non-rheumatic aortic sclerosis (HCC)    Hypotension due to hypovolemia    Emesis    Diarrhea    Thrombocytopenia (HCC)    NSVT (nonsustained ventricular tachycardia) (HCC)    Hypotension    Unintentional weight loss of 10% body weight within 6 months    Moderate protein-calorie malnutrition (HCC)    Pulmonary hypertension (HCC)    Erosive esophagitis       Subjective:  · Seen this a.m., the patient had trouble with breathing at about 2 AM.  This resolved with an extra aerosol treatment. Labs and x-rays are reviewed. A CT scan of the chest is pending with attention to the right lower lobe.       Vitals:  VITALS:  BP (!) 105/55   Pulse 132   Temp 97.2 °F (36.2 °C) (Axillary)   Resp (!) 47   Ht 5' 8\" (1.727 m)   Wt 140 lb 3.2 oz (63.6 kg)   SpO2 99%   BMI 21.32 kg/m²     24HR INTAKE/OUTPUT:      Intake/Output Summary (Last 24 hours) at 8/3/2020 1158  Last data filed at 8/3/2020 1100  Gross per 24 hour   Intake 290 ml   Output 875 ml   Net -585 ml       24HR PULSE OXIMETRY RANGE:    SpO2  Av.9 %  Min: 90 %  Max: 100 %    Medications:  IV:   dilTIAZem 10 mg/hr (20 0840)       Scheduled Meds:   magnesium sulfate  2 g Intravenous Once    metoprolol succinate  25 mg Oral BID    digoxin  250 mcg Intravenous Once    digoxin  250 4.8   * 106 106   CO2 23 22 23   PHOS 1.9* 2.7 2.7   BUN 10 15 16   CREATININE 1.0 1.0 1.0     LIVER PROFILE:   Recent Labs     08/01/20  0630 08/02/20  0610 08/03/20  0545   AST 19 23 44*   ALT 23 25 60*   BILIDIR <0.2 <0.2 <0.2   BILITOT 0.3 0.3 0.3   ALKPHOS 82 80 95     PT/INR:   Recent Labs     08/01/20  0630 08/02/20  0610 08/03/20  0545   PROTIME 16.5* 15.2* 13.0*   INR 1.4 1.3 1.1     APTT: No results for input(s): APTT in the last 72 hours. Pathology:  1. N/A      Microbiology:  1. None    Recent ABG:   Recent Labs     08/02/20 2115   PH 7.380   PO2 74.6*   PCO2 35.2   HCO3 20.4*   BE -4.2*   O2SAT 94.7   METHB 0.2   O2HB 94.2   COHB 0.3   O2CON 12.1   HHB 5.3*   THB 9.1*     FiO2 : 50 %       Recent Films:  CT CHEST WO CONTRAST   Final Result   Patchy bibasilar infiltrates/atelectasis and pleural effusion likely   CHF and/or pneumonia. Surveillance recommended. Cardiomegaly with coronary artery calcification. Severe compression deformity of T6 and mild compression deformity of   the inferior endplate of T5. XR CHEST PORTABLE   Final Result      Superimposed upon bilateral interstitial infiltrates is increasing   consolidation and effusion of the right lung base. XR CHEST PORTABLE   Final Result   1. Acute right basilar pneumonia. 2. Remainder of findings described as above. This report has been electronically signed by Taiwo Victoria MD.      [address] [facility] Additional Contrast? None   Final Result      1. No evidence of abdominal or pelvic mass or lymphadenopathy. 2. Multiple gas-filled distended loops of small bowel are present   within the anterior aspect of the abdomen which could indicate   adynamic ileus versus partial or early small bowel obstruction. Stool   and gas is seen within the colon. Short-term follow-up may be helpful   for further evaluation. 3. Diverticulosis without evidence of acute diverticulitis.       4.

## 2020-08-03 NOTE — CONSULTS
Relation Age of Onset    Cancer Mother         ?  Other Mother         Hemorrhage    Heart Disease Father     Heart Disease Brother        Social History:  Social History     Socioeconomic History    Marital status:      Spouse name: Not on file    Number of children: Not on file    Years of education: Not on file    Highest education level: Not on file   Occupational History    Not on file   Social Needs    Financial resource strain: Not on file    Food insecurity     Worry: Not on file     Inability: Not on file   [employer] needs     Medical: Not on file     Non-medical: Not on file   Tobacco Use    Smoking status: Current Every Day Smoker     Packs/day: 1.00     Years: 62.00     Pack years: 62.00     Types: Cigarettes     Start date: 9/5/1955    Smokeless tobacco: Never Used   Substance and Sexual Activity    Alcohol use: No    Drug use: No    Sexual activity: Not on file   Lifestyle    Physical activity     Days per week: Not on file     Minutes per session: Not on file    Stress: Not on file   Relationships    Social connections     Talks on phone: Not on file     Gets together: Not on file     Attends Restorationist service: Not on file     Active member of club or organization: Not on file     Attends meetings of clubs or organizations: Not on file     Relationship status: Not on file    Intimate partner violence     Fear of current or ex partner: Not on file     Emotionally abused: Not on file     Physically abused: Not on file     Forced sexual activity: Not on file   Other Topics Concern    Not on file   Social History Narrative    Not on file       Allergies:   Allergies   Allergen Reactions    Pcn [Penicillins] Swelling    Dye [Iodides]      Heart and kidney dye / reaction unknown       Current Medications:  Current Facility-Administered Medications   Medication Dose Route Frequency Provider Last Rate Last Dose    magnesium sulfate 2 g in 50 mL IVPB premix  2 g budesonide (PULMICORT) nebulizer suspension 250 mcg  250 mcg Nebulization BID Gavi Cook MD   250 mcg at 08/03/20 0807    ipratropium-albuterol (DUONEB) nebulizer solution 1 ampule  1 ampule Inhalation Q4H WA Gavi Cook MD   1 ampule at 08/03/20 6930    acetaminophen (TYLENOL) tablet 650 mg  650 mg Oral Q4H PRN Gavi Cook MD        warfarin (COUMADIN) daily dosing (placeholder)   Other RX Placeholder Gavi Cook MD        pantoprazole (PROTONIX) injection 40 mg  40 mg Intravenous BID Gavi Cook MD   40 mg at 08/02/20 2102    And    sodium chloride (PF) 0.9 % injection 10 mL  10 mL Intravenous BID Gavi Cook MD   10 mL at 08/02/20 2102    sodium chloride flush 0.9 % injection 10 mL  10 mL Intravenous 2 times per day Ria Rodriguez MD   10 mL at 08/02/20 2102    sodium chloride flush 0.9 % injection 10 mL  10 mL Intravenous PRN Ria Rodriguez MD   10 mL at 08/01/20 2227      dilTIAZem 5 mg/hr (08/03/20 0744)       Physical Exam:  BP (!) 93/59   Pulse 109   Temp 97.6 °F (36.4 °C) (Axillary)   Resp 30   Ht 5' 8\" (1.727 m)   Wt 140 lb 3.2 oz (63.6 kg)   SpO2 95%   BMI 21.32 kg/m²   Weight change: Wt Readings from Last 3 Encounters:   08/01/20 140 lb 3.2 oz (63.6 kg)   03/25/20 142 lb (64.4 kg)   10/24/19 175 lb (79.4 kg)         General: Awake, alert, oriented x3, no acute distress although mildly dyspneic  HEENT: Unremarkable  Neck: No JVD or bruits. Cardiac: Irregular and mildly tachycardic rate and rhythm, normal S1 and S2, no extra heart sounds, murmurs, heaves, thrills  Resp: Lungs have coarse breath sounds bilaterally without wheezing or crackles. No accessory muscle use or retraction  Abdomen: soft, nontender, nondistended, no gross organomegaly or mass  Skin: Warm and dry, no cyanosis.   Musculoskeletal: normal tone and strength in the upper and lower extremities bilaterally  Neuro: Grossly unremarkable  Psych: Cooperative, and normal daily once appropriate from GI standpoint. #3.  Coronary disease- no indication of unstable angina. Consider stress testing once discharged and as outpatient. Does not need any further Plavix. Start aspirin as soon as possible given previous stent placement. #3. Hypertension- blood pressure controlled. No new antihypertensives added. #4. Hyperlipidemia- statin once stable. #5.  Acute diastolic heart failure-probably due to combination of rapid AF along with left ventricular diastolic dysfunction. Not due to hypertension. Bumex 1 mg IV twice daily, starting now. I's and O's every shift. Monitor fluid status and creatinine. #6.  Continue to follow.             Electronically signed by Anthony Gerard MD on 8/3/2020 at 8:51 AM

## 2020-08-03 NOTE — PROGRESS NOTES
Date: 8/3/2020    Time: 8:39 AM    Patient Placed On BIPAP/CPAP/ Non-Invasive Ventilation? Yes    If no must comment. Facial area red/color change? No           If YES are Blister/Lesion present? No   If yes must notify nursing staff  BIPAP/CPAP skin barrier?   Yes    Skin barrier type:mepilex       Comments:        Hang Mata

## 2020-08-04 ENCOUNTER — APPOINTMENT (OUTPATIENT)
Dept: GENERAL RADIOLOGY | Age: 80
DRG: 380 | End: 2020-08-04
Payer: MEDICARE

## 2020-08-04 LAB
AADO2: 143.4 MMHG
ALBUMIN SERPL-MCNC: 3.3 G/DL (ref 3.5–5.2)
ALP BLD-CCNC: 101 U/L (ref 40–129)
ALT SERPL-CCNC: 92 U/L (ref 0–40)
ANION GAP SERPL CALCULATED.3IONS-SCNC: 10 MMOL/L (ref 7–16)
ANISOCYTOSIS: ABNORMAL
AST SERPL-CCNC: 55 U/L (ref 0–39)
B.E.: 0.7 MMOL/L (ref -3–3)
BASOPHILIC STIPPLING: ABNORMAL
BASOPHILS ABSOLUTE: 0.04 E9/L (ref 0–0.2)
BASOPHILS RELATIVE PERCENT: 0.2 % (ref 0–2)
BILIRUB SERPL-MCNC: 0.3 MG/DL (ref 0–1.2)
BILIRUBIN DIRECT: <0.2 MG/DL (ref 0–0.3)
BILIRUBIN, INDIRECT: ABNORMAL MG/DL (ref 0–1)
BUN BLDV-MCNC: 19 MG/DL (ref 8–23)
C-REACTIVE PROTEIN: <0.1 MG/DL (ref 0–0.4)
CALCIUM SERPL-MCNC: 8.3 MG/DL (ref 8.6–10.2)
CHLORIDE BLD-SCNC: 104 MMOL/L (ref 98–107)
CO2: 28 MMOL/L (ref 22–29)
COHB: 0.3 % (ref 0–1.5)
CREAT SERPL-MCNC: 1 MG/DL (ref 0.7–1.2)
CRITICAL: ABNORMAL
DATE ANALYZED: ABNORMAL
DATE OF COLLECTION: ABNORMAL
EOSINOPHILS ABSOLUTE: 0 E9/L (ref 0.05–0.5)
EOSINOPHILS RELATIVE PERCENT: 0 % (ref 0–6)
FIO2: 40 %
GFR AFRICAN AMERICAN: >60
GFR NON-AFRICAN AMERICAN: >60 ML/MIN/1.73
GLUCOSE BLD-MCNC: 138 MG/DL (ref 74–99)
HCO3: 24.7 MMOL/L (ref 22–26)
HCT VFR BLD CALC: 26.2 % (ref 37–54)
HEMOGLOBIN: 8.6 G/DL (ref 12.5–16.5)
HHB: 3.4 % (ref 0–5)
HYPOCHROMIA: ABNORMAL
IMMATURE GRANULOCYTES #: 1.04 E9/L
IMMATURE GRANULOCYTES %: 4.6 % (ref 0–5)
INR BLD: 1.1
LAB: ABNORMAL
LACTIC ACID: 2.7 MMOL/L (ref 0.5–2.2)
LYMPHOCYTES ABSOLUTE: 0.52 E9/L (ref 1.5–4)
LYMPHOCYTES RELATIVE PERCENT: 2.3 % (ref 20–42)
Lab: ABNORMAL
MAGNESIUM: 2.2 MG/DL (ref 1.6–2.6)
MAGNESIUM: 2.2 MG/DL (ref 1.6–2.6)
MCH RBC QN AUTO: 31.3 PG (ref 26–35)
MCHC RBC AUTO-ENTMCNC: 32.8 % (ref 32–34.5)
MCV RBC AUTO: 95.3 FL (ref 80–99.9)
METHB: 0.3 % (ref 0–1.5)
MODE: ABNORMAL
MONOCYTES ABSOLUTE: 1.34 E9/L (ref 0.1–0.95)
MONOCYTES RELATIVE PERCENT: 6 % (ref 2–12)
NEUTROPHILS ABSOLUTE: 19.52 E9/L (ref 1.8–7.3)
NEUTROPHILS RELATIVE PERCENT: 86.9 % (ref 43–80)
O2 CONTENT: 12.6 ML/DL
O2 SATURATION: 96.6 % (ref 92–98.5)
O2HB: 96 % (ref 94–97)
OPERATOR ID: 789
PANCREATIC ELASTASE, FECAL: 393 UG/G
PATIENT TEMP: 37 C
PCO2: 36.8 MMHG (ref 35–45)
PDW BLD-RTO: 14.6 FL (ref 11.5–15)
PEEP/CPAP: 6 CMH2O
PFO2: 2.24 MMHG/%
PH BLOOD GAS: 7.44 (ref 7.35–7.45)
PHOSPHORUS: 2.7 MG/DL (ref 2.5–4.5)
PIP: 10 CMH2O
PLATELET # BLD: 266 E9/L (ref 130–450)
PMV BLD AUTO: 10.4 FL (ref 7–12)
PO2: 89.5 MMHG (ref 75–100)
POLYCHROMASIA: ABNORMAL
POTASSIUM SERPL-SCNC: 3.9 MMOL/L (ref 3.5–5)
POTASSIUM SERPL-SCNC: 4.5 MMOL/L (ref 3.5–5)
PROTHROMBIN TIME: 12.6 SEC (ref 9.3–12.4)
RBC # BLD: 2.75 E12/L (ref 3.8–5.8)
RI(T): 160 %
SEDIMENTATION RATE, ERYTHROCYTE: 2 MM/HR (ref 0–15)
SODIUM BLD-SCNC: 142 MMOL/L (ref 132–146)
SOURCE, BLOOD GAS: ABNORMAL
THB: 9.2 G/DL (ref 11.5–16.5)
TIME ANALYZED: 558
TOTAL PROTEIN: 4.9 G/DL (ref 6.4–8.3)
WBC # BLD: 22.5 E9/L (ref 4.5–11.5)

## 2020-08-04 PROCEDURE — 6370000000 HC RX 637 (ALT 250 FOR IP): Performed by: INTERNAL MEDICINE

## 2020-08-04 PROCEDURE — 80048 BASIC METABOLIC PNL TOTAL CA: CPT

## 2020-08-04 PROCEDURE — 84100 ASSAY OF PHOSPHORUS: CPT

## 2020-08-04 PROCEDURE — 2580000003 HC RX 258: Performed by: INTERNAL MEDICINE

## 2020-08-04 PROCEDURE — C9113 INJ PANTOPRAZOLE SODIUM, VIA: HCPCS | Performed by: INTERNAL MEDICINE

## 2020-08-04 PROCEDURE — 2500000003 HC RX 250 WO HCPCS: Performed by: INTERNAL MEDICINE

## 2020-08-04 PROCEDURE — 6360000002 HC RX W HCPCS: Performed by: INTERNAL MEDICINE

## 2020-08-04 PROCEDURE — 36592 COLLECT BLOOD FROM PICC: CPT

## 2020-08-04 PROCEDURE — 86140 C-REACTIVE PROTEIN: CPT

## 2020-08-04 PROCEDURE — 71045 X-RAY EXAM CHEST 1 VIEW: CPT

## 2020-08-04 PROCEDURE — 83605 ASSAY OF LACTIC ACID: CPT

## 2020-08-04 PROCEDURE — 2700000000 HC OXYGEN THERAPY PER DAY

## 2020-08-04 PROCEDURE — 36415 COLL VENOUS BLD VENIPUNCTURE: CPT

## 2020-08-04 PROCEDURE — 85610 PROTHROMBIN TIME: CPT

## 2020-08-04 PROCEDURE — 2500000003 HC RX 250 WO HCPCS

## 2020-08-04 PROCEDURE — 85651 RBC SED RATE NONAUTOMATED: CPT

## 2020-08-04 PROCEDURE — 2000000000 HC ICU R&B

## 2020-08-04 PROCEDURE — 94660 CPAP INITIATION&MGMT: CPT

## 2020-08-04 PROCEDURE — 37799 UNLISTED PX VASCULAR SURGERY: CPT

## 2020-08-04 PROCEDURE — 83735 ASSAY OF MAGNESIUM: CPT

## 2020-08-04 PROCEDURE — 97161 PT EVAL LOW COMPLEX 20 MIN: CPT

## 2020-08-04 PROCEDURE — 82805 BLOOD GASES W/O2 SATURATION: CPT

## 2020-08-04 PROCEDURE — 97165 OT EVAL LOW COMPLEX 30 MIN: CPT

## 2020-08-04 PROCEDURE — 84132 ASSAY OF SERUM POTASSIUM: CPT

## 2020-08-04 PROCEDURE — 85025 COMPLETE CBC W/AUTO DIFF WBC: CPT

## 2020-08-04 PROCEDURE — 80076 HEPATIC FUNCTION PANEL: CPT

## 2020-08-04 PROCEDURE — 6360000002 HC RX W HCPCS: Performed by: SPECIALIST

## 2020-08-04 PROCEDURE — 99222 1ST HOSP IP/OBS MODERATE 55: CPT | Performed by: PHYSICIAN ASSISTANT

## 2020-08-04 PROCEDURE — 94640 AIRWAY INHALATION TREATMENT: CPT

## 2020-08-04 RX ORDER — POTASSIUM CHLORIDE 20 MEQ/1
20 TABLET, EXTENDED RELEASE ORAL 2 TIMES DAILY
Status: DISCONTINUED | OUTPATIENT
Start: 2020-08-04 | End: 2020-08-07 | Stop reason: HOSPADM

## 2020-08-04 RX ORDER — LIDOCAINE HYDROCHLORIDE 10 MG/ML
INJECTION, SOLUTION INFILTRATION; PERINEURAL
Status: COMPLETED
Start: 2020-08-04 | End: 2020-08-04

## 2020-08-04 RX ORDER — IPRATROPIUM BROMIDE AND ALBUTEROL SULFATE 2.5; .5 MG/3ML; MG/3ML
1 SOLUTION RESPIRATORY (INHALATION) 2 TIMES DAILY PRN
Status: DISCONTINUED | OUTPATIENT
Start: 2020-08-04 | End: 2020-08-07 | Stop reason: HOSPADM

## 2020-08-04 RX ORDER — DIGOXIN 0.25 MG/ML
500 INJECTION INTRAMUSCULAR; INTRAVENOUS ONCE
Status: COMPLETED | OUTPATIENT
Start: 2020-08-04 | End: 2020-08-04

## 2020-08-04 RX ORDER — METHYLPREDNISOLONE SODIUM SUCCINATE 40 MG/ML
40 INJECTION, POWDER, LYOPHILIZED, FOR SOLUTION INTRAMUSCULAR; INTRAVENOUS EVERY 8 HOURS
Status: DISCONTINUED | OUTPATIENT
Start: 2020-08-04 | End: 2020-08-04

## 2020-08-04 RX ORDER — METHYLPREDNISOLONE SODIUM SUCCINATE 40 MG/ML
40 INJECTION, POWDER, LYOPHILIZED, FOR SOLUTION INTRAMUSCULAR; INTRAVENOUS DAILY
Status: DISCONTINUED | OUTPATIENT
Start: 2020-08-04 | End: 2020-08-05

## 2020-08-04 RX ORDER — WARFARIN SODIUM 2 MG/1
2 TABLET ORAL
Status: ACTIVE | OUTPATIENT
Start: 2020-08-04 | End: 2020-08-05

## 2020-08-04 RX ORDER — DIGOXIN 0.25 MG/ML
250 INJECTION INTRAMUSCULAR; INTRAVENOUS ONCE
Status: DISCONTINUED | OUTPATIENT
Start: 2020-08-04 | End: 2020-08-05

## 2020-08-04 RX ORDER — FLUCONAZOLE 100 MG/1
200 TABLET ORAL DAILY
Status: DISCONTINUED | OUTPATIENT
Start: 2020-08-05 | End: 2020-08-07 | Stop reason: HOSPADM

## 2020-08-04 RX ADMIN — PANTOPRAZOLE SODIUM 40 MG: 40 INJECTION, POWDER, FOR SOLUTION INTRAVENOUS at 21:40

## 2020-08-04 RX ADMIN — LIDOCAINE HYDROCHLORIDE 200 MG: 10 INJECTION, SOLUTION INFILTRATION; PERINEURAL at 14:28

## 2020-08-04 RX ADMIN — BUMETANIDE 1 MG: 0.25 INJECTION INTRAMUSCULAR; INTRAVENOUS at 08:31

## 2020-08-04 RX ADMIN — BUDESONIDE 250 MCG: 0.25 SUSPENSION RESPIRATORY (INHALATION) at 19:56

## 2020-08-04 RX ADMIN — SODIUM CHLORIDE, PRESERVATIVE FREE 10 ML: 5 INJECTION INTRAVENOUS at 08:33

## 2020-08-04 RX ADMIN — BUMETANIDE 1 MG: 0.25 INJECTION INTRAMUSCULAR; INTRAVENOUS at 20:01

## 2020-08-04 RX ADMIN — ZOLPIDEM TARTRATE 10 MG: 5 TABLET ORAL at 00:30

## 2020-08-04 RX ADMIN — METOPROLOL SUCCINATE 25 MG: 25 TABLET, EXTENDED RELEASE ORAL at 08:33

## 2020-08-04 RX ADMIN — BENZONATATE 100 MG: 100 CAPSULE ORAL at 21:39

## 2020-08-04 RX ADMIN — SODIUM CHLORIDE, PRESERVATIVE FREE 10 ML: 5 INJECTION INTRAVENOUS at 08:34

## 2020-08-04 RX ADMIN — PANTOPRAZOLE SODIUM 40 MG: 40 INJECTION, POWDER, FOR SOLUTION INTRAVENOUS at 08:33

## 2020-08-04 RX ADMIN — ARFORMOTEROL TARTRATE 15 MCG: 15 SOLUTION RESPIRATORY (INHALATION) at 09:33

## 2020-08-04 RX ADMIN — DIGOXIN 500 MCG: 0.25 INJECTION INTRAMUSCULAR; INTRAVENOUS at 14:22

## 2020-08-04 RX ADMIN — BENZONATATE 100 MG: 100 CAPSULE ORAL at 15:52

## 2020-08-04 RX ADMIN — METHYLPREDNISOLONE SODIUM SUCCINATE 60 MG: 125 INJECTION, POWDER, LYOPHILIZED, FOR SOLUTION INTRAMUSCULAR; INTRAVENOUS at 08:32

## 2020-08-04 RX ADMIN — BUDESONIDE 250 MCG: 0.25 SUSPENSION RESPIRATORY (INHALATION) at 09:33

## 2020-08-04 RX ADMIN — POTASSIUM CHLORIDE 20 MEQ: 400 INJECTION, SOLUTION INTRAVENOUS at 07:31

## 2020-08-04 RX ADMIN — METHYLPREDNISOLONE SODIUM SUCCINATE 40 MG: 40 INJECTION, POWDER, LYOPHILIZED, FOR SOLUTION INTRAMUSCULAR; INTRAVENOUS at 14:22

## 2020-08-04 RX ADMIN — IPRATROPIUM BROMIDE AND ALBUTEROL SULFATE 1 AMPULE: 2.5; .5 SOLUTION RESPIRATORY (INHALATION) at 09:33

## 2020-08-04 RX ADMIN — ARFORMOTEROL TARTRATE 15 MCG: 15 SOLUTION RESPIRATORY (INHALATION) at 19:56

## 2020-08-04 RX ADMIN — FLUCONAZOLE 400 MG: 2 INJECTION, SOLUTION INTRAVENOUS at 17:27

## 2020-08-04 RX ADMIN — DIGOXIN 125 MCG: 0.25 INJECTION INTRAMUSCULAR; INTRAVENOUS at 08:32

## 2020-08-04 RX ADMIN — DEXTROSE MONOHYDRATE 10 MG/HR: 50 INJECTION, SOLUTION INTRAVENOUS at 07:31

## 2020-08-04 RX ADMIN — ATORVASTATIN CALCIUM 60 MG: 20 TABLET, FILM COATED ORAL at 08:30

## 2020-08-04 RX ADMIN — METOPROLOL SUCCINATE 25 MG: 25 TABLET, EXTENDED RELEASE ORAL at 21:43

## 2020-08-04 RX ADMIN — Medication 300 UNITS: at 21:40

## 2020-08-04 RX ADMIN — SODIUM CHLORIDE, PRESERVATIVE FREE 10 ML: 5 INJECTION INTRAVENOUS at 20:01

## 2020-08-04 RX ADMIN — BENZONATATE 100 MG: 100 CAPSULE ORAL at 11:05

## 2020-08-04 RX ADMIN — POTASSIUM CHLORIDE 20 MEQ: 400 INJECTION, SOLUTION INTRAVENOUS at 08:24

## 2020-08-04 RX ADMIN — SODIUM CHLORIDE, PRESERVATIVE FREE 10 ML: 5 INJECTION INTRAVENOUS at 21:39

## 2020-08-04 ASSESSMENT — PAIN SCALES - GENERAL
PAINLEVEL_OUTOF10: 0

## 2020-08-04 NOTE — PROGRESS NOTES
PROGRESS NOTE    Patient Presents with/Seen in Consultation For      *gi bleed   CHIEF COMPLAINT:   fatigue, nausea, vomiting, diarrhea, shortness of breath, and cough    Subjective:     Patient states his breathing \"is not good. \" Pt states he was to have thoracentesis but it was held. BS RN d/w pt possibility for thoracentesis for tomorrow. Pt denies abd pain, n/v. D/W pt his Bleeding Scan was negative, all questions answered. Pt states appetite is good. Pt reports he is upset regarding rehab, he does not want to go to Rehab, he is teary eyed - emotional support given. D/W pt to take things one day at a time, he agrees. All pt questions answered. Pt states he is upset he has nobody visiting him, he is lonely and scared - emotional support given. No BM documented x 2 days, will resume Colace. Review of Systems  Aside from what was mentioned in the PMH and HPI, essentially unremarkable, all others negative. Objective:     BP (!) 105/49   Pulse 91   Temp 97.6 °F (36.4 °C) (Oral)   Resp 26   Ht 5' 8\" (1.727 m)   Wt 151 lb 7.3 oz (68.7 kg)   SpO2 97%   BMI 23.03 kg/m²     General appearance: alert, awake, fatigued and ill appearing, thin, laying in bed on BIPAP with BS RN present on monitor with a-line waveform, pale, laying in bed, and cooperative  Eyes: conjunctiva pale, sclera anicteric. PERRL. Lungs: rhonchi with diffuse wheezing to auscultation bilaterally.  O2 per NC  Heart: irregular rapid rate and rhythm, no murmur, 2+ pulses; trace BLE edema  Abdomen: soft, non-tender; bowel sounds normal; no masses,  no organomegaly  Extremities: extremities with trace BLE edema  Pulses: 2+ and symmetric  Skin: Skin color pale, texture, turgor normal.   Neurologic: Grossly normal    potassium chloride (KLOR-CON M) extended release tablet 20 mEq, BID  ipratropium-albuterol (DUONEB) nebulizer solution 1 ampule, BID PRN  warfarin (COUMADIN) tablet 2 mg, Once  methylPREDNISolone sodium (SOLU-MEDROL) injection 40 mg, LABALBU 3.3 08/04/2020    CALCIUM 8.3 08/04/2020    BILITOT 0.3 08/04/2020    ALKPHOS 101 08/04/2020    AST 55 08/04/2020    ALT 92 08/04/2020     Hepatic Function Panel:    Lab Results   Component Value Date    ALKPHOS 101 08/04/2020    ALT 92 08/04/2020    AST 55 08/04/2020    PROT 4.9 08/04/2020    BILITOT 0.3 08/04/2020    BILIDIR <0.2 08/04/2020    IBILI see below 08/04/2020    LABALBU 3.3 08/04/2020     No components found for: CHLPLat  Lab Results   Component Value Date    TRIG 79 07/31/2020   e    LDLCALC 33 07/31/2020             Lab Results   Component Value Date    HDL 30 07/31/2020    D  Lab Results   Component Value Date    LDLCALC 33 07/31/2020   ate    PROTIME 12.6 08/04/2020    INR 1.1 08/04/2020     NIHLIVERTOX. GOV:    DIFLUCAN:    Hepatotoxicity  Transient mild-to-moderate elevations in serum aminotransferase levels occur in up to 5% of patients treated with fluconazole, but these abnormalities are usually asymptomatic and resolve even with continuation of the medication. ALT elevations above 8 times the upper limit of normal are reported to occur in 1% of patients taking fluconazole and to represent the most common adverse event leading to early discontinuation of treatment. Clinically apparent hepatotoxicity due to fluconazole is rare, but well described. The liver injury is typically hepatocellular, arises within the first few weeks of therapy and can be accompanied by signs of hypersensitivity such as fever, rash and eosinophilia. Fatal instances of fluconazole induced liver injury have been reported (Case 1), but most cases are self-limited, although recovery may be delayed for several weeks after stopping fluconazole and may be slow requiring 2 to 3 months. Likelihood score: B (likely cause of clinically apparent liver injury).     Mechanism of Injury  The cause of clinically apparent hepatotoxicity from fluconazole is unknown; however, it may relate to the ability of fluconazole to alter [address] [place] [address] per Dr. Janette HAQ-ACNS-BC, FNP-BC 8/4/2020 7:20 PM For Dr. Marty Brennan

## 2020-08-04 NOTE — PROGRESS NOTES
in:  1150         Evaluation Time includes thorough review of current medical information, gathering information on past medical history/social history and prior level of function, completion of standardized testing/informal observation of tasks, assessment of data and education on plan of care and goals.     CPT codes:  [x] Low Complexity PT evaluation 82432  [] Moderate Complexity PT evaluation 16285  [] High Complexity PT evaluation 89778  [] PT Re-evaluation 13757  [] Gait training 58425  minutes  [] Therapeutic activities 45351  minutes  [] Therapeutic exercises 06093 minutes  [] Neuromuscular reeducation 50070  minutes       Madeleine 18 number:  PT [id number]

## 2020-08-04 NOTE — PROGRESS NOTES
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 This may preclude the need for thoracentesis.       Vitals:  VITALS:  BP (!) 93/51   Pulse 100   Temp 96.8 °F (36 °C) (Axillary)   Resp 24   Ht 5' 8\" (1.727 m)   Wt 151 lb 7.3 oz (68.7 kg)   SpO2 95%   BMI 23.03 kg/m²     24HR INTAKE/OUTPUT:      Intake/Output Summary (Last 24 hours) at 2020 1148  Last data filed at 2020 0940  Gross per 24 hour   Intake 2374 ml   Output 4803 ml   Net -2429 ml       24HR PULSE OXIMETRY RANGE:    SpO2  Av.5 %  Min: 87 %  Max: 99 %    Medications:  IV:   phenylephrine (SOFÍA-SYNEPHRINE) 50mg/250mL infusion Stopped (20 0609)    dilTIAZem 7.5 mg/hr (20 0940)       Scheduled Meds:   potassium chloride  20 mEq Oral BID    warfarin  2 mg Oral Once    methylPREDNISolone  40 mg Intravenous Q8H    metoprolol succinate  25 mg Oral BID    bumetanide 1 mg Intravenous BID    heparin flush  3 mL Intravenous Q12H    fluconazole  400 mg Intravenous Q24H    digoxin  125 mcg Intravenous Daily    benzonatate  100 mg Oral TID    [Held by provider] amLODIPine  5 mg Oral Daily    atorvastatin  60 mg Oral Daily    [Held by provider] clopidogrel  75 mg Oral Daily    [Held by provider] isosorbide mononitrate  30 mg Oral Daily    Arformoterol Tartrate  15 mcg Nebulization BID    budesonide  250 mcg Nebulization BID    warfarin (COUMADIN) daily dosing (placeholder)   Other RX Placeholder    pantoprazole  40 mg Intravenous BID    And    sodium chloride (PF)  10 mL Intravenous BID    sodium chloride flush  10 mL Intravenous 2 times per day       Diet:   Dietary Nutrition Supplements: Clear Liquid Oral Supplement  DIET LOW FAT;     EXAM:  General: No distress. Eyes: PERRL. No sclera icterus. No conjunctival injection. ENT: No discharge. Pharynx clear. Neck: Trachea midline. Normal thyroid. Resp: No accessory muscle use. No rales. No wheezing. No rhonchi. CV: Regular rate. Regular rhythm. No murmur or rub. Abd: Non-tender. Non-distended. No masses. No organomegaly. Normal bowel sounds. Skin: Warm and dry. No nodule on exposed extremities. No rash on exposed extremities. Ext: No cyanosis, clubbing, edema  Lymph: No cervical LAD. No supraclavicular LAD. M/S: No cyanosis. No joint deformity. No clubbing. Neuro: Positive pupils/gag/corneals. Normal pain response.        Results:  CBC:   Recent Labs     08/02/20  0610 08/02/20  1800 08/03/20  0545 08/04/20  0555   WBC 18.0*  --  19.2* 22.5*   HGB 8.1* 8.7* 8.4* 8.6*   HCT 25.0* 26.7* 25.8* 26.2*   MCV 93.6  --  93.8 95.3     --  206 266     BMP:   Recent Labs     08/02/20  2110 08/03/20  0545 08/04/20  0555    137 142   K 4.8 4.8 3.9    106 104   CO2 22 23 28   PHOS 2.7 2.7 2.7   BUN 15 16 19   CREATININE 1.0 1.0 1.0     LIVER PROFILE:   Recent Labs     08/02/20  0610 08/03/20  0545 lung bases show bibasilar opacities related to   pneumonia and atelectasis with small bilateral pleural effusions. 6. Fusiform infrarenal abdominal aortic aneurysm is demonstrated   measuring up to 3 cm in diameter. XR CHEST PORTABLE   Final Result      Chronic coarsened interstitial lung markings are seen throughout both   lungs. No obvious pneumonia or pleural effusion. Assessment:  1. 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 Ongoing tobacco abuse  3. GI bleed, rule out underlying malignancy with associated unintentional weight loss  4. Bilateral effusions with compressive atelectasis: This is likely related to volume resuscitation, now standing at greater than 6 L of a positive input output.        Plan:  1. Continue empiric treatment for COPD  2. Diuretic as tolerated  3. Consideration for thoracentesis if the effusion does not improve. Time at the bedside, reviewing labs and radiographs, reviewing updated notes and consultations, discussing with staff and family was more than 35 minutes. Please note that voice recognition technology was used in the preparation of this note and make therefore it may contain inadvertent transcription errors. If the patient is a COVID 19 isolation patient, the above physical exam reflects that of the examining physician for the day. Dima Cordero M.D., F.C.C.P.     [facility], [address] [place] [address], [address] [facility]

## 2020-08-04 NOTE — PROGRESS NOTES
Critical Care Team - Daily Progress Note         Date and time: 8/4/2020 8:15 AM  Patient's name:  Solo Pozo  Medical Record Number: [id number]  Patient's account/billing number: [de-identified]  Patient's YOB: 1940  Age: 70 y.o. Date of Admission: 7/30/2020  6:00 PM  Length of stay during current admission: 5    Primary Care Physician: Jeet Gonzales MD  ICU Attending Physician: Dr. Daniele Jay Status: Limited  Reason for ICU admission: COPD exacerbation     SUBJECTIVE:            OVERNIGHT EVENTS:     Required Gerardo-Synephrine  to be started overnight for short while. Patient received Toprol-XL and Lasix. Did wear BiPAP but was very agitated     CURRENT VENTILATION STATUS: 4 liters which appears to be his baseline   []? Ventilator   []? BIPAP         [x]? Nasal Cannula       []? Room Air    IF INTUBATED, ET TUBE MARKING AT LOWER LIP:       cms  SECRETIONS Amount:         []? Small          []? Moderate                []? Large  [x]? None                       Color:              []? White          []? Colored                   []? Bloody  SEDATION:    RAAS Score:  []? Propofol gtt             []? Versed gtt               []? Ativan gtt                [x]? No Sedation  PARALYZED:             [x]? No                           []? Yes  VASOPRESSORS:    [x]? No                           []? Yes    If yes - []? Levophed       []? Dopamine          []? Vasopressin       []? Dobutamine  []? Phenylephrine         []? Epinephrine  CENTRAL LINES:     []? No               []? Yes   (Date of Insertion:   )  And Rabia      7/31    If yes -     []? Right IJ             []? Left IJ         [x]? Right Femoral        []? Left Femoral                   []? Right Subclavian           []? Left Subclavian                  []? PICC Line  THEODORE'S CATHETER:   [x]? No                      []? Yes  (Date of Insertion:   )   URINE OUTPUT:            [x]?  Good                  []? Low              []? Anuric         OBJECTIVE:     VITAL SIGNS:  BP (!) 93/51   Pulse 98   Temp 96.8 °F (36 °C) (Axillary)   Resp 26   Ht 5' 8\" (1.727 m)   Wt 151 lb 7.3 oz (68.7 kg)   SpO2 94%   BMI 23.03 kg/m²   Tmax over 24 hours:  Temp (24hrs), Av.4 °F (36.3 °C), Min:96.8 °F (36 °C), Max:97.8 °F (36.6 °C)      Patient Vitals for the past 6 hrs:   BP Temp Temp src Pulse Resp SpO2   20 0700 (!) 93/51 -- -- 98 26 94 %   20 0600 (!) 99/52 -- -- 98 16 97 %   20 0500 (!) 109/46 -- -- 95 17 97 %   20 0400 (!) 107/50 96.8 °F (36 °C) Axillary 94 19 98 %   20 0300 (!) 113/52 -- -- 114 14 96 %         Intake/Output Summary (Last 24 hours) at 2020 0815  Last data filed at 2020 0600  Gross per 24 hour   Intake 2184 ml   Output 4103 ml   Net -1919 ml     Wt Readings from Last 2 Encounters:   20 151 lb 7.3 oz (68.7 kg)   20 142 lb (64.4 kg)     Body mass index is 23.03 kg/m².         PHYSICAL EXAMINATION:    General appearance - alert, well appearing, and in no distress  Mental status - alert, oriented to person, place, and time  Eyes - pupils equal and reactive, extraocular eye movements intact  Ears - external ear canals normal  Nose - normal and patent,, discharge   Mouth - mucous membranes moist, pharynx normal without lesions  Neck - supple, no significant adenopathy, JVD  Chest - clear to auscultation symmetric air entry  Heart - normal rate, regular rhythm, normal S1, S2, no murmurs, rubs, clicks or gallops  Abdomen - soft, nontender, nondistended, no masses or organomegaly  Neurological - alert, oriented, normal speech, no focal findings or movement disorder noted  Extremities - peripheral pulses normal, no pedal edema, no clubbing or cyanosis  Skin - normal coloration and turgor, no rashes, few bruises bilaterally      MEDICATIONS:    Scheduled Meds:   metoprolol succinate  25 mg Oral BID    bumetanide  1 mg Intravenous BID    heparin flush  3 mL Intravenous Q12H    zolpidem 10 mg Oral Nightly    fluconazole  400 mg Intravenous Q24H    digoxin  125 mcg Intravenous Daily    benzonatate  100 mg Oral TID    methylPREDNISolone  60 mg Intravenous Q8H    [Held by provider] amLODIPine  5 mg Oral Daily    atorvastatin  60 mg Oral Daily    [Held by provider] clopidogrel  75 mg Oral Daily    [Held by provider] isosorbide mononitrate  30 mg Oral Daily    Arformoterol Tartrate  15 mcg Nebulization BID    budesonide  250 mcg Nebulization BID    ipratropium-albuterol  1 ampule Inhalation Q4H WA    warfarin (COUMADIN) daily dosing (placeholder)   Other RX Placeholder    pantoprazole  40 mg Intravenous BID    And    sodium chloride (PF)  10 mL Intravenous BID    sodium chloride flush  10 mL Intravenous 2 times per day     Continuous Infusions:   phenylephrine (SOFÍA-SYNEPHRINE) 50mg/250mL infusion Stopped (08/04/20 0609)    dilTIAZem 10 mg/hr (08/04/20 0731)     PRN Meds:   potassium chloride, 20 mEq, PRN  magnesium sulfate, 2 g, PRN  heparin flush, 3 mL, PRN  guaiFENesin, 400 mg, 4x Daily PRN  HYDROcodone-acetaminophen, 1 tablet, Q4H PRN  acetaminophen, 650 mg, Q4H PRN  sodium chloride flush, 10 mL, PRN          VENT SETTINGS (Comprehensive) (if applicable):  Vent Information  Skin Assessment: Clean, dry, & intact  FiO2 : 40 %  SpO2: 94 %  SpO2/FiO2 ratio: 242.5  I Time/ I Time %: 0.9 s  Mask Type: Full face mask  Mask Size: Medium  Additional Respiratory  Assessments  Pulse: 98  Resp: 26  SpO2: 94 %  Oral Care: Mouth swabbed  ABGs:   Recent Labs     08/04/20  0558   PH 7.444   PCO2 36.8   PO2 89.5   HCO3 24.7   BE 0.7   O2SAT 96.6       Laboratory findings:  Complete Blood Count:   Recent Labs     08/02/20  0610 08/02/20  1800 08/03/20  0545 08/04/20  0555   WBC 18.0*  --  19.2* 22.5*   HGB 8.1* 8.7* 8.4* 8.6*   HCT 25.0* 26.7* 25.8* 26.2*     --  206 266        Last 3 Blood Glucose:   Recent Labs     08/02/20  2110 08/03/20  0545 08/04/20  0555   GLUCOSE 139* 148* 138* PT/INR:    Lab Results   Component Value Date    PROTIME 12.6 08/04/2020    INR 1.1 08/04/2020     PTT:    Lab Results   Component Value Date    APTT 55.9 10/08/2019       Comprehensive Metabolic Profile:   Recent Labs     08/02/20  2110 08/03/20  0545 08/04/20  0555    137 142   K 4.8 4.8 3.9    106 104   CO2 22 23 28   BUN 15 16 19   CREATININE 1.0 1.0 1.0   GLUCOSE 139* 148* 138*   CALCIUM 8.3* 8.5* 8.3*   PROT  --  4.7* 4.9*   LABALBU  --  3.2* 3.3*   BILITOT  --  0.3 0.3   ALKPHOS  --  95 101   AST  --  44* 55*   ALT  --  60* 92*      Magnesium:   Lab Results   Component Value Date    MG 2.2 08/04/2020     Phosphorus:   Lab Results   Component Value Date    PHOS 2.7 08/04/2020     Ionized Calcium: No results found for: CAION     Urinalysis:     Troponin: No results for input(s): TROPONINI in the last 72 hours.     Microbiology:    Cultures during this admission:       Blood cultures:                  [] None drawn      [] Negative     []  Positive     [] Pending   Urine Culture:                    [] None drawn      [] Negative     []  Positive     [] Pending  Sputum Culture:                [] None drawn      [] Negative     []  Positive     [] Pending  Endotracheal aspirate:      [] None drawn      [] Negative     []  Positive     [] Pending  Stool:    [] None Sent        [] Negative     []  Positive     [] Pending   Other Micro:   [] None Sent        [] Negative     []  Positive     [] Pending     Other pertinent Labs:       Radiology/Imaging:       ASSESSMENT:     Principal Problem:    GI bleeding  Active Problems:    Bilateral carotid artery stenosis    Tobacco dependence    Chronic obstructive pulmonary disease with acute exacerbation (HCC)    Combined systolic and diastolic congestive heart failure (HCC)    Atrial fibrillation (HCC)    On home O2    Hypertension    Hyperlipidemia    CAD (coronary artery disease)    Hypoprothrombinemia (HCC)    CKD (chronic kidney disease) stage 3, GFR 30-59 ml/min (HCC)    Hypokalemia    Non-rheumatic aortic sclerosis (HCC)    Hypotension due to hypovolemia    Emesis    Diarrhea    Thrombocytopenia (HCC)    Hypotension    Unintentional weight loss of 10% body weight within 6 months    Moderate protein-calorie malnutrition ([address]    Pulmonary hypertension (HCC)    Erosive esophagitis  Resolved Problems:    * No resolved hospital problems. *      Additional assessment:    70 y.o. M with 120 py continued smoker with PMH of CHF, CAD, CKD, HTN, HLD presented on 7/30 with hypotension and GI bleed   INR found to be elevated 10 which was reversed. Patient required Gerardo-Synephrine, octreotide, pantoprazole drip  Chest x-ray with increased congestion  7/31 CT chest no mass or lymphadenopathy. Small bowel is distended with adynamic ileus diverticulosis. EGD erosive esophagitis with GERD   8/2 chest film right basilar pneumonia  8/3 on BiPAP when sleeping  8/3 CT chest showing moderate effusions with atelectasis advanced COPD 90% compression deformity of T6  Bleeding scan negative  8/4 overnight required Gerardo-Synephrine chest x-ray clearing right-sided effusion     1. hypovolemia causing shock now off pressors  2. GI bleed s/p 3 units PRBC, 2 units FFP, 1 unit platelets  3. Erosive esophagitis with Candida on EGD on fluconazole  4. Echo showing EF 55% 8/3/2020  5. A. Fib  with RVR requiring Lopressor and Cardizem drip now on Toprol and digoxin  6. Anemia  7. Thrombocytopenia  8. Lactic acidosis  9. COPD (follows Dr. Luciana Aguilar) in exacerbation  10. Asbestosis  11. NICHOLAS noncompliant  12. Chronic hypoxic respiratory failure on 2- 4  L  13. CAD s/p CABG 1983, 1992 and stents 2000, 2002, 2005, 2009 on Plavix  14. CKD stage III  15. Left ear I&D status post I&D with excision of fistula and reconstruction 8/26/2019 on was on chronic antibiotics. Cultures 1/5/2020 revealed Klebsiella.   Vancomycin was DC'd PICC line was removed and patient was started on Levaquin for 6 weeks and Keflex for 1 month. Patient was recultured and started on Zyvox 7/6  16. Nicotine addiction  17. Compression fracture T6  18. HLD  19. HTN  20. History of weight loss  21. CODE STATUS: Meds only  22. Previous admission 2018 nonsustained V. tach        PLAN:   Appears to be diuresing well  We will consult palliative care  Would continue diuretics  Patient may benefit from going to LTAC  Restart potassium twice daily  Hesitant to use Eliquis which cannot be reversed. will start Coumadin at 2 mg tonight. Solu-Medrol 60 every 8 this is a very high dose will drop to 40 every 8-  Given this patient's age should not be on Ambien  And if he has sleep apnea this will make his obstruction worse  Lactic acidosis may be due to duo nebs and Brovana beta-2 agonists. Will cut down the amount of DuoNeb's to twice daily  Change to p.o. fluconazole If ok with ID      WEAN PER PROTOCOL:                  [x]? No               []? Yes             []? N/A  DISCONTINUE ANY LABS:              [x]? No               []? Yes  ICU PROPHYLAXIS:  Stress ulcer:  [x]? PPI Agent               []? Y0Pocpa      []? Sucralfate               []? Other:  VTE:                []? Enoxaparin             []? Unfract. Heparin Subcut                [x]? EPC Cuffs  NUTRITION:  []? NPO            []? Tube Feeding (Specify: )   []? TPN                        []? PO (Diet: Dietary Nutrition Supplements: Clear Liquid Oral Supplement  DIET LOW FAT;)  HOME MEDICATIONS RECONCILED:        [x]? No                           []? Yes  INSULIN DRIP:                                               [x]? No                           []? Yes  CONSULTATION NEEDED:                          [x]? No                           []? Yes  FAMILY UPDATED:                                       [x]? No                           []? Yes  TRANSFER OUT OF ICU:                             [x]? No                           []? Yes still requiring pressors on and off  MAGALY Hoyos. KIZZY SEWELL I personally saw, examined and provided care for the patient. Radiographs, labs and medication list were reviewed by me independently. I spoke with bedside nursing, therapists and consultants.  Critical care services and times documented are independent of procedures and multidisciplinary rounds with were conducted with the MICU team. The case was discussed in detail and plans for care were established.       8/4/2020, 8:15 AM

## 2020-08-04 NOTE — PROGRESS NOTES
Not ready for bipap yet due to lasix administration. Patient states he will let his RN know when he is ready.

## 2020-08-04 NOTE — CONSULTS
Palliative Care Department  Palliative Medicine Initial Consult  Provider: [facility] days prior to Consult: Hospital Day: 6    Referring Provider:  César Aguilera MD     Reason for Consult:  [x]  Code status Discussion  [x]  Assist with goals of care  []  Psychosocial support  []  Symptom Management    Chief Complaint: Nausea/vomiting with dizziness and fatigue    Subjective:     HPI:  Mert Glover is a 70 y.o. male with significant past medical history of CHF, COPD, chronic kidney disease, coronary artery disease status post CABG, hypertension, hyperlipidemia, history of asbestosis, erosive esophagitis, atrial fibrillation on anticoagulation, thrombocytopenia, who presented to the emergency department from home with fatigue, N/V and worsening SOB. Patient completed ED workup and was admitted to the hospital with the diagnoses of nausea, vomiting, diarrhea, supratherapeutic INR and gastrointestinal hemorrhage. He required admission to the ICU. He received 2 units of packed red blood cells as well as 1 unit of fresh frozen plasma. He was seen by GI due to coffee-ground emesis and had an EGD completed revealing erosive esophagitis with Candida esophagitis, no active gastric bleeding noted. He was started on octreotide and protonix drip. Additional consults include pulmonology, infectious disease, and cardiology. Patient developed Atrial fibrillation with RVR and started on digoxin and diltiazem drip. Bleeding scan negative for any active bleeding. INR was greater than 10 on presentation has been corrected. Cardiology did advise restarting anticoagulation utilizing Eliquis 5 mg twice daily once cleared by GI. He requires BiPAP intermittently and has obstructive sleep apnea. He is being followed by pulmonary. Discussed with bedside nurse. Patient was attempting to get out of bed and somewhat confused overnight. It is noted he took Ambien. This morning he is more awake.   He is on nasal cannula and appears in no distress. He converses appropriately and appears to be a good historian. He feels better than when he came to the hospital.  He has some shortness of breath still but is much improved. He denies any pain, nausea, vomiting, diarrhea or constipation. No family is currently at bedside. Goals of care: improve or maintain function/quality of life, remain at home, preserve independence/autonomy/control and continue current management    Code Status: limited code- no CPR, cardioversion/defibrillation, intubation and yes to     Advanced Directives: Living Will, HC-POA and Documents requested    Surrogate/Legal NOK: Significant Other  Contacts:  Garcia Andre significant other [phone]    Spiritual assessment: No spiritual distress identified   Bereavement and grief: low-risk bereavement    History obtain from EMR, patient and staff    Past Medical History:   Diagnosis Date    Amblyopia 1961    corrected    Anticoagulated on Coumadin     Atrial fibrillation ([facility] 9/5/2018    Bilateral carotid artery stenosis 07/03/2014    CAD (coronary artery disease)     follows with / Eliel Pierson every 6 months    CHF (congestive heart failure) ([facility]     last episode 9/2018 ?  CKD (chronic kidney disease) stage 3, GFR 30-59 ml/min ([facility]) 7/30/2020    Colon polyp     COPD (chronic obstructive pulmonary disease) ([facility])     follows with DR. 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[facility])     Hypotension due to hypovolemia 7/30/2020    Myopia     Non-rheumatic aortic sclerosis ([facility])     NSVT (nonsustained ventricular tachycardia) ([address] 2018    On home O2     2 Liters/NC at night and during day prn    NICHOLAS (obstructive sleep apnea)     no use cpap    Pulmonary hypertension ([address] 8/1/2020    To severe 2020    Squamous cell carcinoma, face     skin    Thrombocytopenia ([address] 7/30/2020    Tobacco dependence 7/3/2014    Unintentional weight loss of 10% body weight within 6 months 7/31/2020       Past Surgical History:   Procedure Laterality Date    APPENDECTOMY      BACK SURGERY  years ago    lumbar    CARDIAC CATHETERIZATION      heart cath x4, cabg twice    COLONOSCOPY     [address]      2000, 2002, 2005, 2009   total of 8 stents   [address]    two     EAR SURGERY Left 6/25/2019    I & D LEFT EAR ABSCESS performed by Prasanna Driver DO at [facility] EAR SURGERY Left 8/26/2019    INCISION AND DRAINAGE LEFT EAR WITH EXCISION OF FISTULA  AND RECONSTRUCTION performed by Prasanna Driver DO at [address] [place] [address]    amblyoplia  / multiple surgeries    SKIN CANCER EXCISION      UPPER GASTROINTESTINAL ENDOSCOPY N/A 7/31/2020    BEDSIDE EGD ESOPHAGOGASTRODUODENOSCOPY performed by Ale Pelaez MD at [facility] ENDOSCOPY       Current Medications:     potassium chloride  20 mEq Oral BID    warfarin  2 mg Oral Once    methylPREDNISolone  40 mg Intravenous Daily    lidocaine        metoprolol succinate  25 mg Oral BID    bumetanide  1 mg Intravenous BID    heparin flush  3 mL Intravenous Q12H    fluconazole  400 mg Intravenous Q24H    digoxin  125 mcg Intravenous Daily    benzonatate  100 mg Oral TID    atorvastatin  60 mg Oral Daily    [Held by provider] clopidogrel  75 mg Oral Daily    Arformoterol Tartrate  15 mcg Nebulization BID    budesonide  250 mcg Nebulization BID    warfarin (COUMADIN) daily dosing (placeholder)   Other RX Placeholder    pantoprazole  40 mg Intravenous BID    And    sodium chloride (PF)  10 mL Intravenous BID    sodium chloride flush  10 mL Intravenous 2 times per day       PRN Medications:  ipratropium-albuterol, potassium chloride, magnesium sulfate, heparin flush, guaiFENesin, HYDROcodone-acetaminophen, acetaminophen, sodium chloride flush    IV Medications:   phenylephrine (SOFÍA-SYNEPHRINE) 50mg/250mL infusion Stopped (08/04/20 0609)    dilTIAZem 7.5 mg/hr (08/04/20 0940)       Inpatient medications reviewed: yes  Home Medications reviewed: yes    Allergies   Allergen Reactions    Linezolid Other (See Comments)     Thrombocytopenia, impactful diarrhea, weight loss    Pcn [Penicillins] Swelling    Dye [Iodides]      Heart and kidney dye / reaction unknown       Family History   Problem Relation Age of Onset    Cancer Mother         ?  Other Mother         Hemorrhage    Heart Disease Father     Heart Disease Brother        Social history:   status: yes  Marital status:   Living status: alone   Work history: retired  Tobacco use: yes  History of drug use: no  History of alcohol use: no    Review of Systems:  As per HPI, remaining review of systems negative/unremarkable    Objective:   PHYSICAL EXAM:     Vitals:    BP (!) 93/51   Pulse 98   Temp 96.8 °F (36 °C) (Axillary)   Resp 23   Ht 5' 8\" (1.727 m)   Wt 151 lb 7.3 oz (68.7 kg)   SpO2 96%   BMI 23.03 kg/m²   Gen: elderly, chronically ill, NAD, awake, alert   HEENT: normocephalic, atraumatic, PERRL, mucosa dry, sclera anicteric, conjunctiva pink and moist  Neck: trachea midline  Lungs: Respiratory rate increases mildly with conversation, decreased breath sounds bilaterally throughout lung fields right side greater than left with coarse breath sounds noted in left. Heart: irregular rate and rhythm, distant heart tones, no murmurs/rubs/gallops appreciated   Abdomen: normoactive bowel sounds, soft, non-tender, non-distended  Musculoskeletal: no gross deformity, moving all extremities  Extremities: Edema of arms and legs  Skin: Without rashes, lesions, bruising  Neuro: awake, alert, oriented x 3, follows commands, no gross neurologic deficit    I&O  I/O this shift:   In: 480 [P.O.:480]  Out: 1075 [Urine:1075]    Intake/Output Summary (Last 24 hours) at 8/4/2020 1408  Last data filed at 8/4/2020 1130  Gross per 24 hour   Intake 1894 ml   Output 4300 ml   Net -2406 ml       Results/Verification of Data Review  Objective data reviewed labs, radiology. Recent Labs     08/02/20  0610 08/02/20  1800 08/03/20  0545 08/04/20  0555   WBC 18.0*  --  19.2* 22.5*   RBC 2.67*  --  2.75* 2.75*   HGB 8.1* 8.7* 8.4* 8.6*   HCT 25.0* 26.7* 25.8* 26.2*   MCV 93.6  --  93.8 95.3   MCH 30.3  --  30.5 31.3   MCHC 32.4  --  32.6 32.8   RDW 14.5  --  14.6 14.6     --  206 266   MPV 10.4  --  10.7 10.4       Recent Labs     08/02/20  2110 08/03/20  0545 08/04/20  0555    137 142   K 4.8 4.8 3.9    106 104   CO2 22 23 28   BUN 15 16 19   CREATININE 1.0 1.0 1.0   GLUCOSE 139* 148* 138*   CALCIUM 8.3* 8.5* 8.3*       No results for input(s): POCGLU in the last 72 hours. CBC with Differential:    Lab Results   Component Value Date    WBC 18.2 08/05/2020    RBC 2.85 08/05/2020    HGB 8.9 08/05/2020    HCT 27.3 08/05/2020     08/05/2020    MCV 95.8 08/05/2020    MCH 31.2 08/05/2020    MCHC 32.6 08/05/2020    RDW 14.6 08/05/2020    LYMPHOPCT 2.3 08/05/2020    MONOPCT 7.4 08/05/2020    BASOPCT 0.1 08/05/2020    MONOSABS 1.34 08/05/2020    LYMPHSABS 0.42 08/05/2020    EOSABS 0.00 08/05/2020    BASOSABS 0.02 08/05/2020     Hepatic Function Panel:    Lab Results   Component Value Date    ALKPHOS 99 08/05/2020    ALT 91 08/05/2020    AST 47 08/05/2020    PROT 4.5 08/05/2020    BILITOT 0.3 08/05/2020    BILIDIR <0.2 08/05/2020    IBILI see below 08/05/2020    LABALBU 3.1 08/05/2020       Urine:  UA:No results found for: NITRITE, COLORU, PHUR, LABCAST, WBCUA, RBCUA, MUCUS, TRICHOMONAS, YEAST, BACTERIA, CLARITYU, SPECGRAV, LEUKOCYTESUR, UROBILINOGEN, BILIRUBINUR, BLOODU, GLUCOSEU, AMORPHOUS    Cultures:  No results for input(s): BC in the last 72 hours. No results for input(s): Maryfrances Bigger in the last 72 hours.   No results for input(s): ORG in the last 72 hours. No results for input(s): [facility] in the last 72 hours. No results for input(s): LABURIN in the last 72 hours. No results for input(s): CULTRESP in the last 72 hours. No results for input(s): CXCATHTIP in the last 72 hours. Invalid input(s): STREPPNEUMAGU  No results for input(s): INFLUENA in the last 72 hours. No results for input(s): INFLUENB in the last 72 hours. No results for input(s): FLUBQC in the last 72 hours. No results for input(s): WNDABS in the last 72 hours. Imaging:  Ct Abdomen Pelvis Wo Contrast Additional Contrast? None    Result Date: 2020  Patient MRN:  [id number] : 1940 Age: 70 years Gender: Male Order Date:  2020 4:31 PM EXAM: CT ABDOMEN PELVIS WO CONTRAST COMPARISON: None INDICATION:  diarrhea, n/v, 90# wt loss- unintentional diarrhea, n/v, 90# wt loss- unintentional TECHNIQUE:  Low-dose CT acquisition technique included one of the following options; 1. Automated exposure control, 2. Adjustment of mA and/or kV according to the patient's size or 3. Use of iterative reconstruction. FINDINGS: There is no there are multiple gas-filled distended loops of small bowel located within the anterior aspect of the abdomen and pelvis measuring up to 2.8 cm. Stool and gas is seen within the colon. There is a severe burden of diverticulosis involving the sigmoid colon. No evidence of acute diverticulitis. The appendix is not visualized. No intrahepatic or extrahepatic bile duct dilatation. Gallbladder is grossly unremarkable. Pancreas is grossly unremarkable. Spleen is nonenlarged. There is bilateral perinephric fat stranding. No hydronephrosis. There is a infrarenal fusiform abdominal aortic aneurysm measuring up to 3 cm in diameter. Urinary bladder appears normal in contour. No evidence of lytic or blastic bone lesion associated with the pelvis or lumbar spine.  View of lung bases shows bibasilar opacities with small bilateral pleural effusions. 1. No evidence of abdominal or pelvic mass or lymphadenopathy. 2. Multiple gas-filled distended loops of small bowel are present within the anterior aspect of the abdomen which could indicate adynamic ileus versus partial or early small bowel obstruction. Stool and gas is seen within the colon. Short-term follow-up may be helpful for further evaluation. 3. Diverticulosis without evidence of acute diverticulitis. 4. Bilateral perinephric fat stranding could suggest chronic medical renal disease with appropriate clinical history. 5. View of the lung bases show bibasilar opacities related to pneumonia and atelectasis with small bilateral pleural effusions. 6. Fusiform infrarenal abdominal aortic aneurysm is demonstrated measuring up to 3 cm in diameter. Xr Chest Portable    Result Date: 2020  Patient MRN:  [id number] : 1940 Age: 70 years Gender: Male Order Date:  2020 6:15 PM EXAM: XR CHEST PORTABLE COMPARISON: 2019 INDICATION:  SOB SOB FINDINGS: There are median sternotomy wires. There are coarsened interstitial lung markings throughout both lungs. No focal airspace opacity or pleural effusion. No pneumothorax. Chronic coarsened interstitial lung markings are seen throughout both lungs. No obvious pneumonia or pleural effusion.       Assessment/Plan      Active Hospital Problems    Diagnosis Date Noted    Moderate protein-calorie malnutrition ([address] [E44.0] 2020    Pulmonary hypertension (HCC) [I27.20] 2020    Erosive esophagitis [K22.10] 2020    Unintentional weight loss of 10% body weight within 6 months [R63.4] 2020    GI bleeding [K92.2] 2020    CKD (chronic kidney disease) stage 3, GFR 30-59 ml/min (HCC) [N18.3] 2020    Hypotension due to hypovolemia [I95.89, E86.1] 2020    Emesis [R11.10] 2020    Diarrhea [R19.7] 2020    Thrombocytopenia ([address] [D69.6] 2020    Hypotension [I95.9] 2020    to see how he did with therapy this afternoon. He identified he has power of  forms completed and that his significant other Melony De La Rosa is his healthcare surrogate. He also has CODE STATUS established and is a limited code per order. Family Meeting:  Participants:patient  Family meeting was held to discuss:treatment options, goals of care, prior expressed wishes, advanced care planning, symptom management and discharge plan     Controlled Substances Monitoring:      Discharge planning:   to be determined    Discussed patient and the plan of care with the Palliative medicine IDT members. Referrals to: none today    Time/Communication  Greater than 51% of time spent, total 50 minutes in counseling and coordination of care at the bedside regarding/over telephone goals of care, symptom management, diagnosis and prognosis and see above. Wally Wheeler PA-C  Palliative Medicine    Thank you for allowing Palliative Medicine to participate in the care of Ruthann Ba. Note: This report was completed using [employer] voiced recognition software. Every effort has been made to ensure accuracy; however, inadvertent computerized transcription errors may be present.

## 2020-08-04 NOTE — PROGRESS NOTES
Occupational Therapy  OCCUPATIONAL THERAPY INITIAL EVALUATION      Date:2020  Patient Name: Ramya Jsohi  MRN: [id number]  : 1940  Room: [address] [place], [place] [address]    Referring Provider: Jose De Jesus Ford MD    Evaluating OT: Mumtaz Franks OTR/L [id number]    AM-PAC Daily Activity Raw Score:     Recommended Adaptive Equipment: TBD    Diagnosis: GI bleed. Pt presents to ED from home with fatigue, nausea, vomiting and diarrhea. Surgery:  upper endoscopy   Pertinent Medical History: HTN, emphysema, COPD, CHF, CAD  Precautions:  Falls, O2, art line, triple lumen in R groin      Home Living: Pt lives alone in a single story apt with 10 steps on entry. Bathroom setup: walk in shower, standard commode     Prior Level of Function: Independent with ADLs, Independent with IADLs; completed functional mobility with no AD. 2-4L O2 at home. Driving: Yes    Pain Level: no reported pain    Cognition: A&O: . Problem solving:  WFL   Judgement/safety:  WFL     Functional Assessment:   Initial Eval Status  Date: 20 Treatment session:  Short Term Goals  Treatment frequency: 2-5x/wk PRN x1-3 wks     Feeding Set up     Grooming Set up  Independent   UB Dressing Set up  Independent   LB Dressing Min A  Mod I    Bathing Mod A  Mod I   Toileting Min A  Mod I   Bed Mobility  Supine to sit: Min A  Sit to Supine: SBA     Functional Transfers STS: CGA  Mod I   Functional Mobility CGA with no AD  Side steps to HOB    Mod I during ADLs   Balance Sitting: fair plus    Standing: fair/fair minus  1 LOB during stand task     Activity Tolerance poor  standing brice x6-7 min with fair plus balance during self care tasks           ADL comments: Per nursing report dangle, stand and side step only this session due to Cardizem drip. -120 throughout session. O2 on 6L 93% rest, with activity: 90% min recovery time    Treatment: Patient educated on techniques for completion of ADL, safe functional transfers and functional mobility. Patient required cues for follow through with proper hand/foot placement, pacing, safety and technique in bed mobility, functional transfers, functional mobility and LB dressing in preparation for maximum independence in all self care tasks. Hand Dominance: Right []  Left []   Strength ROM Additional Info:    RUE  4/5 WFL good  and FMC/dexterity noted during ADL tasks     LUE 4/5 WFL good  and FMC/dexterity noted during ADL tasks         Hearing: WFL   Vision: WFL   Sensation:  No c/o numbness or tingling   Tone: WFL   Edema: none                             Long Term Goal (1-3 wks): Pt will maximize functional performance in all self care tasks/functional transfers with good follow through of all trained techniques for safe transition to next level of care    Eval Complexity: Low    Assessment of current deficits   Functional mobility [x]  ADLs [x] Strength [x]  Cognition []  Functional transfers  [x] IADLs [x] Safety Awareness [x]  Endurance [x]  Fine Motor Coordination [] Balance [x] Vision/perception [] Sensation []   Gross Motor Coordination [] ROM [] Delirium []                  Motor Control []    Plan of Care:   ADL retraining [x]   Equipment needs [x]   Neuromuscular re-education [x] Energy Conservation Techniques [x]  Functional Transfer training [x] Patient and/or Family Education [x]  Functional Mobility training [x]  Environmental Modifications [x]  Cognitive re-training []   Compensatory techniques for ADLs [x]  Splinting Needs []   Positioning to improve overall function [x]   Therapeutic Activity [x]  Therapeutic Exercise  [x]  Visual/Perceptual: []    Delirium prevention/treatment  []   Other:  []    Rehab Potential: Good for established goals     Patient / Family Goal: To get home. Patient and/or family were instructed on functional diagnosis, prognosis/goals and OT plan of care. Pt verbalized understanding. Upon arrival, patient supine in bed.  At end of session, patient supine in bed with call light and phone within reach, all lines and tubes intact. Pt would benefit from continued skilled OT to increase safety and independence with completion of ADL/IADL tasks for functional independence and quality of life.      Low Evaluation + 0 timed treatment minutes    Evaluation time includes thorough review of current medical information, gathering information on past medical history/social history and prior level of function, completion of standardized testing/informal observation of tasks, assessment of data, and development of POC/Goals    Olen Kehr OTR/L  [id number]

## 2020-08-04 NOTE — PROGRESS NOTES
PROGRESS  NOTE --                                                          INTERNAL  MEDICINE                                                                              I  PERSONALLY SAW , EXAMINED, AND CARED Rubens, 8/4/2020     LABS, XRAY ,CHART, AND MEDICATIONS  REVIEWED BY ME .        8/1/2020-sUBJECTIVE: Esequiel Wild is alert awake and cooperative; oriented ×3. Denies any chest pain dyspnea nausea emesis. Tolerating diet. No abdominal pain. Had his first bowel movement today since admission. Unlikely to be C. difficile. Last evening he did have liquid diet and enjoyed it he would like something more substantial today. States he has hard time sleeping without Ambien request bedtime dose of same. I spoke with nursing, still having a hard time weaning from intravenous phenylephrine. Blood pressure keeps dropping without it. He remains afebrile. Most recent blood pressure 90/40.  97% saturation 2 L nasal cannula. Respiratory rate 21. Intake and output +5392 cc. Potassium 3.1; lactic acid finally dropping, 2.7. Glucose 103 calcium 8.3 phosphorus 1.8 protein 4.9 albumin 3.4 remainder liver functions normal.  Hemoglobin admission 11.0; today hemoglobin 8.1; WBC 16.4, INR 1.4. Platelets 434, remarkable improvement since admission, would seem likely caused by Zyvox. proBNP on admission 2500. CRP 0.2. Procalcitonin 0.10. A1c 6.2.  2D echo completed ejection fraction 55%; septal motion consistent with previous bypass surgery. Markedly large right atrium moderate tricuspid regurgitation moderate to severe pulmonary hypertension. Aortic sclerosis; pulmonary valve is sclerotic. No pleural effusion no pericardial effusion. Urine culture less than 25,000 gram-negative rods. EGD completed yesterday with following results--    Esophagus:  GERD. severe erosive esophagitis.  Candida infection        Stomach: Gastritis      Duodenum:  Normal         Pulmonary note from today appreciated; continue to wean Gerardo-Synephrine as able; Ambien as needed for sleep. Dietary consult appreciated, moderate malnutrition. Oral nutritional supplement 3 times a day has been added. 8/2/2020-patient sitting in bed finishing his lunch at this time. Currently no significant dyspnea; he has had no chest pain at all. Last night he became very short of breath; hard time sleeping because of it. Ambien 5 mg did not help much. He still had only one bowel movement since admission. He should be taken out of isolation, likelihood of C. Difficile zero. Most likely Zyvox induced diarrhea. Last evening he did have chest x-ray performed with following results--    Impression:          1. Acute right basilar pneumonia. 2. Remainder of findings described as above. He has been seen by pulmonary as well as ID today. They are not sure if he does have a pneumonia. Patient still at 95% saturation on 2 L nasal cannula. Blood pressure still borderline, 88/45 earlier today 102/50 currently. Overnight his heart rate got as high as 150 with a respiratory rate of 31. Currently heart rate 118 respirations 18. He remains afebrile. His major complaint today, difficult time expectorating mucus. His phosphorus remains low at 1.9; calcium slightly low 8.4. CRP less than 0.1. WBC 18.0 hemoglobin 8.1 platelets 176. INR 1.3 sed rate 4. ID consult from yesterday appreciated; they agree unlikely patient has infection of left ear and more likely polychondritis. Erosive esophagitis, Diflucan has been started. ID note from today appreciated; fluconazole 400 mg daily; increase dose of steroids. Stool for occult blood was positive. Rotavirus negative in stool; enteric gurjit continues in stool. C. difficile was not performed because of formed stool.   Sputum Gram stain as follows--    Group 3: >25 PMN's/LPF and >10 Epithelial cells/LPF   Moderate Polymorphonuclear leukocytes   Moderate Epithelial cells   Abundant Mononuclear leukocytes   Abundant Gram negative rods   Few yeast     Urine culture as follows--    Organism Abnormal    07/31/2020  6:15 AM   - [facility]    Klebsiella pneumoniae ssp pneumoniae    Urine Culture, Routine  07/31/2020  6:15 AM  [facility]    <25,000 CFU/ml    Testing Performed By     Lab - [address].  Name  Director  Address  Valid Date Range    74-[place] - [address] [facility]  [facility]  Joelle Cr MD  [address] [place] [address]. [facility] [id number]  12/03/19 1502-Present    Narrative   Performed by: [address] [place] [address] Lab   Source: URV       Site: Urine&Urine              Susceptibility     Klebsiella pneumoniae ssp pneumoniae (1)     Antibiotic  Interpretation  JOVAN  Status     ampicillin  Resistant  >=^32  mcg/mL      ceFAZolin  Sensitive  <=^4  mcg/mL      cefepime  Sensitive  <=^0.12  mcg/mL      cefTRIAXone  Sensitive  <=^0.25  mcg/mL      Confirmatory Extended Spectrum Beta-Lactamase   Neg  mcg/mL      ertapenem  Sensitive  <=^0.12  mcg/mL      gentamicin  Sensitive  <=^1  mcg/mL      levofloxacin  Sensitive  ^1  mcg/mL      nitrofurantoin  Resistant  ^128  mcg/mL      piperacillin-tazobactam  Sensitive  ^16  mcg/mL      trimethoprim-sulfamethoxazole  Sensitive  <=^20  mcg/mL           8/3/2008-patient remains in intensive care; currently no chest pain no dyspnea. Had a very stormy night with A. fib RVR and profound dyspnea. Patient remains afebrile. Still have significant increase in respiratory rate as high as 47 with the last 24 hours. Blood pressure 137/35. Heart rate improved 82. Respirations currently 17.  93% saturation 4 L nasal cannula. Intake and output +6675 cc. Magnesium 1.8 lactic acid 2.7 glucose 148 calcium 8.5 phosphorus finally normal 2.7 protein 4.7 albumin 3.2 ALT AST 60/44 respectively.   Hemoglobin 8.4 WBC 19.2 platelets 542. Sed rate 3. ABG from yesterday PO2 74.6 PCO2 35.2 pH 7.380. The above was done while patient on BiPAP. Gerardo-Synephrine IV has been discontinued, blood pressure stable. Heart rate was still averaging 120 most of the night; patient was then given dose of digoxin 375 mcg IV to be followed by 250 mcg. Patient has been started on diltiazem 5 mg/h IV. Gram stain of sputum with following results--     Group 2: 10-25 PMN's/LPF and >10 Epithelial cells/LPF   Few Polymorphonuclear leukocytes   Few Epithelial cells   Abundant Gram negative rods      CT of chest done yesterday with following results--    FINDINGS:   There is cardiomegaly with the calcified pericarditis. There is   coronary artery calcifications. Small to moderate lymph nodes are   identified in the mediastinum. The trachea and major bronchi are   patent. Moderate pleural effusions with atelectasis/infiltrates are   identified in the lower lobes bilaterally with  minimal atelectasis in   the right middle lobe. There is advanced COPD. Liver is of decreased   attenuation likely fatty infiltrated. There is diffuse vascular   calcification. There is 90% compression deformity of T6 with kyphosis   and mild compression deformity of the inferior endplate of T5. .        Impression:         Patchy bibasilar infiltrates/atelectasis and pleural effusion likely   CHF and/or pneumonia. Surveillance recommended. Cardiomegaly with coronary artery calcification. Severe compression deformity of T6 and mild compression deformity of   the inferior endplate of T5. Patient's pulmonologist was consulted; yesterday's note appreciated await CT of the chest results. GI note from yesterday, in view of ongoing positive stool for occult blood nuclear bleeding scan was ordered. Patient had RRT called at 1945 hrs. yesterday due to A. fib RVR and worsening shortness of breath. Heart rate has been up in the 190s.   He was given 5 mg of Lopressor IV and Cardizem drip was started. GI note from day appreciated, no changes noted. Patient was again placed on BiPAP this morning at 0838 hrs. due to dyspnea. Nuclear bleeding scan pending. 8/4/2020-patient laying quietly in his intensive care bed. No chest pain no dyspnea. Only real complaint excess urination overnight due to IV Bumex ordered by cardiology. Coughing is less. Patient has been afebrile for the last 24 hours. Most recent blood pressure still low 93/51 arterial line. 94% saturation 4 L nasal cannula. Heart rate 98/min. Intake and output still +3486 cc; a decrease of greater than 3000 cc since yesterday. Lactic acid is still +2.7. Protein 4.9 albumin 3.3 phosphorus 2.7. ALT AST still increased 92/55 respectively. Hemoglobin 8.6 WBC 22.5 platelets 570. INR 1.1. ABGs from this morning PO2 89.5 PCO2 36.8 pH 7.444. That was done on BiPAP. Nuclear medicine bleeding scan no evidence of acute GI bleed. Chest x-ray from today with following results--    FINDINGS:   Stable cardiomediastinal silhouette. Pulmonary vascularity is   congested. There are pleural effusions as well as bibasilar   atelectasis, right greater than left.        Impression:         Pleural effusions with adjacent atelectasis. Pulmonary vascular   congestion indicating elevated left heart filling pressures. Cardiology consult from yesterday appreciated; loaded with IV digoxin change Lopressor to metoprolol succinate. Recommend Bumex 1 mg IV twice daily.        Objective:     PHYSICAL EXAM:    VS: BP (!) 93/51   Pulse 98   Temp 96.8 °F (36 °C) (Axillary)   Resp 16   Ht 5' 8\" (1.727 m)   Wt 151 lb 7.3 oz (68.7 kg)   SpO2 94%   BMI 23.03 kg/m²     Labs:   CBC:   Lab Results   Component Value Date    WBC 22.5 08/04/2020    RBC 2.75 08/04/2020    HGB 8.6 08/04/2020    HCT 26.2 08/04/2020    MCV 95.3 08/04/2020    MCH 31.3 08/04/2020    MCHC 32.8 08/04/2020    RDW 14.6 08/04/2020     08/04/2020    MPV 10.4 Lab Results   Component Value Date    PROTIME 12.6 08/04/2020    INR 1.1 08/04/2020     Warfarin PT/INR:  No components found for: Navya Rouolivia  PTT:    Lab Results   Component Value Date    APTT 55.9 10/08/2019   [APTT}  Troponin:    Lab Results   Component Value Date    TROPONINI <0.01 07/31/2020     Last 3 Troponin:    Lab Results   Component Value Date    TROPONINI <0.01 07/31/2020    TROPONINI <0.01 07/31/2020    TROPONINI <0.01 07/30/2020     U/A:  No results found for: NITRITE, COLORU, PROTEINU, PHUR, LABCAST, WBCUA, RBCUA, MUCUS, TRICHOMONAS, YEAST, BACTERIA, CLARITYU, SPECGRAV, LEUKOCYTESUR, UROBILINOGEN, BILIRUBINUR, BLOODU, GLUCOSEU, AMORPHOUS  ABG:    Lab Results   Component Value Date    PH 7.444 08/04/2020    PCO2 36.8 08/04/2020    PO2 89.5 08/04/2020    HCO3 24.7 08/04/2020    BE 0.7 08/04/2020    O2SAT 96.6 08/04/2020     HgBA1c:    Lab Results   Component Value Date    LABA1C 6.2 07/31/2020     FLP:    Lab Results   Component Value Date    TRIG 79 07/31/2020    HDL 30 07/31/2020    LDLCALC 33 07/31/2020    LABVLDL 16 07/31/2020     TSH:    Lab Results   Component Value Date    TSH 0.525 07/31/2020     VITAMIN B12: No components found for: B12  FOLATE:    Lab Results   Component Value Date    FOLATE 9.0 07/31/2020     IRON:  No results found for: IRON  Iron Saturation:  No components found for: PERCENTFE  TIBC:  No results found for: TIBC  FERRITIN:  No results found for: FERRITIN  PSA: No results found for: PSA     General appearance: Alert, Awake, Oriented times 3, no distress; nasal cannula oxygen in place  Skin: Warm and dry ; no rashes  Head: Normocephalic. No masses, lesions or tenderness noted  Eyes: Conjunctivae pale, sclera white. PERRL,EOM-I  Ears: Right external ear normal left external ear seems to have cauliflower ear despite patient's complaint of drainage from ear. Nose/Sinuses: Nares normal. Septum midline.  Mucosa normal. No drainage  Oropharynx: Oropharynx clear with no exudate seen  Neck: Supple. No jugular venous distension, lymphadenopathy or thyromegaly Trachea midline  Lungs: Bilateral wheezes, occasional rhonchi bibasilar rales  Heart: Irregularly irregular at 130/min. No rub or gallop; grade 1/6 systolic murmur second right intercostal space grade 1/6 systolic murmur left sternal border  Abdomen: Soft, non-tender. BS normal. No masses, organomegaly; no rebound or guarding; right groin triple-lumen catheter persists  Extremities: No edema, Peripheral pulses palpable  Musculoskeletal: Muscular strength appears intact. Neuro:  No focal motor defects ; II-XII grossly intact . CORNELL equally    TELEMETRY: REVIEWED--Telemetry: Atrial fibrillation    ASSESSMENT:   Principal Problem:    GI bleeding  Active Problems:    Bilateral carotid artery stenosis    Tobacco dependence    Chronic obstructive pulmonary disease with acute exacerbation (HCC)    Combined systolic and diastolic congestive heart failure (HCC)    Atrial fibrillation (HCC)    On home O2    Hypertension    Hyperlipidemia    CAD (coronary artery disease)    Hypoprothrombinemia (HCC)    CKD (chronic kidney disease) stage 3, GFR 30-59 ml/min (HCC)    Hypokalemia    Non-rheumatic aortic sclerosis (HCC)    Hypotension due to hypovolemia    Emesis    Diarrhea    Thrombocytopenia (HCC)    Hypotension    Unintentional weight loss of 10% body weight within 6 months    Moderate protein-calorie malnutrition (HCC)    Pulmonary hypertension (HCC)    Erosive esophagitis  Resolved Problems:    * No resolved hospital problems.  *      PLAN:  SEE ORDERS      RE  CHANGES AND FINDINGS   Medications reviewed with patient  GI prophylaxis  DVT prophylaxis  Consultants notes reviewed   Continue oral potassium  Continue potassium protocol  Add potassium phosphate 20 mmol IV today  Monitor labs  I discussed the above with infectious disease regarding long-term Zyvox, equivocal left ear infection  Low-fat diet  Ambien for sleep 5 mg  Wean Gerardo-Synephrine as possible  Hold anticoagulation in view of continuing drop in hemoglobin despite atrial fibrillation  Sequential compression device  PT OT  Continue nasal cannula oxygen  Transfuse as needed, keep hemoglobin greater than 7.0  Continue aerosol treatments  Plavix isosorbide mononitrate metoprolol tartrate amlodipine all on hold  Intensivist has restarted metoprolol tartrate 25 mg twice daily; patient had been on 100 mg twice daily prehospital.  Because of low blood pressure smaller dose initiated  Digoxin 125 mcg IV daily to help control heart rate  CT of chest, pneumonia  Wean Gerardo-Synephrine as able  Fluconazole 400 mg IV every 24 hours  Tessalon Perles  Guaifenesin 400 mg 4 times daily  Increase methylprednisolone 60 mg every 8 hours  Increase Ambien 10 mg at bedtime  Potassium phosphate 30 mmol IV today  Monitor labs  Diltiazem IV drip has been started 5 mg/h  Digoxin 375 mcg was given one-time this morning  IV Gerardo-Synephrine has been discontinued  Continue aerosol treatments  Fluconazole 400 mg IV every 24 hours  Methylprednisolone 60 mg IV every 8 hours  Toprol XL 25 mg twice daily in place of Lopressor  2 g magnesium sulfate given one-time  Cardiology has been consulted by intensivist  I discussed the above with intensivist; apixaban will be started if bleeding scan shows no evidence of blood loss; has been ordered by cardiology  Digoxin 125 mcg IV daily  Coumadin 2 mg daily has been started today-I spoke with intensivist regarding same; they felt Coumadin would be safer since patient presented with GI bleed and no good reversal for apixaban. Reduce methylprednisolone 40 mg every 8 hours  Metoprolol succinate 25 mg twice daily  Phenylephrine IV continues 30 mL/h, 100 mcg/min. Fluconazole 400 mg IV every 24 hours, Candida esophagitis          Discussed with patient and nursing.       Chu Nguyễn DO     10:19 AM     8/4/2020    TIME > 35 MINUTES    >  50 %  OF  TIME  DISCUSSION Labs     08/02/20  0610   CRP <0.1       ESR:  Recent Labs     08/02/20  0610 08/03/20  0545   SEDRATE 4 3       RADIOLOGY: REVIEWED AVAILABLE REPORT  XR CHEST PORTABLE   Final Result   Pleural effusions with adjacent atelectasis. Pulmonary vascular   congestion indicating elevated left heart filling pressures. NM GI BLOOD LOSS   Final Result   There is no evidence for acute GI bleed            CT CHEST WO CONTRAST   Final Result   Addendum 1 of 1   Old left rib fractures are noted. Final      XR CHEST PORTABLE   Final Result      Superimposed upon bilateral interstitial infiltrates is increasing   consolidation and effusion of the right lung base. XR CHEST PORTABLE   Final Result   1. Acute right basilar pneumonia. 2. Remainder of findings described as above. This report has been electronically signed by Monie Leggett MD.      [facility] Additional Contrast? None   Final Result      1. No evidence of abdominal or pelvic mass or lymphadenopathy. 2. Multiple gas-filled distended loops of small bowel are present   within the anterior aspect of the abdomen which could indicate   adynamic ileus versus partial or early small bowel obstruction. Stool   and gas is seen within the colon. Short-term follow-up may be helpful   for further evaluation. 3. Diverticulosis without evidence of acute diverticulitis. 4. Bilateral perinephric fat stranding could suggest chronic medical   renal disease with appropriate clinical history. 5. View of the lung bases show bibasilar opacities related to   pneumonia and atelectasis with small bilateral pleural effusions. 6. Fusiform infrarenal abdominal aortic aneurysm is demonstrated   measuring up to 3 cm in diameter. XR CHEST PORTABLE   Final Result      Chronic coarsened interstitial lung markings are seen throughout both   lungs. No obvious pneumonia or pleural effusion.                 Blanco Cortes DO 10:19 AM     8/4/2020      Voice recognition software used for dictation

## 2020-08-04 NOTE — PROGRESS NOTES
PROGRESS NOTE    Patient Presents with/Seen in Consultation For      *gi bleed   CHIEF COMPLAINT:   fatigue, nausea, vomiting, diarrhea, shortness of breath, and cough    Subjective:     Patient states he feels much better today. Pt states his breathing is better. He denies abd pain, n/v. States his appetite is good. No BM documented in 2 days. Pt discussed possible [facility], emotional support given, he is upset regarding LTAC. Review of Systems  Aside from what was mentioned in the PMH and HPI, essentially unremarkable, all others negative. Objective:     /66   Pulse 80   Temp 98.6 °F (37 °C) (Oral)   Resp 30   Ht 5' 8\" (1.727 m)   Wt 151 lb 7.3 oz (68.7 kg)   SpO2 95%   BMI 23.03 kg/m²     General appearance: alert, awake, pale, thin, sitting up in bed finishing eating, pale, laying in bed, and cooperative  Eyes: conjunctiva pale, sclera anicteric. PERRL. Lungs: rhonchi with diffuse wheezing to auscultation bilaterally.  O2 per NC  Heart: irregular rate and rhythm, no murmur, 2+ pulses; trace BLE edema  Abdomen: soft, non-tender; bowel sounds normal; no masses,  no organomegaly  Extremities: extremities with trace BLE edema  Pulses: 2+ and symmetric  Skin: Skin color pale, texture, turgor normal.   Neurologic: Grossly normal    melatonin tablet 3 mg, Nightly PRN  pantoprazole (PROTONIX) tablet 40 mg, BID AC  potassium chloride (KLOR-CON M) extended release tablet 20 mEq, BID  ipratropium-albuterol (DUONEB) nebulizer solution 1 ampule, BID PRN  methylPREDNISolone sodium (SOLU-MEDROL) injection 40 mg, Daily  fluconazole (DIFLUCAN) tablet 200 mg, Daily  magnesium sulfate 2 g in 50 mL IVPB premix, PRN  metoprolol succinate (TOPROL XL) extended release tablet 25 mg, BID  bumetanide (BUMEX) injection 1 mg, BID  heparin flush 100 UNIT/ML injection 300 Units, Q12H  heparin flush 100 UNIT/ML injection 300 Units, PRN  digoxin (LANOXIN) injection 125 mcg, Daily  benzonatate (TESSALON) capsule 100 mg, TID  guaiFENesin tablet 400 mg, 4x Daily PRN  atorvastatin (LIPITOR) tablet 60 mg, Daily  [Held by provider] clopidogrel (PLAVIX) tablet 75 mg, Daily  HYDROcodone-acetaminophen (NORCO) 7.5-325 MG per tablet 1 tablet, Q4H PRN  Arformoterol Tartrate (BROVANA) nebulizer solution 15 mcg, BID  budesonide (PULMICORT) nebulizer suspension 250 mcg, BID  acetaminophen (TYLENOL) tablet 650 mg, Q4H PRN  warfarin (COUMADIN) daily dosing (placeholder), RX Placeholder  sodium chloride flush 0.9 % injection 10 mL, 2 times per day  sodium chloride flush 0.9 % injection 10 mL, PRN         Data Review  CBC:   Lab Results   Component Value Date    WBC 18.2 08/05/2020    RBC 2.85 08/05/2020    HGB 8.9 08/05/2020    HCT 27.3 08/05/2020    MCV 95.8 08/05/2020    MCH 31.2 08/05/2020    MCHC 32.6 08/05/2020    RDW 14.6 08/05/2020     08/05/2020    MPV 10.5 08/05/2020     CMP:    Lab Results   Component Value Date     08/05/2020    K 4.0 08/05/2020    K 2.6 07/30/2020     08/05/2020    CO2 31 08/05/2020    BUN 24 08/05/2020    CREATININE 1.1 08/05/2020    GFRAA >60 08/05/2020    LABGLOM >60 08/05/2020    GLUCOSE 142 08/05/2020    PROT 4.5 08/05/2020    LABALBU 3.1 08/05/2020    CALCIUM 8.3 08/05/2020    BILITOT 0.3 08/05/2020    ALKPHOS 99 08/05/2020    AST 47 08/05/2020    ALT 91 08/05/2020     Hepatic Function Panel:    Lab Results   Component Value Date    ALKPHOS 99 08/05/2020    ALT 91 08/05/2020    AST 47 08/05/2020    PROT 4.5 08/05/2020    BILITOT 0.3 08/05/2020    BILIDIR <0.2 08/05/2020    IBILI see below 08/05/2020    LABALBU 3.1 08/05/2020     No components found for: CHLPLat  Lab Results   Component Value Date    TRIG 79 07/31/2020   e    LDLCALC 33 07/31/2020             Lab Results   Component Value Date    HDL 30 07/31/2020    D  Lab Results   Component Value Date    LDLCALC 33 07/31/2020   ate    PROTIME 13.1 08/05/2020    INR 1.1 08/05/2020     IRON:  No results found for: IRON  Iron Saturation:  No components found for: PERCENTFE  FERRITIN:  No results found for: FERRITIN      Assessment:     Principal Problem:    GI bleeding  Active Problems:  ? Anemia, normocytic, normochromic-melanotic FOBT+ stool - EGD erosive esophagitis, candida, and gastritsi  ? Elevated LFTs, likely 2/2 Diflucan vs shock liver  ? Diarrhea - resolved  ? Nausea and vomiting - resolved  ? Thrombocytopenia - resolved  ? Unintentional weight loss - 90#/6-8 mo  ? Supratherapeutic INR - resolved  ? Leukocytosis - ID following  ? A-Fib on Warfarin - Warfarin on hold  ? Shortness of breath, worse on exertion-defer to pulmonary/ICU  ? Hypokalemia, hypochloremia -defer to ICU/PCP  ? Hypotension-defer to ICU  ? EGD 7/31/20: Severe erosive esophagitis, Candida esophagitis, but no varices seen. Stomach showed gastritis and biopsies were not taken in view of the patient's thrombocytopenia. Duodenum appeared unremarkable. There was no evidence of old or active bleeding. ? Constipation        Plan:     ? Continue Colace po 200 mg at HS  ? Palliative Care input noted  ? Critical care management per ICU   ? Low fat diet, as tolerated  ? Fluconazole increased per ID - LFTs rising - consider alternative - defer to ID   ? Medicate for nausea as ordered  ? Serial H&H, transfuse per ICU  ? Monitor CBC, CMP, INR daily  ? Defer comorbidities to others  ? Colonoscopy to be done once pt stabilized  ? Planning for LTAC in progress to [facility]  ? Continue to monitor      Note: This report was completed utilizing computer voice recognition software. Every effort has been made to ensure accuracy, however; inadvertent computerized transcription errors may be present.      Discussed with Dr. Daniel Ribera per Dr. Brian Delacruz LUYH-LORD-US, FNP-BC 8/5/2020 1:40 PM For Dr. Delmis Griggs

## 2020-08-04 NOTE — PROGRESS NOTES
[address] [place] [address] [facility]  Progress Note      Chief Complaint   Patient presents with    Emesis     N/V with dizziness and fatigue     Fatigue    Dizziness       SUBJECTIVE:  Patient is tolerating medications. No reported adverse drug reactions. No nausea, vomiting, diarrhea. Awake and alert  Events of last night noted  Remains on Cardizem drip. Off of pressors  On bipap, alterant with n/c  blood pressure hovering around the 839 systolic    Review of systems:  As stated above in the chief complaint, otherwise negative.     Medications:  Scheduled Meds:   potassium chloride  20 mEq Oral BID    warfarin  2 mg Oral Once    methylPREDNISolone  40 mg Intravenous Daily    lidocaine        metoprolol succinate  25 mg Oral BID    bumetanide  1 mg Intravenous BID    heparin flush  3 mL Intravenous Q12H    fluconazole  400 mg Intravenous Q24H    digoxin  125 mcg Intravenous Daily    benzonatate  100 mg Oral TID    atorvastatin  60 mg Oral Daily    [Held by provider] clopidogrel  75 mg Oral Daily    Arformoterol Tartrate  15 mcg Nebulization BID    budesonide  250 mcg Nebulization BID    warfarin (COUMADIN) daily dosing (placeholder)   Other RX Placeholder    pantoprazole  40 mg Intravenous BID    And    sodium chloride (PF)  10 mL Intravenous BID    sodium chloride flush  10 mL Intravenous 2 times per day     Continuous Infusions:   phenylephrine (SOFÍA-SYNEPHRINE) 50mg/250mL infusion Stopped (20 0609)    dilTIAZem 7.5 mg/hr (20 0940)     PRN Meds:ipratropium-albuterol, potassium chloride, magnesium sulfate, heparin flush, guaiFENesin, HYDROcodone-acetaminophen, acetaminophen, sodium chloride flush    OBJECTIVE:  BP (!) 93/51   Pulse 98   Temp 96.8 °F (36 °C) (Axillary)   Resp 23   Ht 5' 8\" (1.727 m)   Wt 151 lb 7.3 oz (68.7 kg)   SpO2 96%   BMI 23.03 kg/m²   Temp  Av.5 °F (36.4 °C)  Min: 96.8 °F (36 °C)  Max: 97.8 °F (36.6 °C)  Constitutional: The patient is awake, alert, and oriented. Skin: Warm and dry. No rashes were noted. HEENT: Round and reactive pupils. Moist mucous membranes. No ulcerations or thrush. Neck: Supple to movements. Chest: No use of accessory muscles to breathe. Symmetrical expansion. No wheezing, crackles or rhonchi. Decreased breath sounds in the bases  Cardiovascular: S1 and S2 irregularly irregular. No murmurs appreciated. Abdomen: Positive bowel sounds to auscultation. Benign to palpation. No masses felt. No hepatosplenomegaly. Genitourinary: Male  Extremities: No clubbing, no cyanosis, no edema.   Lines: peripheral    Laboratory and Tests Review:  Lab Results   Component Value Date    WBC 22.5 (H) 08/04/2020    WBC 19.2 (H) 08/03/2020    WBC 18.0 (H) 08/02/2020    HGB 8.6 (L) 08/04/2020    HCT 26.2 (L) 08/04/2020    MCV 95.3 08/04/2020     08/04/2020     Lab Results   Component Value Date    NEUTROABS 19.52 (H) 08/04/2020    NEUTROABS 17.01 (H) 08/03/2020    NEUTROABS 16.19 (H) 08/02/2020     No results found for: [facility]  Lab Results   Component Value Date    ALT 92 (H) 08/04/2020    AST 55 (H) 08/04/2020    ALKPHOS 101 08/04/2020    BILITOT 0.3 08/04/2020     Lab Results   Component Value Date     08/04/2020    K 3.9 08/04/2020    K 2.6 07/30/2020     08/04/2020    CO2 28 08/04/2020    BUN 19 08/04/2020    CREATININE 1.0 08/04/2020    CREATININE 1.0 08/03/2020    CREATININE 1.0 08/02/2020    GFRAA >60 08/04/2020    LABGLOM >60 08/04/2020    GLUCOSE 138 08/04/2020    PROT 4.9 08/04/2020    LABALBU 3.3 08/04/2020    CALCIUM 8.3 08/04/2020    BILITOT 0.3 08/04/2020    ALKPHOS 101 08/04/2020    AST 55 08/04/2020    ALT 92 08/04/2020     Lab Results   Component Value Date    CRP <0.1 08/04/2020    CRP <0.1 08/02/2020    CRP <0.1 08/01/2020     Lab Results   Component Value Date    SEDRATE 2 08/04/2020    SEDRATE 3 08/03/2020    SEDRATE 4 08/02/2020     Radiology:  Reviewed    Microbiology:   Lab Results Component Value Date    BC 24 Hours no growth 07/31/2020    BC 5 Days- no growth 02/21/2016    ORG Klebsiella pneumoniae ssp pneumoniae 07/31/2020    ORG Anaerobic gram positive cocci 06/29/2020    ORG Staphylococcus epidermidis 06/29/2020    ORG Candida species 06/29/2020     Lab Results   Component Value Date    BLOODCULT2 24 Hours no growth 07/31/2020    BLOODCULT2 5 Days- no growth 09/04/2018    BLOODCULT2 5 Days- no growth 02/21/2016    ORG Klebsiella pneumoniae ssp pneumoniae 07/31/2020    ORG Anaerobic gram positive cocci 06/29/2020    ORG Staphylococcus epidermidis 06/29/2020    ORG Candida species 06/29/2020     WOUND/ABSCESS   Date Value Ref Range Status   06/29/2020 Light growth  Final   01/03/2020 Light growth  Final   01/03/2020 Rare growth  Final     Smear, Respiratory   Date Value Ref Range Status   09/04/2018   Final    Group 5: >25 PMN's/LPF and <10 Epithelial cells/LPF  Abundant Polymorphonuclear leukocytes  Epithelial cells not seen  Moderate Gram negative rods  Rare Gram positive cocci in pairs       No results found for: MPNEUMO, CLAMYDCU, LABLEGI, AFBCX, FUNGSM, LABFUNG  CULTURE, RESPIRATORY   Date Value Ref Range Status   09/04/2018 Oral Pharyngeal Marysol present  Final     No results found for: CXCATHTIP  No results found for: BFCS  Culture Surgical   Date Value Ref Range Status   06/25/2019 Light growth  Final     Urine Culture, Routine   Date Value Ref Range Status   07/31/2020 (A)  Final    <10,000 CFU/mL  Oxidase positive gram negative rods     07/31/2020 <25,000 CFU/ml  Final     MRSA Culture Only   Date Value Ref Range Status   07/31/2020 Methicillin resistant Staph aureus not isolated  Final   urine with klb    ASSESSMENT:  · Reactive leukocytosis secondary to bleed  · A.  Fib  · Left ear issue is quiescence  · Candida esophagitis on a low dose of Diflucan down with the steroids I think this should be increased  · Platelets improved    PLAN:  · Continue fluconazole 400 a day-will check with Dr Priyank Fuller about switching to oral  · Patient has been on antibiotics orally since January more less and loath to give him any more necessarily  · Check final cultures  · Monitor labs    Leona Goode  1:27 PM  8/4/2020     Pt seen and examined. Above discussed agree. Labs, cultures, and radiographs reviewed. Face to Face encounter occurred. Changes made as necessary.      Chiara Bravo MD

## 2020-08-04 NOTE — PROGRESS NOTES
Date: 8/4/2020    Time: 12:43 PM    Patient Placed On BIPAP/CPAP/ Non-Invasive Ventilation? Yes    If no must comment. Facial area red/color change? No           If YES are Blister/Lesion present? No   If yes must notify nursing staff  BIPAP/CPAP skin barrier? Yes    Skin barrier type:mepilex       Comments:         Sanna Brice

## 2020-08-04 NOTE — PROGRESS NOTES
DAILY PROGRESS NOTE - [facility]    SUBJECTIVE:    He is being followed for rapid atrial fibrillation and acute diastolic heart failure. Average rate around 110 to 120 bpm at this time but diltiazem drip remains at 7.5 mg/h.  2 L negative over the last 24 hours but around 3.5 L positive since admission. Echo revealing low normal LVEF, moderately severe pulmonary hypertension, decreased RVEF. Currently laying in bed on BiPAP mask, which he uses intermittently. OBJECTIVE:    His vital signs were reviewed today. Temp heart rate varies between 85 and 110-115, respiratory 20, blood pressure 95/50.     Vitals:    08/04/20 0933   BP:    Pulse:    Resp: 16   Temp:    SpO2:        Scheduled Meds:   metoprolol succinate  25 mg Oral BID    bumetanide  1 mg Intravenous BID    heparin flush  3 mL Intravenous Q12H    zolpidem  10 mg Oral Nightly    fluconazole  400 mg Intravenous Q24H    digoxin  125 mcg Intravenous Daily    benzonatate  100 mg Oral TID    methylPREDNISolone  60 mg Intravenous Q8H    [Held by provider] amLODIPine  5 mg Oral Daily    atorvastatin  60 mg Oral Daily    [Held by provider] clopidogrel  75 mg Oral Daily    [Held by provider] isosorbide mononitrate  30 mg Oral Daily    Arformoterol Tartrate  15 mcg Nebulization BID    budesonide  250 mcg Nebulization BID    ipratropium-albuterol  1 ampule Inhalation Q4H WA    warfarin (COUMADIN) daily dosing (placeholder)   Other RX Placeholder    pantoprazole  40 mg Intravenous BID    And    sodium chloride (PF)  10 mL Intravenous BID    sodium chloride flush  10 mL Intravenous 2 times per day     Continuous Infusions:   phenylephrine (SOFÍA-SYNEPHRINE) 50mg/250mL infusion Stopped (08/04/20 0609)    dilTIAZem 7.5 mg/hr (08/04/20 0940)     PRN Meds:.potassium chloride, magnesium sulfate, heparin flush, guaiFENesin, HYDROcodone-acetaminophen, acetaminophen, sodium chloride flush    REVIEW OF SYSTEMS: Difficult to obtain through BiPAP mask.    FAMILY HISTORY: Negative for CAD in first-degree relatives. SOCIAL HISTORY: Negative for alcohol, tobacco, or illicit drug use. PHYSICAL EXAM:    General Appearance:  awake, alert, oriented, mildly dyspneic  Neck:  no bruits  Lungs:  Normal expansion. Coarse breath sounds bilaterally to auscultation. No rales, rhonchi, or wheezing. Heart:  Heart sounds are normal.  Irregular rate and rhythm without murmur, gallop or rub. Abdomen:  Soft, non-tender. Extremities: Extremities warm to touch, pink, with no edema. Neuro/musculosketal:  Unremarkable. LABS:    Recent Labs     08/02/20  2110 08/03/20  0545 08/04/20  0555    137 142   CREATININE 1.0 1.0 1.0       Recent Labs     08/02/20  1800 08/03/20  0545 08/04/20  0555   HGB 8.7* 8.4* 8.6*       Recent Labs     08/02/20  0610 08/03/20  0545 08/04/20  0555   INR 1.3 1.1 1.1         IMPRESSION:    #1.  Rapid permanent atrial fibrillation- probably provoked by underlying infection and anemia. Would give further IV digoxin. Would hold metoprolol ONLY if heart rate below 50 or systolic pressure below 90. #2. Anticoagulation status- question arises of whether to give Eliquis anticoagulation or warfarin, warfarin being preferred because it is reversible. However, his INR was 10 on presentation which may have contributed at least in part to his GI bleed. Warfarin, although less optimal than Eliquis, would be acceptable in this situation. Goal INR is between 2 and 3. Restart when acceptable to ICU team.    #3.  Coronary disease- 2 previous CABG surgeries and multiple stents. No indication of unstable angina. Recommend restarting baby aspirin as soon as possible. #3. Hypertension-blood pressure borderline low but would not hold any medications unless systolic pressure below 90 or mean arterial pressure below 60. I stopped amlodipine and isosorbide. #4. Hyperlipidemia-statin once stable.     #5.  Acute diastolic heart

## 2020-08-04 NOTE — CARE COORDINATION
Updated discharge plan of care. Pt remains on 5l/nc, cardizem drip, iv bumex, iv solu medrol and leopoldo drip. I spoke with pt and also called and spoke with Jer Estrella, pt's significant other. She would like a referral to [facility], given to Kasandra Acuna. If he does not meet criteria, he may need short-term rehab. PT/OT to see pt when stable. 1st choice would be [facility].  Will continue to follow-O

## 2020-08-05 LAB
ALBUMIN SERPL-MCNC: 3.1 G/DL (ref 3.5–5.2)
ALP BLD-CCNC: 99 U/L (ref 40–129)
ALT SERPL-CCNC: 91 U/L (ref 0–40)
ANION GAP SERPL CALCULATED.3IONS-SCNC: 9 MMOL/L (ref 7–16)
AST SERPL-CCNC: 47 U/L (ref 0–39)
BASOPHILIC STIPPLING: ABNORMAL
BASOPHILS ABSOLUTE: 0.02 E9/L (ref 0–0.2)
BASOPHILS RELATIVE PERCENT: 0.1 % (ref 0–2)
BILIRUB SERPL-MCNC: 0.3 MG/DL (ref 0–1.2)
BILIRUBIN DIRECT: <0.2 MG/DL (ref 0–0.3)
BILIRUBIN, INDIRECT: ABNORMAL MG/DL (ref 0–1)
BLOOD CULTURE, ROUTINE: NORMAL
BUN BLDV-MCNC: 24 MG/DL (ref 8–23)
C-REACTIVE PROTEIN: <0.1 MG/DL (ref 0–0.4)
CALCIUM SERPL-MCNC: 8.3 MG/DL (ref 8.6–10.2)
CHLORIDE BLD-SCNC: 101 MMOL/L (ref 98–107)
CO2: 31 MMOL/L (ref 22–29)
CREAT SERPL-MCNC: 1.1 MG/DL (ref 0.7–1.2)
CULTURE, BLOOD 2: NORMAL
EOSINOPHILS ABSOLUTE: 0 E9/L (ref 0.05–0.5)
EOSINOPHILS RELATIVE PERCENT: 0 % (ref 0–6)
GFR AFRICAN AMERICAN: >60
GFR NON-AFRICAN AMERICAN: >60 ML/MIN/1.73
GLUCOSE BLD-MCNC: 142 MG/DL (ref 74–99)
HCT VFR BLD CALC: 27.3 % (ref 37–54)
HEMOGLOBIN: 8.9 G/DL (ref 12.5–16.5)
IMMATURE GRANULOCYTES #: 0.67 E9/L
IMMATURE GRANULOCYTES %: 3.7 % (ref 0–5)
INR BLD: 1.1
LACTIC ACID: 2.8 MMOL/L (ref 0.5–2.2)
LYMPHOCYTES ABSOLUTE: 0.42 E9/L (ref 1.5–4)
LYMPHOCYTES RELATIVE PERCENT: 2.3 % (ref 20–42)
MAGNESIUM: 2.1 MG/DL (ref 1.6–2.6)
MCH RBC QN AUTO: 31.2 PG (ref 26–35)
MCHC RBC AUTO-ENTMCNC: 32.6 % (ref 32–34.5)
MCV RBC AUTO: 95.8 FL (ref 80–99.9)
MONOCYTES ABSOLUTE: 1.34 E9/L (ref 0.1–0.95)
MONOCYTES RELATIVE PERCENT: 7.4 % (ref 2–12)
NEUTROPHILS ABSOLUTE: 15.7 E9/L (ref 1.8–7.3)
NEUTROPHILS RELATIVE PERCENT: 86.5 % (ref 43–80)
PDW BLD-RTO: 14.6 FL (ref 11.5–15)
PHOSPHORUS: 2.9 MG/DL (ref 2.5–4.5)
PLATELET # BLD: 270 E9/L (ref 130–450)
PMV BLD AUTO: 10.5 FL (ref 7–12)
POLYCHROMASIA: ABNORMAL
POTASSIUM SERPL-SCNC: 4 MMOL/L (ref 3.5–5)
PROTHROMBIN TIME: 13.1 SEC (ref 9.3–12.4)
RBC # BLD: 2.85 E12/L (ref 3.8–5.8)
SEDIMENTATION RATE, ERYTHROCYTE: 0 MM/HR (ref 0–15)
SODIUM BLD-SCNC: 141 MMOL/L (ref 132–146)
TOTAL PROTEIN: 4.5 G/DL (ref 6.4–8.3)
WBC # BLD: 18.2 E9/L (ref 4.5–11.5)

## 2020-08-05 PROCEDURE — 6370000000 HC RX 637 (ALT 250 FOR IP): Performed by: INTERNAL MEDICINE

## 2020-08-05 PROCEDURE — 97530 THERAPEUTIC ACTIVITIES: CPT

## 2020-08-05 PROCEDURE — 85025 COMPLETE CBC W/AUTO DIFF WBC: CPT

## 2020-08-05 PROCEDURE — 80048 BASIC METABOLIC PNL TOTAL CA: CPT

## 2020-08-05 PROCEDURE — 2060000000 HC ICU INTERMEDIATE R&B

## 2020-08-05 PROCEDURE — 2500000003 HC RX 250 WO HCPCS: Performed by: INTERNAL MEDICINE

## 2020-08-05 PROCEDURE — 6370000000 HC RX 637 (ALT 250 FOR IP): Performed by: SPECIALIST

## 2020-08-05 PROCEDURE — 6360000002 HC RX W HCPCS: Performed by: INTERNAL MEDICINE

## 2020-08-05 PROCEDURE — 94640 AIRWAY INHALATION TREATMENT: CPT

## 2020-08-05 PROCEDURE — 36415 COLL VENOUS BLD VENIPUNCTURE: CPT

## 2020-08-05 PROCEDURE — 94660 CPAP INITIATION&MGMT: CPT

## 2020-08-05 PROCEDURE — 86140 C-REACTIVE PROTEIN: CPT

## 2020-08-05 PROCEDURE — C9113 INJ PANTOPRAZOLE SODIUM, VIA: HCPCS | Performed by: INTERNAL MEDICINE

## 2020-08-05 PROCEDURE — 80076 HEPATIC FUNCTION PANEL: CPT

## 2020-08-05 PROCEDURE — 36592 COLLECT BLOOD FROM PICC: CPT

## 2020-08-05 PROCEDURE — 84100 ASSAY OF PHOSPHORUS: CPT

## 2020-08-05 PROCEDURE — 85610 PROTHROMBIN TIME: CPT

## 2020-08-05 PROCEDURE — 83605 ASSAY OF LACTIC ACID: CPT

## 2020-08-05 PROCEDURE — 83735 ASSAY OF MAGNESIUM: CPT

## 2020-08-05 PROCEDURE — 97535 SELF CARE MNGMENT TRAINING: CPT

## 2020-08-05 PROCEDURE — 85651 RBC SED RATE NONAUTOMATED: CPT

## 2020-08-05 PROCEDURE — 2580000003 HC RX 258: Performed by: INTERNAL MEDICINE

## 2020-08-05 PROCEDURE — 2700000000 HC OXYGEN THERAPY PER DAY

## 2020-08-05 RX ORDER — PREDNISONE 20 MG/1
40 TABLET ORAL DAILY
Status: DISCONTINUED | OUTPATIENT
Start: 2020-08-05 | End: 2020-08-07 | Stop reason: HOSPADM

## 2020-08-05 RX ORDER — ZOLPIDEM TARTRATE 5 MG/1
10 TABLET ORAL NIGHTLY PRN
Status: DISCONTINUED | OUTPATIENT
Start: 2020-08-05 | End: 2020-08-05 | Stop reason: CLARIF

## 2020-08-05 RX ORDER — ZOLPIDEM TARTRATE 5 MG/1
5 TABLET ORAL NIGHTLY PRN
Status: DISCONTINUED | OUTPATIENT
Start: 2020-08-05 | End: 2020-08-05

## 2020-08-05 RX ORDER — LANOLIN ALCOHOL/MO/W.PET/CERES
3 CREAM (GRAM) TOPICAL NIGHTLY PRN
Status: DISCONTINUED | OUTPATIENT
Start: 2020-08-05 | End: 2020-08-07 | Stop reason: HOSPADM

## 2020-08-05 RX ORDER — PANTOPRAZOLE SODIUM 40 MG/1
40 TABLET, DELAYED RELEASE ORAL
Status: DISCONTINUED | OUTPATIENT
Start: 2020-08-05 | End: 2020-08-07 | Stop reason: HOSPADM

## 2020-08-05 RX ADMIN — BENZONATATE 100 MG: 100 CAPSULE ORAL at 23:36

## 2020-08-05 RX ADMIN — BUDESONIDE 250 MCG: 0.25 SUSPENSION RESPIRATORY (INHALATION) at 08:10

## 2020-08-05 RX ADMIN — BUMETANIDE 1 MG: 0.25 INJECTION INTRAMUSCULAR; INTRAVENOUS at 16:52

## 2020-08-05 RX ADMIN — SODIUM CHLORIDE, PRESERVATIVE FREE 10 ML: 5 INJECTION INTRAVENOUS at 20:23

## 2020-08-05 RX ADMIN — METOPROLOL SUCCINATE 25 MG: 25 TABLET, EXTENDED RELEASE ORAL at 08:16

## 2020-08-05 RX ADMIN — MELATONIN 3 MG ORAL TABLET 3 MG: 3 TABLET ORAL at 23:36

## 2020-08-05 RX ADMIN — ZOLPIDEM TARTRATE 5 MG: 5 TABLET ORAL at 01:15

## 2020-08-05 RX ADMIN — FLUCONAZOLE 200 MG: 100 TABLET ORAL at 08:18

## 2020-08-05 RX ADMIN — ATORVASTATIN CALCIUM 60 MG: 20 TABLET, FILM COATED ORAL at 08:18

## 2020-08-05 RX ADMIN — BUMETANIDE 1 MG: 0.25 INJECTION INTRAMUSCULAR; INTRAVENOUS at 08:18

## 2020-08-05 RX ADMIN — ARFORMOTEROL TARTRATE 15 MCG: 15 SOLUTION RESPIRATORY (INHALATION) at 08:10

## 2020-08-05 RX ADMIN — POTASSIUM CHLORIDE 20 MEQ: 20 TABLET, EXTENDED RELEASE ORAL at 08:18

## 2020-08-05 RX ADMIN — DIGOXIN 125 MCG: 0.25 INJECTION INTRAMUSCULAR; INTRAVENOUS at 08:19

## 2020-08-05 RX ADMIN — POTASSIUM CHLORIDE 20 MEQ: 20 TABLET, EXTENDED RELEASE ORAL at 20:22

## 2020-08-05 RX ADMIN — METHYLPREDNISOLONE SODIUM SUCCINATE 40 MG: 40 INJECTION, POWDER, LYOPHILIZED, FOR SOLUTION INTRAMUSCULAR; INTRAVENOUS at 08:18

## 2020-08-05 RX ADMIN — BENZONATATE 100 MG: 100 CAPSULE ORAL at 08:18

## 2020-08-05 RX ADMIN — BUDESONIDE 250 MCG: 0.25 SUSPENSION RESPIRATORY (INHALATION) at 20:59

## 2020-08-05 RX ADMIN — METOPROLOL SUCCINATE 25 MG: 25 TABLET, EXTENDED RELEASE ORAL at 20:14

## 2020-08-05 RX ADMIN — ZOLPIDEM TARTRATE 5 MG: 5 TABLET ORAL at 02:04

## 2020-08-05 RX ADMIN — PANTOPRAZOLE SODIUM 40 MG: 40 TABLET, DELAYED RELEASE ORAL at 16:52

## 2020-08-05 RX ADMIN — ARFORMOTEROL TARTRATE 15 MCG: 15 SOLUTION RESPIRATORY (INHALATION) at 20:59

## 2020-08-05 RX ADMIN — BENZONATATE 100 MG: 100 CAPSULE ORAL at 16:52

## 2020-08-05 RX ADMIN — PANTOPRAZOLE SODIUM 40 MG: 40 INJECTION, POWDER, FOR SOLUTION INTRAVENOUS at 08:18

## 2020-08-05 ASSESSMENT — PAIN SCALES - GENERAL
PAINLEVEL_OUTOF10: 0

## 2020-08-05 NOTE — PROGRESS NOTES
Occupational Therapy  OT BEDSIDE TREATMENT NOTE      Date:2020  Patient Name: Griffin Enamorado  MRN: [id number]  : 1940  Room: [address] [place], [place] [address]     Referring Provider: Christine Chandler MD     Evaluating OT: Dee Dee Rodriguez OTR/L [id number]     AM-PAC Daily Activity Raw Score: 17     Recommended Adaptive Equipment: TBD     Diagnosis: GI bleed. Pt presents to ED from home with fatigue, nausea, vomiting and diarrhea. Surgery:  upper endoscopy   Pertinent Medical History: HTN, emphysema, COPD, CHF, CAD  Precautions:  Falls, O2, art line, triple lumen in R groin      Home Living: Pt lives alone in a single story apt with 10 steps on entry. Bathroom setup: walk in shower, standard commode      Prior Level of Function: Independent with ADLs, Independent with IADLs; completed functional mobility with no AD. 2-4L O2 at home. Driving: Yes     Pain Level: Pt denied having pain      Cognition: Alert and oriented. Cues for safety awareness. Impulsive at times. Functional Assessment:    Initial Eval Status  Date: 20 Treatment session:  Short Term Goals  Treatment frequency: 2-5x/wk PRN x1-3 wks      Feeding Set up       Grooming Set up Supervision while standing at the sink   Independent   UB Dressing Set up Min A due to IV lines.   Independent   LB Dressing Min A Min  A  Mod I    Bathing Mod A   Mod I   Toileting Min A SBA   Pt walked to bathroom to sit on toilet.  Independent with toilet hygiene  Mod I   Bed Mobility  Supine to sit: Min A  Sit to Supine: SBA SBA supine to sit       Functional Transfers STS: CGA SBA from bed, chair, and toilet   Mod I   Functional Mobility CGA with no AD  Side steps to HOB    SBA using w/w   Limited within room due to monitor lines.   Mod I during ADLs   Balance Sitting: fair plus     Standing: fair/fair minus  1 LOB during stand task       Activity Tolerance poor Fair   standing brice x6-7 min with fair plus balance during self care tasks       Comments:  Pt motivated to increase level of activity. Wants to return to home at discharge. SOB with exertion. O2 saturation 92-93%. Pt remained in chair at end of the session. Education/treatment:  ADL retraining with facilitation of movement to increase self care. Therapeutic activity to address balance, strength, and endurance for ADL and transfers. Pt education of walker safety, energy conservation, and transfer safety. · Pt has made  progress towards set goals.    · Continue with current plan of care        Treatment Charges: Mins Units    ADL/Home Mgt 29531 10 1    Thera Activities [id number]     Ther Ex 13215      Manual Therapy 91131      Neuro Re-ed 06602      Orthotic manage/training  56110      Non Billable Time      Total Timed Treatment 18 200 [facility]/L [address]

## 2020-08-05 NOTE — PROGRESS NOTES
Critical Care Team - Daily Progress Note         Date and time: 8/5/2020 8:17 AM  Patient's name:  Brit Livingston  Medical Record Number: [id number]  Patient's account/billing number: [de-identified]  Patient's YOB: 1940  Age: 70 y.o. Date of Admission: 7/30/2020  6:00 PM  Length of stay during current admission: 6    Primary Care Physician: Bashir Aguilar MD  ICU Attending Physician: Dr. Eladio Gary Status: Limited    Reason for ICU admission: COPD exacerbation     SUBJECTIVE:            OVERNIGHT EVENTS:    No problems overnight heart rate was controlled. Patient got confused on the Ambien.     CURRENT VENTILATION STATUS: 4 liters which appears to be his baseline   []? ? Ventilator   [x]? ? BIPAP nightly        [x]? ? Nasal Cannula       []? ? Room Air    IF INTUBATED, ET TUBE MARKING AT LOWER LIP:       cms  SECRETIONS Amount:         []? ? Small          []? ? Moderate                []? ? Large  [x]? ? EWDB                       [facility]:              []? ? White          []? ? Colored                   []? ? Bloody  SEDATION:    RAAS Score:  []?? Propofol gtt             []? ? Versed gtt               []? ? Ativan gtt                [x]? ? No Sedation  PARALYZED:             [x]? ? No                           []? ? Yes  VASOPRESSORS:    [x]? ? No                           []? ? Yes    If yes - []? ? Levophed       []? ? Dopamine          []? ? Vasopressin       []? ? Dobutamine  []? ? Phenylephrine         []? ? Epinephrine  CENTRAL LINES:     []? ? No               []? ? Yes   (Date of Insertion:   )  And Rabia      7/31    If yes -     []? ? Right IJ             []? ? Left IJ         [x]? ? Right Femoral        []? ? Left Femoral                   []? ? Right Subclavian           []? ? Left Subclavian                  []? ?[address] CATHETER:   [x]? ? No                      []? ? Yes  (Date of Insertion:   )   URINE OUTPUT:            [x]? ?[address]                  []? ? Low              []? ? Anuric            OBJECTIVE:     VITAL SIGNS:  /66   Pulse 75   Temp 98.6 °F (37 °C) (Oral)   Resp 20   Ht 5' 8\" (1.727 m)   Wt 151 lb 7.3 oz (68.7 kg)   SpO2 97%   BMI 23.03 kg/m²   Tmax over 24 hours:  Temp (24hrs), Av °F (36.7 °C), Min:97.6 °F (36.4 °C), Max:98.6 °F (37 °C)      Patient Vitals for the past 6 hrs:   BP Temp Temp src Pulse Resp SpO2 Weight   20 0600 126/66 -- -- 75 20 97 % 151 lb 7.3 oz (68.7 kg)   20 0500 126/66 -- -- 74 20 96 % --   20 0426 -- -- -- -- 22 98 % --   20 0400 (!) 98/52 98.6 °F (37 °C) Oral 76 17 98 % --   20 0300 (!) 106/50 -- -- 65 19 -- --         Intake/Output Summary (Last 24 hours) at 2020 0817  Last data filed at 2020 7099  Gross per 24 hour   Intake 1690 ml   Output 3495 ml   Net -1805 ml     Wt Readings from Last 2 Encounters:   20 151 lb 7.3 oz (68.7 kg)   20 142 lb (64.4 kg)     Body mass index is 23.03 kg/m².         PHYSICAL EXAMINATION:  General appearance - alert, well appearing, and in no distress  Mental status - alert, oriented to person, place, and time  Eyes - pupils equal and reactive, extraocular eye movements intact  Ears - external ear canals normal  Nose - normal and patent,, discharge   Mouth - mucous membranes moist, pharynx normal without lesions  Neck - supple, no significant adenopathy, JVD  Chest - clear to auscultation symmetric air entry  Heart - normal rate, regular rhythm, normal S1, S2, no murmurs, rubs, clicks or gallops  Abdomen - soft, nontender, nondistended, no masses or organomegaly  Neurological - alert, oriented, normal speech, no focal findings or movement disorder noted  Extremities - peripheral pulses normal, no pedal edema, no clubbing or cyanosis  Skin - normal coloration and turgor, no rashes, few bruises bilaterally      MEDICATIONS:       MEDICATIONS:    Scheduled Meds:   potassium chloride  20 mEq Oral BID    methylPREDNISolone  40 mg Intravenous Daily    digoxin  250 mcg Intravenous Once    fluconazole  200 mg Oral Daily    metoprolol succinate  25 mg Oral BID    bumetanide  1 mg Intravenous BID    heparin flush  3 mL Intravenous Q12H    digoxin  125 mcg Intravenous Daily    benzonatate  100 mg Oral TID    atorvastatin  60 mg Oral Daily    [Held by provider] clopidogrel  75 mg Oral Daily    Arformoterol Tartrate  15 mcg Nebulization BID    budesonide  250 mcg Nebulization BID    warfarin (COUMADIN) daily dosing (placeholder)   Other RX Placeholder    pantoprazole  40 mg Intravenous BID    And    sodium chloride (PF)  10 mL Intravenous BID    sodium chloride flush  10 mL Intravenous 2 times per day     Continuous Infusions:   phenylephrine (SOFÍA-SYNEPHRINE) 50mg/250mL infusion Stopped (08/04/20 0609)    dilTIAZem Stopped (08/04/20 1552)     PRN Meds:   zolpidem, 5 mg, Nightly PRN  ipratropium-albuterol, 1 ampule, BID PRN  potassium chloride, 20 mEq, PRN  magnesium sulfate, 2 g, PRN  heparin flush, 3 mL, PRN  guaiFENesin, 400 mg, 4x Daily PRN  HYDROcodone-acetaminophen, 1 tablet, Q4H PRN  acetaminophen, 650 mg, Q4H PRN  sodium chloride flush, 10 mL, PRN          VENT SETTINGS (Comprehensive) (if applicable):  Vent Information  Skin Assessment: Clean, dry, & intact  FiO2 : 40 %  SpO2: 97 %  SpO2/FiO2 ratio: 245  I Time/ I Time %: 0.9 s  Mask Type: Full face mask  Mask Size: Medium  Additional Respiratory  Assessments  Pulse: 75  Resp: 20  SpO2: 97 %  Oral Care: Mouth swabbed  ABGs:   Recent Labs     08/04/20  0558   PH 7.444   PCO2 36.8   PO2 89.5   HCO3 24.7   BE 0.7   O2SAT 96.6       Laboratory findings:  Complete Blood Count:   Recent Labs     08/03/20  0545 08/04/20  0555 08/05/20  0535   WBC 19.2* 22.5* 18.2*   HGB 8.4* 8.6* 8.9*   HCT 25.8* 26.2* 27.3*    266 270        Last 3 Blood Glucose:   Recent Labs     08/03/20  0545 08/04/20  0555 08/05/20  0535   GLUCOSE 148* 138* 142*        PT/INR:    Lab Results   Component Value Date PROTIME 13.1 08/05/2020    INR 1.1 08/05/2020     PTT:    Lab Results   Component Value Date    APTT 55.9 10/08/2019       Comprehensive Metabolic Profile:   Recent Labs     08/03/20  0545 08/04/20  0555 08/04/20  1705 08/05/20  0535    142  --  141   K 4.8 3.9 4.5 4.0    104  --  101   CO2 23 28  --  31*   BUN 16 19  --  24*   CREATININE 1.0 1.0  --  1.1   GLUCOSE 148* 138*  --  142*   CALCIUM 8.5* 8.3*  --  8.3*   PROT 4.7* 4.9*  --  4.5*   LABALBU 3.2* 3.3*  --  3.1*   BILITOT 0.3 0.3  --  0.3   ALKPHOS 95 101  --  99   AST 44* 55*  --  47*   ALT 60* 92*  --  91*      Magnesium:   Lab Results   Component Value Date    MG 2.1 08/05/2020     Phosphorus:   Lab Results   Component Value Date    PHOS 2.9 08/05/2020     Ionized Calcium: No results found for: CAION     Urinalysis:     Troponin: No results for input(s): TROPONINI in the last 72 hours.     Microbiology:    Cultures during this admission:       Blood cultures:                  [] None drawn      [x] Negative     []  Positive     [] Pending   Urine Culture:                    [] None drawn      [] Negative     [x]  Positive     [] Pending low colonies of Klebsiella and Pseudomonas  Sputum Culture:                [] None drawn      [x] Negative     []  Positive     [] Pending  Endotracheal aspirate:      [] None drawn      [x] Negative     []  Positive     [] Pending  Stool:    [] None Sent        [] Negative     []  Positive     [] Pending   Other Micro:   [] None Sent        [] Negative     []  Positive     [] Pending     Other pertinent Labs:       Radiology/Imaging:       ASSESSMENT:     Principal Problem:    GI bleeding  Active Problems:    Bilateral carotid artery stenosis    Tobacco dependence    Chronic obstructive pulmonary disease with acute exacerbation (HCC)    Combined systolic and diastolic congestive heart failure (HCC)    Atrial fibrillation ([facility] [address]    On home O2    Hypertension    Hyperlipidemia    CAD (coronary artery disease) Hypoprothrombinemia (HCC)    CKD (chronic kidney disease) stage 3, GFR 30-59 ml/min (HCC)    Hypokalemia    Non-rheumatic aortic sclerosis (HCC)    Hypotension due to hypovolemia    Emesis    Diarrhea    Thrombocytopenia (HCC)    Hypotension    Unintentional weight loss of 10% body weight within 6 months    Moderate protein-calorie malnutrition ([facility] [address]    Pulmonary hypertension (HCC)    Erosive esophagitis  Resolved Problems:    * No resolved hospital problems. *    Additional assessment:     70 y.o. M with 120 py continued smoker with PMH of CHF, CAD, CKD, HTN, HLD presented on 7/30 with hypotension and GI bleed   INR found to be elevated 10 which was reversed.  Patient required Gerardo-Synephrine, octreotide, pantoprazole drip  Chest x-ray with increased congestion  7/31 CT chest no mass or lymphadenopathy.  Small bowel is distended with adynamic ileus diverticulosis.  EGD erosive esophagitis with GERD   8/2 chest film right basilar pneumonia  8/3 on BiPAP when sleeping  8/3 CT chest showing moderate effusions with atelectasis advanced COPD 90% compression deformity of T6  Bleeding scan negative  8/4 overnight required Gerardo-Synephrine chest x-ray clearing right-sided effusion  8/5 got confused with Ambien     1. hypovolemia causing shock now off pressors   2. GI bleed s/p 3 units PRBC, 2 units FFP, 1 unit platelets  3. Erosive esophagitis with Candida on EGD on fluconazole  4. Echo showing EF 55% 8/3/2020  5. A. Fib  with RVR on Toprol and digoxin rate controlled  6. Anemia stable  7. Thrombocytopenia  8. Lactic acidosis  9. COPD (follows Dr. Nikhil Jennings) in exacerbation  10. Asbestosis  11. NICHOLAS noncompliant  12. Chronic hypoxic respiratory failure on 2- 4  L  13. CAD s/p CABG [id number], 1992 and stents 2000, 2002, 2005, 2009 on Plavix  14. CKD stage III  15.  Left ear I&D status post I&D with excision of fistula and reconstruction 8/26/2019 on was on chronic antibiotics.  Cultures 1/5/2020 revealed Klebsiella.  Vancomycin was DC'd PICC line was removed and patient was started on Levaquin for 6 weeks and Keflex for 1 month.  Patient was recultured and started on Zyvox 7/6  16. Nicotine addiction  17. Compression fracture T6  18. HLD  19. HTN  20. History of weight loss  21. CODE STATUS: Meds only  22. Previous admission 2018 nonsustained V. tach        PLAN:   DC Ambien  Melatonin nightly as needed  We will change Bumex to more during daytime hours  Protonix changed to p.o. Is compliant with BiPAP would benefit from getting this for home  DC triple-lumen catheter  No plans for thoracentesis so will restart Coumadin  PT OT  Still with lactic acidosis may be due to medications ? beta-2 agonists     WEAN PER PROTOCOL:                  [x]? ? No               []? ? ENJ             []? ? N/A  DISCONTINUE ANY LABS:              [x]? ? No               []? ? Yes  ICU PROPHYLAXIS:  Stress ulcer:  [x]? ? PPI Agent               []? ? [id number]      []? ? Sucralfate               []? ? Other:  NBC:                []? ? Enoxaparin             []? ? Unfract. Heparin Subcut                [x]? ? EPC Cuffs  NUTRITION:  []?? NPO            []? ? Tube Feeding (Specify: )   []? ? TPN                        []? ? PO (Diet: Dietary Nutrition Supplements: Clear Liquid Oral Supplement  DIET LOW FAT;)  HOME MEDICATIONS RECONCILED:        [x]? ? XH                           []? ? Yes  INSULIN DRIP:                                               [x]? ? LC                           []? ? Yes  CONSULTATION NEEDED:                          [x]? ? OX                           []? ? Yes  FAMILY UPDATED:                                       [x]? ? SI                           []? ? Yes  TRANSFER OUT OF ICU:                             []? ? No                           [x]? ?Renny Pereyra M.D. Chioma Cramer. KIZZY SEWELL                     I personally saw, examined and provided care for the patient. Radiographs, labs and medication list were reviewed by me independently.  I spoke with bedside nursing, therapists and consultants. Critical care services and times documented are independent of procedures and multidisciplinary rounds with were conducted with the MICU team. The case was discussed in detail and plans for care were established.     8/5/2020, 8:17 AM

## 2020-08-05 NOTE — PATIENT CARE CONFERENCE
Intensive Care Daily Quality Rounding Checklist        ICU Team Members:  Dr. More Chaparro, charge nurse, bedside nurse, clinical pharmacist     ICU Day #: NUMBER: 6     Intubation Date:  n/a     Ventilator Day #: n/a     Central Line Insertion Date: July 31                                                    Day #: NUMBER: 6      Arterial Line Insertion Date: July 31                             Day #: NUMBER: 6     DVT Prophylaxis: PCDs    GI Prophylaxis: Protonix     Damon Catheter Insertion Date:  n/a                                        Day #: n/a                             Continued need (if yes, reason documented and discussed with physician): n/a     Skin Issues/ Wounds and ordered treatment discussed on rounds: no issues     Goals/ Plans for the Day: Daily labs, wean o2 as able, remove central line and place peripheral IV, advance activity as able, transfer to floor today

## 2020-08-05 NOTE — PROGRESS NOTES
PROGRESS  NOTE --                                                          INTERNAL  MEDICINE                                                                              I  PERSONALLY SAW , EXAMINED, AND CARED Rub[place], 8/5/2020     LABS, XRAY ,CHART, AND MEDICATIONS  REVIEWED BY ME .        8/1/2020-sUBJECTIVE: Brett Barksdale is alert awake and cooperative; oriented ×3. Denies any chest pain dyspnea nausea emesis. Tolerating diet. No abdominal pain. Had his first bowel movement today since admission. Unlikely to be C. difficile. Last evening he did have liquid diet and enjoyed it he would like something more substantial today. States he has hard time sleeping without Ambien request bedtime dose of same. I spoke with nursing, still having a hard time weaning from intravenous phenylephrine. Blood pressure keeps dropping without it. He remains afebrile. Most recent blood pressure 90/40.  97% saturation 2 L nasal cannula. Respiratory rate 21. Intake and output +5392 cc. Potassium 3.1; lactic acid finally dropping, 2.7. Glucose 103 calcium 8.3 phosphorus 1.8 protein 4.9 albumin 3.4 remainder liver functions normal.  Hemoglobin admission 11.0; today hemoglobin 8.1; WBC 16.4, INR 1.4. Platelets 975, remarkable improvement since admission, would seem likely caused by Zyvox. proBNP on admission 2500. CRP 0.2. Procalcitonin 0.10. A1c 6.2.  2D echo completed ejection fraction 55%; septal motion consistent with previous bypass surgery. Markedly large right atrium moderate tricuspid regurgitation moderate to severe pulmonary hypertension. Aortic sclerosis; pulmonary valve is sclerotic. No pleural effusion no pericardial effusion. Urine culture less than 25,000 gram-negative rods. EGD completed yesterday with following results--    Esophagus:  GERD. severe erosive esophagitis.  Candida infection        Stomach: Gastritis      Duodenum:  Normal         Pulmonary note from today appreciated; continue to wean Gerardo-Synephrine as able; Ambien as needed for sleep. Dietary consult appreciated, moderate malnutrition. Oral nutritional supplement 3 times a day has been added. 8/2/2020-patient sitting in bed finishing his lunch at this time. Currently no significant dyspnea; he has had no chest pain at all. Last night he became very short of breath; hard time sleeping because of it. Ambien 5 mg did not help much. He still had only one bowel movement since admission. He should be taken out of isolation, likelihood of C. Difficile zero. Most likely Zyvox induced diarrhea. Last evening he did have chest x-ray performed with following results--    Impression:          1. Acute right basilar pneumonia. 2. Remainder of findings described as above. He has been seen by pulmonary as well as ID today. They are not sure if he does have a pneumonia. Patient still at 95% saturation on 2 L nasal cannula. Blood pressure still borderline, 88/45 earlier today 102/50 currently. Overnight his heart rate got as high as 150 with a respiratory rate of 31. Currently heart rate 118 respirations 18. He remains afebrile. His major complaint today, difficult time expectorating mucus. His phosphorus remains low at 1.9; calcium slightly low 8.4. CRP less than 0.1. WBC 18.0 hemoglobin 8.1 platelets 390. INR 1.3 sed rate 4. ID consult from yesterday appreciated; they agree unlikely patient has infection of left ear and more likely polychondritis. Erosive esophagitis, Diflucan has been started. ID note from today appreciated; fluconazole 400 mg daily; increase dose of steroids. Stool for occult blood was positive. Rotavirus negative in stool; enteric gurjit continues in stool. C. difficile was not performed because of formed stool.   Sputum Gram stain as follows--    Group 3: >25 PMN's/LPF and >10 Epithelial cells/LPF   Moderate platelets 669. Sed rate 3. ABG from yesterday PO2 74.6 PCO2 35.2 pH 7.380. The above was done while patient on BiPAP. Gerardo-Synephrine IV has been discontinued, blood pressure stable. Heart rate was still averaging 120 most of the night; patient was then given dose of digoxin 375 mcg IV to be followed by 250 mcg. Patient has been started on diltiazem 5 mg/h IV. Gram stain of sputum with following results--     Group 2: 10-25 PMN's/LPF and >10 Epithelial cells/LPF   Few Polymorphonuclear leukocytes   Few Epithelial cells   Abundant Gram negative rods      CT of chest done yesterday with following results--    FINDINGS:   There is cardiomegaly with the calcified pericarditis. There is   coronary artery calcifications. Small to moderate lymph nodes are   identified in the mediastinum. The trachea and major bronchi are   patent. Moderate pleural effusions with atelectasis/infiltrates are   identified in the lower lobes bilaterally with  minimal atelectasis in   the right middle lobe. There is advanced COPD. Liver is of decreased   attenuation likely fatty infiltrated. There is diffuse vascular   calcification. There is 90% compression deformity of T6 with kyphosis   and mild compression deformity of the inferior endplate of T5. .        Impression:         Patchy bibasilar infiltrates/atelectasis and pleural effusion likely   CHF and/or pneumonia. Surveillance recommended. Cardiomegaly with coronary artery calcification. Severe compression deformity of T6 and mild compression deformity of   the inferior endplate of T5. Patient's pulmonologist was consulted; yesterday's note appreciated await CT of the chest results. GI note from yesterday, in view of ongoing positive stool for occult blood nuclear bleeding scan was ordered. Patient had RRT called at 1945 hrs. yesterday due to A. fib RVR and worsening shortness of breath. Heart rate has been up in the 190s.   He was given 5 mg of Lopressor IV and Cardizem drip was started. GI note from day appreciated, no changes noted. Patient was again placed on BiPAP this morning at 0838 hrs. due to dyspnea. Nuclear bleeding scan pending. 8/4/2020-patient laying quietly in his intensive care bed. No chest pain no dyspnea. Only real complaint excess urination overnight due to IV Bumex ordered by cardiology. Coughing is less. Patient has been afebrile for the last 24 hours. Most recent blood pressure still low 93/51 arterial line. 94% saturation 4 L nasal cannula. Heart rate 98/min. Intake and output still +3486 cc; a decrease of greater than 3000 cc since yesterday. Lactic acid is still +2.7. Protein 4.9 albumin 3.3 phosphorus 2.7. ALT AST still increased 92/55 respectively. Hemoglobin 8.6 WBC 22.5 platelets 528. INR 1.1. ABGs from this morning PO2 89.5 PCO2 36.8 pH 7.444. That was done on BiPAP. Nuclear medicine bleeding scan no evidence of acute GI bleed. Chest x-ray from today with following results--    FINDINGS:   Stable cardiomediastinal silhouette. Pulmonary vascularity is   congested. There are pleural effusions as well as bibasilar   atelectasis, right greater than left.        Impression:         Pleural effusions with adjacent atelectasis. Pulmonary vascular   congestion indicating elevated left heart filling pressures. Cardiology consult from yesterday appreciated; loaded with IV digoxin change Lopressor to metoprolol succinate. Recommend Bumex 1 mg IV twice daily. 8/5/2020-patient laying quietly in intensive care bed; no chest pain no dyspnea. His right femoral catheter has been removed. Patient is hoping to be up and about more would like to shower sit in a chair etc.  I discussed all the above with intensivist; patient apparently will be transferred out of intensive care today. Patient has been afebrile last 24 hours. Most recent respirations 30 pulse 80 blood pressure 117/66.  95% saturation on 3.5 L nasal cannula. Intake and output 2506 cc; decrease of 1 from yesterday. CO2 31 BUN 24 lactic acid 2.8 glucose 142 calcium 8.3 phosphorus 2.9 CRP less than 0.1. ALT AST still elevated 91/47 respectively. Hemoglobin 8.9 WBC 8.2 platelets 071. Sed rate 0 INR 1.1. Intensivist note from yesterday appreciated; they would like to stop Ambien and change fluconazole to oral.  Cardiology note appreciated continue metoprolol, hold only if heart rate less than 50 or systolic pressure less than 90. They agree with restarting warfarin. They recommend not holding IV diuretic for blood pressure. Pulmonary note appreciated; no appreciable changes. Thoracentesis had been considered but held due to IV diuresis. Physical therapy AM PAC score 16/24 from yesterday. GI note from yesterday, colonoscopy to be done once patient stabilized. They note patient's rising liver function test may be a result of fluconazole. ID note from yesterday, no changes. Cardiology note from today appreciated, no changes. Pulmonary note from today, no thoracentesis at this time.  note per day appreciated, [facility]       Objective:     PHYSICAL EXAM:    VS: /66   Pulse 80   Temp 98.6 °F (37 °C) (Oral)   Resp 30   Ht 5' 8\" (1.727 m)   Wt 151 lb 7.3 oz (68.7 kg)   SpO2 95%   BMI 23.03 kg/m²     Labs:   CBC:   Lab Results   Component Value Date    WBC 18.2 08/05/2020    RBC 2.85 08/05/2020    HGB 8.9 08/05/2020    HCT 27.3 08/05/2020    MCV 95.8 08/05/2020    MCH 31.2 08/05/2020    MCHC 32.6 08/05/2020    RDW 14.6 08/05/2020     08/05/2020    MPV 10.5 08/05/2020     CBC with Differential:    Lab Results   Component Value Date    WBC 18.2 08/05/2020    RBC 2.85 08/05/2020    HGB 8.9 08/05/2020    HCT 27.3 08/05/2020     08/05/2020    MCV 95.8 08/05/2020    MCH 31.2 08/05/2020    MCHC 32.6 08/05/2020    RDW 14.6 08/05/2020    LYMPHOPCT 2.3 08/05/2020    MONOPCT 7.4 08/05/2020    BASOPCT 0.1 08/05/2020    MONOSABS 1.34 08/05/2020    LYMPHSABS 0.42 08/05/2020    EOSABS 0.00 08/05/2020    BASOSABS 0.02 08/05/2020     Hemoglobin/Hematocrit:    Lab Results   Component Value Date    HGB 8.9 08/05/2020    HCT 27.3 08/05/2020     CMP:    Lab Results   Component Value Date     08/05/2020    K 4.0 08/05/2020    K 2.6 07/30/2020     08/05/2020    CO2 31 08/05/2020    BUN 24 08/05/2020    CREATININE 1.1 08/05/2020    GFRAA >60 08/05/2020    LABGLOM >60 08/05/2020    GLUCOSE 142 08/05/2020    PROT 4.5 08/05/2020    LABALBU 3.1 08/05/2020    CALCIUM 8.3 08/05/2020    BILITOT 0.3 08/05/2020    ALKPHOS 99 08/05/2020    AST 47 08/05/2020    ALT 91 08/05/2020     BMP:    Lab Results   Component Value Date     08/05/2020    K 4.0 08/05/2020    K 2.6 07/30/2020     08/05/2020    CO2 31 08/05/2020    BUN 24 08/05/2020    LABALBU 3.1 08/05/2020    CREATININE 1.1 08/05/2020    CALCIUM 8.3 08/05/2020    GFRAA >60 08/05/2020    LABGLOM >60 08/05/2020    GLUCOSE 142 08/05/2020     Hepatic Function Panel:    Lab Results   Component Value Date    ALKPHOS 99 08/05/2020    ALT 91 08/05/2020    AST 47 08/05/2020    PROT 4.5 08/05/2020    BILITOT 0.3 08/05/2020    BILIDIR <0.2 08/05/2020    IBILI see below 08/05/2020    LABALBU 3.1 08/05/2020     Ionized Calcium:  No results found for: IONCA  Magnesium:    Lab Results   Component Value Date    MG 2.1 08/05/2020     Phosphorus:    Lab Results   Component Value Date    PHOS 2.9 08/05/2020     LDH:  No results found for: LDH  Uric Acid:    Lab Results   Component Value Date    LABURIC 7.7 07/31/2020     PT/INR:    Lab Results   Component Value Date    PROTIME 13.1 08/05/2020    INR 1.1 08/05/2020     Warfarin PT/INR:  No components found for: PTPATWAR, PTINRWAR  PTT:    Lab Results   Component Value Date    APTT 55.9 10/08/2019   [APTT}  Troponin:    Lab Results   Component Value Date    TROPONINI <0.01 07/31/2020     Last 3 Troponin:    Lab Results   Component non-tender. BS normal. No masses, organomegaly; no rebound or guarding; right groin triple-lumen catheter persists  Extremities: No edema, Peripheral pulses palpable  Musculoskeletal: Muscular strength appears intact. Neuro:  No focal motor defects ; II-XII grossly intact . CORNELL equally    TELEMETRY: REVIEWED--Telemetry: Atrial fibrillation    ASSESSMENT:   Principal Problem:    GI bleeding  Active Problems:    Bilateral carotid artery stenosis    Tobacco dependence    Chronic obstructive pulmonary disease with acute exacerbation (HCC)    Combined systolic and diastolic congestive heart failure (HCC)    Atrial fibrillation (HCC)    On home O2    Hypertension    Hyperlipidemia    CAD (coronary artery disease)    Hypoprothrombinemia (HCC)    CKD (chronic kidney disease) stage 3, GFR 30-59 ml/min (HCC)    Hypokalemia    Non-rheumatic aortic sclerosis (HCC)    Hypotension due to hypovolemia    Emesis    Diarrhea    Thrombocytopenia (HCC)    Hypotension    Unintentional weight loss of 10% body weight within 6 months    Moderate protein-calorie malnutrition (HCC)    Pulmonary hypertension (HCC)    Erosive esophagitis  Resolved Problems:    * No resolved hospital problems.  *      PLAN:  SEE ORDERS      RE  CHANGES AND FINDINGS   Medications reviewed with patient  GI prophylaxis  DVT prophylaxis  Consultants notes reviewed   Continue oral potassium  Continue potassium protocol  Add potassium phosphate 20 mmol IV today  Monitor labs  I discussed the above with infectious disease regarding long-term Zyvox, equivocal left ear infection  Low-fat diet  Ambien for sleep 5 mg  Wean Gerardo-Synephrine as possible  Hold anticoagulation in view of continuing drop in hemoglobin despite atrial fibrillation  Sequential compression device  PT OT  Continue nasal cannula oxygen  Transfuse as needed, keep hemoglobin greater than 7.0  Continue aerosol treatments  Plavix isosorbide mononitrate metoprolol tartrate amlodipine all on hold  Intensivist has restarted metoprolol tartrate 25 mg twice daily; patient had been on 100 mg twice daily prehospital.  Because of low blood pressure smaller dose initiated  Digoxin 125 mcg IV daily to help control heart rate  CT of chest, pneumonia  Wean Gerardo-Synephrine as able  Fluconazole 400 mg IV every 24 hours  Tessalon Perles  Guaifenesin 400 mg 4 times daily  Increase methylprednisolone 60 mg every 8 hours  Increase Ambien 10 mg at bedtime  Potassium phosphate 30 mmol IV today  Monitor labs  Diltiazem IV drip has been started 5 mg/h  Digoxin 375 mcg was given one-time this morning  IV Gerardo-Synephrine has been discontinued  Continue aerosol treatments  Fluconazole 400 mg IV every 24 hours  Methylprednisolone 60 mg IV every 8 hours  Toprol XL 25 mg twice daily in place of Lopressor  2 g magnesium sulfate given one-time  Cardiology has been consulted by intensivist  I discussed the above with intensivist; apixaban will be started if bleeding scan shows no evidence of blood loss; has been ordered by cardiology  Digoxin 125 mcg IV daily  Coumadin 2 mg daily has been started today-I spoke with intensivist regarding same; they felt Coumadin would be safer since patient presented with GI bleed and no good reversal for apixaban. Reduce methylprednisolone 40 mg every 8 hours  Metoprolol succinate 25 mg twice daily  Phenylephrine IV continues 30 mL/h, 100 mcg/min.   Fluconazole 400 mg IV every 24 hours, Candida esophagitis  Bumex 1 mg IV every 12 hours  Digoxin  mcg daily  Fluconazole has been changed to oral, 200 mg daily-by ID  Intensivist has discontinued DuoNeb aerosols continue others  Methylprednisolone 40 mg IV once daily  Toprol-XL 25 mg twice daily  Oral potassium chloride replacement  Warfarin 2 mg daily; had been on hold pending possible thoracentesis  Diltiazem IV has been discontinued  Phenylephrine IV has been discontinued  Bedside chair 3 times a day            Discussed with patient and nursing. Shar Nguyễn DO     12:12 PM     8/5/2020    TIME > 35 MINUTES    >  50 %  OF  TIME  DISCUSSION               ------------  INFORMATION  -----------      DIET:Dietary Nutrition Supplements: Clear Liquid Oral Supplement  DIET LOW FAT; Allergies   Allergen Reactions    Linezolid Other (See Comments)     Thrombocytopenia, impactful diarrhea, weight loss    Pcn [Penicillins] Swelling    Dye [Iodides]      Heart and kidney dye / reaction unknown         MEDICATION SIDE EFFECTS:none       SCHEDULED MEDS:                                 Scheduled Meds:   pantoprazole  40 mg Oral BID AC    potassium chloride  20 mEq Oral BID    methylPREDNISolone  40 mg Intravenous Daily    fluconazole  200 mg Oral Daily    metoprolol succinate  25 mg Oral BID    bumetanide  1 mg Intravenous BID    heparin flush  3 mL Intravenous Q12H    digoxin  125 mcg Intravenous Daily    benzonatate  100 mg Oral TID    atorvastatin  60 mg Oral Daily    [Held by provider] clopidogrel  75 mg Oral Daily    Arformoterol Tartrate  15 mcg Nebulization BID    budesonide  250 mcg Nebulization BID    warfarin (COUMADIN) daily dosing (placeholder)   Other RX Placeholder    sodium chloride flush  10 mL Intravenous 2 times per day       Continuous Infusions:        Data:       Intake/Output Summary (Last 24 hours) at 8/5/2020 1212  Last data filed at 8/5/2020 0614  Gross per 24 hour   Intake 730 ml   Output 2420 ml   Net -1690 ml       Wt Readings from Last 3 Encounters:   08/05/20 151 lb 7.3 oz (68.7 kg)   03/25/20 142 lb (64.4 kg)   10/24/19 175 lb (79.4 kg)       Labs: Additional    GLUCOSE:No results for input(s): POCGLU in the last 72 hours.     BNP:No results found for: BNP    CRP:   Recent Labs     08/04/20  0555 08/05/20  0535   CRP <0.1 <0.1       ESR:  Recent Labs     08/03/20  0545 08/04/20  0555 08/05/20  0535   SEDRATE 3 2 0       RADIOLOGY: REVIEWED AVAILABLE REPORT  XR CHEST PORTABLE   Final Result   Pleural effusions with adjacent atelectasis. Pulmonary vascular   congestion indicating elevated left heart filling pressures. NM GI BLOOD LOSS   Final Result   There is no evidence for acute GI bleed            CT CHEST WO CONTRAST   Final Result   Addendum 1 of 1   Old left rib fractures are noted. Final      XR CHEST PORTABLE   Final Result      Superimposed upon bilateral interstitial infiltrates is increasing   consolidation and effusion of the right lung base. XR CHEST PORTABLE   Final Result   1. Acute right basilar pneumonia. 2. Remainder of findings described as above. This report has been electronically signed by Karen Kaplan MD.      [id number] [facility] Additional Contrast? None   Final Result      1. No evidence of abdominal or pelvic mass or lymphadenopathy. 2. Multiple gas-filled distended loops of small bowel are present   within the anterior aspect of the abdomen which could indicate   adynamic ileus versus partial or early small bowel obstruction. Stool   and gas is seen within the colon. Short-term follow-up may be helpful   for further evaluation. 3. Diverticulosis without evidence of acute diverticulitis. 4. Bilateral perinephric fat stranding could suggest chronic medical   renal disease with appropriate clinical history. 5. View of the lung bases show bibasilar opacities related to   pneumonia and atelectasis with small bilateral pleural effusions. 6. Fusiform infrarenal abdominal aortic aneurysm is demonstrated   measuring up to 3 cm in diameter. XR CHEST PORTABLE   Final Result      Chronic coarsened interstitial lung markings are seen throughout both   lungs. No obvious pneumonia or pleural effusion.                 [address]   12:12 PM     8/5/2020      Voice recognition software used for dictation

## 2020-08-05 NOTE — PROGRESS NOTES
Pulmonary Progress Note    Admit Date: 2020  Hospital day                               PCP: Johana Lundberg MD    Chief Complaint (s):  Patient Active Problem List   Diagnosis    Bilateral carotid artery stenosis    Tobacco dependence    Chronic obstructive pulmonary disease with acute exacerbation (HCC)    Combined systolic and diastolic congestive heart failure (HCC)    Hypoxia    Atrial fibrillation (HCC)    GI bleeding    On home O2    Hypertension    Hyperlipidemia   Greene H/O asbestosis    CAD (coronary artery disease)    Hypoprothrombinemia ([address]    CKD (chronic kidney disease) stage 3, GFR 30-59 ml/min (HCC)    Hypokalemia    Non-rheumatic aortic sclerosis (HCC)    Hypotension due to hypovolemia    Emesis    Diarrhea    Thrombocytopenia (HCC)    NSVT (nonsustained ventricular tachycardia) (HCC)    Hypotension    Unintentional weight loss of 10% body weight within 6 months    Moderate protein-calorie malnutrition (HCC)    Pulmonary hypertension (HCC)    Erosive esophagitis       Subjective:  · Awake, alert and somewhat cantankerous this a.m. Excellent output with diuretic with significant reexpansion of the right lower lobe in diminution of the size of the right pleural effusion. This precludes the need for thoracentesis.       Vitals:  VITALS:  /62   Pulse 76   Temp 98.6 °F (37 °C) (Oral)   Resp 20   Ht 5' 8\" (1.727 m)   Wt 151 lb 7.3 oz (68.7 kg)   SpO2 97%   BMI 23.03 kg/m²     24HR INTAKE/OUTPUT:      Intake/Output Summary (Last 24 hours) at 2020 1054  Last data filed at 2020 4644  Gross per 24 hour   Intake 1210 ml   Output 2670 ml   Net -1460 ml       24HR PULSE OXIMETRY RANGE:    SpO2  Av.7 %  Min: 92 %  Max: 100 %    Medications:  IV:      Scheduled Meds:   pantoprazole  40 mg Oral BID AC    potassium chloride  20 mEq Oral BID    methylPREDNISolone  40 mg Intravenous Daily    fluconazole  200 mg Oral Daily    metoprolol PROTIME 13.0* 12.6* 13.1*   INR 1.1 1.1 1.1     APTT: No results for input(s): APTT in the last 72 hours. Pathology:  1. N/A      Microbiology:  1. None    Recent ABG:   Recent Labs     08/04/20  0558   PH 7.444   PO2 89.5   PCO2 36.8   HCO3 24.7   BE 0.7   O2SAT 96.6   METHB 0.3   O2HB 96.0   COHB 0.3   O2CON 12.6   HHB 3.4   THB 9.2*     FiO2 : 40 %       Recent Films:  XR CHEST PORTABLE   Final Result   Pleural effusions with adjacent atelectasis. Pulmonary vascular   congestion indicating elevated left heart filling pressures. NM GI BLOOD LOSS   Final Result   There is no evidence for acute GI bleed            CT CHEST WO CONTRAST   Final Result   Addendum 1 of 1   Old left rib fractures are noted. Final      XR CHEST PORTABLE   Final Result      Superimposed upon bilateral interstitial infiltrates is increasing   consolidation and effusion of the right lung base. XR CHEST PORTABLE   Final Result   1. Acute right basilar pneumonia. 2. Remainder of findings described as above. This report has been electronically signed by Florida Woods MD.      [facility] Additional Contrast? None   Final Result      1. No evidence of abdominal or pelvic mass or lymphadenopathy. 2. Multiple gas-filled distended loops of small bowel are present   within the anterior aspect of the abdomen which could indicate   adynamic ileus versus partial or early small bowel obstruction. Stool   and gas is seen within the colon. Short-term follow-up may be helpful   for further evaluation. 3. Diverticulosis without evidence of acute diverticulitis. 4. Bilateral perinephric fat stranding could suggest chronic medical   renal disease with appropriate clinical history. 5. View of the lung bases show bibasilar opacities related to   pneumonia and atelectasis with small bilateral pleural effusions.       6. Fusiform infrarenal abdominal aortic aneurysm is demonstrated measuring up to 3 cm in diameter. XR CHEST PORTABLE   Final Result      Chronic coarsened interstitial lung markings are seen throughout both   lungs. No obvious pneumonia or pleural effusion. Assessment:  1. 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 Ongoing tobacco abuse  3. GI bleed, rule out underlying malignancy with associated unintentional weight loss  4. Bilateral effusions with compressive atelectasis: There is been a good response to diuretics with the patient being negative some 2 L just yesterday.        Plan:  1. Continue empiric treatment for COPD  2. Diuretic as tolerated  3. Hold off on thoracentesis    Time at the bedside, reviewing labs and radiographs, reviewing updated notes and consultations, discussing with staff and family was more than 35 minutes. Please note that voice recognition technology was used in the preparation of this note and make therefore it may contain inadvertent transcription errors. If the patient is a COVID 19 isolation patient, the above physical exam reflects that of the examining physician for the day. Clotilde Clarke M.D., F.C.C.P.     [facility], [address] [place] [address], [address] [place] [address], [facility]

## 2020-08-05 NOTE — PROGRESS NOTES
Patient transferring from ICU room 204 to 610, report called to , placed on telepack 610, patient belongings consisting of shorts, suspenders, shoes, glasses transported with patient.

## 2020-08-05 NOTE — PROGRESS NOTES
Physical Therapy  Facility/Department: [address] ICU  Daily Treatment Note  NAME: Jordyn Loja  : 1940  MRN: [id number]    Date of Service: 2020      Patient Diagnosis(es): The primary encounter diagnosis was Nausea vomiting and diarrhea. Diagnoses of Supratherapeutic INR and Gastrointestinal hemorrhage, unspecified gastrointestinal hemorrhage type were also pertinent to this visit. has a past medical history of Amblyopia, Anticoagulated on Coumadin, Atrial fibrillation (HCC), Bilateral carotid artery stenosis, CAD (coronary artery disease), CHF (congestive heart failure) ([address] CKD (chronic kidney disease) stage 3, GFR 30-59 ml/min (HCC), Colon polyp, COPD (chronic obstructive pulmonary disease) (HCC), Diarrhea, Emesis, Emphysema of lung ([address] Erosive esophagitis, Full dentures, H/O asbestosis, Hyperlipidemia, Hypertension, Hypokalemia, Hypoprothrombinemia ([address] Hypotension due to hypovolemia, Myopia, Non-rheumatic aortic sclerosis ([address] NSVT (nonsustained ventricular tachycardia) ([address] On home O2, NICHOLAS (obstructive sleep apnea), Pulmonary hypertension ([address] Squamous cell carcinoma, face, Thrombocytopenia ([address] Tobacco dependence, and Unintentional weight loss of 10% body weight within 6 months. has a past surgical history that includes Appendectomy; Eye surgery (); Coronary artery bypass graft (, ); Colonoscopy; Skin cancer excision; Cardiac catheterization; Coronary angioplasty with stent; ear surgery (Left, 2019); back surgery (years ago); ear surgery (Left, 2019); and Upper gastrointestinal endoscopy (N/A, 2020). Evaluating Therapist: Henry Peguero PT      Referring Provider:  Dr. Ada Johnson #:  [id number]   DIAGNOSIS:  GIB    PRECAUTIONS: falls, O2@ 6 LNC      Social:  Pt lives alone  in a  1  floor apartment, 10  steps and 1 rails to enter.   Prior to admission pt walked with no AD, independent        Initial Evaluation  Date:  2020 Treatment     2020 Short Term/ Long Term   Goals   Was pt agreeable to Eval/treatment? Yes   yes     Does pt have pain?  none reported   none reported     Bed Mobility  Rolling:  SBA   Supine to sit:  Min assist   Sit to supine:  SBA   Scooting:  Min assist in sit   supine to sit:  SBA  Scooting:  SBA to sitting EOB  independent    Transfers Sit to stand:  SBA /CGA  Stand to sit:  SBA   Stand pivot: NT   sit to stand:  SBA  Stand to sit:  SBA    independent    Ambulation     4 feet side stepping  with  No AD  with  CGA  120 feet with w/w CGA   150 feet with  No AD  with  Independent    Stair negotiation: ascended and descended Nt   NT  10 steps with  1 rail with  S/I    LE ROM  WFL        LE strength  WFL        AM- PAC RAW score  16/ 24 17/24        Patient education  Pt educated on PT objectives during treatment session, hand placement during transfers, safety with w/w. Patient response to education:   Pt verbalized understanding Pt demonstrated skill Pt requires further education in this area   Yes  With cueing yes     ASSESSMENT:    Comments:  Pt found in bed and left sitting up in the chair with call light in reach and nursing present. Treatment:  Patient practiced and was instructed in the following treatment:    Functional mobilityperformed as documented above. Cueing required for hand placement during transfers. No report of dizziness during functional mobility. Cueing required for safety during mobility. PLAN:    Patient is making good progress towards established goals. Will continue with current POC.      Time in  1334  Time out  1354    Total Treatment Time  20 minutes     CPT codes:    [x] Therapeutic activities 46718 20minutes  [] Therapeutic exercises 21140  minutes       Lisa Bobo, [phone]

## 2020-08-05 NOTE — CARE COORDINATION
Updated discharge planning. Ethel George from [facility], can accept pt. Discussed with both pt and his significant other, Thuy Mensah. Pt wants to see how he does with therapies.  Will continue to follow-MJO

## 2020-08-05 NOTE — PROGRESS NOTES
[address] [place] [address] [facility]  Progress Note      Chief Complaint   Patient presents with    Emesis     N/V with dizziness and fatigue     Fatigue    Dizziness       SUBJECTIVE:  Patient is tolerating medications. No reported adverse drug reactions. No nausea, vomiting, diarrhea. Awake and alert  Events of last night noted  Remains on Cardizem drip. Off of pressors  On bipap, alterant with n/c  blood pressure hovering around the 478 systolic    Review of systems:  As stated above in the chief complaint, otherwise negative. Medications:  Scheduled Meds:   pantoprazole  40 mg Oral BID AC    predniSONE  40 mg Oral Daily    potassium chloride  20 mEq Oral BID    fluconazole  200 mg Oral Daily    metoprolol succinate  25 mg Oral BID    bumetanide  1 mg Intravenous BID    heparin flush  3 mL Intravenous Q12H    digoxin  125 mcg Intravenous Daily    benzonatate  100 mg Oral TID    atorvastatin  60 mg Oral Daily    [Held by provider] clopidogrel  75 mg Oral Daily    Arformoterol Tartrate  15 mcg Nebulization BID    budesonide  250 mcg Nebulization BID    warfarin (COUMADIN) daily dosing (placeholder)   Other RX Placeholder    sodium chloride flush  10 mL Intravenous 2 times per day     Continuous Infusions:    PRN Meds:melatonin, ipratropium-albuterol, magnesium sulfate, heparin flush, guaiFENesin, HYDROcodone-acetaminophen, acetaminophen, sodium chloride flush    OBJECTIVE:  /60   Pulse 85   Temp 98.6 °F (37 °C) (Oral)   Resp 23   Ht 5' 8\" (1.727 m)   Wt 151 lb 7.3 oz (68.7 kg)   SpO2 96%   BMI 23.03 kg/m²   Temp  Av.4 °F (36.9 °C)  Min: 97.8 °F (36.6 °C)  Max: 98.6 °F (37 °C)  Constitutional: The patient is awake, alert, and oriented. Skin: Warm and dry. No rashes were noted. HEENT: Round and reactive pupils. Moist mucous membranes. No ulcerations or thrush. Neck: Supple to movements. Chest: No use of accessory muscles to breathe.  Symmetrical expansion. No wheezing, crackles or rhonchi. Decreased breath sounds in the bases  Cardiovascular: S1 and S2 irregularly irregular. No murmurs appreciated. Abdomen: Positive bowel sounds to auscultation. Benign to palpation. No masses felt. No hepatosplenomegaly. Genitourinary: Male  Extremities: No clubbing, no cyanosis, no edema.   Lines: peripheral    Laboratory and Tests Review:  Lab Results   Component Value Date    WBC 18.2 (H) 08/05/2020    WBC 22.5 (H) 08/04/2020    WBC 19.2 (H) 08/03/2020    HGB 8.9 (L) 08/05/2020    HCT 27.3 (L) 08/05/2020    MCV 95.8 08/05/2020     08/05/2020     Lab Results   Component Value Date    NEUTROABS 15.70 (H) 08/05/2020    NEUTROABS 19.52 (H) 08/04/2020    NEUTROABS 17.01 (H) 08/03/2020     No results found for: [facility]  Lab Results   Component Value Date    ALT 91 (H) 08/05/2020    AST 47 (H) 08/05/2020    ALKPHOS 99 08/05/2020    BILITOT 0.3 08/05/2020     Lab Results   Component Value Date     08/05/2020    K 4.0 08/05/2020    K 2.6 07/30/2020     08/05/2020    CO2 31 08/05/2020    BUN 24 08/05/2020    CREATININE 1.1 08/05/2020    CREATININE 1.0 08/04/2020    CREATININE 1.0 08/03/2020    GFRAA >60 08/05/2020    LABGLOM >60 08/05/2020    GLUCOSE 142 08/05/2020    PROT 4.5 08/05/2020    LABALBU 3.1 08/05/2020    CALCIUM 8.3 08/05/2020    BILITOT 0.3 08/05/2020    ALKPHOS 99 08/05/2020    AST 47 08/05/2020    ALT 91 08/05/2020     Lab Results   Component Value Date    CRP <0.1 08/05/2020    CRP <0.1 08/04/2020    CRP <0.1 08/02/2020     Lab Results   Component Value Date    SEDRATE 0 08/05/2020    SEDRATE 2 08/04/2020    SEDRATE 3 08/03/2020     Radiology:  Reviewed    Microbiology:   Lab Results   Component Value Date    BC 5 Days no growth 07/31/2020    BC 5 Days- no growth 02/21/2016    ORG Klebsiella pneumoniae ssp pneumoniae 07/31/2020    ORG Anaerobic gram positive cocci 06/29/2020    ORG Staphylococcus epidermidis 06/29/2020    ORG Candida species 06/29/2020     Lab Results   Component Value Date    BLOODCULT2 5 Days no growth 07/31/2020    BLOODCULT2 5 Days- no growth 09/04/2018    BLOODCULT2 5 Days- no growth 02/21/2016    ORG Klebsiella pneumoniae ssp pneumoniae 07/31/2020    ORG Anaerobic gram positive cocci 06/29/2020    ORG Staphylococcus epidermidis 06/29/2020    ORG Candida species 06/29/2020     WOUND/ABSCESS   Date Value Ref Range Status   06/29/2020 Light growth  Final   01/03/2020 Light growth  Final   01/03/2020 Rare growth  Final     Smear, Respiratory   Date Value Ref Range Status   09/04/2018   Final    Group 5: >25 PMN's/LPF and <10 Epithelial cells/LPF  Abundant Polymorphonuclear leukocytes  Epithelial cells not seen  Moderate Gram negative rods  Rare Gram positive cocci in pairs       No results found for: MPNEUMO, CLAMYDCU, LABLEGI, AFBCX, FUNGSM, LABFUNG  CULTURE, RESPIRATORY   Date Value Ref Range Status   09/04/2018 Oral Pharyngeal Marysol present  Final     No results found for: CXCATHTIP  No results found for: BFCS  Culture Surgical   Date Value Ref Range Status   06/25/2019 Light growth  Final     Urine Culture, Routine   Date Value Ref Range Status   07/31/2020 (A)  Final    <10,000 CFU/mL  Oxidase positive gram negative rods     07/31/2020 <25,000 CFU/ml  Final     MRSA Culture Only   Date Value Ref Range Status   07/31/2020 Methicillin resistant Staph aureus not isolated  Final   urine with klb    ASSESSMENT:  · Reactive leukocytosis secondary to bleed  · A.  Fib  · Left ear issue is quiescence  · Candida esophagitis on a low dose of Diflucan down with the steroids I think this should be increased  · Platelets improved    PLAN:  · Continue fluconazole 200 mg po   Patient has been on antibiotics orally since January more less and loath to give him any more necessarily  · Check final cultures  · Monitor labs    Lawerance Frames  4:18 PM  8/5/2020

## 2020-08-05 NOTE — PROGRESS NOTES
DAILY PROGRESS NOTE - THE HEART CENTER    SUBJECTIVE:    He is being followed for rapid atrial fibrillation and acute diastolic heart failure. Required more IV digoxin yesterday. Rate now controlled nicely at around 85 bpm.  Had mental status changes after receiving Ambien for sleep last night. Somewhat aggressive and attempted to get out of bed. Off of the BiPAP mask at this time and says he feels better. Another 1 L negative overnight and now around 2 L positive since admission. OBJECTIVE:    His vital signs were reviewed today. Vitals:    08/05/20 0600   BP: 126/66   Pulse: 75   Resp: 20   Temp:    SpO2: 97%       Scheduled Meds:   potassium chloride  20 mEq Oral BID    methylPREDNISolone  40 mg Intravenous Daily    digoxin  250 mcg Intravenous Once    fluconazole  200 mg Oral Daily    metoprolol succinate  25 mg Oral BID    bumetanide  1 mg Intravenous BID    heparin flush  3 mL Intravenous Q12H    digoxin  125 mcg Intravenous Daily    benzonatate  100 mg Oral TID    atorvastatin  60 mg Oral Daily    [Held by provider] clopidogrel  75 mg Oral Daily    Arformoterol Tartrate  15 mcg Nebulization BID    budesonide  250 mcg Nebulization BID    warfarin (COUMADIN) daily dosing (placeholder)   Other RX Placeholder    pantoprazole  40 mg Intravenous BID    And    sodium chloride (PF)  10 mL Intravenous BID    sodium chloride flush  10 mL Intravenous 2 times per day     Continuous Infusions:   phenylephrine (SOFÍA-SYNEPHRINE) 50mg/250mL infusion Stopped (08/04/20 0609)    dilTIAZem Stopped (08/04/20 1552)     PRN Meds:.zolpidem, ipratropium-albuterol, potassium chloride, magnesium sulfate, heparin flush, guaiFENesin, HYDROcodone-acetaminophen, acetaminophen, sodium chloride flush    REVIEW OF SYSTEMS: Other than mild intermittent dyspnea, the full 10 point review of systems is negative. FAMILY HISTORY: Negative for CAD in first-degree relatives.     SOCIAL HISTORY: Negative for alcohol, tobacco, or illicit drug use. PHYSICAL EXAM:    General Appearance:  awake, alert, oriented, no distress  Neck:  no bruits  Lungs:  Normal expansion. Coarse breath sounds bilaterally to auscultation. No rales, rhonchi, or wheezing. Heart:  Heart sounds are normal.  Irregular rate and rhythm without murmur, gallop or rub. Abdomen:  Soft, non-tender. Extremities: Extremities warm to touch, pink, with no edema. Neuro/musculosketal:  Unremarkable. LABS:    Recent Labs     08/03/20  0545 08/04/20  0555 08/05/20  0535    142 141   CREATININE 1.0 1.0 1.1       Recent Labs     08/03/20  0545 08/04/20  0555 08/05/20  0535   HGB 8.4* 8.6* 8.9*       Recent Labs     08/03/20  0545 08/04/20  0555 08/05/20  0535   INR 1.1 1.1 1.1         IMPRESSION:    #1.  Rapid permanent atrial fibrillation- probably provoked by underlying infection and anemia. Rate is now better controlled. Continue digoxin and metoprolol succinate. Would hold metoprolol ONLY if heart rate below 50 or systolic pressure below 90. #2. Anticoagulation status- question arises of whether to give Eliquis anticoagulation or warfarin, warfarin being preferred because it is reversible. However, his INR was 10 on presentation which may have contributed at least in part to his GI bleed. Warfarin, although less optimal than Eliquis, would be acceptable in this situation. Goal INR is between 2 and 3. Restart when acceptable to ICU team.    #3.  Coronary disease- 2 previous CABG surgeries and multiple stents. No indication of unstable angina. Recommend restarting baby aspirin as soon as possible. #3. Hypertension-blood pressure now controlled off of amlodipine and isosorbide and have not restarted them. #4.  Hyperlipidemia-statin once stable. #5.  Acute diastolic heart failure-probably due to combination of rapid atrial fibrillation along with left ventricular diastolic dysfunction. Not due to hypertension.   Diuresing fairly well on Bumex and have continued at current dosage. DO NOT hold IV diuretic for blood pressure. #6.  Continue to follow.

## 2020-08-06 LAB
ANION GAP SERPL CALCULATED.3IONS-SCNC: 7 MMOL/L (ref 7–16)
ANISOCYTOSIS: ABNORMAL
BASOPHILS ABSOLUTE: 0.02 E9/L (ref 0–0.2)
BASOPHILS RELATIVE PERCENT: 0.1 % (ref 0–2)
BUN BLDV-MCNC: 27 MG/DL (ref 8–23)
C-REACTIVE PROTEIN: <0.1 MG/DL (ref 0–0.4)
CALCIUM SERPL-MCNC: 8.2 MG/DL (ref 8.6–10.2)
CHLORIDE BLD-SCNC: 100 MMOL/L (ref 98–107)
CO2: 34 MMOL/L (ref 22–29)
CREAT SERPL-MCNC: 1 MG/DL (ref 0.7–1.2)
EOSINOPHILS ABSOLUTE: 0 E9/L (ref 0.05–0.5)
EOSINOPHILS RELATIVE PERCENT: 0 % (ref 0–6)
GFR AFRICAN AMERICAN: >60
GFR NON-AFRICAN AMERICAN: >60 ML/MIN/1.73
GLUCOSE BLD-MCNC: 106 MG/DL (ref 74–99)
HCT VFR BLD CALC: 28.4 % (ref 37–54)
HEMOGLOBIN: 9.3 G/DL (ref 12.5–16.5)
HYPOCHROMIA: ABNORMAL
IMMATURE GRANULOCYTES #: 0.32 E9/L
IMMATURE GRANULOCYTES %: 1.9 % (ref 0–5)
INR BLD: 1.1
LYMPHOCYTES ABSOLUTE: 0.57 E9/L (ref 1.5–4)
LYMPHOCYTES RELATIVE PERCENT: 3.4 % (ref 20–42)
MAGNESIUM: 2.2 MG/DL (ref 1.6–2.6)
MCH RBC QN AUTO: 31 PG (ref 26–35)
MCHC RBC AUTO-ENTMCNC: 32.7 % (ref 32–34.5)
MCV RBC AUTO: 94.7 FL (ref 80–99.9)
MONOCYTES ABSOLUTE: 1.69 E9/L (ref 0.1–0.95)
MONOCYTES RELATIVE PERCENT: 10.1 % (ref 2–12)
NEUTROPHILS ABSOLUTE: 14.21 E9/L (ref 1.8–7.3)
NEUTROPHILS RELATIVE PERCENT: 84.5 % (ref 43–80)
PDW BLD-RTO: 14.5 FL (ref 11.5–15)
PHOSPHORUS: 3 MG/DL (ref 2.5–4.5)
PLATELET # BLD: 275 E9/L (ref 130–450)
PMV BLD AUTO: 10.3 FL (ref 7–12)
POLYCHROMASIA: ABNORMAL
POTASSIUM SERPL-SCNC: 4.5 MMOL/L (ref 3.5–5)
PROTHROMBIN TIME: 12.9 SEC (ref 9.3–12.4)
RBC # BLD: 3 E12/L (ref 3.8–5.8)
SEDIMENTATION RATE, ERYTHROCYTE: 2 MM/HR (ref 0–15)
SODIUM BLD-SCNC: 141 MMOL/L (ref 132–146)
WBC # BLD: 16.8 E9/L (ref 4.5–11.5)

## 2020-08-06 PROCEDURE — 97535 SELF CARE MNGMENT TRAINING: CPT

## 2020-08-06 PROCEDURE — 97110 THERAPEUTIC EXERCISES: CPT

## 2020-08-06 PROCEDURE — 84100 ASSAY OF PHOSPHORUS: CPT

## 2020-08-06 PROCEDURE — 6370000000 HC RX 637 (ALT 250 FOR IP): Performed by: INTERNAL MEDICINE

## 2020-08-06 PROCEDURE — 85610 PROTHROMBIN TIME: CPT

## 2020-08-06 PROCEDURE — 6360000002 HC RX W HCPCS: Performed by: INTERNAL MEDICINE

## 2020-08-06 PROCEDURE — 83735 ASSAY OF MAGNESIUM: CPT

## 2020-08-06 PROCEDURE — 2580000003 HC RX 258: Performed by: INTERNAL MEDICINE

## 2020-08-06 PROCEDURE — 85651 RBC SED RATE NONAUTOMATED: CPT

## 2020-08-06 PROCEDURE — 2060000000 HC ICU INTERMEDIATE R&B

## 2020-08-06 PROCEDURE — 2500000003 HC RX 250 WO HCPCS: Performed by: INTERNAL MEDICINE

## 2020-08-06 PROCEDURE — 80048 BASIC METABOLIC PNL TOTAL CA: CPT

## 2020-08-06 PROCEDURE — 85025 COMPLETE CBC W/AUTO DIFF WBC: CPT

## 2020-08-06 PROCEDURE — 86140 C-REACTIVE PROTEIN: CPT

## 2020-08-06 PROCEDURE — 36415 COLL VENOUS BLD VENIPUNCTURE: CPT

## 2020-08-06 PROCEDURE — 94660 CPAP INITIATION&MGMT: CPT

## 2020-08-06 PROCEDURE — 2700000000 HC OXYGEN THERAPY PER DAY

## 2020-08-06 PROCEDURE — 94640 AIRWAY INHALATION TREATMENT: CPT

## 2020-08-06 RX ORDER — WARFARIN SODIUM 5 MG/1
5 TABLET ORAL
Status: COMPLETED | OUTPATIENT
Start: 2020-08-06 | End: 2020-08-06

## 2020-08-06 RX ORDER — CLOPIDOGREL BISULFATE 75 MG/1
75 TABLET ORAL DAILY
Status: DISCONTINUED | OUTPATIENT
Start: 2020-08-06 | End: 2020-08-07

## 2020-08-06 RX ADMIN — PANTOPRAZOLE SODIUM 40 MG: 40 TABLET, DELAYED RELEASE ORAL at 16:13

## 2020-08-06 RX ADMIN — DIGOXIN 125 MCG: 0.25 INJECTION INTRAMUSCULAR; INTRAVENOUS at 08:48

## 2020-08-06 RX ADMIN — BENZONATATE 100 MG: 100 CAPSULE ORAL at 08:49

## 2020-08-06 RX ADMIN — BENZONATATE 100 MG: 100 CAPSULE ORAL at 14:10

## 2020-08-06 RX ADMIN — ARFORMOTEROL TARTRATE 15 MCG: 15 SOLUTION RESPIRATORY (INHALATION) at 20:57

## 2020-08-06 RX ADMIN — BUDESONIDE 250 MCG: 0.25 SUSPENSION RESPIRATORY (INHALATION) at 20:57

## 2020-08-06 RX ADMIN — PREDNISONE 40 MG: 20 TABLET ORAL at 08:49

## 2020-08-06 RX ADMIN — FLUCONAZOLE 200 MG: 100 TABLET ORAL at 08:49

## 2020-08-06 RX ADMIN — BENZONATATE 100 MG: 100 CAPSULE ORAL at 20:21

## 2020-08-06 RX ADMIN — BUMETANIDE 1 MG: 0.25 INJECTION INTRAMUSCULAR; INTRAVENOUS at 08:48

## 2020-08-06 RX ADMIN — BUDESONIDE 250 MCG: 0.25 SUSPENSION RESPIRATORY (INHALATION) at 08:59

## 2020-08-06 RX ADMIN — METOPROLOL SUCCINATE 25 MG: 25 TABLET, EXTENDED RELEASE ORAL at 08:49

## 2020-08-06 RX ADMIN — MELATONIN 3 MG ORAL TABLET 3 MG: 3 TABLET ORAL at 23:31

## 2020-08-06 RX ADMIN — METOPROLOL SUCCINATE 25 MG: 25 TABLET, EXTENDED RELEASE ORAL at 20:21

## 2020-08-06 RX ADMIN — SODIUM CHLORIDE, PRESERVATIVE FREE 10 ML: 5 INJECTION INTRAVENOUS at 20:45

## 2020-08-06 RX ADMIN — PANTOPRAZOLE SODIUM 40 MG: 40 TABLET, DELAYED RELEASE ORAL at 06:04

## 2020-08-06 RX ADMIN — WARFARIN SODIUM 5 MG: 5 TABLET ORAL at 19:05

## 2020-08-06 RX ADMIN — CLOPIDOGREL BISULFATE 75 MG: 75 TABLET ORAL at 14:09

## 2020-08-06 RX ADMIN — SODIUM CHLORIDE, PRESERVATIVE FREE 10 ML: 5 INJECTION INTRAVENOUS at 08:49

## 2020-08-06 RX ADMIN — ATORVASTATIN CALCIUM 60 MG: 20 TABLET, FILM COATED ORAL at 08:49

## 2020-08-06 RX ADMIN — ARFORMOTEROL TARTRATE 15 MCG: 15 SOLUTION RESPIRATORY (INHALATION) at 08:59

## 2020-08-06 RX ADMIN — POTASSIUM CHLORIDE 20 MEQ: 20 TABLET, EXTENDED RELEASE ORAL at 08:49

## 2020-08-06 RX ADMIN — BUMETANIDE 1 MG: 0.25 INJECTION INTRAMUSCULAR; INTRAVENOUS at 16:13

## 2020-08-06 RX ADMIN — POTASSIUM CHLORIDE 20 MEQ: 20 TABLET, EXTENDED RELEASE ORAL at 20:21

## 2020-08-06 NOTE — CARE COORDINATION
CM NOTE: Transferred from ICU to 10 W. Per QFR--- select LTAC following. Remains on IV bumex & IV digoxin. Currently using O2 4L which he only used at HS at home. Provided by HCS. No hx HHC. CM met with pt to discuss role, anticipated LOS & current plan of care. Per [facility]---lacks criteria for LTAC. CM explained this to pt. States he wants to return home. CM discussed [place] choice provided while in ICU. Pt states he is going home when stable. Agreeable to [facility]. VM left for Aniya Delgado (partner) for choices & referral. CM & SW continue to follow.

## 2020-08-06 NOTE — PROGRESS NOTES
Per ANNAMARIA [facility] cannot start service till Monday, patient stated he is not going home but to girlfriend's house at discharge, call placed to Ilana WALKER to clarify, left message.   Electronically signed by Conchis Escalante RN on 8/6/2020 at 3:27 PM

## 2020-08-06 NOTE — PROGRESS NOTES
PROGRESS  NOTE --                                                          INTERNAL  MEDICINE                                                                              I  PERSONALLY SAW , EXAMINED, AND CARED [place], 8/6/2020     LABS, XRAY ,CHART, AND MEDICATIONS  REVIEWED BY ME .        8/1/2020-sUBJECTIVE: Ayde Iraheta is alert awake and cooperative; oriented ×3. Denies any chest pain dyspnea nausea emesis. Tolerating diet. No abdominal pain. Had his first bowel movement today since admission. Unlikely to be C. difficile. Last evening he did have liquid diet and enjoyed it he would like something more substantial today. States he has hard time sleeping without Ambien request bedtime dose of same. I spoke with nursing, still having a hard time weaning from intravenous phenylephrine. Blood pressure keeps dropping without it. He remains afebrile. Most recent blood pressure 90/40.  97% saturation 2 L nasal cannula. Respiratory rate 21. Intake and output +5392 cc. Potassium 3.1; lactic acid finally dropping, 2.7. Glucose 103 calcium 8.3 phosphorus 1.8 protein 4.9 albumin 3.4 remainder liver functions normal.  Hemoglobin admission 11.0; today hemoglobin 8.1; WBC 16.4, INR 1.4. Platelets 548, remarkable improvement since admission, would seem likely caused by Zyvox. proBNP on admission 2500. CRP 0.2. Procalcitonin 0.10. A1c 6.2.  2D echo completed ejection fraction 55%; septal motion consistent with previous bypass surgery. Markedly large right atrium moderate tricuspid regurgitation moderate to severe pulmonary hypertension. Aortic sclerosis; pulmonary valve is sclerotic. No pleural effusion no pericardial effusion. Urine culture less than 25,000 gram-negative rods. EGD completed yesterday with following results--    Esophagus:  GERD. severe erosive esophagitis.  Candida infection        Stomach: Polymorphonuclear leukocytes   Moderate Epithelial cells   Abundant Mononuclear leukocytes   Abundant Gram negative rods   Few yeast     Urine culture as follows--    Organism Abnormal    07/31/2020  6:15 AM   - [facility]    Klebsiella pneumoniae ssp pneumoniae    Urine Culture, Routine  07/31/2020  6:15 AM   - [facility]    <25,000 CFU/ml    Testing Performed By     Lab - [address].  Name  Director  Address  Valid Date Range    23-CV - [address] [facility]. [address] [facility] [place] [facility]  Jeff Plata MD  [address] [place] [address]. [address] [place] [address]  12/03/19 1502-Present    Narrative   Performed by: [address] [place] [address] Lab   Source: URV       Site: Urine&Urine              Susceptibility     Klebsiella pneumoniae ssp pneumoniae (1)     Antibiotic  Interpretation  JOVAN  Status     ampicillin  Resistant  >=^32  mcg/mL      ceFAZolin  Sensitive  <=^4  mcg/mL      cefepime  Sensitive  <=^0.12  mcg/mL      cefTRIAXone  Sensitive  <=^0.25  mcg/mL      Confirmatory Extended Spectrum Beta-Lactamase   Neg  mcg/mL      ertapenem  Sensitive  <=^0.12  mcg/mL      gentamicin  Sensitive  <=^1  mcg/mL      levofloxacin  Sensitive  ^1  mcg/mL      nitrofurantoin  Resistant  ^128  mcg/mL      piperacillin-tazobactam  Sensitive  ^16  mcg/mL      trimethoprim-sulfamethoxazole  Sensitive  <=^20  mcg/mL           8/3/2008-patient remains in intensive care; currently no chest pain no dyspnea. Had a very stormy night with A. fib RVR and profound dyspnea. Patient remains afebrile. Still have significant increase in respiratory rate as high as 47 with the last 24 hours. Blood pressure 137/35. Heart rate improved 82. Respirations currently 17.  93% saturation 4 L nasal cannula. Intake and output +6675 cc. Magnesium 1.8 lactic acid 2.7 glucose 148 calcium 8.5 phosphorus finally normal 2.7 protein 4.7 albumin 3.2 ALT AST 60/44 respectively.   Hemoglobin 8.4 WBC 19.2 Cardizem drip was started. GI note from day appreciated, no changes noted. Patient was again placed on BiPAP this morning at 0838 hrs. due to dyspnea. Nuclear bleeding scan pending. 8/4/2020-patient laying quietly in his intensive care bed. No chest pain no dyspnea. Only real complaint excess urination overnight due to IV Bumex ordered by cardiology. Coughing is less. Patient has been afebrile for the last 24 hours. Most recent blood pressure still low 93/51 arterial line. 94% saturation 4 L nasal cannula. Heart rate 98/min. Intake and output still +3486 cc; a decrease of greater than 3000 cc since yesterday. Lactic acid is still +2.7. Protein 4.9 albumin 3.3 phosphorus 2.7. ALT AST still increased 92/55 respectively. Hemoglobin 8.6 WBC 22.5 platelets 560. INR 1.1. ABGs from this morning PO2 89.5 PCO2 36.8 pH 7.444. That was done on BiPAP. Nuclear medicine bleeding scan no evidence of acute GI bleed. Chest x-ray from today with following results--    FINDINGS:   Stable cardiomediastinal silhouette. Pulmonary vascularity is   congested. There are pleural effusions as well as bibasilar   atelectasis, right greater than left.        Impression:         Pleural effusions with adjacent atelectasis. Pulmonary vascular   congestion indicating elevated left heart filling pressures. Cardiology consult from yesterday appreciated; loaded with IV digoxin change Lopressor to metoprolol succinate. Recommend Bumex 1 mg IV twice daily. 8/5/2020-patient laying quietly in intensive care bed; no chest pain no dyspnea. His right femoral catheter has been removed. Patient is hoping to be up and about more would like to shower sit in a chair etc.  I discussed all the above with intensivist; patient apparently will be transferred out of intensive care today. Patient has been afebrile last 24 hours. Most recent respirations 30 pulse 80 blood pressure 117/66.  95% saturation on 3.5 L nasal cannula. switch to oral Bumex twice daily beginning tomorrow.  note appreciated; patient hoping to be discharged with home health care to home.       Objective:     PHYSICAL EXAM:    VS: BP (!) 110/54   Pulse 70   Temp 97.8 °F (36.6 °C) (Oral)   Resp 20   Ht 5' 8\" (1.727 m)   Wt 149 lb 9.6 oz (67.9 kg)   SpO2 99%   BMI 22.75 kg/m²     Labs:   CBC:   Lab Results   Component Value Date    WBC 16.8 08/06/2020    RBC 3.00 08/06/2020    HGB 9.3 08/06/2020    HCT 28.4 08/06/2020    MCV 94.7 08/06/2020    MCH 31.0 08/06/2020    MCHC 32.7 08/06/2020    RDW 14.5 08/06/2020     08/06/2020    MPV 10.3 08/06/2020     CBC with Differential:    Lab Results   Component Value Date    WBC 16.8 08/06/2020    RBC 3.00 08/06/2020    HGB 9.3 08/06/2020    HCT 28.4 08/06/2020     08/06/2020    MCV 94.7 08/06/2020    MCH 31.0 08/06/2020    MCHC 32.7 08/06/2020    RDW 14.5 08/06/2020    LYMPHOPCT 3.4 08/06/2020    MONOPCT 10.1 08/06/2020    BASOPCT 0.1 08/06/2020    MONOSABS 1.69 08/06/2020    LYMPHSABS 0.57 08/06/2020    EOSABS 0.00 08/06/2020    BASOSABS 0.02 08/06/2020     Hemoglobin/Hematocrit:    Lab Results   Component Value Date    HGB 9.3 08/06/2020    HCT 28.4 08/06/2020     CMP:    Lab Results   Component Value Date     08/06/2020    K 4.5 08/06/2020    K 2.6 07/30/2020     08/06/2020    CO2 34 08/06/2020    BUN 27 08/06/2020    CREATININE 1.0 08/06/2020    GFRAA >60 08/06/2020    LABGLOM >60 08/06/2020    GLUCOSE 106 08/06/2020    PROT 4.5 08/05/2020    LABALBU 3.1 08/05/2020    CALCIUM 8.2 08/06/2020    BILITOT 0.3 08/05/2020    ALKPHOS 99 08/05/2020    AST 47 08/05/2020    ALT 91 08/05/2020     BMP:    Lab Results   Component Value Date     08/06/2020    K 4.5 08/06/2020    K 2.6 07/30/2020     08/06/2020    CO2 34 08/06/2020    BUN 27 08/06/2020    LABALBU 3.1 08/05/2020    CREATININE 1.0 08/06/2020    CALCIUM 8.2 08/06/2020    GFRAA >60 08/06/2020    LABGLOM >60 08/06/2020 GLUCOSE 106 08/06/2020     Hepatic Function Panel:    Lab Results   Component Value Date    ALKPHOS 99 08/05/2020    ALT 91 08/05/2020    AST 47 08/05/2020    PROT 4.5 08/05/2020    BILITOT 0.3 08/05/2020    BILIDIR <0.2 08/05/2020    IBILI see below 08/05/2020    LABALBU 3.1 08/05/2020     Ionized Calcium:  No results found for: IONCA  Magnesium:    Lab Results   Component Value Date    MG 2.2 08/06/2020     Phosphorus:    Lab Results   Component Value Date    PHOS 3.0 08/06/2020     LDH:  No results found for: LDH  Uric Acid:    Lab Results   Component Value Date    LABURIC 7.7 07/31/2020     PT/INR:    Lab Results   Component Value Date    PROTIME 12.9 08/06/2020    INR 1.1 08/06/2020     Warfarin PT/INR:  No components found for: PTPATWAR, PTINRWAR  PTT:    Lab Results   Component Value Date    APTT 55.9 10/08/2019   [APTT}  Troponin:    Lab Results   Component Value Date    TROPONINI <0.01 07/31/2020     Last 3 Troponin:    Lab Results   Component Value Date    TROPONINI <0.01 07/31/2020    TROPONINI <0.01 07/31/2020    TROPONINI <0.01 07/30/2020     U/A:  No results found for: NITRITE, COLORU, PROTEINU, PHUR, LABCAST, WBCUA, RBCUA, MUCUS, TRICHOMONAS, YEAST, BACTERIA, CLARITYU, SPECGRAV, LEUKOCYTESUR, UROBILINOGEN, BILIRUBINUR, BLOODU, GLUCOSEU, AMORPHOUS  ABG:    Lab Results   Component Value Date    PH 7.444 08/04/2020    PCO2 36.8 08/04/2020    PO2 89.5 08/04/2020    HCO3 24.7 08/04/2020    BE 0.7 08/04/2020    O2SAT 96.6 08/04/2020     HgBA1c:    Lab Results   Component Value Date    LABA1C 6.2 07/31/2020     FLP:    Lab Results   Component Value Date    TRIG 79 07/31/2020    HDL 30 07/31/2020    LDLCALC 33 07/31/2020    LABVLDL 16 07/31/2020     TSH:    Lab Results   Component Value Date    TSH 0.525 07/31/2020     VITAMIN B12: No components found for: B12  FOLATE:    Lab Results   Component Value Date    FOLATE 9.0 07/31/2020     IRON:  No results found for: IRON  Iron Saturation:  No components found for: PERCENTFE  TIBC:  No results found for: TIBC  FERRITIN:  No results found for: FERRITIN  PSA: No results found for: PSA     General appearance: Alert, Awake, Oriented times 3, no distress; nasal cannula oxygen in place  Skin: Warm and dry ; no rashes  Head: Normocephalic. No masses, lesions or tenderness noted  Eyes: Conjunctivae pale, sclera white. PERRL,EOM-I  Ears: Right external ear normal left external ear seems to have cauliflower ear despite patient's complaint of drainage from ear. Nose/Sinuses: Nares normal. Septum midline. Mucosa normal. No drainage  Oropharynx: Oropharynx clear with no exudate seen  Neck: Supple. No jugular venous distension, lymphadenopathy or thyromegaly Trachea midline  Lungs: Less bilateral wheezing; rare rhonchi no rales at this time  Heart: Irregularly irregular at 80/min. No rub or gallop; grade 1/6 systolic murmur second right intercostal space grade 1/6 systolic murmur left sternal border  Abdomen: Soft, non-tender. BS normal. No masses, organomegaly; no rebound or guarding; right groin triple-lumen catheter persists  Extremities: No edema, Peripheral pulses palpable  Musculoskeletal: Muscular strength appears intact. Neuro:  No focal motor defects ; II-XII grossly intact .  CORNELL equally    TELEMETRY: REVIEWED--Telemetry: Atrial fibrillation    ASSESSMENT:   Principal Problem:    GI bleeding  Active Problems:    Bilateral carotid artery stenosis    Tobacco dependence    Chronic obstructive pulmonary disease with acute exacerbation (HCC)    Combined systolic and diastolic congestive heart failure (HCC)    Atrial fibrillation (HCC)    On home O2    Hypertension    Hyperlipidemia    CAD (coronary artery disease)    Hypoprothrombinemia (HCC)    CKD (chronic kidney disease) stage 3, GFR 30-59 ml/min (HCC)    Hypokalemia    Non-rheumatic aortic sclerosis (HCC)    Hypotension due to hypovolemia    Emesis    Diarrhea    Thrombocytopenia (HCC)    Hypotension    Unintentional weight loss of 10% body weight within 6 months    Moderate protein-calorie malnutrition (HCC)    Pulmonary hypertension (HCC)    Erosive esophagitis  Resolved Problems:    * No resolved hospital problems.  *      PLAN:  SEE ORDERS      RE  CHANGES AND FINDINGS   Medications reviewed with patient  GI prophylaxis  DVT prophylaxis  Consultants notes reviewed   Continue oral potassium  Continue potassium protocol  Add potassium phosphate 20 mmol IV today  Monitor labs  I discussed the above with infectious disease regarding long-term Zyvox, equivocal left ear infection  Low-fat diet  Ambien for sleep 5 mg  Wean Gerardo-Synephrine as possible  Hold anticoagulation in view of continuing drop in hemoglobin despite atrial fibrillation  Sequential compression device  PT OT  Continue nasal cannula oxygen  Transfuse as needed, keep hemoglobin greater than 7.0  Continue aerosol treatments  Plavix isosorbide mononitrate metoprolol tartrate amlodipine all on hold  Intensivist has restarted metoprolol tartrate 25 mg twice daily; patient had been on 100 mg twice daily prehospital.  Because of low blood pressure smaller dose initiated  Digoxin 125 mcg IV daily to help control heart rate  CT of chest, pneumonia  Wean Gerardo-Synephrine as able  Fluconazole 400 mg IV every 24 hours  Tessalon Perles  Guaifenesin 400 mg 4 times daily  Increase methylprednisolone 60 mg every 8 hours  Increase Ambien 10 mg at bedtime  Potassium phosphate 30 mmol IV today  Monitor labs  Diltiazem IV drip has been started 5 mg/h  Digoxin 375 mcg was given one-time this morning  IV Gerardo-Synephrine has been discontinued  Continue aerosol treatments  Fluconazole 400 mg IV every 24 hours  Methylprednisolone 60 mg IV every 8 hours  Toprol XL 25 mg twice daily in place of Lopressor  2 g magnesium sulfate given one-time  Cardiology has been consulted by intensivist  I discussed the above with intensivist; apixaban will be started if bleeding scan shows no evidence of blood loss; has been ordered by cardiology  Digoxin 125 mcg IV daily  Coumadin 2 mg daily has been started today-I spoke with intensivist regarding same; they felt Coumadin would be safer since patient presented with GI bleed and no good reversal for apixaban. Reduce methylprednisolone 40 mg every 8 hours  Metoprolol succinate 25 mg twice daily  Phenylephrine IV continues 30 mL/h, 100 mcg/min. Fluconazole 400 mg IV every 24 hours, Candida esophagitis  Bumex 1 mg IV every 12 hours  Digoxin  mcg daily  Fluconazole has been changed to oral, 200 mg daily-by ID  Intensivist has discontinued DuoNeb aerosols continue others  Methylprednisolone 40 mg IV once daily  Toprol-XL 25 mg twice daily  Oral potassium chloride replacement  Warfarin 2 mg daily; had been on hold pending possible thoracentesis  Diltiazem IV has been discontinued  Phenylephrine IV has been discontinued  Bedside chair 3 times a day  Home health care orders entered  Bumex 1 mg IV twice daily for today  Digoxin 125 mcg IV today  Continue Pulmicort and Brovana nebulizer  IV methylprednisolone has been discontinued  Oral Protonix continues  Intensivist was to restart Coumadin yesterday, it was not restarted--thoracentesis on hold  Warfarin 5 mg by mouth today  Prednisone 40 mg by mouth daily has been started by intensivist  Restart Plavix 75 mg daily  May shower with help  Likely home tomorrow         Discussed with patient and nursing. Vernell Nguyễn DO     12:19 PM     8/6/2020    TIME > 35 MINUTES    >  50 %  OF  TIME  DISCUSSION               ------------  INFORMATION  -----------      DIET:Dietary Nutrition Supplements: Clear Liquid Oral Supplement  DIET LOW FAT;         Allergies   Allergen Reactions    Linezolid Other (See Comments)     Thrombocytopenia, impactful diarrhea, weight loss    Pcn [Penicillins] Swelling    Dye [Iodides]      Heart and kidney dye / reaction unknown         MEDICATION SIDE EFFECTS:none       SCHEDULED MEDS: Scheduled Meds:   pantoprazole  40 mg Oral BID AC    predniSONE  40 mg Oral Daily    potassium chloride  20 mEq Oral BID    fluconazole  200 mg Oral Daily    metoprolol succinate  25 mg Oral BID    bumetanide  1 mg Intravenous BID    heparin flush  3 mL Intravenous Q12H    digoxin  125 mcg Intravenous Daily    benzonatate  100 mg Oral TID    atorvastatin  60 mg Oral Daily    [Held by provider] clopidogrel  75 mg Oral Daily    Arformoterol Tartrate  15 mcg Nebulization BID    budesonide  250 mcg Nebulization BID    warfarin (COUMADIN) daily dosing (placeholder)   Other RX Placeholder    sodium chloride flush  10 mL Intravenous 2 times per day       Continuous Infusions:        Data:       Intake/Output Summary (Last 24 hours) at 8/6/2020 1219  Last data filed at 8/6/2020 1054  Gross per 24 hour   Intake 120 ml   Output 2950 ml   Net -2830 ml       Wt Readings from Last 3 Encounters:   08/06/20 149 lb 9.6 oz (67.9 kg)   03/25/20 142 lb (64.4 kg)   10/24/19 175 lb (79.4 kg)       Labs: Additional    GLUCOSE:No results for input(s): POCGLU in the last 72 hours. BNP:No results found for: BNP    CRP:   Recent Labs     08/04/20  0555 08/05/20  0535 08/06/20  0450   CRP <0.1 <0.1 <0.1       ESR:  Recent Labs     08/04/20  0555 08/05/20  0535 08/06/20  0450   SEDRATE 2 0 2       RADIOLOGY: REVIEWED AVAILABLE REPORT  XR CHEST PORTABLE   Final Result   Pleural effusions with adjacent atelectasis. Pulmonary vascular   congestion indicating elevated left heart filling pressures. NM GI BLOOD LOSS   Final Result   There is no evidence for acute GI bleed            CT CHEST WO CONTRAST   Final Result   Addendum 1 of 1   Old left rib fractures are noted. Final      XR CHEST PORTABLE   Final Result      Superimposed upon bilateral interstitial infiltrates is increasing   consolidation and effusion of the right lung base. XR CHEST PORTABLE   Final Result   1.  Acute right

## 2020-08-06 NOTE — PROGRESS NOTES
Comprehensive Nutrition Assessment    Type and Reason for Visit:  Reassess    Nutrition Recommendations/Plan: Continue Diet. Will Modify Current ONS to Ensure High Pro ONS BID. Nutrition Assessment:  Pt is improving from a nutritional stand-point AEB pt consuming ~% of most meals/ONS since adm w/ no noted complaints at this time. Remains at risk 2/2 Moderate Malnutrition w/ poor intake/wt loss upon adm. Will modify current ONS to aid in PO intake. Malnutrition Assessment:  Malnutrition Status: Moderate malnutrition    Context:  Chronic Illness     Findings of the 6 clinical characteristics of malnutrition:  Energy Intake:  7 - 75% or less estimated energy requirements for 1 month or longer  Weight Loss:  Unable to assess     Body Fat Loss:  No significant body fat loss     Muscle Mass Loss:  1 - Mild muscle mass loss Clavicles (pectoralis & deltoids)  Fluid Accumulation:  No significant fluid accumulation     Strength:  Not Performed    Estimated Daily Nutrient Needs:  Energy (kcal):  3714-0051(MSJ x 1.2 SF per ABW); Weight Used for Energy Requirements:  Admission     Protein (g):  75-85(1.2-1.4 gm/kg per ABW); Weight Used for Protein Requirements:  Admission        Fluid (ml/day):  7172-7905(1 ml/kcal); Weight Used for Fluid Requirements:  Admission      Nutrition Related Findings:  A&O, U/L dentures, Abd/BS WDL, No edema, I/O's WNL(EGD w/ Bx - GERD, Ero. Eso., Candida, Gastritis 7/31)      Wounds:  None       Current Nutrition Therapies:    Dietary Nutrition Supplements: Clear Liquid Oral Supplement  DIET LOW FAT;     Anthropometric Measures:  · Height: 5' 8\" (172.7 cm)  · Current Body Weight: 149 lb (67.6 kg)   · Admission Body Weight: 134 lb (60.8 kg)(7/31 actual)    · Usual Body Weight: 189 lb (85.7 kg)(stated wt 8/2019, no actual wt hx on file)     · Ideal Body Weight: 154 lbs; % Ideal Body Weight 90.9 %   · BMI: 22.7  · Adjusted Body Weight:  ; No Adjustment   · Adjusted BMI: · BMI Categories: Underweight (BMI less than 22) age over 65(per ABW)       Nutrition Diagnosis:   · Moderate malnutrition, In context of chronic illness related to altered GI function(2/2 Erosive Esophagitis) as evidenced by poor intake prior to admission, weight loss, mild muscle loss      Nutrition Interventions:   Food and/or Nutrient Delivery:  Continue Current Diet, Modify Oral Nutrition Supplement(Continue Diet. Will Modify Current ONS to Ensure High Pro ONS BID.)  Nutrition Education/Counseling:  No recommendation at this time   Coordination of Nutrition Care:  Continued Inpatient Monitoring    Goals:  PO intake >75% of meals/ONS. Nutrition Monitoring and Evaluation:   Behavioral-Environmental Outcomes:  (n/a)   Food/Nutrient Intake Outcomes:  Diet Advancement/Tolerance, Food and Nutrient Intake, Supplement Intake  Physical Signs/Symptoms Outcomes:  Biochemical Data, Chewing or Swallowing, GI Status, Fluid Status or Edema, Nutrition Focused Physical Findings, Skin, Weight     Discharge Planning:     Too soon to determine     Electronically signed by Casey Salguero RD, LD on 8/6/20 at 4:13 PM EDT    Contact: ext [phone]

## 2020-08-06 NOTE — PROGRESS NOTES
Date: 8/6/2020    Time: 12:45 AM    Patient Placed On BIPAP/CPAP/ Non-Invasive Ventilation? Yes    If no must comment. Facial area red/color change? No           If YES are Blister/Lesion present? No   If yes must notify nursing staff  BIPAP/CPAP skin barrier?   Yes    Skin barrier type:duoderm       Comments:        Jessica Perez, RRT

## 2020-08-06 NOTE — PLAN OF CARE
Problem: Falls - Risk of:  Goal: Will remain free from falls  Description: Will remain free from falls  Outcome: Met This Shift  Goal: Absence of physical injury  Description: Absence of physical injury  Outcome: Met This Shift     Problem: Pain:  Goal: Pain level will decrease  Description: Pain level will decrease  Outcome: Met This Shift  Goal: Control of acute pain  Description: Control of acute pain  Outcome: Met This Shift  Goal: Control of chronic pain  Description: Control of chronic pain  Outcome: Met This Shift     Problem: Gas Exchange - Impaired:  Goal: Levels of oxygenation will improve  Description: Levels of oxygenation will improve  Outcome: Met This Shift     Problem: Skin Integrity:  Goal: Will show no infection signs and symptoms  Description: Will show no infection signs and symptoms  Outcome: Met This Shift  Goal: Absence of new skin breakdown  Description: Absence of new skin breakdown  Outcome: Met This Shift

## 2020-08-06 NOTE — PROGRESS NOTES
[facility] Quality Flow/Interdisciplinary Rounds Progress Note        Quality Flow Rounds held on August 6, 2020    Disciplines Attending:  Bedside Nurse, ,  and Nursing Unit Leadership    Esteban Nazario was admitted on 7/30/2020  6:00 PM    Anticipated Discharge Date:  Expected Discharge Date: 08/10/20    Disposition:    Josep Score:  Josep Scale Score: 20    Readmission Risk              Risk of Unplanned Readmission:        24           Discussed patient goal for the day, patient clinical progression, and barriers to discharge. The following Goal(s) of the Day/Commitment(s) have been identified:  Transition to PO diuretics-therapy scores in-determine discharge location today.       Gisel Ferrera  August 6, 2020

## 2020-08-06 NOTE — PROGRESS NOTES
Spoke with Yu-patient's girlfriend. Patient will discharge tomorrow to her home for monitoring over the weekend-patient will go home on Monday and both are ok for [facility] to start service Monday at patient's apartment.    Electronically signed by Lucas Bowden RN on 8/6/2020 at 4:50 PM

## 2020-08-06 NOTE — PROGRESS NOTES
PROGRESS NOTE    Patient Presents with/Seen in Consultation For      *gi bleed   CHIEF COMPLAINT:   fatigue, nausea, vomiting, diarrhea, shortness of breath, and cough    Subjective:     Patient states he feels much better and did better with therapy today. He states he spoke with his fiancée and he is going home with home health care and staying with her until he strong enough to get to his apartment. Patient states he has been up to the chair and up to the bathroom without difficulty. He denies abdominal pain, nausea, or vomiting. Patient states he had 1 dark brown BM, \"the stools are getting lighter, not so black. \"  Patient states his appetite is good. Review of Systems  Aside from what was mentioned in the PMH and HPI, essentially unremarkable, all others negative. Objective:     BP (!) 110/54   Pulse 70   Temp 97.8 °F (36.6 °C) (Oral)   Resp 20   Ht 5' 8\" (1.727 m)   Wt 149 lb 9.6 oz (67.9 kg)   SpO2 99%   BMI 22.75 kg/m²     General appearance: alert, awake, pale, thin, laying in bed  no apparent distress, pale, and cooperative  Eyes: conjunctiva pale, sclera anicteric. PERRL. Lungs: rhonchi with diffuse wheezing to auscultation bilaterally.  O2 per NC  Heart: irregular rate and rhythm, no murmur, 2+ pulses; no edema  Abdomen: soft, non-tender; bowel sounds normal; no masses,  no organomegaly  Extremities: extremities with no edema  Pulses: 2+ and symmetric  Skin: Skin color pale, texture, turgor normal.   Neurologic: Grossly normal    warfarin (COUMADIN) tablet 5 mg, Once  clopidogrel (PLAVIX) tablet 75 mg, Daily  melatonin tablet 3 mg, Nightly PRN  pantoprazole (PROTONIX) tablet 40 mg, BID AC  predniSONE (DELTASONE) tablet 40 mg, Daily  potassium chloride (KLOR-CON M) extended release tablet 20 mEq, BID  ipratropium-albuterol (DUONEB) nebulizer solution 1 ampule, BID PRN  fluconazole (DIFLUCAN) tablet 200 mg, Daily  magnesium sulfate 2 g in 50 mL IVPB premix, PRN  metoprolol succinate (TOPROL XL) extended release tablet 25 mg, BID  bumetanide (BUMEX) injection 1 mg, BID  heparin flush 100 UNIT/ML injection 300 Units, Q12H  heparin flush 100 UNIT/ML injection 300 Units, PRN  digoxin (LANOXIN) injection 125 mcg, Daily  benzonatate (TESSALON) capsule 100 mg, TID  guaiFENesin tablet 400 mg, 4x Daily PRN  atorvastatin (LIPITOR) tablet 60 mg, Daily  HYDROcodone-acetaminophen (NORCO) 7.5-325 MG per tablet 1 tablet, Q4H PRN  Arformoterol Tartrate (BROVANA) nebulizer solution 15 mcg, BID  budesonide (PULMICORT) nebulizer suspension 250 mcg, BID  acetaminophen (TYLENOL) tablet 650 mg, Q4H PRN  warfarin (COUMADIN) daily dosing (placeholder), RX Placeholder  sodium chloride flush 0.9 % injection 10 mL, 2 times per day  sodium chloride flush 0.9 % injection 10 mL, PRN         Data Review  CBC:   Lab Results   Component Value Date    WBC 16.8 08/06/2020    RBC 3.00 08/06/2020    HGB 9.3 08/06/2020    HCT 28.4 08/06/2020    MCV 94.7 08/06/2020    MCH 31.0 08/06/2020    MCHC 32.7 08/06/2020    RDW 14.5 08/06/2020     08/06/2020    MPV 10.3 08/06/2020     CMP:    Lab Results   Component Value Date     08/06/2020    K 4.5 08/06/2020    K 2.6 07/30/2020     08/06/2020    CO2 34 08/06/2020    BUN 27 08/06/2020    CREATININE 1.0 08/06/2020    GFRAA >60 08/06/2020    LABGLOM >60 08/06/2020    GLUCOSE 106 08/06/2020    PROT 4.5 08/05/2020    LABALBU 3.1 08/05/2020    CALCIUM 8.2 08/06/2020    BILITOT 0.3 08/05/2020    ALKPHOS 99 08/05/2020    AST 47 08/05/2020    ALT 91 08/05/2020     Hepatic Function Panel:    Lab Results   Component Value Date    ALKPHOS 99 08/05/2020    ALT 91 08/05/2020    AST 47 08/05/2020    PROT 4.5 08/05/2020    BILITOT 0.3 08/05/2020    BILIDIR <0.2 08/05/2020    IBILI see below 08/05/2020    LABALBU 3.1 08/05/2020     No components found for: CHLPLat  Lab Results   Component Value Date    TRIG 79 07/31/2020   e    LDLCALC 33 07/31/2020             Lab Results   Component Value Date    HDL 30 07/31/2020    D  Lab Results   Component Value Date    LDLCALC 33 07/31/2020   ate    PROTIME 12.9 08/06/2020    INR 1.1 08/06/2020         Assessment:     Principal Problem:    GI bleeding  Active Problems:  ? Anemia, normocytic, normochromic-melanotic FOBT+ stool - EGD erosive esophagitis, candida, and gastritis  ? Elevated LFTs, likely 2/2 shock liver - trending downward  ? Diarrhea - resolved  ? Nausea and vomiting - resolved  ? Thrombocytopenia - resolved  ? Unintentional weight loss - 90#/6-8 mo  ? Supratherapeutic INR - resolved  ? Leukocytosis - ID following  ? A-Fib on Warfarin - Warfarin on hold  ? Shortness of breath, worse on exertion-defer to pulmonary  ? Hypokalemia, hypochloremia -defer to PCP  ? Hypotension-resolved  ? EGD 7/31/20: Severe erosive esophagitis, Candida esophagitis, but no varices seen. Stomach showed gastritis and biopsies were not taken in view of the patient's thrombocytopenia. Duodenum appeared unremarkable. There was no evidence of old or active bleeding. ? Constipation    Plan:     ? Continue Colace po 200 mg at HS  ? Palliative Care following  ? Low fat diet, as tolerated  ? Fluconazole increased per ID - LFTs rising - consider alternative - defer to ID   ? Medicate for nausea as ordered  ? Serial H&H, transfuse per ICU  ? Monitor CBC, CMP, INR daily  ? Defer comorbidities to others  ? Colonoscopy to be done once pt stabilized, likely as outpatient  ? Planning for discharge soon per PCP  ? Continue to monitor    Note: This report was completed utilizing computer voice recognition software. Every effort has been made to ensure accuracy, however; inadvertent computerized transcription errors may be present.      Discussed with Dr. Mechelle Arriaga per Dr. Sylvia HAQ-ACNS-BC, FNP-BC 8/6/2020 2:29 PM For Dr. Roma Greene

## 2020-08-06 NOTE — CARE COORDINATION
CM spoke with Edgar Lee (partner) who was upset as pt arguing with her when I called. CM explained pt wants to return home & is alert & able to make own decisions. Edgar Lee states he wants her to stay with him & take care of him but she must report to work certain days. CM will contact pt again to discuss transition of care.

## 2020-08-06 NOTE — PLAN OF CARE
Problem: Malnutrition  (NI-5.2)  Goal: Food and/or Nutrient Delivery  Continue Diet. Will Modify Current ONS to Ensure High Pro ONS BID. Description: Individualized approach for food/nutrient provision.   Outcome: Met This Shift

## 2020-08-06 NOTE — PROGRESS NOTES
Patient up with standby assistance x 1 to Bathroom, refusing to use urinal at this time, states, \"Im done doing that\".   No output recorded

## 2020-08-06 NOTE — PROGRESS NOTES
Occupational Therapy  OT BEDSIDE TREATMENT NOTE      Date:2020  Patient Name: Esteban Nazario  MRN: [id number]  : 1940  Room: [address] [place] [address]-A     Referring Dominik Chapman MD     Evaluating OT: Irene Valadez OTR/L [id number]     AM-PAC Daily Activity Raw Score: 18/24     Recommended Adaptive Equipment: TBD     Diagnosis: GI bleed. Pt presents to ED from home with fatigue, nausea, vomiting and diarrhea. Surgery:  upper endoscopy   Pertinent Medical History: HTN, emphysema, COPD, CHF, CAD  Precautions:  Falls, O2, art line, triple lumen in R groin      Home Living: Pt lives alone in a single story apt with 10 steps on entry. Bathroom setup: walk in shower, standard commode      Prior Level of Function: Independent with ADLs, Independent with IADLs; completed functional mobility with no AD. 2-4L O2 at home.   Driving: Yes     Pain Level: No c/o pain    Cognition: Alert and oriented grossly with fair ability to follow commands                Functional Assessment:    Initial Eval Status  Date: 20 Treatment session: 20 Short Term Goals  Treatment frequency: 2-5x/wk PRN x1-3 wks      Feeding Set up       Grooming Set up   Independent   UB Dressing Set up  Independent   LB Dressing Min A SNA to don underwear and pants Mod I    Bathing Mod A   Mod I   Toileting Min A  Mod I   Bed Mobility  Supine to sit: Min A  Sit to Supine: SBA Supine to it: SBA     Functional Transfers STS: CGA STS: SBA from EOB and arm chair Mod I   Functional Mobility CGA with no AD  Side steps to HOB    SBA using ww in room Mod I during ADLs   Balance Sitting: fair plus     Standing: fair/fair minus  1 LOB during stand task  Sitting: Independent  Standing: SBA     Activity Tolerance poor Fair   standing brice x6-7 min with fair plus balance during self care tasks         B UE were WFL during tasks. Comments: Upon arrival pt was supine in bed.  At end of session pt was transferred to chair with all lines and tubes intact and call light within reach. Treatment: Instructed pt on 2x10 reps of AROM shoulder flexion, shoulder horizontal abduction and scapular retraction requiring mod vc and demos for correct form. Pt also completed 1x5 reps of STS exercises from arm chair. Pt unable to follow commands for pacing to conserve energy during activities. Pt declined additional ADLs due to girlfriend arriving soon to assist with self care. Per RN pt does not need to have chair alarm activated when OOB. Education: Energy conservation training, pursed lip breathing and safety techniques to maximize independence with ADLs. · Pt has made good progress towards set goals.    · Continue with current plan of care      Treatment Charges: Mins Units   Ther Ex  42015 15 1   Manual Therapy [id number]     Thera Activities [id number]     ADL/Home Mgt 22852 8 1   Neuro Re-ed [id number]     Group Therapy      Orthotic manage/training  [id number]     Non-Billable Time     Total Timed Treatment 23 2       4703 Jonny CARRILLO/L [id number]

## 2020-08-06 NOTE — PROGRESS NOTES
DAILY PROGRESS NOTE - [facility]    SUBJECTIVE:    He is being followed for rapid atrial fibrillation and acute diastolic heart failure. His heart rate is now much better controlled and he continues to diurese. Around 2 L negative and now fluid status near even since admission. Remains on IV diuretic. OBJECTIVE:    His vital signs were reviewed today. Vitals:    08/06/20 0044   BP:    Pulse:    Resp: 20   Temp:    SpO2:        Scheduled Meds:   pantoprazole  40 mg Oral BID AC    predniSONE  40 mg Oral Daily    potassium chloride  20 mEq Oral BID    fluconazole  200 mg Oral Daily    metoprolol succinate  25 mg Oral BID    bumetanide  1 mg Intravenous BID    heparin flush  3 mL Intravenous Q12H    digoxin  125 mcg Intravenous Daily    benzonatate  100 mg Oral TID    atorvastatin  60 mg Oral Daily    [Held by provider] clopidogrel  75 mg Oral Daily    Arformoterol Tartrate  15 mcg Nebulization BID    budesonide  250 mcg Nebulization BID    warfarin (COUMADIN) daily dosing (placeholder)   Other RX Placeholder    sodium chloride flush  10 mL Intravenous 2 times per day     Continuous Infusions:    PRN Meds:.melatonin, ipratropium-albuterol, magnesium sulfate, heparin flush, guaiFENesin, HYDROcodone-acetaminophen, acetaminophen, sodium chloride flush    REVIEW OF SYSTEMS: Other than mild intermittent dyspnea, the full 10 point review of systems is negative. FAMILY HISTORY: Negative for CAD in first-degree relatives. SOCIAL HISTORY: Negative for alcohol, tobacco, or illicit drug use. PHYSICAL EXAM:    General Appearance:  awake, alert, oriented, no distress  Neck:  no bruits  Lungs:  Normal expansion. Coarse breath sounds bilaterally to auscultation. No rales, rhonchi, or wheezing. Heart:  Heart sounds are normal.  Irregular rate and rhythm without murmur, gallop or rub. Abdomen:  Soft, non-tender.   Extremities: Extremities warm to touch, pink, with no

## 2020-08-06 NOTE — PROGRESS NOTES
[address] [place] [address] [facility]  Progress Note      Chief Complaint   Patient presents with    Emesis     N/V with dizziness and fatigue     Fatigue    Dizziness       SUBJECTIVE:  Patient is tolerating medications. No reported adverse drug reactions. No nausea, vomiting, diarrhea. Awake and alert  4 L nasal cannula  Tolerating p.o. Review of systems:  As stated above in the chief complaint, otherwise negative. Medications:  Scheduled Meds:   warfarin  5 mg Oral Once    clopidogrel  75 mg Oral Daily    pantoprazole  40 mg Oral BID AC    predniSONE  40 mg Oral Daily    potassium chloride  20 mEq Oral BID    fluconazole  200 mg Oral Daily    metoprolol succinate  25 mg Oral BID    bumetanide  1 mg Intravenous BID    heparin flush  3 mL Intravenous Q12H    digoxin  125 mcg Intravenous Daily    benzonatate  100 mg Oral TID    atorvastatin  60 mg Oral Daily    Arformoterol Tartrate  15 mcg Nebulization BID    budesonide  250 mcg Nebulization BID    warfarin (COUMADIN) daily dosing (placeholder)   Other RX Placeholder    sodium chloride flush  10 mL Intravenous 2 times per day     Continuous Infusions:    PRN Meds:melatonin, ipratropium-albuterol, magnesium sulfate, heparin flush, guaiFENesin, HYDROcodone-acetaminophen, acetaminophen, sodium chloride flush    OBJECTIVE:  BP (!) 110/54   Pulse 70   Temp 97.8 °F (36.6 °C) (Oral)   Resp 20   Ht 5' 8\" (1.727 m)   Wt 149 lb 9.6 oz (67.9 kg)   SpO2 99%   BMI 22.75 kg/m²   Temp  Av.2 °F (36.8 °C)  Min: 97.8 °F (36.6 °C)  Max: 98.6 °F (37 °C)  Constitutional: The patient is awake, alert, and oriented. Skin: Warm and dry. No rashes were noted. HEENT: Round and reactive pupils. Moist mucous membranes. No ulcerations or thrush. Neck: Supple to movements. Chest: No use of accessory muscles to breathe. Symmetrical expansion. No wheezing, crackles or rhonchi.   Decreased breath sounds in the bases  Cardiovascular: S1 5 Days- no growth 09/04/2018    BLOODCULT2 5 Days- no growth 02/21/2016    ORG Klebsiella pneumoniae ssp pneumoniae 07/31/2020    ORG Anaerobic gram positive cocci 06/29/2020    ORG Staphylococcus epidermidis 06/29/2020    ORG Candida species 06/29/2020     WOUND/ABSCESS   Date Value Ref Range Status   06/29/2020 Light growth  Final   01/03/2020 Light growth  Final   01/03/2020 Rare growth  Final     Smear, Respiratory   Date Value Ref Range Status   09/04/2018   Final    Group 5: >25 PMN's/LPF and <10 Epithelial cells/LPF  Abundant Polymorphonuclear leukocytes  Epithelial cells not seen  Moderate Gram negative rods  Rare Gram positive cocci in pairs       No results found for: MPNEUMO, CLAMYDCU, LABLEGI, AFBCX, FUNGSM, LABFUNG  CULTURE, RESPIRATORY   Date Value Ref Range Status   09/04/2018 Oral Pharyngeal Marysol present  Final     No results found for: CXCATHTIP  No results found for: BFCS  Culture Surgical   Date Value Ref Range Status   06/25/2019 Light growth  Final     Urine Culture, Routine   Date Value Ref Range Status   07/31/2020 (A)  Final    <10,000 CFU/mL  Oxidase positive gram negative rods     07/31/2020 <25,000 CFU/ml  Final     [facility] Culture Only   Date Value Ref Range Status   07/31/2020 Methicillin resistant Staph aureus not isolated  Final   urine with klb    ASSESSMENT:  · Reactive leukocytosis secondary to bleed  · A.  Fib  · Left ear issue is quiescence  · Candida esophagitis on a low dose of Diflucan down with the steroids I think this should be increase    PLAN:  · Continue fluconazole 200 mg po   Patient has been on antibiotics orally since January more less and loath to give him any more necessarily  · Check final cultures  · Monitor labs\  · Leukocytosis improved    Jurgen Kenney  3:04 PM  8/6/2020

## 2020-08-06 NOTE — PROGRESS NOTES
Date: 8/5/2020    Time: 10:11 PM    Patient Placed On BIPAP/CPAP/ Non-Invasive Ventilation? No    If no must comment. Facial area red/color change? No           If YES are Blister/Lesion present? No   If yes must notify nursing staff  BIPAP/CPAP skin barrier? Yes    Skin barrier type:duoderm       Comments: Patient refusing to go on at this time, he likes to go on around midnight. Will let next therapist know that he likes to go on later. Machine in room if needed.         Luna Christianson

## 2020-08-07 VITALS
HEART RATE: 70 BPM | HEIGHT: 68 IN | OXYGEN SATURATION: 94 % | RESPIRATION RATE: 20 BRPM | TEMPERATURE: 98.1 F | WEIGHT: 149.6 LBS | DIASTOLIC BLOOD PRESSURE: 54 MMHG | BODY MASS INDEX: 22.67 KG/M2 | SYSTOLIC BLOOD PRESSURE: 92 MMHG

## 2020-08-07 LAB
ANION GAP SERPL CALCULATED.3IONS-SCNC: 5 MMOL/L (ref 7–16)
BASOPHILS ABSOLUTE: 0.01 E9/L (ref 0–0.2)
BASOPHILS RELATIVE PERCENT: 0.1 % (ref 0–2)
BUN BLDV-MCNC: 28 MG/DL (ref 8–23)
C-REACTIVE PROTEIN: <0.1 MG/DL (ref 0–0.4)
CALCIUM SERPL-MCNC: 8.2 MG/DL (ref 8.6–10.2)
CHLORIDE BLD-SCNC: 101 MMOL/L (ref 98–107)
CO2: 37 MMOL/L (ref 22–29)
CREAT SERPL-MCNC: 1 MG/DL (ref 0.7–1.2)
EOSINOPHILS ABSOLUTE: 0.01 E9/L (ref 0.05–0.5)
EOSINOPHILS RELATIVE PERCENT: 0.1 % (ref 0–6)
GFR AFRICAN AMERICAN: >60
GFR NON-AFRICAN AMERICAN: >60 ML/MIN/1.73
GLUCOSE BLD-MCNC: 97 MG/DL (ref 74–99)
HCT VFR BLD CALC: 29 % (ref 37–54)
HEMOGLOBIN: 9.3 G/DL (ref 12.5–16.5)
IMMATURE GRANULOCYTES #: 0.32 E9/L
IMMATURE GRANULOCYTES %: 2 % (ref 0–5)
INR BLD: 1.1
LYMPHOCYTES ABSOLUTE: 0.61 E9/L (ref 1.5–4)
LYMPHOCYTES RELATIVE PERCENT: 3.9 % (ref 20–42)
MAGNESIUM: 2.4 MG/DL (ref 1.6–2.6)
MCH RBC QN AUTO: 30.4 PG (ref 26–35)
MCHC RBC AUTO-ENTMCNC: 32.1 % (ref 32–34.5)
MCV RBC AUTO: 94.8 FL (ref 80–99.9)
MONOCYTES ABSOLUTE: 1.35 E9/L (ref 0.1–0.95)
MONOCYTES RELATIVE PERCENT: 8.6 % (ref 2–12)
NEUTROPHILS ABSOLUTE: 13.41 E9/L (ref 1.8–7.3)
NEUTROPHILS RELATIVE PERCENT: 85.3 % (ref 43–80)
PDW BLD-RTO: 14.7 FL (ref 11.5–15)
PHOSPHORUS: 3 MG/DL (ref 2.5–4.5)
PLATELET # BLD: 295 E9/L (ref 130–450)
PMV BLD AUTO: 10.3 FL (ref 7–12)
POTASSIUM SERPL-SCNC: 4.6 MMOL/L (ref 3.5–5)
PROTHROMBIN TIME: 13.1 SEC (ref 9.3–12.4)
RBC # BLD: 3.06 E12/L (ref 3.8–5.8)
SEDIMENTATION RATE, ERYTHROCYTE: 0 MM/HR (ref 0–15)
SODIUM BLD-SCNC: 143 MMOL/L (ref 132–146)
WBC # BLD: 15.7 E9/L (ref 4.5–11.5)

## 2020-08-07 PROCEDURE — 83735 ASSAY OF MAGNESIUM: CPT

## 2020-08-07 PROCEDURE — 85025 COMPLETE CBC W/AUTO DIFF WBC: CPT

## 2020-08-07 PROCEDURE — 86140 C-REACTIVE PROTEIN: CPT

## 2020-08-07 PROCEDURE — 2580000003 HC RX 258: Performed by: INTERNAL MEDICINE

## 2020-08-07 PROCEDURE — 85651 RBC SED RATE NONAUTOMATED: CPT

## 2020-08-07 PROCEDURE — 6370000000 HC RX 637 (ALT 250 FOR IP): Performed by: INTERNAL MEDICINE

## 2020-08-07 PROCEDURE — 36415 COLL VENOUS BLD VENIPUNCTURE: CPT

## 2020-08-07 PROCEDURE — 94640 AIRWAY INHALATION TREATMENT: CPT

## 2020-08-07 PROCEDURE — 84100 ASSAY OF PHOSPHORUS: CPT

## 2020-08-07 PROCEDURE — 2700000000 HC OXYGEN THERAPY PER DAY

## 2020-08-07 PROCEDURE — 94660 CPAP INITIATION&MGMT: CPT

## 2020-08-07 PROCEDURE — 80048 BASIC METABOLIC PNL TOTAL CA: CPT

## 2020-08-07 PROCEDURE — 6360000002 HC RX W HCPCS: Performed by: INTERNAL MEDICINE

## 2020-08-07 PROCEDURE — 85610 PROTHROMBIN TIME: CPT

## 2020-08-07 RX ORDER — PANTOPRAZOLE SODIUM 40 MG/1
40 TABLET, DELAYED RELEASE ORAL
Qty: 30 TABLET | Refills: 3 | Status: SHIPPED | OUTPATIENT
Start: 2020-08-07

## 2020-08-07 RX ORDER — FLUCONAZOLE 200 MG/1
200 TABLET ORAL DAILY
Qty: 7 TABLET | Refills: 0 | Status: SHIPPED | OUTPATIENT
Start: 2020-08-08 | End: 2020-08-07

## 2020-08-07 RX ORDER — FLUCONAZOLE 200 MG/1
200 TABLET ORAL DAILY
Qty: 14 TABLET | Refills: 0 | Status: SHIPPED | OUTPATIENT
Start: 2020-08-08 | End: 2020-08-22

## 2020-08-07 RX ORDER — WARFARIN SODIUM 4 MG/1
4 TABLET ORAL DAILY
Qty: 30 TABLET | Refills: 3 | Status: SHIPPED | OUTPATIENT
Start: 2020-08-07 | End: 2020-09-24 | Stop reason: ALTCHOICE

## 2020-08-07 RX ORDER — LANOLIN ALCOHOL/MO/W.PET/CERES
3 CREAM (GRAM) TOPICAL NIGHTLY PRN
Refills: 3 | COMMUNITY
Start: 2020-08-07 | End: 2020-09-24

## 2020-08-07 RX ORDER — BUMETANIDE 1 MG/1
1 TABLET ORAL 2 TIMES DAILY
Qty: 30 TABLET | Refills: 3 | Status: ON HOLD | OUTPATIENT
Start: 2020-08-07 | End: 2021-11-04

## 2020-08-07 RX ORDER — BUMETANIDE 1 MG/1
1 TABLET ORAL 2 TIMES DAILY
Status: DISCONTINUED | OUTPATIENT
Start: 2020-08-07 | End: 2020-08-07 | Stop reason: HOSPADM

## 2020-08-07 RX ORDER — ASPIRIN 81 MG/1
81 TABLET, CHEWABLE ORAL DAILY
Status: DISCONTINUED | OUTPATIENT
Start: 2020-08-07 | End: 2020-08-07 | Stop reason: HOSPADM

## 2020-08-07 RX ORDER — DIGOXIN 125 MCG
125 TABLET ORAL DAILY
Status: DISCONTINUED | OUTPATIENT
Start: 2020-08-07 | End: 2020-08-07 | Stop reason: HOSPADM

## 2020-08-07 RX ORDER — METOPROLOL SUCCINATE 25 MG/1
25 TABLET, EXTENDED RELEASE ORAL 2 TIMES DAILY
Qty: 30 TABLET | Refills: 3 | Status: SHIPPED | OUTPATIENT
Start: 2020-08-07

## 2020-08-07 RX ORDER — PREDNISONE 20 MG/1
TABLET ORAL
Qty: 30 TABLET | Refills: 0 | Status: SHIPPED | OUTPATIENT
Start: 2020-08-07 | End: 2021-03-25

## 2020-08-07 RX ORDER — DIGOXIN 125 MCG
125 TABLET ORAL DAILY
Qty: 30 TABLET | Refills: 3 | Status: ON HOLD | OUTPATIENT
Start: 2020-08-08 | End: 2021-11-04

## 2020-08-07 RX ADMIN — BENZONATATE 100 MG: 100 CAPSULE ORAL at 09:57

## 2020-08-07 RX ADMIN — PREDNISONE 40 MG: 20 TABLET ORAL at 09:56

## 2020-08-07 RX ADMIN — PANTOPRAZOLE SODIUM 40 MG: 40 TABLET, DELAYED RELEASE ORAL at 06:31

## 2020-08-07 RX ADMIN — FLUCONAZOLE 200 MG: 100 TABLET ORAL at 09:59

## 2020-08-07 RX ADMIN — ARFORMOTEROL TARTRATE 15 MCG: 15 SOLUTION RESPIRATORY (INHALATION) at 09:04

## 2020-08-07 RX ADMIN — ATORVASTATIN CALCIUM 60 MG: 20 TABLET, FILM COATED ORAL at 09:56

## 2020-08-07 RX ADMIN — BUMETANIDE 1 MG: 1 TABLET ORAL at 09:56

## 2020-08-07 RX ADMIN — BUDESONIDE 250 MCG: 0.25 SUSPENSION RESPIRATORY (INHALATION) at 09:04

## 2020-08-07 RX ADMIN — SODIUM CHLORIDE, PRESERVATIVE FREE 10 ML: 5 INJECTION INTRAVENOUS at 09:57

## 2020-08-07 RX ADMIN — POTASSIUM CHLORIDE 20 MEQ: 20 TABLET, EXTENDED RELEASE ORAL at 09:56

## 2020-08-07 RX ADMIN — DIGOXIN 125 MCG: 125 TABLET ORAL at 09:57

## 2020-08-07 RX ADMIN — ASPIRIN 81 MG: 81 TABLET, CHEWABLE ORAL at 09:56

## 2020-08-07 NOTE — PROGRESS NOTES
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 Physical exam suggest the effusions have all but been eliminated. He has been up and around in the room but needs to see physical therapy prior to discharge. He is not interested in rehab.       Vitals:  VITALS:  BP (!) 92/54   Pulse 70   Temp 98.1 °F (36.7 °C) (Oral)   Resp 20   Ht 5' 8\" (1.727 m)   Wt 149 lb 9.6 oz (67.9 kg)   SpO2 94%   BMI 22.75 kg/m²     24HR INTAKE/OUTPUT:      Intake/Output Summary (Last 24 hours) at 2020 1051  Last data filed at 2020 7939  Gross per 24 hour   Intake 120 ml   Output 1025 ml   Net -905 ml       24HR PULSE OXIMETRY RANGE:    SpO2  Av.7 %  Min: 94 %  Max: 96 %    Medications:  IV:      Scheduled Meds:   bumetanide  1 mg Oral BID    digoxin  125 mcg Oral Daily    aspirin  81 mg Oral Daily    pantoprazole  40 mg Oral BID AC    predniSONE  40 mg Oral Daily    potassium chloride  20 mEq Oral BID    fluconazole  200 mg Oral Daily    metoprolol succinate  25 mg Oral BID    heparin flush  3 mL Intravenous Q12H    benzonatate  100 mg Oral TID    atorvastatin  60 mg Oral Daily    Arformoterol Tartrate  15 mcg Nebulization BID    budesonide  250 mcg Nebulization BID    warfarin (COUMADIN) daily dosing (placeholder)   Other RX Placeholder    sodium chloride flush  10 mL Intravenous 2 times per day       Diet:   DIET LOW FAT; Dietary Nutrition Supplements: Low Calorie High Protein Supplement     EXAM:  General: No distress. Eyes: PERRL. No sclera icterus. No conjunctival injection. ENT: No discharge. Pharynx clear. Neck: Trachea midline. Normal thyroid. Resp: No accessory muscle use. No rales. No wheezing. Lower lobe rhonchi. CV: Regular rate. Regular rhythm. No murmur or rub. Abd: Non-tender. Non-distended. No masses. No organomegaly. Normal bowel sounds. Skin: Warm and dry. No nodule on exposed extremities. No rash on exposed extremities. Ext: No cyanosis, clubbing, edema  Lymph: No cervical LAD. No supraclavicular LAD. M/S: No cyanosis. No joint deformity. No clubbing. Neuro: Positive pupils/gag/corneals. Normal pain response. Results:  CBC:   Recent Labs     08/05/20 0535 08/06/20 0450 08/07/20  0325   WBC 18.2* 16.8* 15.7*   HGB 8.9* 9.3* 9.3*   HCT 27.3* 28.4* 29.0*   MCV 95.8 94.7 94.8    275 295     BMP:   Recent Labs     08/05/20 0535 08/06/20 0450 08/07/20  0325    141 143   K 4.0 4.5 4.6    100 101   CO2 31* 34* 37*   PHOS 2.9 3.0 3.0   BUN 24* 27* 28*   CREATININE 1.1 1.0 1.0     LIVER PROFILE:   Recent Labs     08/05/20  0535   AST 47*   ALT 91*   BILIDIR <0.2   BILITOT 0.3   ALKPHOS 99     PT/INR:   Recent Labs     08/05/20  0535 08/06/20 0450 08/07/20  0325   PROTIME 13.1* 12.9* 13.1*   INR 1.1 1.1 1.1     APTT: No results for input(s):  APTT in the last 72 hours. Pathology:  1. N/A      Microbiology:  1. None    Recent ABG:   No results for input(s): PH, PO2, PCO2, HCO3, BE, O2SAT, METHB, O2HB, COHB, O2CON, HHB, THB in the last 72 hours. FiO2 : 40 %       Recent Films:  XR CHEST PORTABLE   Final Result   Pleural effusions with adjacent atelectasis. Pulmonary vascular   congestion indicating elevated left heart filling pressures. NM GI BLOOD LOSS   Final Result   There is no evidence for acute GI bleed            CT CHEST WO CONTRAST   Final Result   Addendum 1 of 1   Old left rib fractures are noted. Final      XR CHEST PORTABLE   Final Result      Superimposed upon bilateral interstitial infiltrates is increasing   consolidation and effusion of the right lung base. XR CHEST PORTABLE   Final Result   1. Acute right basilar pneumonia. 2. Remainder of findings described as above. This report has been electronically signed by Claudene Lease MD.      [address] [facility] Additional Contrast? None   Final Result      1. No evidence of abdominal or pelvic mass or lymphadenopathy. 2. Multiple gas-filled distended loops of small bowel are present   within the anterior aspect of the abdomen which could indicate   adynamic ileus versus partial or early small bowel obstruction. Stool   and gas is seen within the colon. Short-term follow-up may be helpful   for further evaluation. 3. Diverticulosis without evidence of acute diverticulitis. 4. Bilateral perinephric fat stranding could suggest chronic medical   renal disease with appropriate clinical history. 5. View of the lung bases show bibasilar opacities related to   pneumonia and atelectasis with small bilateral pleural effusions. 6. Fusiform infrarenal abdominal aortic aneurysm is demonstrated   measuring up to 3 cm in diameter. XR CHEST PORTABLE   Final Result      Chronic coarsened interstitial lung markings are seen throughout both   lungs.  No obvious pneumonia or pleural effusion. Assessment:  1. 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 Ongoing tobacco abuse  3. GI bleed, rule out underlying malignancy with associated unintentional weight loss  4. Bilateral effusions with compressive atelectasis: There is been a good response to diuretics with the patient being negative some 2 L just yesterday.        Plan:  1. Continue empiric treatment for COPD  2. Diuretic as tolerated  3. Given the PCO2 of 35, there is no indication for nocturnal ventilation. Time at the bedside, reviewing labs and radiographs, reviewing updated notes and consultations, discussing with staff and family was more than 35 minutes. Please note that voice recognition technology was used in the preparation of this note and make therefore it may contain inadvertent transcription errors. If the patient is a COVID 19 isolation patient, the above physical exam reflects that of the examining physician for the day. Clara Brewer M.D., F.C.C.P.     [facility] [address] [facility], [address] [facility], [address] [place] [address]

## 2020-08-07 NOTE — PROGRESS NOTES
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 He took a walk with physical therapy. There is no oxyhemoglobin desaturation with supplemental oxygen in place. The patient has oxygen at home.       Vitals:  VITALS:  BP (!) 92/54   Pulse 70   Temp 98.1 °F (36.7 °C) (Oral)   Resp 20   Ht 5' 8\" (1.727 m)   Wt 149 lb 9.6 oz (67.9 kg)   SpO2 94%   BMI 22.75 kg/m²     24HR INTAKE/OUTPUT:      Intake/Output Summary (Last 24 hours) at 2020 1052  Last data filed at 2020 4308  Gross per 24 hour   Intake 120 ml   Output 1025 ml   Net -905 ml       24HR PULSE OXIMETRY RANGE:    SpO2  Av.7 %  Min: 94 %  Max: 96 %    Medications:  IV:      Scheduled Meds:   bumetanide  1 mg Oral BID    digoxin  125 mcg Oral Daily    aspirin  81 mg Oral Daily    pantoprazole  40 mg Oral BID AC    predniSONE  40 mg Oral Daily    potassium chloride  20 mEq Oral BID    fluconazole  200 mg Oral Daily    metoprolol succinate  25 mg Oral BID    heparin flush  3 mL Intravenous Q12H    benzonatate  100 mg Oral TID    atorvastatin  60 mg Oral Daily    Arformoterol Tartrate  15 mcg Nebulization BID    budesonide  250 mcg Nebulization BID    warfarin (COUMADIN) daily dosing (placeholder)   Other RX Placeholder    sodium chloride flush  10 mL Intravenous 2 times per day       Diet:   DIET LOW FAT; Dietary Nutrition Supplements: Low Calorie High Protein Supplement     EXAM:  General: No distress. Eyes: PERRL. No sclera icterus. No conjunctival injection. ENT: No discharge. Pharynx clear. Neck: Trachea midline. Normal thyroid. Resp: No accessory muscle use. No rales. No wheezing. Bilateral scattered rhonchi. CV: Regular rate. Regular rhythm. No murmur or rub. Abd: Non-tender. Non-distended. No masses. No organomegaly. Normal bowel sounds. Skin: Warm and dry. No nodule on exposed extremities. No rash on exposed extremities. Ext: No cyanosis, clubbing, edema  Lymph: No cervical LAD. No supraclavicular LAD. M/S: No cyanosis. No joint deformity. No clubbing. Neuro: Positive pupils/gag/corneals. Normal pain response. Results:  CBC:   Recent Labs     08/05/20 0535 08/06/20 0450 08/07/20  0325   WBC 18.2* 16.8* 15.7*   HGB 8.9* 9.3* 9.3*   HCT 27.3* 28.4* 29.0*   MCV 95.8 94.7 94.8    275 295     BMP:   Recent Labs     08/05/20 0535 08/06/20 0450 08/07/20  0325    141 143   K 4.0 4.5 4.6    100 101   CO2 31* 34* 37*   PHOS 2.9 3.0 3.0   BUN 24* 27* 28*   CREATININE 1.1 1.0 1.0     LIVER PROFILE:   Recent Labs     08/05/20 0535   AST 47*   ALT 91*   BILIDIR <0.2   BILITOT 0.3   ALKPHOS 99     PT/INR:   Recent Labs     08/05/20 0535 08/06/20 0450 08/07/20  0325   PROTIME 13.1* 12.9* 13.1*   INR 1.1 1.1 1.1     APTT: No results for input(s): APTT in the last 72 hours. Pathology:  1. N/A      Microbiology:  1.  None    Recent ABG:   No results for input(s): PH, PO2, PCO2, HCO3, now oxygen dependent  2. Ongoing tobacco abuse  3. GI bleed, rule out underlying malignancy with associated unintentional weight loss  4. Bilateral effusions with compressive atelectasis: There is been a good response to diuretics with the patient being negative some 2 L just yesterday.        Plan:  1. Continue empiric treatment for COPD  2. Diuretic as tolerated  3. Given the PCO2 of 35, there is no indication for nocturnal ventilation. 4. Okay to discharge from a pulmonary standpoint on his current therapies at home. Time at the bedside, reviewing labs and radiographs, reviewing updated notes and consultations, discussing with staff and family was more than 35 minutes. Please note that voice recognition technology was used in the preparation of this note and make therefore it may contain inadvertent transcription errors. If the patient is a COVID 19 isolation patient, the above physical exam reflects that of the examining physician for the day. Edith Mcgovern M.D., F.C.C.P.     [facility] [address] [facility], [address], [address], [facility]

## 2020-08-07 NOTE — PROGRESS NOTES
PROGRESS NOTE    Patient Presents with/Seen in Consultation For      *gi bleed   CHIEF COMPLAINT:   fatigue, nausea, vomiting, diarrhea, shortness of breath, and cough    Subjective:     Patient states he is doing well and going to his fiance's home the weekend then to his apartment Sunday night for [address] Monday for PT. Pt denies abd pain, n/v, diarrhea. Pt states he is eating well. Review of Systems  Aside from what was mentioned in the PMH and HPI, essentially unremarkable, all others negative. Objective:     BP (!) 92/54   Pulse 70   Temp 98.1 °F (36.7 °C) (Oral)   Resp 20   Ht 5' 8\" (1.727 m)   Wt 149 lb 9.6 oz (67.9 kg)   SpO2 94%   BMI 22.75 kg/m²     General appearance: alert, awake, thin, laying in bed  no apparent distress, pale, and cooperative  Eyes: conjunctiva pale, sclera anicteric. PERRL. Lungs: rhonchi with diffuse wheezing to auscultation bilaterally.  O2 per NC  Heart: irregular rate and rhythm, no murmur, 2+ pulses; no edema  Abdomen: soft, non-tender; bowel sounds normal; no masses,  no organomegaly  Extremities: extremities with no edema  Pulses: 2+ and symmetric  Skin: Skin color pale, texture, turgor normal.   Neurologic: Grossly normal    bumetanide (BUMEX) tablet 1 mg, BID  digoxin (LANOXIN) tablet 125 mcg, Daily  aspirin chewable tablet 81 mg, Daily  melatonin tablet 3 mg, Nightly PRN  pantoprazole (PROTONIX) tablet 40 mg, BID AC  predniSONE (DELTASONE) tablet 40 mg, Daily  potassium chloride (KLOR-CON M) extended release tablet 20 mEq, BID  ipratropium-albuterol (DUONEB) nebulizer solution 1 ampule, BID PRN  fluconazole (DIFLUCAN) tablet 200 mg, Daily  magnesium sulfate 2 g in 50 mL IVPB premix, PRN  metoprolol succinate (TOPROL XL) extended release tablet 25 mg, BID  heparin flush 100 UNIT/ML injection 300 Units, Q12H  heparin flush 100 UNIT/ML injection 300 Units, PRN  benzonatate (TESSALON) capsule 100 mg, TID  guaiFENesin tablet 400 mg, 4x Daily PRN  atorvastatin (LIPITOR) tablet 60 mg, Daily  HYDROcodone-acetaminophen (NORCO) 7.5-325 MG per tablet 1 tablet, Q4H PRN  Arformoterol Tartrate (BROVANA) nebulizer solution 15 mcg, BID  budesonide (PULMICORT) nebulizer suspension 250 mcg, BID  acetaminophen (TYLENOL) tablet 650 mg, Q4H PRN  warfarin (COUMADIN) daily dosing (placeholder), RX Placeholder  sodium chloride flush 0.9 % injection 10 mL, 2 times per day  sodium chloride flush 0.9 % injection 10 mL, PRN         Data Review  CBC:   Lab Results   Component Value Date    WBC 15.7 08/07/2020    RBC 3.06 08/07/2020    HGB 9.3 08/07/2020    HCT 29.0 08/07/2020    MCV 94.8 08/07/2020    MCH 30.4 08/07/2020    MCHC 32.1 08/07/2020    RDW 14.7 08/07/2020     08/07/2020    MPV 10.3 08/07/2020     CMP:    Lab Results   Component Value Date     08/07/2020    K 4.6 08/07/2020    K 2.6 07/30/2020     08/07/2020    CO2 37 08/07/2020    BUN 28 08/07/2020    CREATININE 1.0 08/07/2020    GFRAA >60 08/07/2020    LABGLOM >60 08/07/2020    GLUCOSE 97 08/07/2020    PROT 4.5 08/05/2020    LABALBU 3.1 08/05/2020    CALCIUM 8.2 08/07/2020    BILITOT 0.3 08/05/2020    ALKPHOS 99 08/05/2020    AST 47 08/05/2020    ALT 91 08/05/2020     Hepatic Function Panel:    Lab Results   Component Value Date    ALKPHOS 99 08/05/2020    ALT 91 08/05/2020    AST 47 08/05/2020    PROT 4.5 08/05/2020    BILITOT 0.3 08/05/2020    BILIDIR <0.2 08/05/2020    IBILI see below 08/05/2020    LABALBU 3.1 08/05/2020     No components found for: CHLPLat  Lab Results   Component Value Date    TRIG 79 07/31/2020   e    LDLCALC 33 07/31/2020             Lab Results   Component Value Date    HDL 30 07/31/2020    D  Lab Results   Component Value Date    LDLCALC 33 07/31/2020   ate    PROTIME 13.1 08/07/2020    INR 1.1 08/07/2020       Assessment:     Principal Problem:    GI bleeding  Active Problems:  ? Anemia, normocytic, normochromic-melanotic FOBT+ stool - EGD erosive esophagitis, candida, and gastritis  ?  Elevated

## 2020-08-07 NOTE — PROGRESS NOTES
[address] [place] [address] [facility]  [facility]  Progress Note      Chief Complaint   Patient presents with    Emesis     N/V with dizziness and fatigue     Fatigue    Dizziness       SUBJECTIVE:  Patient is tolerating medications. No reported adverse drug reactions. No nausea, vomiting, diarrhea. Awake and alert  3 L nasal cannula  Tolerating p.o. Review of systems:  As stated above in the chief complaint, otherwise negative. Medications:  Scheduled Meds:   bumetanide  1 mg Oral BID    digoxin  125 mcg Oral Daily    aspirin  81 mg Oral Daily    pantoprazole  40 mg Oral BID AC    predniSONE  40 mg Oral Daily    potassium chloride  20 mEq Oral BID    fluconazole  200 mg Oral Daily    metoprolol succinate  25 mg Oral BID    heparin flush  3 mL Intravenous Q12H    benzonatate  100 mg Oral TID    atorvastatin  60 mg Oral Daily    Arformoterol Tartrate  15 mcg Nebulization BID    budesonide  250 mcg Nebulization BID    warfarin (COUMADIN) daily dosing (placeholder)   Other RX Placeholder    sodium chloride flush  10 mL Intravenous 2 times per day     Continuous Infusions:    PRN Meds:melatonin, ipratropium-albuterol, magnesium sulfate, heparin flush, guaiFENesin, HYDROcodone-acetaminophen, acetaminophen, sodium chloride flush    OBJECTIVE:  BP (!) 92/54   Pulse 70   Temp 98.1 °F (36.7 °C) (Oral)   Resp 20   Ht 5' 8\" (1.727 m)   Wt 149 lb 9.6 oz (67.9 kg)   SpO2 94%   BMI 22.75 kg/m²   Temp  Av.1 °F (36.7 °C)  Min: 97.9 °F (36.6 °C)  Max: 98.3 °F (36.8 °C)  Constitutional: The patient is awake, alert, and oriented. Skin: Warm and dry. No rashes were noted. HEENT: Round and reactive pupils. Moist mucous membranes. No ulcerations or thrush. Neck: Supple to movements. Chest: No use of accessory muscles to breathe. Symmetrical expansion. No wheezing, crackles or rhonchi. Decreased breath sounds in the bases  Cardiovascular: S1 and S2 irregularly irregular.  No murmurs appreciated. Abdomen: Positive bowel sounds to auscultation. Benign to palpation. No masses felt. No hepatosplenomegaly. Genitourinary: Male  Extremities: No clubbing, no cyanosis, no edema.   Lines: peripheral    Laboratory and Tests Review:  Lab Results   Component Value Date    WBC 15.7 (H) 08/07/2020    WBC 16.8 (H) 08/06/2020    WBC 18.2 (H) 08/05/2020    HGB 9.3 (L) 08/07/2020    HCT 29.0 (L) 08/07/2020    MCV 94.8 08/07/2020     08/07/2020     Lab Results   Component Value Date    NEUTROABS 13.41 (H) 08/07/2020    NEUTROABS 14.21 (H) 08/06/2020    NEUTROABS 15.70 (H) 08/05/2020     No results found for: [facility]  Lab Results   Component Value Date    ALT 91 (H) 08/05/2020    AST 47 (H) 08/05/2020    ALKPHOS 99 08/05/2020    BILITOT 0.3 08/05/2020     Lab Results   Component Value Date     08/07/2020    K 4.6 08/07/2020    K 2.6 07/30/2020     08/07/2020    CO2 37 08/07/2020    BUN 28 08/07/2020    CREATININE 1.0 08/07/2020    CREATININE 1.0 08/06/2020    CREATININE 1.1 08/05/2020    GFRAA >60 08/07/2020    LABGLOM >60 08/07/2020    GLUCOSE 97 08/07/2020    PROT 4.5 08/05/2020    LABALBU 3.1 08/05/2020    CALCIUM 8.2 08/07/2020    BILITOT 0.3 08/05/2020    ALKPHOS 99 08/05/2020    AST 47 08/05/2020    ALT 91 08/05/2020     Lab Results   Component Value Date    CRP <0.1 08/07/2020    CRP <0.1 08/06/2020    CRP <0.1 08/05/2020     Lab Results   Component Value Date    SEDRATE 0 08/07/2020    SEDRATE 2 08/06/2020    SEDRATE 0 08/05/2020     Radiology:  Reviewed    Microbiology:   Lab Results   Component Value Date    BC 5 Days no growth 07/31/2020    BC 5 Days- no growth 02/21/2016    ORG Klebsiella pneumoniae ssp pneumoniae 07/31/2020    ORG Anaerobic gram positive cocci 06/29/2020    ORG Staphylococcus epidermidis 06/29/2020    ORG Candida species 06/29/2020     Lab Results   Component Value Date    BLOODCULT2 5 Days no growth 07/31/2020    BLOODCULT2 5 Days- no growth 09/04/2018    BLOODCULT2 5 Days- no growth 02/21/2016    ORG Klebsiella pneumoniae ssp pneumoniae 07/31/2020    ORG Anaerobic gram positive cocci 06/29/2020    ORG Staphylococcus epidermidis 06/29/2020    ORG Candida species 06/29/2020     WOUND/ABSCESS   Date Value Ref Range Status   06/29/2020 Light growth  Final   01/03/2020 Light growth  Final   01/03/2020 Rare growth  Final     Smear, Respiratory   Date Value Ref Range Status   09/04/2018   Final    Group 5: >25 PMN's/LPF and <10 Epithelial cells/LPF  Abundant Polymorphonuclear leukocytes  Epithelial cells not seen  Moderate Gram negative rods  Rare Gram positive cocci in pairs       No results found for: MPNEUMO, CLAMYDCU, LABLEGI, AFBCX, FUNGSM, LABFUNG  CULTURE, RESPIRATORY   Date Value Ref Range Status   09/04/2018 Oral Pharyngeal Marysol present  Final     No results found for: CXCATHTIP  No results found for: BFCS  Culture Surgical   Date Value Ref Range Status   06/25/2019 Light growth  Final     Urine Culture, Routine   Date Value Ref Range Status   07/31/2020 (A)  Final    <10,000 CFU/mL  Oxidase positive gram negative rods     07/31/2020 <25,000 CFU/ml  Final     MRSA Culture Only   Date Value Ref Range Status   07/31/2020 Methicillin resistant Staph aureus not isolated  Final   urine with klb    ASSESSMENT:  · Reactive leukocytosis secondary to bleed  · A. Fib  · Left ear issue is quiescence  · Candida esophagitis on a low dose of Diflucan down with the steroids I think this should be increase    PLAN:  · Continue fluconazole 200 mg po day 7. Will need 3 weeks total. Script written. Patient has been on antibiotics orally since January more less and loath to give him any more necessarily  · Check final cultures  · Monitor labs\  · Leukocytosis improved  · Ok to discharge    Jack Alu  11:47 AM  8/7/2020   Pt seen and examined. Above discussed agree with advanced practice nurse. Labs, cultures, and radiographs reviewed. Face to Face encounter occurred.  Changes made as necessary.      Lyn Hunt MD

## 2020-08-07 NOTE — DISCHARGE SUMMARY
the last 1-1/2 weeks. The day he presented to the ED he felt extremely fatigued dizzy almost fell was unable to walk. --Patient states he is been seeing infectious disease for the last few months due to \"infection of the left external ear. He states he has been on Zyvox for least a few months. He had 2 recent surgeries on the ear due to the infection. The ear surgery occurred in June and August 2019. --Hemoglobin had been 12.9, 1/5/2020; upon presentation 8.5. INR 9.9 stool for occult blood was positive. He was transfused 1 unit of packed red cells and fresh frozen plasma and vitamin K in the ED. He was markedly hypotensive 70 systolic admitted to intensive care for the above. Numerous fluid boluses were given but not much improvement. Central line right groin was inserted. He was placed on intravenous Gerardo-Synephrine for blood pressure control. --SARS-CoV-2 was negative. Viral respiratory panel was negative; Legionella strep antigens negative. INR today 1.8. Troponins have been negative. Initial potassium 2.6.  --proBNP 2500; he follows with cardiology, Dr. Negro Paredes  --He began smoking 1955 continues smoking approximately 1 pack a day despite all warnings and all illnesses. --He is on chronic Coumadin for chronic atrial fibrillation  --He underwent coronary artery bypass 1983, 1992. He underwent angioplasty PCI PTCA 2000, 2002, 2005, 2009. --Patient with chronic COPD follows with pulmonary  --Patient had admission in 2018 had episodes of nonsustained ventricular tachycardia. 8/1/2020-sUBJECTIVE: Rubina Joyner is alert awake and cooperative; oriented ×3. Denies any chest pain dyspnea nausea emesis. Tolerating diet. No abdominal pain. Had his first bowel movement today since admission. Unlikely to be C. difficile. Last evening he did have liquid diet and enjoyed it he would like something more substantial today.   States he has hard time sleeping without Ambien request bedtime dose of same.  I spoke with nursing, still having a hard time weaning from intravenous phenylephrine. Blood pressure keeps dropping without it. He remains afebrile. Most recent blood pressure 90/40.  97% saturation 2 L nasal cannula. Respiratory rate 21. Intake and output +5392 cc. Potassium 3.1; lactic acid finally dropping, 2.7. Glucose 103 calcium 8.3 phosphorus 1.8 protein 4.9 albumin 3.4 remainder liver functions normal.  Hemoglobin admission 11.0; today hemoglobin 8.1; WBC 16.4, INR 1.4. Platelets 852, remarkable improvement since admission, would seem likely caused by Zyvox. proBNP on admission 2500. CRP 0.2. Procalcitonin 0.10. A1c 6.2.  2D echo completed ejection fraction 55%; septal motion consistent with previous bypass surgery. Markedly large right atrium moderate tricuspid regurgitation moderate to severe pulmonary hypertension. Aortic sclerosis; pulmonary valve is sclerotic. No pleural effusion no pericardial effusion. Urine culture less than 25,000 gram-negative rods. EGD completed yesterday with following results--     Esophagus:  GERD. severe erosive esophagitis. Candida infection        Stomach:  Gastritis      Duodenum:  Normal           Pulmonary note from today appreciated; continue to wean Gerardo-Synephrine as able; Ambien as needed for sleep. Dietary consult appreciated, moderate malnutrition. Oral nutritional supplement 3 times a day has been added.        8/2/2020-patient sitting in bed finishing his lunch at this time. Currently no significant dyspnea; he has had no chest pain at all. Last night he became very short of breath; hard time sleeping because of it. Ambien 5 mg did not help much. He still had only one bowel movement since admission. He should be taken out of isolation, likelihood of C. Difficile zero. Most likely Zyvox induced diarrhea. Last evening he did have chest x-ray performed with following results--          Impression:           1.  Acute right basilar pneumonia. 2. Remainder of findings described as above.       He has been seen by pulmonary as well as ID today. They are not sure if he does have a pneumonia. Patient still at 95% saturation on 2 L nasal cannula. Blood pressure still borderline, 88/45 earlier today 102/50 currently. Overnight his heart rate got as high as 150 with a respiratory rate of 31. Currently heart rate 118 respirations 18. He remains afebrile. His major complaint today, difficult time expectorating mucus. His phosphorus remains low at 1.9; calcium slightly low 8.4. CRP less than 0.1. WBC 18.0 hemoglobin 8.1 platelets 830. INR 1.3 sed rate 4. ID consult from yesterday appreciated; they agree unlikely patient has infection of left ear and more likely polychondritis. Erosive esophagitis, Diflucan has been started. ID note from today appreciated; fluconazole 400 mg daily; increase dose of steroids. Stool for occult blood was positive. Rotavirus negative in stool; enteric gurjit continues in stool. C. difficile was not performed because of formed stool. Sputum Gram stain as follows--     Group 3: >25 PMN's/LPF and >10 Epithelial cells/LPF   Moderate Polymorphonuclear leukocytes   Moderate Epithelial cells   Abundant Mononuclear leukocytes   Abundant Gram negative rods   Few yeast      Urine culture as follows--     Organism Abnormal    07/31/2020  6:15 AM  [facility]    Klebsiella pneumoniae ssp pneumoniae    Urine Culture, Routine  07/31/2020  6:15 AM   - [address] [place] [address] Lab    <25,000 CFU/ml    Testing Performed By      Lab - Abbreviation  Name  Director  Address  Valid Date Range    39- - [address] [facility]. [address] [facility]  Mercedez Hua MD  [address] [place] [address].    [facility] [id number]  12/03/19 1502-Present    Narrative   Performed by: [address] [place] [facility]   Source: URV       Site: Urine&Urine              Susceptibility      Klebsiella pneumoniae ssp pneumoniae (1)              Antibiotic  Interpretation  JOVAN  Status      ampicillin  Resistant  >=^32  mcg/mL        ceFAZolin  Sensitive  <=^4  mcg/mL        cefepime  Sensitive  <=^0.12  mcg/mL        cefTRIAXone  Sensitive  <=^0.25  mcg/mL        Confirmatory Extended Spectrum Beta-Lactamase    Neg  mcg/mL        ertapenem  Sensitive  <=^0.12  mcg/mL        gentamicin  Sensitive  <=^1  mcg/mL        levofloxacin  Sensitive  ^1  mcg/mL        nitrofurantoin  Resistant  ^128  mcg/mL        piperacillin-tazobactam  Sensitive  ^16  mcg/mL        trimethoprim-sulfamethoxazole  Sensitive  <=^20  mcg/mL                8/3/2008-patient remains in intensive care; currently no chest pain no dyspnea. Had a very stormy night with A. fib RVR and profound dyspnea. Patient remains afebrile. Still have significant increase in respiratory rate as high as 47 with the last 24 hours. Blood pressure 137/35. Heart rate improved 82. Respirations currently 17.  93% saturation 4 L nasal cannula. Intake and output +6675 cc. Magnesium 1.8 lactic acid 2.7 glucose 148 calcium 8.5 phosphorus finally normal 2.7 protein 4.7 albumin 3.2 ALT AST 60/44 respectively. Hemoglobin 8.4 WBC 19.2 platelets 073. Sed rate 3. ABG from yesterday PO2 74.6 PCO2 35.2 pH 7.380. The above was done while patient on BiPAP. Gerardo-Synephrine IV has been discontinued, blood pressure stable. Heart rate was still averaging 120 most of the night; patient was then given dose of digoxin 375 mcg IV to be followed by 250 mcg. Patient has been started on diltiazem 5 mg/h IV. Gram stain of sputum with following results--       Group 2: 10-25 PMN's/LPF and >10 Epithelial cells/LPF   Few Polymorphonuclear leukocytes   Few Epithelial cells   Abundant Gram negative rods       CT of chest done yesterday with following results--          FINDINGS:   There is cardiomegaly with the calcified pericarditis. There is   coronary artery calcifications.  Small to moderate lymph nodes are   identified in the mediastinum. The trachea and major bronchi are   patent. Moderate pleural effusions with atelectasis/infiltrates are   identified in the lower lobes bilaterally with  minimal atelectasis in   the right middle lobe. There is advanced COPD. Liver is of decreased   attenuation likely fatty infiltrated. There is diffuse vascular   calcification. There is 90% compression deformity of T6 with kyphosis   and mild compression deformity of the inferior endplate of T5. .         Impression:         Patchy bibasilar infiltrates/atelectasis and pleural effusion likely   CHF and/or pneumonia. Surveillance recommended. Cardiomegaly with coronary artery calcification. Severe compression deformity of T6 and mild compression deformity of   the inferior endplate of T5.         Patient's pulmonologist was consulted; yesterday's note appreciated await CT of the chest results. GI note from yesterday, in view of ongoing positive stool for occult blood nuclear bleeding scan was ordered. Patient had RRT called at 1945 hrs. yesterday due to A. fib RVR and worsening shortness of breath. Heart rate has been up in the 190s. He was given 5 mg of Lopressor IV and Cardizem drip was started. GI note from day appreciated, no changes noted. Patient was again placed on BiPAP this morning at 0838 hrs. due to dyspnea. Nuclear bleeding scan pending.     8/4/2020-patient laying quietly in his intensive care bed. No chest pain no dyspnea. Only real complaint excess urination overnight due to IV Bumex ordered by cardiology. Coughing is less. Patient has been afebrile for the last 24 hours. Most recent blood pressure still low 93/51 arterial line. 94% saturation 4 L nasal cannula. Heart rate 98/min. Intake and output still +3486 cc; a decrease of greater than 3000 cc since yesterday. Lactic acid is still +2.7. Protein 4.9 albumin 3.3 phosphorus 2.7.   ALT AST still increased 92/55 respectively. Hemoglobin 8.6 WBC 22.5 platelets 711. INR 1.1. ABGs from this morning PO2 89.5 PCO2 36.8 pH 7.444. That was done on BiPAP. Nuclear medicine bleeding scan no evidence of acute GI bleed. Chest x-ray from today with following results--          FINDINGS:   Stable cardiomediastinal silhouette. Pulmonary vascularity is   congested. There are pleural effusions as well as bibasilar   atelectasis, right greater than left.         Impression:         Pleural effusions with adjacent atelectasis. Pulmonary vascular   congestion indicating elevated left heart filling pressures.          Cardiology consult from yesterday appreciated; loaded with IV digoxin change Lopressor to metoprolol succinate. Recommend Bumex 1 mg IV twice daily.      8/5/2020-patient laying quietly in intensive care bed; no chest pain no dyspnea. His right femoral catheter has been removed. Patient is hoping to be up and about more would like to shower sit in a chair etc.  I discussed all the above with intensivist; patient apparently will be transferred out of intensive care today. Patient has been afebrile last 24 hours. Most recent respirations 30 pulse 80 blood pressure 117/66.  95% saturation on 3.5 L nasal cannula. Intake and output 2506 cc; decrease of 1 from yesterday. CO2 31 BUN 24 lactic acid 2.8 glucose 142 calcium 8.3 phosphorus 2.9 CRP less than 0.1. ALT AST still elevated 91/47 respectively. Hemoglobin 8.9 WBC 8.2 platelets 562. Sed rate 0 INR 1.1. Intensivist note from yesterday appreciated; they would like to stop Ambien and change fluconazole to oral.  Cardiology note appreciated continue metoprolol, hold only if heart rate less than 50 or systolic pressure less than 90. They agree with restarting warfarin. They recommend not holding IV diuretic for blood pressure. Pulmonary note appreciated; no appreciable changes. Thoracentesis had been considered but held due to IV diuresis.   Physical therapy AM PAC score 16/24 from yesterday. GI note from yesterday, colonoscopy to be done once patient stabilized. They note patient's rising liver function test may be a result of fluconazole. ID note from yesterday, no changes. Cardiology note from today appreciated, no changes. Pulmonary note from today, no thoracentesis at this time.  note per day appreciated, [facility]     8/6/2020-patient laying quietly in bed; has been transferred out of ICU currently on stepdown unit. No chest pain no dyspnea. Appetite good. He has been up to the bathroom without assistance. He plans on showering today when his friend is available. Patient afebrile for the last 24 hours. Respiratory rate well controlled at 20 pulse 70. Blood pressure 110/54 off of Gerardo-Synephrine. 99% saturation 4 L nasal cannula. Intake and output finally -924 cc.  CO2 elevated 34. Hemoglobin 9.3 WBC 16.8 platelets 768. INR still low 1.1 sed rate 2. Palliative medicine consult appreciated. No change in status. Physical therapy AM PAC score from yesterday 17/24. Cardiology note from today appreciated; they recommend restarting aspirin 81 mg daily soon as acceptable. Continue IV diuretics for today then switch to oral Bumex twice daily beginning tomorrow.  note appreciated; patient hoping to be discharged with home health care to home.     8/7/2020-patient laying quietly in bed no chest pain no dyspnea. Appetite good no diarrhea. He is hoping for discharge today. No urinary symptoms. Afebrile last 24 hours. Blood pressure still low 92/54; 94% saturation 3 L nasal cannula. Intake and output -1254 cc.  CO2 37 BUN 28 creatinine 1.0 hemoglobin 9.3 WBC 15.7 platelets 695. INR 1.1.  GI note from yesterday appreciated; colonoscopy as outpatient. ID note, leukocytosis improving. Dietary consult, moderate malnutrition.   Cardiology note from today, okay for discharge follow-up in cardiology office 4 to 6 weeks.   Measurement of pulse ox, 94% on room air at rest; 91% room air while ambulating; recovery to 98% on 3 L pulse ox at rest.         Hospital orders:  Medications reviewed with patient  GI prophylaxis  DVT prophylaxis  Consultants notes reviewed   Continue oral potassium  Continue potassium protocol  Add potassium phosphate 20 mmol IV today  Monitor labs  I discussed the above with infectious disease regarding long-term Zyvox, equivocal left ear infection  Low-fat diet  Ambien for sleep 5 mg  Wean Gerardo-Synephrine as possible  Hold anticoagulation in view of continuing drop in hemoglobin despite atrial fibrillation  Sequential compression device  PT OT  Continue nasal cannula oxygen  Transfuse as needed, keep hemoglobin greater than 7.0  Continue aerosol treatments  Plavix isosorbide mononitrate metoprolol tartrate amlodipine all on hold  Intensivist has restarted metoprolol tartrate 25 mg twice daily; patient had been on 100 mg twice daily prehospital.  Because of low blood pressure smaller dose initiated  Digoxin 125 mcg IV daily to help control heart rate  CT of chest, pneumonia  Wean Gerardo-Synephrine as able  Fluconazole 400 mg IV every 24 hours  Tessalon Perles  Guaifenesin 400 mg 4 times daily  Increase methylprednisolone 60 mg every 8 hours  Increase Ambien 10 mg at bedtime  Potassium phosphate 30 mmol IV today  Monitor labs  Diltiazem IV drip has been started 5 mg/h  Digoxin 375 mcg was given one-time this morning  IV Gerardo-Synephrine has been discontinued  Continue aerosol treatments  Fluconazole 400 mg IV every 24 hours  Methylprednisolone 60 mg IV every 8 hours  Toprol XL 25 mg twice daily in place of Lopressor  2 g magnesium sulfate given one-time  Cardiology has been consulted by intensivist  I discussed the above with intensivist; apixaban will be started if bleeding scan shows no evidence of blood loss; has been ordered by cardiology  Digoxin 125 mcg IV daily  Coumadin 2 mg daily has been started patient    Consults:cardiology, pulmonary/intensive care, ID, GI and palliative medicine     Past Medical Hx :   Past Medical History:   Diagnosis Date    Amblyopia 1961    corrected    Anticoagulated on Coumadin     Atrial fibrillation ([address] 9/5/2018    Bilateral carotid artery stenosis 07/03/2014    CAD (coronary artery disease)     follows with / Anaid Hyatt every 6 months    CHF (congestive heart failure) ([address]     last episode 9/2018 ?  CKD (chronic kidney disease) stage 3, GFR 30-59 ml/min ([facility]) 7/30/2020    Colon polyp     COPD (chronic obstructive pulmonary disease) ([facility])     follows with DR. 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[facility])     Hypotension due to hypovolemia 7/30/2020    Myopia     Non-rheumatic aortic sclerosis (HCC)     NSVT (nonsustained ventricular tachycardia) ([address] 2018    On home O2     2 Liters/NC at night and during day prn    NICHOLAS (obstructive sleep apnea)     no use cpap    Pulmonary hypertension ([address] 8/1/2020    To severe 2020    Squamous cell carcinoma, face     skin    Thrombocytopenia ([address] 7/30/2020    Tobacco dependence 7/3/2014    Unintentional weight loss of 10% body weight within 6 months 7/31/2020       Past Surgical Hx :  Past Surgical History:   Procedure Laterality Date    APPENDECTOMY      BACK SURGERY  years ago    lumbar    CARDIAC CATHETERIZATION      heart cath x4, cabg twice    COLONOSCOPY     [address]      2000, 2002, 2005, 2009   total of 8 stents   [address]    two     EAR SURGERY Left 6/25/2019    I & D LEFT EAR ABSCESS performed by Arnel Calderon DO at [facility] OR    EAR SURGERY Left 8/26/2019    INCISION AND DRAINAGE LEFT EAR WITH EXCISION OF FISTULA  AND RECONSTRUCTION performed by Larissa Traylor DO at [address]  / multiple surgeries    SKIN CANCER EXCISION      UPPER GASTROINTESTINAL ENDOSCOPY N/A 7/31/2020    BEDSIDE EGD ESOPHAGOGASTRODUODENOSCOPY performed by Caresse Klinefelter, MD at [address]:   CBC:   Lab Results   Component Value Date    WBC 15.7 08/07/2020    RBC 3.06 08/07/2020    HGB 9.3 08/07/2020    HCT 29.0 08/07/2020    MCV 94.8 08/07/2020    MCH 30.4 08/07/2020    MCHC 32.1 08/07/2020    RDW 14.7 08/07/2020     08/07/2020    MPV 10.3 08/07/2020     CBC with Differential:    Lab Results   Component Value Date    WBC 15.7 08/07/2020    RBC 3.06 08/07/2020    HGB 9.3 08/07/2020    HCT 29.0 08/07/2020     08/07/2020    MCV 94.8 08/07/2020    MCH 30.4 08/07/2020    MCHC 32.1 08/07/2020    RDW 14.7 08/07/2020    LYMPHOPCT 3.9 08/07/2020    MONOPCT 8.6 08/07/2020    BASOPCT 0.1 08/07/2020    MONOSABS 1.35 08/07/2020    LYMPHSABS 0.61 08/07/2020    EOSABS 0.01 08/07/2020    BASOSABS 0.01 08/07/2020     Hemoglobin/Hematocrit:    Lab Results   Component Value Date    HGB 9.3 08/07/2020    HCT 29.0 08/07/2020     CMP:    Lab Results   Component Value Date     08/07/2020    K 4.6 08/07/2020    K 2.6 07/30/2020     08/07/2020    CO2 37 08/07/2020    BUN 28 08/07/2020    CREATININE 1.0 08/07/2020    GFRAA >60 08/07/2020    LABGLOM >60 08/07/2020    GLUCOSE 97 08/07/2020    PROT 4.5 08/05/2020    LABALBU 3.1 08/05/2020    CALCIUM 8.2 08/07/2020    BILITOT 0.3 08/05/2020    ALKPHOS 99 08/05/2020    AST 47 08/05/2020    ALT 91 08/05/2020     BMP:    Lab Results   Component Value Date     08/07/2020    K 4.6 08/07/2020    K 2.6 07/30/2020     08/07/2020    CO2 37 08/07/2020    BUN 28 08/07/2020    LABALBU 3.1 08/05/2020    CREATININE 1.0 08/07/2020    CALCIUM 8.2 08/07/2020    GFRAA >60 08/07/2020    LABGLOM >60 08/07/2020    GLUCOSE 97 08/07/2020     Hepatic Function Panel:    Lab Results   Component Value Date    ALKPHOS 99 08/05/2020    ALT 91 08/05/2020    AST 47 08/05/2020    PROT 4.5 08/05/2020    BILITOT 0.3 08/05/2020    BILIDIR <0.2 08/05/2020    IBILI see below 08/05/2020    LABALBU 3.1 08/05/2020     Ionized Calcium:  No results found for: IONCA  Magnesium:    Lab Results   Component Value Date    MG 2.4 08/07/2020     Phosphorus:    Lab Results   Component Value Date    PHOS 3.0 08/07/2020     LDH:  No results found for: LDH  Uric Acid:    Lab Results   Component Value Date    LABURIC 7.7 07/31/2020     PT/INR:    Lab Results   Component Value Date    PROTIME 13.1 08/07/2020    INR 1.1 08/07/2020     Warfarin PT/INR:  No components found for: PTPATWAR, PTINRWAR  PTT:    Lab Results   Component Value Date    APTT 55.9 10/08/2019   [APTT}  Troponin:    Lab Results   Component Value Date    TROPONINI <0.01 07/31/2020     Last 3 Troponin:    Lab Results   Component Value Date    TROPONINI <0.01 07/31/2020    TROPONINI <0.01 07/31/2020    TROPONINI <0.01 07/30/2020     U/A:  No results found for: NITRITE, COLORU, PROTEINU, PHUR, LABCAST, WBCUA, RBCUA, MUCUS, TRICHOMONAS, YEAST, BACTERIA, CLARITYU, SPECGRAV, LEUKOCYTESUR, UROBILINOGEN, BILIRUBINUR, BLOODU, GLUCOSEU, AMORPHOUS  ABG:    Lab Results   Component Value Date    PH 7.444 08/04/2020    PCO2 36.8 08/04/2020    PO2 89.5 08/04/2020    HCO3 24.7 08/04/2020    BE 0.7 08/04/2020    O2SAT 96.6 08/04/2020     HgBA1c:    Lab Results   Component Value Date    LABA1C 6.2 07/31/2020     FLP:    Lab Results   Component Value Date    TRIG 79 07/31/2020    HDL 30 07/31/2020    LDLCALC 33 07/31/2020    LABVLDL 16 07/31/2020     TSH:    Lab Results   Component Value Date    TSH 0.525 07/31/2020     VITAMIN B12: No components found for: B12  FOLATE:    Lab Results   Component Value Date    FOLATE 9.0 07/31/2020     IRON:  No results found for: IRON  Iron Saturation:  No components found for: PERCENTFE  TIBC:  No results found for: TIBC  FERRITIN: No results found for: FERRITIN  TIFFANY:  No results found for: ANATITER, TIFFANY  LIPASE:    Lab Results   Component Value Date    LIPASE 16 07/30/2020     PSA: No results found for: PSA       CBC:  Recent Labs     08/05/20 0535 08/06/20 0450 08/07/20  0325   WBC 18.2* 16.8* 15.7*   RBC 2.85* 3.00* 3.06*   HGB 8.9* 9.3* 9.3*   HCT 27.3* 28.4* 29.0*    275 295   MCV 95.8 94.7 94.8   MCH 31.2 31.0 30.4   MCHC 32.6 32.7 32.1   RDW 14.6 14.5 14.7   LYMPHOPCT 2.3* 3.4* 3.9*   MONOPCT 7.4 10.1 8.6   BASOPCT 0.1 0.1 0.1   MONOSABS 1.34* 1.69* 1.35*   LYMPHSABS 0.42* 0.57* 0.61*   EOSABS 0.00* 0.00* 0.01*   BASOSABS 0.02 0.02 0.01          H & H :  Recent Labs     08/05/20 0535 08/06/20 0450 08/07/20 0325   HGB 8.9* 9.3* 9.3*       TSH:No results for input(s): TSH in the last 72 hours. GLUCOSE:No results for input(s): POCGLU in the last 72 hours. CMP:  Recent Labs     08/05/20 0535 08/06/20 0450 08/07/20 0325    141 143   K 4.0 4.5 4.6    100 101   CO2 31* 34* 37*   BUN 24* 27* 28*   CREATININE 1.1 1.0 1.0   GFRAA >60 >60 >60   LABGLOM >60 >60 >60   GLUCOSE 142* 106* 97   PROT 4.5*  --   --    LABALBU 3.1*  --   --    CALCIUM 8.3* 8.2* 8.2*   BILITOT 0.3  --   --    ALKPHOS 99  --   --    AST 47*  --   --    ALT 91*  --   --          BNP:No results found for: BNP    PROTIME/INR:  Recent Labs     08/05/20 0535 08/06/20  0450 08/07/20  0325   PROTIME 13.1* 12.9* 13.1*   INR 1.1 1.1 1.1       CRP:   Recent Labs     08/05/20  0535 08/06/20  0450 08/07/20  0325   CRP <0.1 <0.1 <0.1       ESR:  Recent Labs     08/05/20  0535 08/06/20  0450 08/07/20  0325   SEDRATE 0 2 0       LIPASE , AMYLASE:  Lab Results   Component Value Date    LIPASE 16 07/30/2020      No results found for: AMYLASE    ABGs: No results found for: PHART, PO2ART, UIQ2EHJ    CARDIAC: No results for input(s): CKTOTAL, CKMB, CKMBINDEX, TROPONINI in the last 72 hours.     Lipid Profile:   Lab Results   Component Value Date    TRIG 78 2020    HDL 30 2020    LDLCALC 33 2020    CHOL 79 2020        Ct Abdomen Pelvis Wo Contrast Additional Contrast? None    Result Date: 2020  Patient MRN:  [id number] : 1940 Age: 70 years Gender: Male Order Date:  2020 4:31 PM EXAM: CT ABDOMEN PELVIS WO CONTRAST COMPARISON: None INDICATION:  diarrhea, n/v, 90# wt loss- unintentional diarrhea, n/v, 90# wt loss- unintentional TECHNIQUE:  Low-dose CT acquisition technique included one of the following options; 1. Automated exposure control, 2. Adjustment of mA and/or kV according to the patient's size or 3. Use of iterative reconstruction. FINDINGS: There is no there are multiple gas-filled distended loops of small bowel located within the anterior aspect of the abdomen and pelvis measuring up to 2.8 cm. Stool and gas is seen within the colon. There is a severe burden of diverticulosis involving the sigmoid colon. No evidence of acute diverticulitis. The appendix is not visualized. No intrahepatic or extrahepatic bile duct dilatation. Gallbladder is grossly unremarkable. Pancreas is grossly unremarkable. Spleen is nonenlarged. There is bilateral perinephric fat stranding. No hydronephrosis. There is a infrarenal fusiform abdominal aortic aneurysm measuring up to 3 cm in diameter. Urinary bladder appears normal in contour. No evidence of lytic or blastic bone lesion associated with the pelvis or lumbar spine. View of lung bases shows bibasilar opacities with small bilateral pleural effusions. 1. No evidence of abdominal or pelvic mass or lymphadenopathy. 2. Multiple gas-filled distended loops of small bowel are present within the anterior aspect of the abdomen which could indicate adynamic ileus versus partial or early small bowel obstruction. Stool and gas is seen within the colon. Short-term follow-up may be helpful for further evaluation. 3. Diverticulosis without evidence of acute diverticulitis.  4. Bilateral perinephric fat stranding could suggest chronic medical renal disease with appropriate clinical history. 5. View of the lung bases show bibasilar opacities related to pneumonia and atelectasis with small bilateral pleural effusions. 6. Fusiform infrarenal abdominal aortic aneurysm is demonstrated measuring up to 3 cm in diameter. Ct Chest Wo Contrast    Addendum Date: 8/3/2020    Old left rib fractures are noted. Result Date: 8/3/2020  Patient MRN:  [id number] : 1940 Age: 70 years Gender: Male Order Date:  8/3/2020 9:02 AM EXAM: CT CHEST WO CONTRAST number of images 435. Technique: Low-dose CT  acquisition technique included one of following options; 1 . Automated exposure control, 2. Adjustment of MA and or KV according to patient's size or 3. Use of iterative reconstruction. INDICATION:  Pneumonia, right lung Pneumonia, right lung COMPARISON: 2019, and chest radiograph 2020. FINDINGS: There is cardiomegaly with the calcified pericarditis. There is coronary artery calcifications. Small to moderate lymph nodes are identified in the mediastinum. The trachea and major bronchi are patent. Moderate pleural effusions with atelectasis/infiltrates are identified in the lower lobes bilaterally with  minimal atelectasis in the right middle lobe. There is advanced COPD. Liver is of decreased attenuation likely fatty infiltrated. There is diffuse vascular calcification. There is 90% compression deformity of T6 with kyphosis and mild compression deformity of the inferior endplate of T5. [place] High Patchy bibasilar infiltrates/atelectasis and pleural effusion likely CHF and/or pneumonia. Surveillance recommended. Cardiomegaly with coronary artery calcification. Severe compression deformity of T6 and mild compression deformity of the inferior endplate of T5.      Nm Gi Blood Loss    Result Date: 8/3/2020  Patient MRN: [id number] : 1940 Age:  70 years Gender: Male Order Date: 8/3/2020 9:04 AM Exam: NM GI BLOOD LOSS Number of Images: 5 views Indication:   GIB, melena, negative EGD GIB, melena, negative EGD Comparison: None. Findings: The patient was injected with 22.7 mCi of technetium UltraTag. There is no evidence for acute GI bleed     There is no evidence for acute GI bleed     Xr Chest Portable    Result Date: 2020  Patient MRN: [id number] : 1940 Age:  70 years Gender: Male Order Date: 2020 6:34 AM Exam: XR CHEST PORTABLE Number of Images: 1 view Indication:   pleural effusions pleural effusions Comparison: 2020 FINDINGS: Stable cardiomediastinal silhouette. Pulmonary vascularity is congested. There are pleural effusions as well as bibasilar atelectasis, right greater than left. Pleural effusions with adjacent atelectasis. Pulmonary vascular congestion indicating elevated left heart filling pressures. Xr Chest Portable    Result Date: 2020  Clinical indications: Cough. Shortness of breath. TECHNIQUE: Single frontal projection of the chest (1 view). COMPARISON: Respiratory failure. CT chest 2019. Chest x-ray 2020 and 2020 FINDINGS: Superimposed upon bilateral interstitial infiltrates is increasing consolidation and effusion of the right lung base. The heart is enlarged. There is calcification within thoracic aorta. Acromioclavicular arthropathy. Prior median sternotomy wires and pericardial clips. The heart, lungs, mediastinum and regional skeleton are otherwise unremarkable. Superimposed upon bilateral interstitial infiltrates is increasing consolidation and effusion of the right lung base. Xr Chest Portable    Result Date: 2020  PROCEDURE INFORMATION: Exam: XR Chest, 1 View Exam date and time: 2020 2:30 AM Age: 70years old Clinical indication: Shortness of breath; Patient HX: Congestion, SOB TECHNIQUE: Imaging protocol: XR of the chest Views: 1 view.  COMPARISON: DX XR CHEST PORTABLE 2020 6:34 PM FINDINGS: Tubes, catheters and devices: Overlying EKG leads. Lungs: Chronic appearing bilateral lung parenchymal interstitial fibrotic opacities present. Patchy confluent right basilar airspace opacity. Emphysematous changes. Tiny bilateral pleural effusions and/or bibasilar dependent pleural thickening. Pleural space: No pneumothorax. Heart/Mediastinum: Patient is status post prior CABG procedure. Enlarged cardiac silhouette. No mediastinal mass. Atheromatous calcifications of aorta. Bones/joints: Osteopenia. No acute fracture or neoplastic osseous lesion. 1. Acute right basilar pneumonia. 2. Remainder of findings described as above. This report has been electronically signed by Sparkle Joseph MD.    Xr Chest Portable    Result Date: 2020  Patient MRN:  [id number] : 1940 Age: 70 years Gender: Male Order Date:  2020 6:15 PM EXAM: XR CHEST PORTABLE COMPARISON: 2019 INDICATION:  SOB SOB FINDINGS: There are median sternotomy wires. There are coarsened interstitial lung markings throughout both lungs. No focal airspace opacity or pleural effusion. No pneumothorax. Chronic coarsened interstitial lung markings are seen throughout both lungs. No obvious pneumonia or pleural effusion.       Discharge Exam:  See progress note from today    Current Discharge Medication List      START taking these medications    Details   pantoprazole (PROTONIX) 40 MG tablet Take 1 tablet by mouth 2 times daily (before meals)  Qty: 30 tablet, Refills: 3      melatonin 3 MG TABS tablet Take 1 tablet by mouth nightly as needed (Sleep)  Qty:  , Refills: 3      bumetanide (BUMEX) 1 MG tablet Take 1 tablet by mouth 2 times daily  Qty: 30 tablet, Refills: 3      digoxin (LANOXIN) 125 MCG tablet Take 1 tablet by mouth daily  Qty: 30 tablet, Refills: 3      metoprolol succinate (TOPROL XL) 25 MG extended release tablet Take 1 tablet by mouth 2 times daily  Qty: 30 tablet, Refills: 3      fluconazole (DIFLUCAN) 200 MG tablet Take 1 tablet by mouth daily for 7 days  Qty: 7 tablet, Refills: 0      predniSONE (DELTASONE) 20 MG tablet 40 mg daily for 3 days then 30 mg daily for 3 days then 20 mg daily for 3 days then 10 mg daily for 3 days then stop  Qty: 30 tablet, Refills: 0         CONTINUE these medications which have CHANGED    Details   warfarin (COUMADIN) 4 MG tablet Take 1 tablet by mouth daily  Qty: 30 tablet, Refills: 3         CONTINUE these medications which have NOT CHANGED    Details   HYDROcodone-acetaminophen (NORCO) 7.5-325 MG per tablet TAKE ONE TABLET BY MOUTH EVERY 8 HOURS AS NEEDED FOR PAIN. (DO NOT TAKE MORE THAN 3 TABLETS DAILY)      Revefenacin 175 MCG/3ML SOLN Inhale 1 ampule into the lungs 2 times daily  Qty: 60 vial, Refills: 1      fluticasone-salmeterol (ADVAIR DISKUS) 500-50 MCG/DOSE diskus inhaler Inhale 1 puff into the lungs 2 times daily Use AM DOS. potassium chloride (KLOR-CON M) 20 MEQ extended release tablet Take 1 tablet by mouth 2 times daily (with meals)  Qty: 60 tablet, Refills: 3      OXYGEN Inhale 2 L/min into the lungs nightly Uses prn during day.  States he is currently wearing 4L at home      clopidogrel (PLAVIX) 75 MG tablet Take 75 mg by mouth daily LD 8/20/2019 per Dr. Gonzalez/Wes      atorvastatin (LIPITOR) 80 MG tablet Take 60 mg by mouth daily       albuterol (PROVENTIL) (5 MG/ML) 0.5% nebulizer solution Take 1 mL by nebulization 3 times daily as needed for Wheezing  Qty: 120 each, Refills: 3         STOP taking these medications       linezolid (ZYVOX) 600 MG tablet Comments:   Reason for Stopping:         torsemide (DEMADEX) 20 MG tablet Comments:   Reason for Stopping:         metoprolol (LOPRESSOR) 100 MG tablet Comments:   Reason for Stopping:         isosorbide mononitrate (IMDUR) 30 MG CR tablet Comments:   Reason for Stopping:         amLODIPine (NORVASC) 5 MG tablet Comments:   Reason for Stopping:               Time Spent on discharge is more than 45 minutes --      TIME  INCLUDES TIME THAT WAS SPENT WITH DISCHARGE PAPERS, MEDICATION REVIEW, MEDICATION RECONCILIATION,   PRESCRIPTIONS, CHART REVIEW, PATIENT EXAM , FINAL PROGRESS NOTE, DISCUSSION OF FINDINGS WITH PATIENT AND AVAILABLE FAMILY , AND DICTATION  WHERE NEEDED ; Home Health Care [facility]) FORMS COMPLETED ; N-17  COMPLETION ;  H&P UPDATED ; DURABLE EQUIPMENT FORMS.      Active Hospital Problems    Diagnosis    Moderate protein-calorie malnutrition (HCC) [E44.0]    Pulmonary hypertension (HCC) [I27.20]    Erosive esophagitis [K22.10]    Unintentional weight loss of 10% body weight within 6 months [R63.4]    GI bleeding [K92.2]    CKD (chronic kidney disease) stage 3, GFR 30-59 ml/min (HCC) [N18.3]    Hypotension due to hypovolemia [I95.89, E86.1]    Emesis [R11.10]    Diarrhea [R19.7]    Thrombocytopenia (HCC) [D69.6]    Hypotension [I95.9]    On home O2 [Z99.81]    Hypertension [I10]    Hyperlipidemia [E78.5]    CAD (coronary artery disease) [I25.10]    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

## 2020-08-07 NOTE — PROGRESS NOTES
Pt took out IV per self & laid it on bedside table. Pt took off cardiac monitor & laid it on table. RN attempted to give D/C instructions to pt as he said he takes care of his meds yet pt states \"Just wait til\" my girlfriend gets here & do it then. \"  RN inquired if girlfriend bringing portable O2 tank & pt states \"No, I will be fine. I don't need it for the short ride home. \"  Girlfriend here to  pt & states \"I'm a nurse practioner & I understand these instructions so we'll be going now. \"

## 2020-08-07 NOTE — PROGRESS NOTES
[facility] Quality Flow/Interdisciplinary Rounds Progress Note        Quality Flow Rounds held on August 7, 2020    Disciplines Attending:  Bedside Nurse, ,  and Nursing Unit Leadership    Carlos Muller was admitted on 7/30/2020  6:00 PM    Anticipated Discharge Date:  Expected Discharge Date: 08/10/20    Disposition:    Josep Score:  Josep Scale Score: 21    Readmission Risk              Risk of Unplanned Readmission:        25           Discussed patient goal for the day, patient clinical progression, and barriers to discharge. The following Goal(s) of the Day/Commitment(s) have been identified:  Discharge.       Yoanna Damon  August 7, 2020

## 2020-08-07 NOTE — PROGRESS NOTES
Pulse ox was ___94___% on room air at rest.  Ambulated patient on room air. Oxygen saturation was ___91___% on room air while ambulating. Oxygen applied.   Recovery pulse ox was ____98__% on ____3___ liters of oxygen @ rest.

## 2020-08-07 NOTE — CARE COORDINATION
CM NOTE: Plan is discharge to girlfriend's house for the weekend as she is a nurse & will provide asst. Pt will then return home to start home care services 8/10/20. Does not require additional home O2 ----just at [facility] which was baseline prior to admission.

## 2020-08-07 NOTE — PROGRESS NOTES
DAILY PROGRESS NOTE - THE HEART CENTER    SUBJECTIVE:    He is being followed for rapid atrial fibrillation and acute diastolic heart failure. His heart rate is now much better controlled and he now has a negative fluid status. Feels very well and requesting to be discharged. No further GI bleeding and he was placed back on warfarin which was preferred by intensivist because it can be reversed, unlike Eliquis which I had recommended. Hypertension, hyperlipidemia, COPD, CAD post CABG 1983 with vein graft to LAD and again in 1992 with vein grafts to LAD and RCA. Subsequent ZELDA to ramus and circumflex 2005 and circumflex again in 2009. Paroxysmal atrial fibrillation for which she is on warfarin. Preserved left ventricular systolic function on previous echo. OBJECTIVE:    His vital signs were reviewed today. Vitals:    08/06/20 2330   BP: 114/70   Pulse: 70   Resp: 20   Temp: 98.3 °F (36.8 °C)   SpO2: 94%       Scheduled Meds:   clopidogrel  75 mg Oral Daily    pantoprazole  40 mg Oral BID AC    predniSONE  40 mg Oral Daily    potassium chloride  20 mEq Oral BID    fluconazole  200 mg Oral Daily    metoprolol succinate  25 mg Oral BID    bumetanide  1 mg Intravenous BID    heparin flush  3 mL Intravenous Q12H    digoxin  125 mcg Intravenous Daily    benzonatate  100 mg Oral TID    atorvastatin  60 mg Oral Daily    Arformoterol Tartrate  15 mcg Nebulization BID    budesonide  250 mcg Nebulization BID    warfarin (COUMADIN) daily dosing (placeholder)   Other RX Placeholder    sodium chloride flush  10 mL Intravenous 2 times per day     Continuous Infusions:    PRN Meds:.melatonin, ipratropium-albuterol, magnesium sulfate, heparin flush, guaiFENesin, HYDROcodone-acetaminophen, acetaminophen, sodium chloride flush    REVIEW OF SYSTEMS: Other than mild intermittent dyspnea, the full 10 point review of systems is negative. FAMILY HISTORY: Negative for CAD in first-degree relatives.     SOCIAL HISTORY: Negative for alcohol, tobacco, or illicit drug use. PHYSICAL EXAM:    General Appearance:  awake, alert, oriented, no distress  Neck:  no bruits  Lungs:  Normal expansion. Essentially clear breath sounds bilaterally to auscultation. No rales, rhonchi, or wheezing. Heart:  Heart sounds are normal.  Irregular rate and rhythm without murmur, gallop or rub. Abdomen:  Soft, non-tender. Extremities: Extremities warm to touch, pink, with no edema. Neuro/musculosketal:  Unremarkable. LABS:    Recent Labs     08/05/20 0535 08/06/20  0450 08/07/20  0325    141 143   CREATININE 1.1 1.0 1.0       Recent Labs     08/05/20 0535 08/06/20 0450 08/07/20  0325   HGB 8.9* 9.3* 9.3*       Recent Labs     08/05/20 0535 08/06/20 0450 08/07/20  0325   INR 1.1 1.1 1.1         IMPRESSION:    #1.  Rapid permanent atrial fibrillation- probably provoked by underlying anemia due to GI bleed. Rate is now better controlled. Continue digoxin and metoprolol succinate. Would hold metoprolol ONLY if heart rate below 50 or systolic pressure below 90. #2. Anticoagulation status- question arises of whether to give Eliquis anticoagulation or warfarin, warfarin being preferred because it is reversible. However, his INR was 10 on presentation which may have contributed at least in part to his GI bleed. Warfarin, although less optimal than Eliquis, would be acceptable in this situation. Goal INR is between 2 and 3. Restart when acceptable to hospitalist team.    #3.  Coronary disease- 2 previous CABG surgeries and multiple stents. No indication of unstable angina. Can stop clopidogrel and start baby aspirin daily. #3. Hypertension-blood pressure now controlled off of amlodipine and isosorbide and have not restarted them. #4.  Hyperlipidemia-statin once stable. #5.  Acute diastolic heart failure-probably due to combination of rapid atrial fibrillation along with left ventricular diastolic dysfunction.

## 2020-08-07 NOTE — PROGRESS NOTES
PROGRESS  NOTE --                                                          INTERNAL  MEDICINE                                                                              I  PERSONALLY SAW , EXAMINED, AND CARED Rubens, 8/7/2020     LABS, XRAY ,CHART, AND MEDICATIONS  REVIEWED BY ME .        8/1/2020-sUBJECTIVE: Davion Moore is alert awake and cooperative; oriented ×3. Denies any chest pain dyspnea nausea emesis. Tolerating diet. No abdominal pain. Had his first bowel movement today since admission. Unlikely to be C. difficile. Last evening he did have liquid diet and enjoyed it he would like something more substantial today. States he has hard time sleeping without Ambien request bedtime dose of same. I spoke with nursing, still having a hard time weaning from intravenous phenylephrine. Blood pressure keeps dropping without it. He remains afebrile. Most recent blood pressure 90/40.  97% saturation 2 L nasal cannula. Respiratory rate 21. Intake and output +5392 cc. Potassium 3.1; lactic acid finally dropping, 2.7. Glucose 103 calcium 8.3 phosphorus 1.8 protein 4.9 albumin 3.4 remainder liver functions normal.  Hemoglobin admission 11.0; today hemoglobin 8.1; WBC 16.4, INR 1.4. Platelets 729, remarkable improvement since admission, would seem likely caused by Zyvox. proBNP on admission 2500. CRP 0.2. Procalcitonin 0.10. A1c 6.2.  2D echo completed ejection fraction 55%; septal motion consistent with previous bypass surgery. Markedly large right atrium moderate tricuspid regurgitation moderate to severe pulmonary hypertension. Aortic sclerosis; pulmonary valve is sclerotic. No pleural effusion no pericardial effusion. Urine culture less than 25,000 gram-negative rods. EGD completed yesterday with following results--    Esophagus:  GERD. severe erosive esophagitis.  Candida infection        Stomach: Gastritis      Duodenum:  Normal         Pulmonary note from today appreciated; continue to wean Gerardo-Synephrine as able; Ambien as needed for sleep. Dietary consult appreciated, moderate malnutrition. Oral nutritional supplement 3 times a day has been added. 8/2/2020-patient sitting in bed finishing his lunch at this time. Currently no significant dyspnea; he has had no chest pain at all. Last night he became very short of breath; hard time sleeping because of it. Ambien 5 mg did not help much. He still had only one bowel movement since admission. He should be taken out of isolation, likelihood of C. Difficile zero. Most likely Zyvox induced diarrhea. Last evening he did have chest x-ray performed with following results--    Impression:          1. Acute right basilar pneumonia. 2. Remainder of findings described as above. He has been seen by pulmonary as well as ID today. They are not sure if he does have a pneumonia. Patient still at 95% saturation on 2 L nasal cannula. Blood pressure still borderline, 88/45 earlier today 102/50 currently. Overnight his heart rate got as high as 150 with a respiratory rate of 31. Currently heart rate 118 respirations 18. He remains afebrile. His major complaint today, difficult time expectorating mucus. His phosphorus remains low at 1.9; calcium slightly low 8.4. CRP less than 0.1. WBC 18.0 hemoglobin 8.1 platelets 571. INR 1.3 sed rate 4. ID consult from yesterday appreciated; they agree unlikely patient has infection of left ear and more likely polychondritis. Erosive esophagitis, Diflucan has been started. ID note from today appreciated; fluconazole 400 mg daily; increase dose of steroids. Stool for occult blood was positive. Rotavirus negative in stool; enteric gurjit continues in stool. C. difficile was not performed because of formed stool.   Sputum Gram stain as follows--    Group 3: >25 PMN's/LPF and >10 Epithelial cells/LPF   Moderate Polymorphonuclear leukocytes   Moderate Epithelial cells   Abundant Mononuclear leukocytes   Abundant Gram negative rods   Few yeast     Urine culture as follows--    Organism Abnormal    07/31/2020  6:15 AM   - [facility]    Klebsiella pneumoniae ssp pneumoniae    Urine Culture, Routine  07/31/2020  6:15 AM   - [facility]    <25,000 CFU/ml    Testing Performed By     Lab - [address].  Name  Director  Address  Valid Date Range    14-XV - [address] [facility]  [address] [facility]  Shy Rodriguez MD  [address] [place] [address]. Maria Isabel Brought [id number]  12/03/19 1502-Present    Narrative   Performed by: [address] [place] [address] Lab   Source: URV       Site: Urine&Urine              Susceptibility     Klebsiella pneumoniae ssp pneumoniae (1)     Antibiotic  Interpretation  JOVAN  Status     ampicillin  Resistant  >=^32  mcg/mL      ceFAZolin  Sensitive  <=^4  mcg/mL      cefepime  Sensitive  <=^0.12  mcg/mL      cefTRIAXone  Sensitive  <=^0.25  mcg/mL      Confirmatory Extended Spectrum Beta-Lactamase   Neg  mcg/mL      ertapenem  Sensitive  <=^0.12  mcg/mL      gentamicin  Sensitive  <=^1  mcg/mL      levofloxacin  Sensitive  ^1  mcg/mL      nitrofurantoin  Resistant  ^128  mcg/mL      piperacillin-tazobactam  Sensitive  ^16  mcg/mL      trimethoprim-sulfamethoxazole  Sensitive  <=^20  mcg/mL           8/3/2008-patient remains in intensive care; currently no chest pain no dyspnea. Had a very stormy night with A. fib RVR and profound dyspnea. Patient remains afebrile. Still have significant increase in respiratory rate as high as 47 with the last 24 hours. Blood pressure 137/35. Heart rate improved 82. Respirations currently 17.  93% saturation 4 L nasal cannula. Intake and output +6675 cc. Magnesium 1.8 lactic acid 2.7 glucose 148 calcium 8.5 phosphorus finally normal 2.7 protein 4.7 albumin 3.2 ALT AST 60/44 respectively.   Hemoglobin 8.4 WBC 19.2 platelets 298. Sed rate 3. ABG from yesterday PO2 74.6 PCO2 35.2 pH 7.380. The above was done while patient on BiPAP. Gerardo-Synephrine IV has been discontinued, blood pressure stable. Heart rate was still averaging 120 most of the night; patient was then given dose of digoxin 375 mcg IV to be followed by 250 mcg. Patient has been started on diltiazem 5 mg/h IV. Gram stain of sputum with following results--     Group 2: 10-25 PMN's/LPF and >10 Epithelial cells/LPF   Few Polymorphonuclear leukocytes   Few Epithelial cells   Abundant Gram negative rods      CT of chest done yesterday with following results--    FINDINGS:   There is cardiomegaly with the calcified pericarditis. There is   coronary artery calcifications. Small to moderate lymph nodes are   identified in the mediastinum. The trachea and major bronchi are   patent. Moderate pleural effusions with atelectasis/infiltrates are   identified in the lower lobes bilaterally with  minimal atelectasis in   the right middle lobe. There is advanced COPD. Liver is of decreased   attenuation likely fatty infiltrated. There is diffuse vascular   calcification. There is 90% compression deformity of T6 with kyphosis   and mild compression deformity of the inferior endplate of T5. .        Impression:         Patchy bibasilar infiltrates/atelectasis and pleural effusion likely   CHF and/or pneumonia. Surveillance recommended. Cardiomegaly with coronary artery calcification. Severe compression deformity of T6 and mild compression deformity of   the inferior endplate of T5. Patient's pulmonologist was consulted; yesterday's note appreciated await CT of the chest results. GI note from yesterday, in view of ongoing positive stool for occult blood nuclear bleeding scan was ordered. Patient had RRT called at 1945 hrs. yesterday due to A. fib RVR and worsening shortness of breath. Heart rate has been up in the 190s.   He was given 5 mg of Lopressor IV and Cardizem drip was started. GI note from day appreciated, no changes noted. Patient was again placed on BiPAP this morning at 0838 hrs. due to dyspnea. Nuclear bleeding scan pending. 8/4/2020-patient laying quietly in his intensive care bed. No chest pain no dyspnea. Only real complaint excess urination overnight due to IV Bumex ordered by cardiology. Coughing is less. Patient has been afebrile for the last 24 hours. Most recent blood pressure still low 93/51 arterial line. 94% saturation 4 L nasal cannula. Heart rate 98/min. Intake and output still +3486 cc; a decrease of greater than 3000 cc since yesterday. Lactic acid is still +2.7. Protein 4.9 albumin 3.3 phosphorus 2.7. ALT AST still increased 92/55 respectively. Hemoglobin 8.6 WBC 22.5 platelets 680. INR 1.1. ABGs from this morning PO2 89.5 PCO2 36.8 pH 7.444. That was done on BiPAP. Nuclear medicine bleeding scan no evidence of acute GI bleed. Chest x-ray from today with following results--    FINDINGS:   Stable cardiomediastinal silhouette. Pulmonary vascularity is   congested. There are pleural effusions as well as bibasilar   atelectasis, right greater than left.        Impression:         Pleural effusions with adjacent atelectasis. Pulmonary vascular   congestion indicating elevated left heart filling pressures. Cardiology consult from yesterday appreciated; loaded with IV digoxin change Lopressor to metoprolol succinate. Recommend Bumex 1 mg IV twice daily. 8/5/2020-patient laying quietly in intensive care bed; no chest pain no dyspnea. His right femoral catheter has been removed. Patient is hoping to be up and about more would like to shower sit in a chair etc.  I discussed all the above with intensivist; patient apparently will be transferred out of intensive care today. Patient has been afebrile last 24 hours. Most recent respirations 30 pulse 80 blood pressure 117/66.  95% saturation on 3.5 L nasal cannula. Intake and output 2506 cc; decrease of 1 from yesterday. CO2 31 BUN 24 lactic acid 2.8 glucose 142 calcium 8.3 phosphorus 2.9 CRP less than 0.1. ALT AST still elevated 91/47 respectively. Hemoglobin 8.9 WBC 8.2 platelets 147. Sed rate 0 INR 1.1. Intensivist note from yesterday appreciated; they would like to stop Ambien and change fluconazole to oral.  Cardiology note appreciated continue metoprolol, hold only if heart rate less than 50 or systolic pressure less than 90. They agree with restarting warfarin. They recommend not holding IV diuretic for blood pressure. Pulmonary note appreciated; no appreciable changes. Thoracentesis had been considered but held due to IV diuresis. Physical therapy AM PAC score 16/24 from yesterday. GI note from yesterday, colonoscopy to be done once patient stabilized. They note patient's rising liver function test may be a result of fluconazole. ID note from yesterday, no changes. Cardiology note from today appreciated, no changes. Pulmonary note from today, no thoracentesis at this time.  note per day appreciated, [facility] 8/6/2020-patient laying quietly in bed; has been transferred out of ICU currently on stepdown unit. No chest pain no dyspnea. Appetite good. He has been up to the bathroom without assistance. He plans on showering today when his friend is available. Patient afebrile for the last 24 hours. Respiratory rate well controlled at 20 pulse 70. Blood pressure 110/54 off of Gerardo-Synephrine. 99% saturation 4 L nasal cannula. Intake and output finally -924 cc.  CO2 elevated 34. Hemoglobin 9.3 WBC 16.8 platelets 313. INR still low 1.1 sed rate 2. Palliative medicine consult appreciated. No change in status. Physical therapy AM PAC score from yesterday 17/24. Cardiology note from today appreciated; they recommend restarting aspirin 81 mg daily soon as acceptable.   Continue IV diuretics for today then switch to oral Bumex twice daily beginning tomorrow.  note appreciated; patient hoping to be discharged with home health care to home. 8/7/2020-patient laying quietly in bed no chest pain no dyspnea. Appetite good no diarrhea. He is hoping for discharge today. No urinary symptoms. Afebrile last 24 hours. Blood pressure still low 92/54; 94% saturation 3 L nasal cannula. Intake and output -1254 cc.  CO2 37 BUN 28 creatinine 1.0 hemoglobin 9.3 WBC 15.7 platelets 642. INR 1.1.  GI note from yesterday appreciated; colonoscopy as outpatient. ID note, leukocytosis improving. Dietary consult, moderate malnutrition. Cardiology note from today, okay for discharge follow-up in cardiology office 4 to 6 weeks.   Measurement of pulse ox, 94% on room air at rest; 91% room air while ambulating; recovery to 98% on 3 L pulse ox at rest.      Objective:     PHYSICAL EXAM:    VS: BP (!) 92/54   Pulse 70   Temp 98.1 °F (36.7 °C) (Oral)   Resp 20   Ht 5' 8\" (1.727 m)   Wt 149 lb 9.6 oz (67.9 kg)   SpO2 94%   BMI 22.75 kg/m²     Labs:   CBC:   Lab Results   Component Value Date    WBC 15.7 08/07/2020    RBC 3.06 08/07/2020    HGB 9.3 08/07/2020    HCT 29.0 08/07/2020    MCV 94.8 08/07/2020    MCH 30.4 08/07/2020    MCHC 32.1 08/07/2020    RDW 14.7 08/07/2020     08/07/2020    MPV 10.3 08/07/2020     CBC with Differential:    Lab Results   Component Value Date    WBC 15.7 08/07/2020    RBC 3.06 08/07/2020    HGB 9.3 08/07/2020    HCT 29.0 08/07/2020     08/07/2020    MCV 94.8 08/07/2020    MCH 30.4 08/07/2020    MCHC 32.1 08/07/2020    RDW 14.7 08/07/2020    LYMPHOPCT 3.9 08/07/2020    MONOPCT 8.6 08/07/2020    BASOPCT 0.1 08/07/2020    MONOSABS 1.35 08/07/2020    LYMPHSABS 0.61 08/07/2020    EOSABS 0.01 08/07/2020    BASOSABS 0.01 08/07/2020     Hemoglobin/Hematocrit:    Lab Results   Component Value Date    HGB 9.3 08/07/2020    HCT 29.0 08/07/2020     CMP:    Lab Results   Component Value Date     08/07/2020    K 4.6 08/07/2020    K 2.6 07/30/2020     08/07/2020    CO2 37 08/07/2020    BUN 28 08/07/2020    CREATININE 1.0 08/07/2020    GFRAA >60 08/07/2020    LABGLOM >60 08/07/2020    GLUCOSE 97 08/07/2020    PROT 4.5 08/05/2020    LABALBU 3.1 08/05/2020    CALCIUM 8.2 08/07/2020    BILITOT 0.3 08/05/2020    ALKPHOS 99 08/05/2020    AST 47 08/05/2020    ALT 91 08/05/2020     BMP:    Lab Results   Component Value Date     08/07/2020    K 4.6 08/07/2020    K 2.6 07/30/2020     08/07/2020    CO2 37 08/07/2020    BUN 28 08/07/2020    LABALBU 3.1 08/05/2020    CREATININE 1.0 08/07/2020    CALCIUM 8.2 08/07/2020    GFRAA >60 08/07/2020    LABGLOM >60 08/07/2020    GLUCOSE 97 08/07/2020     Hepatic Function Panel:    Lab Results   Component Value Date    ALKPHOS 99 08/05/2020    ALT 91 08/05/2020    AST 47 08/05/2020    PROT 4.5 08/05/2020    BILITOT 0.3 08/05/2020    BILIDIR <0.2 08/05/2020    IBILI see below 08/05/2020    LABALBU 3.1 08/05/2020     Ionized Calcium:  No results found for: IONCA  Magnesium:    Lab Results   Component Value Date    MG 2.4 08/07/2020     Phosphorus:    Lab Results   Component Value Date    PHOS 3.0 08/07/2020     LDH:  No results found for: LDH  Uric Acid:    Lab Results   Component Value Date    LABURIC 7.7 07/31/2020     PT/INR:    Lab Results   Component Value Date    PROTIME 13.1 08/07/2020    INR 1.1 08/07/2020     Warfarin PT/INR:  No components found for: PTPATWAR, PTINRWAR  PTT:    Lab Results   Component Value Date    APTT 55.9 10/08/2019   [APTT}  Troponin:    Lab Results   Component Value Date    TROPONINI <0.01 07/31/2020     Last 3 Troponin:    Lab Results   Component Value Date    TROPONINI <0.01 07/31/2020    TROPONINI <0.01 07/31/2020    TROPONINI <0.01 07/30/2020     U/A:  No results found for: NITRITE, COLORU, PROTEINU, PHUR, LABCAST, WBCUA, RBCUA, MUCUS, TRICHOMONAS, YEAST, BACTERIA, CLARITYU, SPECGRAV, LEUKOCYTESUR, UROBILINOGEN, BILIRUBINUR, BLOODU, GLUCOSEU, AMORPHOUS  ABG:    Lab Results   Component Value Date    PH 7.444 08/04/2020    PCO2 36.8 08/04/2020    PO2 89.5 08/04/2020    HCO3 24.7 08/04/2020    BE 0.7 08/04/2020    O2SAT 96.6 08/04/2020     HgBA1c:    Lab Results   Component Value Date    LABA1C 6.2 07/31/2020     FLP:    Lab Results   Component Value Date    TRIG 79 07/31/2020    HDL 30 07/31/2020    LDLCALC 33 07/31/2020    LABVLDL 16 07/31/2020     TSH:    Lab Results   Component Value Date    TSH 0.525 07/31/2020     VITAMIN B12: No components found for: B12  FOLATE:    Lab Results   Component Value Date    FOLATE 9.0 07/31/2020     IRON:  No results found for: IRON  Iron Saturation:  No components found for: PERCENTFE  TIBC:  No results found for: TIBC  FERRITIN:  No results found for: FERRITIN  PSA: No results found for: PSA     General appearance: Alert, Awake, Oriented times 3, no distress; nasal cannula oxygen in place  Skin: Warm and dry ; no rashes  Head: Normocephalic. No masses, lesions or tenderness noted  Eyes: Conjunctivae pale, sclera white. PERRL,EOM-I  Ears: Right external ear normal left external ear seems to have cauliflower ear despite patient's complaint of drainage from ear. Nose/Sinuses: Nares normal. Septum midline. Mucosa normal. No drainage  Oropharynx: Oropharynx clear with no exudate seen  Neck: Supple. No jugular venous distension, lymphadenopathy or thyromegaly Trachea midline  Lungs: Mostly clear  Heart: Irregularly irregular at 80/min. No rub or gallop; grade 1/6 systolic murmur second right intercostal space grade 1/6 systolic murmur left sternal border  Abdomen: Soft, non-tender. BS normal. No masses, organomegaly; no rebound or guarding  Extremities: No edema, Peripheral pulses palpable  Musculoskeletal: Muscular strength appears intact. Neuro:  No focal motor defects ; II-XII grossly intact .  CORNELL equally    TELEMETRY: REVIEWED--Telemetry: Atrial fibrillation    ASSESSMENT:   Principal Problem:    GI bleeding  Active Problems:    Bilateral carotid artery stenosis    Tobacco dependence    Chronic obstructive pulmonary disease with acute exacerbation (HCC)    Combined systolic and diastolic congestive heart failure (HCC)    Atrial fibrillation (HCC)    On home O2    Hypertension    Hyperlipidemia    CAD (coronary artery disease)    Hypoprothrombinemia (HCC)    CKD (chronic kidney disease) stage 3, GFR 30-59 ml/min (HCC)    Hypokalemia    Non-rheumatic aortic sclerosis (HCC)    Hypotension due to hypovolemia    Emesis    Diarrhea    Thrombocytopenia (HCC)    Hypotension    Unintentional weight loss of 10% body weight within 6 months    Moderate protein-calorie malnutrition (HCC)    Pulmonary hypertension (HCC)    Erosive esophagitis  Resolved Problems:    * No resolved hospital problems.  *      PLAN:  SEE ORDERS      RE  CHANGES AND FINDINGS   Medications reviewed with patient  GI prophylaxis  DVT prophylaxis  Consultants notes reviewed   Continue oral potassium  Continue potassium protocol  Add potassium phosphate 20 mmol IV today  Monitor labs  I discussed the above with infectious disease regarding long-term Zyvox, equivocal left ear infection  Low-fat diet  Ambien for sleep 5 mg  Wean Gerardo-Synephrine as possible  Hold anticoagulation in view of continuing drop in hemoglobin despite atrial fibrillation  Sequential compression device  PT OT  Continue nasal cannula oxygen  Transfuse as needed, keep hemoglobin greater than 7.0  Continue aerosol treatments  Plavix isosorbide mononitrate metoprolol tartrate amlodipine all on hold  Intensivist has restarted metoprolol tartrate 25 mg twice daily; patient had been on 100 mg twice daily prehospital.  Because of low blood pressure smaller dose initiated  Digoxin 125 mcg IV daily to help control heart rate  CT of chest, pneumonia  Wean Gerardo-Synephrine as able  Fluconazole 400 mg IV every 24 hours  Tessalon Perles  Guaifenesin 400 mg 4 times daily  Increase methylprednisolone 60 mg every 8 hours  Increase Ambien 10 mg at bedtime  Potassium phosphate 30 mmol IV today  Monitor labs  Diltiazem IV drip has been started 5 mg/h  Digoxin 375 mcg was given one-time this morning  IV Gerardo-Synephrine has been discontinued  Continue aerosol treatments  Fluconazole 400 mg IV every 24 hours  Methylprednisolone 60 mg IV every 8 hours  Toprol XL 25 mg twice daily in place of Lopressor  2 g magnesium sulfate given one-time  Cardiology has been consulted by intensivist  I discussed the above with intensivist; apixaban will be started if bleeding scan shows no evidence of blood loss; has been ordered by cardiology  Digoxin 125 mcg IV daily  Coumadin 2 mg daily has been started today-I spoke with intensivist regarding same; they felt Coumadin would be safer since patient presented with GI bleed and no good reversal for apixaban. Reduce methylprednisolone 40 mg every 8 hours  Metoprolol succinate 25 mg twice daily  Phenylephrine IV continues 30 mL/h, 100 mcg/min.   Fluconazole 400 mg IV every 24 hours, Candida esophagitis  Bumex 1 mg IV every 12 hours  Digoxin  mcg daily  Fluconazole has been changed to oral, 200 mg daily-by ID  Intensivist has discontinued DuoNeb aerosols continue others  Methylprednisolone 40 mg IV once daily  Toprol-XL 25 mg twice daily  Oral potassium chloride replacement  Warfarin 2 mg daily; had been on hold pending possible thoracentesis  Diltiazem IV has been discontinued  Phenylephrine IV has been discontinued  Bedside chair 3 times a day  Home health care orders entered  Bumex 1 mg IV twice daily for today  Digoxin 125 mcg IV today  Continue Pulmicort and Brovana nebulizer  IV methylprednisolone has been discontinued  Oral Protonix continues  Intensivist was to restart Coumadin yesterday, it was not restarted--thoracentesis on hold  Warfarin 5 mg by mouth today  Prednisone 40 mg by mouth daily has been started by intensivist  Restart Plavix 75 mg daily  May shower with help  Likely home tomorrow  Med reconciliation completed  Prescriptions written  Home with home health care  Weaning dose of prednisone  Coumadin 4 mg daily until therapeutic  Lanoxin 125 mcg by mouth daily  Bumex 1 mg twice daily  Patient had been on Plavix 75 mg daily upon admission; it was prescribed by cardiologist.  Return to Plavix, discontinue aspirin  Toprol-XL 25 mg twice daily in place of Lopressor  Remain off amlodipine and isosorbide at this time until seen by cardiology, recommended by cardiologist           Discussed with patient and nursing. Marcelle Phillipmamadou WALL     11:16 AM     8/7/2020    TIME > 35 MINUTES    >  50 %  OF  TIME  DISCUSSION               ------------  INFORMATION  -----------      DIET:DIET LOW FAT;   Dietary Nutrition Supplements: Low Calorie High Protein Supplement        Allergies   Allergen Reactions    Linezolid Other (See Comments)     Thrombocytopenia, impactful diarrhea, weight loss    Pcn [Penicillins] Swelling    Dye [Iodides]      Heart and kidney dye / reaction unknown         MEDICATION SIDE EFFECTS:none       SCHEDULED MEDS:                                 Scheduled Meds:   bumetanide  1 mg Oral BID    digoxin  125 mcg Oral Daily    aspirin  81 mg Oral Daily    pantoprazole  40 mg Oral BID AC    predniSONE  40 mg Oral Daily    potassium chloride  20 mEq Oral BID    fluconazole  200 mg Oral Daily    metoprolol succinate  25 mg Oral BID    heparin flush  3 mL Intravenous Q12H    benzonatate  100 mg Oral TID    atorvastatin  60 mg Oral Daily    Arformoterol Tartrate  15 mcg Nebulization BID    budesonide  250 mcg Nebulization BID    warfarin (COUMADIN) daily dosing (placeholder)   Other RX Placeholder    sodium chloride flush  10 mL Intravenous 2 times per day       Continuous Infusions:        Data:       Intake/Output Summary (Last 24 hours) at 8/7/2020 1116  Last data filed at 8/7/2020 7264  Gross per 24 hour   Intake 120 and atelectasis with small bilateral pleural effusions. 6. Fusiform infrarenal abdominal aortic aneurysm is demonstrated   measuring up to 3 cm in diameter. XR CHEST PORTABLE   Final Result      Chronic coarsened interstitial lung markings are seen throughout both   lungs. No obvious pneumonia or pleural effusion.             [address]   11:16 AM     8/7/2020      Voice recognition software used for dictation

## 2020-08-07 NOTE — Clinical Note
Pulse ox was ______% on room air at rest.  Ambulated patient on room air. Oxygen saturation was ______% on room air while ambulating. Oxygen applied. Recovery pulse ox was ______% on _______ liters of oxygen while ambulating.

## 2020-08-07 NOTE — PROGRESS NOTES
Date: 8/6/2020    Time: 10:02 PM    Patient Placed On BIPAP/CPAP/ Non-Invasive Ventilation? No    If no must comment. Facial area red/color change? No           If YES are Blister/Lesion present? No   If yes must notify nursing staff  BIPAP/CPAP skin barrier? Yes    Skin barrier type:duoderm       Comments: He likes to go on around midnight, will let the next therapist know. Bipap machine in room if needed.         Ilene Hawley

## 2020-08-08 ENCOUNTER — CARE COORDINATION (OUTPATIENT)
Dept: CASE MANAGEMENT | Age: 80
End: 2020-08-08

## 2020-08-08 NOTE — CARE COORDINATION
[facility] Transitions Initial Follow Up Call    Call within 2 business days of discharge: Yes    Patient: Sheryl Braden Patient : 1940   MRN: <[id number]>  Reason for Admission:   Discharge Date: 20 RARS: Readmission Risk Score: 25      Last Discharge [facility]       Complaint Diagnosis Description Type Department Provider    20 Emesis; Fatigue; Dizziness Nausea vomiting and diarrhea . .. ED to Hosp-Admission (Discharged) (ADMITTED) Dru Santa DO; [place] Rios. .. Spoke with: N/A    Facility: [facility]    Non-face-to-face services provided:  Chart review for COVID-19 calls    Care Transitions 24 Hour Call    Care Transitions Interventions         Follow Up : Attempted to make contact with Mickey Gregory for COVID-19 initial follow up call post discharge from the hospital without success. Unable to leave a message regarding intent of call and call back information. Will try again my next business day.      Future Appointments   Date Time Provider [place]   2020 10:00 AM MD SANDOR Monsivais [facility]       Isak Zheng RN

## 2020-11-25 ENCOUNTER — HOSPITAL ENCOUNTER (OUTPATIENT)
Dept: GENERAL RADIOLOGY | Age: 80
Discharge: HOME | End: 2020-11-27
Payer: MEDICARE

## 2020-11-25 ENCOUNTER — HOSPITAL ENCOUNTER (OUTPATIENT)
Age: 80
Discharge: HOME | End: 2020-11-27
Payer: MEDICARE

## 2020-11-25 PROCEDURE — 71046 X-RAY EXAM CHEST 2 VIEWS: CPT

## 2021-01-11 ENCOUNTER — OFFICE VISIT (OUTPATIENT)
Dept: NEUROSURGERY | Age: 81
End: 2021-01-11
Payer: MEDICARE

## 2021-01-11 VITALS
BODY MASS INDEX: 20.16 KG/M2 | TEMPERATURE: 98.2 F | HEIGHT: 68 IN | SYSTOLIC BLOOD PRESSURE: 110 MMHG | WEIGHT: 133 LBS | HEART RATE: 58 BPM | DIASTOLIC BLOOD PRESSURE: 61 MMHG

## 2021-01-11 DIAGNOSIS — S22.050A CLOSED WEDGE COMPRESSION FRACTURE OF T6 VERTEBRA, INITIAL ENCOUNTER (HCC): Primary | ICD-10-CM

## 2021-01-11 PROCEDURE — 4004F PT TOBACCO SCREEN RCVD TLK: CPT | Performed by: PHYSICIAN ASSISTANT

## 2021-01-11 PROCEDURE — 4040F PNEUMOC VAC/ADMIN/RCVD: CPT | Performed by: PHYSICIAN ASSISTANT

## 2021-01-11 PROCEDURE — 99203 OFFICE O/P NEW LOW 30 MIN: CPT | Performed by: PHYSICIAN ASSISTANT

## 2021-01-11 PROCEDURE — 1123F ACP DISCUSS/DSCN MKR DOCD: CPT | Performed by: PHYSICIAN ASSISTANT

## 2021-01-11 PROCEDURE — G8427 DOCREV CUR MEDS BY ELIG CLIN: HCPCS | Performed by: PHYSICIAN ASSISTANT

## 2021-01-11 PROCEDURE — G8420 CALC BMI NORM PARAMETERS: HCPCS | Performed by: PHYSICIAN ASSISTANT

## 2021-01-11 PROCEDURE — G8484 FLU IMMUNIZE NO ADMIN: HCPCS | Performed by: PHYSICIAN ASSISTANT

## 2021-01-11 ASSESSMENT — ENCOUNTER SYMPTOMS
ALLERGIC/IMMUNOLOGIC NEGATIVE: 1
GASTROINTESTINAL NEGATIVE: 1
EYES NEGATIVE: 1
RESPIRATORY NEGATIVE: 1
BACK PAIN: 1

## 2021-01-11 NOTE — PROGRESS NOTES
Subjective:      Patient ID: Zain Gregory is a 68 y.o. male. Back Pain  This is a chronic problem. Episode onset: 18 MONTHS. The problem occurs daily. The problem is unchanged. The pain is present in the thoracic spine. The quality of the pain is described as aching. The pain is at a severity of 9/10. The symptoms are aggravated by twisting, standing and bending. Treatments tried: norco, BILLIE, The treatment provided mild relief. Review of Systems   Constitutional: Negative. HENT: Negative. Eyes: Negative. Respiratory: Negative. Cardiovascular: Negative. Gastrointestinal: Negative. Endocrine: Negative. Genitourinary: Negative. Musculoskeletal: Positive for back pain. Skin: Negative. Allergic/Immunologic: Negative. Neurological: Negative. Hematological: Negative. Psychiatric/Behavioral: Negative. Objective:   Physical Exam  HENT:      Head: Normocephalic and atraumatic. Nose: Nose normal.   Eyes:      Pupils: Pupils are equal, round, and reactive to light. Pulmonary:      Effort: Pulmonary effort is normal.   Abdominal:      General: There is no distension. Skin:     General: Skin is warm and dry. Neurological:      Mental Status: He is alert. GCS: GCS eye subscore is 4. GCS verbal subscore is 5. GCS motor subscore is 6. Cranial Nerves: Cranial nerves are intact. Sensory: Sensory deficit present. Motor: Motor function is intact. Gait: Gait is intact. Deep Tendon Reflexes:      Reflex Scores:       Tricep reflexes are 1+ on the right side and 1+ on the left side. Bicep reflexes are 1+ on the right side and 1+ on the left side. Brachioradialis reflexes are 1+ on the right side and 1+ on the left side. Patellar reflexes are 1+ on the right side and 1+ on the left side. Achilles reflexes are 1+ on the right side and 1+ on the left side.      Comments: Decreased sensation to light touch in stocking distribution   Psychiatric:         Mood and Affect: Mood normal.         Assessment:      68year old male with mid back pain for that past 18 months. Thoracic MRI report from 2019 reveals T5 and T6 compression fractures. Plan: Will order a repeat thoracic MRI.          YADIEL Bartholomew Detail Level: Detailed

## 2021-01-23 ENCOUNTER — HOSPITAL ENCOUNTER (OUTPATIENT)
Dept: MRI IMAGING | Age: 81
Discharge: HOME | End: 2021-01-25
Payer: MEDICARE

## 2021-01-23 DIAGNOSIS — S22.050A CLOSED WEDGE COMPRESSION FRACTURE OF T6 VERTEBRA, INITIAL ENCOUNTER (HCC): ICD-10-CM

## 2021-01-23 PROCEDURE — 72146 MRI CHEST SPINE W/O DYE: CPT

## 2021-01-26 ENCOUNTER — OFFICE VISIT (OUTPATIENT)
Dept: NEUROSURGERY | Age: 81
End: 2021-01-26
Payer: MEDICARE

## 2021-01-26 DIAGNOSIS — M54.9 MID BACK PAIN: Primary | ICD-10-CM

## 2021-01-26 PROCEDURE — 4004F PT TOBACCO SCREEN RCVD TLK: CPT | Performed by: PHYSICIAN ASSISTANT

## 2021-01-26 PROCEDURE — G8484 FLU IMMUNIZE NO ADMIN: HCPCS | Performed by: PHYSICIAN ASSISTANT

## 2021-01-26 PROCEDURE — G8428 CUR MEDS NOT DOCUMENT: HCPCS | Performed by: PHYSICIAN ASSISTANT

## 2021-01-26 PROCEDURE — G8420 CALC BMI NORM PARAMETERS: HCPCS | Performed by: PHYSICIAN ASSISTANT

## 2021-01-26 PROCEDURE — 1123F ACP DISCUSS/DSCN MKR DOCD: CPT | Performed by: PHYSICIAN ASSISTANT

## 2021-01-26 PROCEDURE — 99213 OFFICE O/P EST LOW 20 MIN: CPT | Performed by: PHYSICIAN ASSISTANT

## 2021-01-26 PROCEDURE — 4040F PNEUMOC VAC/ADMIN/RCVD: CPT | Performed by: PHYSICIAN ASSISTANT

## 2021-01-26 ASSESSMENT — ENCOUNTER SYMPTOMS
ALLERGIC/IMMUNOLOGIC NEGATIVE: 1
RESPIRATORY NEGATIVE: 1
BACK PAIN: 1
GASTROINTESTINAL NEGATIVE: 1
EYES NEGATIVE: 1

## 2021-01-26 NOTE — PROGRESS NOTES
Subjective:      Patient ID: Leigh Vazquez is a 68 y.o. male. Back Pain  This is a chronic problem. Episode onset: 18 MONTHS. The problem occurs daily. The problem is unchanged. The pain is present in the thoracic spine. The quality of the pain is described as aching. The pain is at a severity of 9/10. The symptoms are aggravated by twisting, standing and bending. Treatments tried: norco, BILLIE, The treatment provided mild relief. Review of Systems   Constitutional: Negative. HENT: Negative. Eyes: Negative. Respiratory: Negative. Cardiovascular: Negative. Gastrointestinal: Negative. Endocrine: Negative. Genitourinary: Negative. Musculoskeletal: Positive for back pain. Skin: Negative. Allergic/Immunologic: Negative. Neurological: Negative. Hematological: Negative. Psychiatric/Behavioral: Negative. Objective:   Physical Exam  HENT:      Head: Normocephalic and atraumatic. Nose: Nose normal.   Eyes:      Pupils: Pupils are equal, round, and reactive to light. Pulmonary:      Effort: Pulmonary effort is normal.   Abdominal:      General: There is no distension. Skin:     General: Skin is warm and dry. Neurological:      Mental Status: He is alert. GCS: GCS eye subscore is 4. GCS verbal subscore is 5. GCS motor subscore is 6. Cranial Nerves: Cranial nerves are intact. Sensory: Sensory deficit present. Motor: Motor function is intact. Gait: Gait is intact. Deep Tendon Reflexes:      Reflex Scores:       Tricep reflexes are 1+ on the right side and 1+ on the left side. Bicep reflexes are 1+ on the right side and 1+ on the left side. Brachioradialis reflexes are 1+ on the right side and 1+ on the left side. Patellar reflexes are 1+ on the right side and 1+ on the left side. Achilles reflexes are 1+ on the right side and 1+ on the left side.      Comments: Decreased sensation to light touch in stocking

## 2021-06-17 ENCOUNTER — HOSPITAL ENCOUNTER (OUTPATIENT)
Age: 81
Discharge: HOME | End: 2021-06-17
Payer: MEDICARE

## 2021-06-17 LAB
ALBUMIN SERPL-MCNC: 4.5 G/DL (ref 3.5–5.2)
ALP BLD-CCNC: 135 U/L (ref 40–129)
ALT SERPL-CCNC: 11 U/L (ref 0–40)
ANION GAP SERPL CALCULATED.3IONS-SCNC: 11 MMOL/L (ref 7–16)
AST SERPL-CCNC: 16 U/L (ref 0–39)
BASOPHILS ABSOLUTE: 0.05 E9/L (ref 0–0.2)
BASOPHILS RELATIVE PERCENT: 0.9 % (ref 0–2)
BILIRUB SERPL-MCNC: 0.3 MG/DL (ref 0–1.2)
BUN BLDV-MCNC: 12 MG/DL (ref 6–23)
C-REACTIVE PROTEIN: 0.3 MG/DL (ref 0–0.4)
CALCIUM SERPL-MCNC: 9.5 MG/DL (ref 8.6–10.2)
CHLORIDE BLD-SCNC: 97 MMOL/L (ref 98–107)
CO2: 29 MMOL/L (ref 22–29)
CREAT SERPL-MCNC: 1.7 MG/DL (ref 0.7–1.2)
EOSINOPHILS ABSOLUTE: 0.14 E9/L (ref 0.05–0.5)
EOSINOPHILS RELATIVE PERCENT: 2.5 % (ref 0–6)
GFR AFRICAN AMERICAN: 47
GFR NON-AFRICAN AMERICAN: 39 ML/MIN/1.73
GLUCOSE BLD-MCNC: 84 MG/DL (ref 74–99)
HCT VFR BLD CALC: 38.2 % (ref 37–54)
HEMOGLOBIN: 12.4 G/DL (ref 12.5–16.5)
IMMATURE GRANULOCYTES #: 0.02 E9/L
IMMATURE GRANULOCYTES %: 0.4 % (ref 0–5)
LYMPHOCYTES ABSOLUTE: 1.01 E9/L (ref 1.5–4)
LYMPHOCYTES RELATIVE PERCENT: 18.3 % (ref 20–42)
MCH RBC QN AUTO: 30.7 PG (ref 26–35)
MCHC RBC AUTO-ENTMCNC: 32.5 % (ref 32–34.5)
MCV RBC AUTO: 94.6 FL (ref 80–99.9)
MONOCYTES ABSOLUTE: 0.62 E9/L (ref 0.1–0.95)
MONOCYTES RELATIVE PERCENT: 11.3 % (ref 2–12)
NEUTROPHILS ABSOLUTE: 3.67 E9/L (ref 1.8–7.3)
NEUTROPHILS RELATIVE PERCENT: 66.6 % (ref 43–80)
PDW BLD-RTO: 18.9 FL (ref 11.5–15)
PLATELET # BLD: 224 E9/L (ref 130–450)
PMV BLD AUTO: 10.2 FL (ref 7–12)
POTASSIUM SERPL-SCNC: 4.4 MMOL/L (ref 3.5–5)
RBC # BLD: 4.04 E12/L (ref 3.8–5.8)
SODIUM BLD-SCNC: 137 MMOL/L (ref 132–146)
TOTAL PROTEIN: 7 G/DL (ref 6.4–8.3)
WBC # BLD: 5.5 E9/L (ref 4.5–11.5)

## 2021-06-17 PROCEDURE — 80053 COMPREHEN METABOLIC PANEL: CPT

## 2021-06-17 PROCEDURE — 36415 COLL VENOUS BLD VENIPUNCTURE: CPT

## 2021-06-17 PROCEDURE — 85025 COMPLETE CBC W/AUTO DIFF WBC: CPT

## 2021-06-17 PROCEDURE — 86140 C-REACTIVE PROTEIN: CPT

## 2021-08-03 ENCOUNTER — HOSPITAL ENCOUNTER (OUTPATIENT)
Age: 81
Discharge: HOME | End: 2021-08-05

## 2021-08-03 PROCEDURE — 88305 TISSUE EXAM BY PATHOLOGIST: CPT

## 2021-11-03 ENCOUNTER — APPOINTMENT (OUTPATIENT)
Dept: GENERAL RADIOLOGY | Age: 81
DRG: 480 | End: 2021-11-03
Payer: MEDICARE

## 2021-11-03 ENCOUNTER — HOSPITAL ENCOUNTER (INPATIENT)
Age: 81
LOS: 5 days | Discharge: HOME HEALTH | DRG: 480 | End: 2021-11-08
Attending: EMERGENCY MEDICINE | Admitting: INTERNAL MEDICINE
Payer: MEDICARE

## 2021-11-03 ENCOUNTER — APPOINTMENT (OUTPATIENT)
Dept: CT IMAGING | Age: 81
DRG: 480 | End: 2021-11-03
Payer: MEDICARE

## 2021-11-03 DIAGNOSIS — S79.001A: ICD-10-CM
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
## 2021-11-03 DIAGNOSIS — Z98.890 HISTORY OF SURGERY: ICD-10-CM

## 2021-11-03 PROBLEM — R63.4 WEIGHT LOSS: Status: ACTIVE | Noted: 2021-11-03

## 2021-11-03 PROBLEM — S72.141A INTERTROCHANTERIC FRACTURE OF RIGHT FEMUR, CLOSED, INITIAL ENCOUNTER (HCC): Status: ACTIVE | Noted: 2021-11-03

## 2021-11-03 PROBLEM — D64.9 CHRONIC ANEMIA: Status: ACTIVE | Noted: 2021-11-03

## 2021-11-03 PROBLEM — S22.39XA RIB FRACTURE: Status: ACTIVE | Noted: 2021-11-03

## 2021-11-03 LAB
ALBUMIN SERPL-MCNC: 4.3 G/DL (ref 3.5–5.2)
ALP BLD-CCNC: 120 U/L (ref 40–129)
ALT SERPL-CCNC: 21 U/L (ref 0–40)
ANION GAP SERPL CALCULATED.3IONS-SCNC: 10 MMOL/L (ref 7–16)
AST SERPL-CCNC: 17 U/L (ref 0–39)
BASOPHILS ABSOLUTE: 0 E9/L (ref 0–0.2)
BASOPHILS RELATIVE PERCENT: 0.1 % (ref 0–2)
BILIRUB SERPL-MCNC: 0.3 MG/DL (ref 0–1.2)
BUN BLDV-MCNC: 30 MG/DL (ref 6–23)
BURR CELLS: ABNORMAL
CALCIUM SERPL-MCNC: 9.2 MG/DL (ref 8.6–10.2)
CHLORIDE BLD-SCNC: 100 MMOL/L (ref 98–107)
CO2: 28 MMOL/L (ref 22–29)
CREAT SERPL-MCNC: 1.5 MG/DL (ref 0.7–1.2)
EOSINOPHILS ABSOLUTE: 0 E9/L (ref 0.05–0.5)
EOSINOPHILS RELATIVE PERCENT: 0 % (ref 0–6)
GFR AFRICAN AMERICAN: 55
GFR NON-AFRICAN AMERICAN: 45 ML/MIN/1.73
GLUCOSE BLD-MCNC: 89 MG/DL (ref 74–99)
HCT VFR BLD CALC: 35.4 % (ref 37–54)
HEMOGLOBIN: 11 G/DL (ref 12.5–16.5)
HYPOCHROMIA: ABNORMAL
LIPASE: 24 U/L (ref 13–60)
LYMPHOCYTES ABSOLUTE: 0.35 E9/L (ref 1.5–4)
LYMPHOCYTES RELATIVE PERCENT: 2.6 % (ref 20–42)
MCH RBC QN AUTO: 24.9 PG (ref 26–35)
MCHC RBC AUTO-ENTMCNC: 31.1 % (ref 32–34.5)
MCV RBC AUTO: 80.1 FL (ref 80–99.9)
MONOCYTES ABSOLUTE: 0.58 E9/L (ref 0.1–0.95)
MONOCYTES RELATIVE PERCENT: 5.2 % (ref 2–12)
NEUTROPHILS ABSOLUTE: 10.76 E9/L (ref 1.8–7.3)
NEUTROPHILS RELATIVE PERCENT: 92.2 % (ref 43–80)
OVALOCYTES: ABNORMAL
PDW BLD-RTO: 17.8 FL (ref 11.5–15)
PLATELET # BLD: 329 E9/L (ref 130–450)
PMV BLD AUTO: 9.7 FL (ref 7–12)
POIKILOCYTES: ABNORMAL
POLYCHROMASIA: ABNORMAL
POTASSIUM REFLEX MAGNESIUM: 4.2 MMOL/L (ref 3.5–5)
RBC # BLD: 4.42 E12/L (ref 3.8–5.8)
SODIUM BLD-SCNC: 138 MMOL/L (ref 132–146)
TARGET CELLS: ABNORMAL
TOTAL PROTEIN: 6.3 G/DL (ref 6.4–8.3)
WBC # BLD: 11.7 E9/L (ref 4.5–11.5)

## 2021-11-03 PROCEDURE — 83690 ASSAY OF LIPASE: CPT

## 2021-11-03 PROCEDURE — 1200000000 HC SEMI PRIVATE

## 2021-11-03 PROCEDURE — 70450 CT HEAD/BRAIN W/O DYE: CPT

## 2021-11-03 PROCEDURE — 73552 X-RAY EXAM OF FEMUR 2/>: CPT

## 2021-11-03 PROCEDURE — 85025 COMPLETE CBC W/AUTO DIFF WBC: CPT

## 2021-11-03 PROCEDURE — 71045 X-RAY EXAM CHEST 1 VIEW: CPT

## 2021-11-03 PROCEDURE — 80053 COMPREHEN METABOLIC PANEL: CPT

## 2021-11-03 PROCEDURE — 99285 EMERGENCY DEPT VISIT HI MDM: CPT

## 2021-11-03 PROCEDURE — 27502 TREATMENT OF THIGH FRACTURE: CPT

## 2021-11-03 PROCEDURE — 72125 CT NECK SPINE W/O DYE: CPT

## 2021-11-03 PROCEDURE — 99222 1ST HOSP IP/OBS MODERATE 55: CPT | Performed by: ORTHOPAEDIC SURGERY

## 2021-11-03 PROCEDURE — 73501 X-RAY EXAM HIP UNI 1 VIEW: CPT

## 2021-11-03 PROCEDURE — 2500000003 HC RX 250 WO HCPCS: Performed by: STUDENT IN AN ORGANIZED HEALTH CARE EDUCATION/TRAINING PROGRAM

## 2021-11-03 PROCEDURE — 72100 X-RAY EXAM L-S SPINE 2/3 VWS: CPT

## 2021-11-03 PROCEDURE — 73502 X-RAY EXAM HIP UNI 2-3 VIEWS: CPT

## 2021-11-03 PROCEDURE — 96375 TX/PRO/DX INJ NEW DRUG ADDON: CPT

## 2021-11-03 PROCEDURE — 6360000002 HC RX W HCPCS: Performed by: EMERGENCY MEDICINE

## 2021-11-03 PROCEDURE — 96374 THER/PROPH/DIAG INJ IV PUSH: CPT

## 2021-11-03 PROCEDURE — 93005 ELECTROCARDIOGRAM TRACING: CPT | Performed by: EMERGENCY MEDICINE

## 2021-11-03 PROCEDURE — 6360000002 HC RX W HCPCS: Performed by: STUDENT IN AN ORGANIZED HEALTH CARE EDUCATION/TRAINING PROGRAM

## 2021-11-03 PROCEDURE — 96376 TX/PRO/DX INJ SAME DRUG ADON: CPT

## 2021-11-03 RX ORDER — KETAMINE HYDROCHLORIDE 10 MG/ML
50 INJECTION, SOLUTION INTRAMUSCULAR; INTRAVENOUS ONCE
Status: COMPLETED | OUTPATIENT
Start: 2021-11-03 | End: 2021-11-03

## 2021-11-03 RX ORDER — PROPOFOL 10 MG/ML
INJECTION, EMULSION INTRAVENOUS
Status: DISPENSED
Start: 2021-11-03 | End: 2021-11-04

## 2021-11-03 RX ORDER — PROPOFOL 10 MG/ML
50 INJECTION, EMULSION INTRAVENOUS ONCE
Status: COMPLETED | OUTPATIENT
Start: 2021-11-03 | End: 2021-11-03

## 2021-11-03 RX ADMIN — PROPOFOL 25 MG: 10 INJECTION, EMULSION INTRAVENOUS at 23:04

## 2021-11-03 RX ADMIN — KETAMINE HYDROCHLORIDE 25 MG: 10 INJECTION, SOLUTION INTRAMUSCULAR; INTRAVENOUS at 23:08

## 2021-11-03 RX ADMIN — HYDROMORPHONE HYDROCHLORIDE 1 MG: 1 INJECTION, SOLUTION INTRAMUSCULAR; INTRAVENOUS; SUBCUTANEOUS at 22:16

## 2021-11-03 RX ADMIN — HYDROMORPHONE HYDROCHLORIDE 1 MG: 1 INJECTION, SOLUTION INTRAMUSCULAR; INTRAVENOUS; SUBCUTANEOUS at 19:58

## 2021-11-03 ASSESSMENT — PAIN SCALES - GENERAL
PAINLEVEL_OUTOF10: 0
PAINLEVEL_OUTOF10: 10

## 2021-11-03 ASSESSMENT — PAIN DESCRIPTION - LOCATION: LOCATION: LEG

## 2021-11-03 ASSESSMENT — PAIN DESCRIPTION - PAIN TYPE: TYPE: ACUTE PAIN

## 2021-11-03 ASSESSMENT — PAIN DESCRIPTION - FREQUENCY: FREQUENCY: CONTINUOUS

## 2021-11-03 ASSESSMENT — PAIN DESCRIPTION - ORIENTATION: ORIENTATION: RIGHT

## 2021-11-03 NOTE — ED NOTES
Pt to ED with right hip and leg pain after falling while carrying a package in his house. Pt states that his hip landed on the side walk and his upper body landed on the grass. Pt denies LOC, denies head or neck pain. Pt takes eliquis. This is patient's second fall in 2 weeks, states that he fell a week and a half ago and fractured ribs on his left side.      Hermilo Novak RN  11/03/21 1940

## 2021-11-03 NOTE — ED PROVIDER NOTES
Department of Emergency Medicine   ED  Provider Note  Admit Date/RoomTime: 11/3/2021  7:31 PM  ED Room: 22/22          History of Present Illness:  11/3/21, Time: 7:33 PM EDT  Chief Complaint   Patient presents with    Hip Pain     tripped and fell right hip and leg pain, denies hitting head, denies Kathy Morales is a 80 y.o. male presenting to the ED for right hip and upper leg pain, beginning less than an hour ago after falling. The complaint has been persistent, severe in severity, and worsened by movement of right leg. Patient states that he was getting a box that was delivered off of the porch when he tripped and fell landing on his right side. Patient immediately felt pain to his right hip and upper leg, no radiation, worsened by any type of movement. EMS was summoned and patient was transported to ED. Patient has a skin tear to his right forearm but denies any upper extremity pain. He denies any loss consciousness, states he is on blood thinner. He denies any headache, no blurred vision. He denies any neck or back pain, no chest or abdominal pain.     Review of Systems:   Pertinent positives and negatives are stated within HPI, all other systems reviewed and are negative.        --------------------------------------------- PAST HISTORY ---------------------------------------------  Past Medical History:  has a past medical history of Amblyopia, Anticoagulated on Coumadin, Atrial fibrillation (HCC), Bilateral carotid artery stenosis, CAD (coronary artery disease), CHF (congestive heart failure) ([address] CKD (chronic kidney disease) stage 3, GFR 30-59 ml/min ([facility]), Colon polyp, COPD (chronic obstructive pulmonary disease) ([address] Diarrhea, Emesis, Emphysema of lung ([address] Erosive esophagitis, Full dentures, H/O asbestosis, Hyperlipidemia, Hypertension, Hypokalemia, Hypoprothrombinemia ([address] Hypotension due to hypovolemia, Myopia, Non-rheumatic aortic sclerosis, NSVT (nonsustained ventricular tachycardia) ([facility] [address] On home O2, NICHOLAS (obstructive sleep apnea), Pulmonary hypertension ([facility] [facility] Squamous cell carcinoma, face, Thrombocytopenia ([facility] [address] Tobacco dependence, and Unintentional weight loss of 10% body weight within 6 months. Past Surgical History:  has a past surgical history that includes Appendectomy; Eye surgery (1961); Coronary artery bypass graft (1983, 1992); Colonoscopy; Skin cancer excision; Cardiac catheterization; Coronary angioplasty with stent; ear surgery (Left, 6/25/2019); back surgery (years ago); ear surgery (Left, 8/26/2019); and Upper gastrointestinal endoscopy (N/A, 7/31/2020). Social History:  reports that he has been smoking cigarettes. He started smoking about 66 years ago. He has a 62.00 pack-year smoking history. He has never used smokeless tobacco. He reports that he does not drink alcohol and does not use drugs. Family History: family history includes Cancer in his mother; Heart Disease in his brother and father; Other in his mother. . Unless otherwise noted, family history is non contributory    The patients home medications have been reviewed. Allergies: Iodine, Linezolid, Pcn [penicillins], Penicillin g, and Dye [iodides]        ---------------------------------------------------PHYSICAL EXAM--------------------------------------    Constitutional/General: Alert and oriented x3  Head: Normocephalic and atraumatic  Eyes: PERRL, EOMI, sclera non icteric  Mouth: Oropharynx clear, handling secretions, no trismus, no asymmetry of the posterior oropharynx or uvular edema  Neck: Supple, full ROM, no stridor, no meningeal signs  Respiratory: Lungs clear to auscultation bilaterally, no wheezes, rales, or rhonchi. Not in respiratory distress  Cardiovascular:  Regular rate. Regular rhythm. No murmurs, no aortic murmurs, no gallops, or rubs. 2+ distal pulses. Equal extremity pulses. Chest: No chest wall tenderness  GI:  Abdomen Soft, Non tender, Non distended.  No rebound, guarding, or rigidity. No pulsatile masses. Musculoskeletal: Right hip is tender to palpation, unable to straighten the leg as he is lying left lateral. Warm and well perfused, no clubbing, cyanosis, or edema. Capillary refill <3 seconds. Skin tear to right forearm, no bony tenderness  Integument: skin warm and dry. No rashes. Neurologic: GCS 15, no focal deficits, symmetric strength 5/5 in the upper and lower extremities bilaterally  Psychiatric: Normal Affect          -------------------------------------------------- RESULTS -------------------------------------------------  I have personally reviewed all laboratory and imaging results for this patient. Results are listed below. LABS: (Lab results interpreted by me)  Results for orders placed or performed during the hospital encounter of 11/03/21   Comprehensive Metabolic Panel w/ Reflex to MG   Result Value Ref Range    Sodium 138 132 - 146 mmol/L    Potassium reflex Magnesium 4.2 3.5 - 5.0 mmol/L    Chloride 100 98 - 107 mmol/L    CO2 28 22 - 29 mmol/L    Anion Gap 10 7 - 16 mmol/L    Glucose 89 74 - 99 mg/dL    BUN 30 (H) 6 - 23 mg/dL    CREATININE 1.5 (H) 0.7 - 1.2 mg/dL    GFR Non-African American 45 >=60 mL/min/1.73    GFR African American 55     Calcium 9.2 8.6 - 10.2 mg/dL    Total Protein 6.3 (L) 6.4 - 8.3 g/dL    Albumin 4.3 3.5 - 5.2 g/dL    Total Bilirubin 0.3 0.0 - 1.2 mg/dL    Alkaline Phosphatase 120 40 - 129 U/L    ALT 21 0 - 40 U/L    AST 17 0 - 39 U/L   Lipase   Result Value Ref Range    Lipase 24 13 - 60 U/L   ,       RADIOLOGY:  Interpreted by Radiologist unless otherwise specified  XR CHEST PORTABLE   Final Result   Stable chronic changes in both lungs. Suspect left 7th rib fracture, indeterminate age. CT Head WO Contrast   Final Result   No acute intracranial abnormality. CT Cervical Spine WO Contrast   Final Result   No acute abnormality of the cervical spine.       Mild loss of height of C5, C6 and T1 vertebral bodies, likely chronic. Degenerative changes C5-C6. XR FEMUR RIGHT (MIN 2 VIEWS)   Final Result   Comminuted and angulated intertrochanteric and proximal femoral shaft   fracture. XR HIP 2-3 VW W PELVIS RIGHT   Final Result   Angulated and comminuted intertrochanteric and proximal femoral shaft   fracture. XR LUMBAR SPINE (2-3 VIEWS)   Final Result   No evidence of acute bony pathology. EKG Interpretation  Interpreted by emergency department physician, Dr. Branden Clemens    Date of EK/3/21  Time:     Rhythm: normal sinus   Rate: 68  Axis: normal  Conduction: right bundle branch block (complete)  ST Segments: no acute change  T Waves: no acute change    Clinical Impression: No findings suggestive of acute ischemia or injury  Comparison to prior EKG: stable as compared to patient's most recent EKG      ------------------------- NURSING NOTES AND VITALS REVIEWED ---------------------------   The nursing notes within the ED encounter and vital signs as below have been reviewed by myself  /60   Pulse 53   Temp 96.8 °F (36 °C)   Resp 18   Wt 111 lb (50.3 kg)   SpO2 93%   BMI 16.88 kg/m²     Oxygen Saturation Interpretation: Normal    The patients available past medical records and past encounters were reviewed. ------------------------------ ED COURSE/MEDICAL DECISION MAKING----------------------  Medications   HYDROmorphone (DILAUDID) injection 1 mg (1 mg IntraVENous Given 11/3/21 1958)   HYDROmorphone (DILAUDID) injection 1 mg (1 mg IntraVENous Given 11/3/21 2216)           The cardiac monitor revealed sinus rhythm with a heart rate in the 60s as interpreted by me. The cardiac monitor was ordered secondary to the patient's chest pain and to monitor for patient for dysrhythmia.    CPT [id number]           Medical Decision Making:     IDr. Stanislaw, am the primary provider of record    73-year-old male presenting with right hip pain after mechanical fall. He has a skin tear to his right forearm but no tenderness, no pain. Tetanus is up-to-date. Patient is on a blood thinner which will prompt imaging of the head. He is lying left lateral with a flexed hip and knee, refusing to straighten due to pain. Distal sensation and pulse are intact. Patient was given Dilaudid for discomfort prior to imaging. Chest x-ray, CT head and cervical spine are unremarkable. X-ray of the left hip shows a comminuted intertrochanteric and proximal femur fracture. Orthopedics consulted and will assess at bedside. Metabolic panel is within acceptable limits. Mild leukocytosis at 11.7, no anemia. Patient was placed in Dean's traction as below, tolerated sedation well. He is more comfortable after traction, will be admitted for further management. PROCEDURE NOTE    11/3/21       Time: 2300    JOINT  REDUCTION  PROCEDURE  Risks, benefits and alternatives (for applicable procedures below) described. Performed By: Marciano Haile DO and Orthopaedic Resident. Indication: fracture  Informed consent: Written consent obtained. The patient was and spouse was counseled regarding the procedure in person, it's indications, risks, potential complications and alternatives and any questions were answered. Consent was obtained. .  Location:   right proximal femur  Procedure:  Anesthsetic/anesthsia was obtained using conscious sedation -SEE CONSCIOUS SEDATION NOTE FOR DETAILS. Attempted reduction was performed by direct Dean's traction  and was successful. Patient tolerated the procedure well. Post-reduction XR's:  were obtained and revealed satisfactory reduction.   Orthopaedic Consultation:  Yes.       -------------------------------- Conscious Sedation Procedure Note -----------------------------  Patient Name: Kelly Reddy   Medical Record Number: [id number]  Date: 11/3/21   Time: 10:46 PM EDT   Room/Bed: 22/22    Indication: fracture dislocation  Consent: I have discussed with the patient and/or the patient representative the indication, alternatives, and the possible risks and/or complications of the planned procedure and the anesthesia methods. The patient and/or patient representative appear to understand and agree to proceed. Physician Involvement: The attending physician was present and supervising this procedure. 11/3/21     Time: 2245 (pre-procedure start time)  Pre-Sedation Documentation and Exam: I have personally completed a history, physical exam & review of systems for this patient (see notes). Vital signs have been reviewed (see flow sheet for vitals). Airway Assessment: normal  Prior History of Anesthesia Complications: none  ASA Classification: Class 2 - A normal healthy patient with mild systemic disease  Sedation/ Anesthesia Plan: intravenous sedation  Medications Used: ketamine 25mg intravenously and propofol 25mg intravenously  Monitoring and Safety: The patient was placed on a cardiac monitor and vital signs, pulse oximetry and level of consciousness were continuously evaluated throughout the procedure. The patient was closely monitored until recovery from the medications was complete and the patient had returned to baseline status. Respiratory therapy was on standby at all times during the procedure.    ----------------------------------- Post Conscious Sedation Note -----------------------------------    11/3/21     Time: 2315 (post-procedure stop time)  Post-Sedation Vital Signs: Vital signs were reviewed and were stable after the procedure (see flow sheet for vitals)       Post-Sedation Exam: Lungs: clear to auscultation bilaterally and Cardiovascular: regular rate and rhythm       Complications: none    Electronically Signed by: Kalen Salomon DO           Re-Evaluations:    This patient's ED course included: a personal history and physicial examination, re-evaluation prior to disposition, IV medications, cardiac monitoring and continuous pulse oximetry    This patient has remained hemodynamically stable, improved and been closely monitored during their ED course. Consultations:  Orthopedic surgery    Will assess at bedside        Counseling: The emergency provider has spoken with the patient and spouse/SO and discussed todays results, in addition to providing specific details for the plan of care and counseling regarding the diagnosis and prognosis. Questions are answered at this time and they are agreeable with the plan.       --------------------------------- IMPRESSION AND DISPOSITION ---------------------------------    IMPRESSION  1. Fall, initial encounter    2. Physeal fracture of proximal end of right femur, unspecified physeal fracture configuration, initial encounter ([facility] [address]        DISPOSITION  Disposition: Admit to med/surg floor  Patient condition is stable        NOTE: This report was transcribed using voice recognition software.  Every effort was made to ensure accuracy; however, inadvertent computerized transcription errors may be present        Cora Pepe DO  11/03/21 3653

## 2021-11-04 PROBLEM — E43 SEVERE PROTEIN-CALORIE MALNUTRITION (HCC): Chronic | Status: ACTIVE | Noted: 2021-11-04

## 2021-11-04 LAB
ANION GAP SERPL CALCULATED.3IONS-SCNC: 10 MMOL/L (ref 7–16)
BASOPHILS ABSOLUTE: 0 E9/L (ref 0–0.2)
BASOPHILS RELATIVE PERCENT: 0.1 % (ref 0–2)
BUN BLDV-MCNC: 28 MG/DL (ref 6–23)
C-REACTIVE PROTEIN: 0.3 MG/DL (ref 0–0.4)
CALCIUM SERPL-MCNC: 8.5 MG/DL (ref 8.6–10.2)
CHLORIDE BLD-SCNC: 101 MMOL/L (ref 98–107)
CO2: 28 MMOL/L (ref 22–29)
CREAT SERPL-MCNC: 1.2 MG/DL (ref 0.7–1.2)
DIGOXIN LEVEL: <0.3 NG/ML (ref 0.8–2)
EKG ATRIAL RATE: 68 BPM
EKG P-R INTERVAL: 112 MS
EKG Q-T INTERVAL: 490 MS
EKG QRS DURATION: 182 MS
EKG QTC CALCULATION (BAZETT): 521 MS
EKG R AXIS: 100 DEGREES
EKG T AXIS: -60 DEGREES
EKG VENTRICULAR RATE: 68 BPM
EOSINOPHILS ABSOLUTE: 0 E9/L (ref 0.05–0.5)
EOSINOPHILS RELATIVE PERCENT: 0 % (ref 0–6)
GFR AFRICAN AMERICAN: >60
GFR NON-AFRICAN AMERICAN: 58 ML/MIN/1.73
GLUCOSE BLD-MCNC: 111 MG/DL (ref 74–99)
HCT VFR BLD CALC: 32.5 % (ref 37–54)
HEMOGLOBIN: 10.2 G/DL (ref 12.5–16.5)
LYMPHOCYTES ABSOLUTE: 0 E9/L (ref 1.5–4)
LYMPHOCYTES RELATIVE PERCENT: 3.8 % (ref 20–42)
MAGNESIUM: 2.1 MG/DL (ref 1.6–2.6)
MCH RBC QN AUTO: 24.8 PG (ref 26–35)
MCHC RBC AUTO-ENTMCNC: 31.4 % (ref 32–34.5)
MCV RBC AUTO: 79.1 FL (ref 80–99.9)
MONOCYTES ABSOLUTE: 0.88 E9/L (ref 0.1–0.95)
MONOCYTES RELATIVE PERCENT: 5.2 % (ref 2–12)
NEUTROPHILS ABSOLUTE: 16.63 E9/L (ref 1.8–7.3)
NEUTROPHILS RELATIVE PERCENT: 94.8 % (ref 43–80)
OVALOCYTES: ABNORMAL
PDW BLD-RTO: 17.6 FL (ref 11.5–15)
PLATELET # BLD: 303 E9/L (ref 130–450)
PMV BLD AUTO: 10.1 FL (ref 7–12)
POIKILOCYTES: ABNORMAL
POTASSIUM REFLEX MAGNESIUM: 3.5 MMOL/L (ref 3.5–5)
RBC # BLD: 4.11 E12/L (ref 3.8–5.8)
SEDIMENTATION RATE, ERYTHROCYTE: 2 MM/HR (ref 0–15)
SODIUM BLD-SCNC: 139 MMOL/L (ref 132–146)
WBC # BLD: 17.5 E9/L (ref 4.5–11.5)

## 2021-11-04 PROCEDURE — 80048 BASIC METABOLIC PNL TOTAL CA: CPT

## 2021-11-04 PROCEDURE — 85025 COMPLETE CBC W/AUTO DIFF WBC: CPT

## 2021-11-04 PROCEDURE — 86140 C-REACTIVE PROTEIN: CPT

## 2021-11-04 PROCEDURE — 83735 ASSAY OF MAGNESIUM: CPT

## 2021-11-04 PROCEDURE — 80162 ASSAY OF DIGOXIN TOTAL: CPT

## 2021-11-04 PROCEDURE — 6360000002 HC RX W HCPCS: Performed by: NURSE PRACTITIONER

## 2021-11-04 PROCEDURE — 1200000000 HC SEMI PRIVATE

## 2021-11-04 PROCEDURE — 85651 RBC SED RATE NONAUTOMATED: CPT

## 2021-11-04 PROCEDURE — 2580000003 HC RX 258: Performed by: NURSE PRACTITIONER

## 2021-11-04 PROCEDURE — 94664 DEMO&/EVAL PT USE INHALER: CPT

## 2021-11-04 PROCEDURE — 6370000000 HC RX 637 (ALT 250 FOR IP): Performed by: NURSE PRACTITIONER

## 2021-11-04 PROCEDURE — 2700000000 HC OXYGEN THERAPY PER DAY

## 2021-11-04 PROCEDURE — 94640 AIRWAY INHALATION TREATMENT: CPT

## 2021-11-04 PROCEDURE — 6370000000 HC RX 637 (ALT 250 FOR IP): Performed by: FAMILY MEDICINE

## 2021-11-04 PROCEDURE — 36415 COLL VENOUS BLD VENIPUNCTURE: CPT

## 2021-11-04 RX ORDER — MORPHINE SULFATE 2 MG/ML
2 INJECTION, SOLUTION INTRAMUSCULAR; INTRAVENOUS EVERY 6 HOURS PRN
Status: DISCONTINUED | OUTPATIENT
Start: 2021-11-04 | End: 2021-11-04

## 2021-11-04 RX ORDER — ALBUTEROL SULFATE 2.5 MG/3ML
2.5 SOLUTION RESPIRATORY (INHALATION) EVERY 8 HOURS PRN
Status: DISCONTINUED | OUTPATIENT
Start: 2021-11-04 | End: 2021-11-08 | Stop reason: HOSPADM

## 2021-11-04 RX ORDER — DIGOXIN 125 MCG
125 TABLET ORAL DAILY
Status: DISCONTINUED | OUTPATIENT
Start: 2021-11-04 | End: 2021-11-07

## 2021-11-04 RX ORDER — MORPHINE SULFATE 2 MG/ML
1 INJECTION, SOLUTION INTRAMUSCULAR; INTRAVENOUS EVERY 6 HOURS PRN
Status: DISCONTINUED | OUTPATIENT
Start: 2021-11-04 | End: 2021-11-04

## 2021-11-04 RX ORDER — CLOPIDOGREL BISULFATE 75 MG/1
75 TABLET ORAL DAILY
Status: DISCONTINUED | OUTPATIENT
Start: 2021-11-04 | End: 2021-11-08 | Stop reason: HOSPADM

## 2021-11-04 RX ORDER — SODIUM CHLORIDE 0.9 % (FLUSH) 0.9 %
5-40 SYRINGE (ML) INJECTION EVERY 12 HOURS SCHEDULED
Status: DISCONTINUED | OUTPATIENT
Start: 2021-11-04 | End: 2021-11-08 | Stop reason: HOSPADM

## 2021-11-04 RX ORDER — SODIUM CHLORIDE 0.9 % (FLUSH) 0.9 %
5-40 SYRINGE (ML) INJECTION PRN
Status: DISCONTINUED | OUTPATIENT
Start: 2021-11-04 | End: 2021-11-08 | Stop reason: HOSPADM

## 2021-11-04 RX ORDER — MORPHINE SULFATE 2 MG/ML
2 INJECTION, SOLUTION INTRAMUSCULAR; INTRAVENOUS EVERY 4 HOURS PRN
Status: DISCONTINUED | OUTPATIENT
Start: 2021-11-04 | End: 2021-11-08 | Stop reason: HOSPADM

## 2021-11-04 RX ORDER — ACETAMINOPHEN 325 MG/1
650 TABLET ORAL EVERY 6 HOURS PRN
Status: DISCONTINUED | OUTPATIENT
Start: 2021-11-04 | End: 2021-11-08 | Stop reason: HOSPADM

## 2021-11-04 RX ORDER — SODIUM CHLORIDE 9 MG/ML
25 INJECTION, SOLUTION INTRAVENOUS PRN
Status: DISCONTINUED | OUTPATIENT
Start: 2021-11-04 | End: 2021-11-08 | Stop reason: HOSPADM

## 2021-11-04 RX ORDER — ACETAMINOPHEN 650 MG/1
650 SUPPOSITORY RECTAL EVERY 6 HOURS PRN
Status: DISCONTINUED | OUTPATIENT
Start: 2021-11-04 | End: 2021-11-08 | Stop reason: HOSPADM

## 2021-11-04 RX ORDER — METOPROLOL SUCCINATE 25 MG/1
25 TABLET, EXTENDED RELEASE ORAL 2 TIMES DAILY
Status: DISCONTINUED | OUTPATIENT
Start: 2021-11-04 | End: 2021-11-07

## 2021-11-04 RX ORDER — ARFORMOTEROL TARTRATE 15 UG/2ML
15 SOLUTION RESPIRATORY (INHALATION) 2 TIMES DAILY
Status: DISCONTINUED | OUTPATIENT
Start: 2021-11-04 | End: 2021-11-08 | Stop reason: HOSPADM

## 2021-11-04 RX ORDER — PANTOPRAZOLE SODIUM 40 MG/1
40 TABLET, DELAYED RELEASE ORAL
Status: DISCONTINUED | OUTPATIENT
Start: 2021-11-04 | End: 2021-11-08 | Stop reason: HOSPADM

## 2021-11-04 RX ORDER — POTASSIUM CHLORIDE 20 MEQ/1
20 TABLET, EXTENDED RELEASE ORAL 2 TIMES DAILY WITH MEALS
Status: DISCONTINUED | OUTPATIENT
Start: 2021-11-04 | End: 2021-11-08 | Stop reason: HOSPADM

## 2021-11-04 RX ORDER — BUDESONIDE 0.5 MG/2ML
1 INHALANT ORAL 2 TIMES DAILY
Status: DISCONTINUED | OUTPATIENT
Start: 2021-11-04 | End: 2021-11-08 | Stop reason: HOSPADM

## 2021-11-04 RX ORDER — BISACODYL 10 MG
10 SUPPOSITORY, RECTAL RECTAL DAILY PRN
Status: DISCONTINUED | OUTPATIENT
Start: 2021-11-04 | End: 2021-11-08 | Stop reason: HOSPADM

## 2021-11-04 RX ORDER — ATORVASTATIN CALCIUM 20 MG/1
60 TABLET, FILM COATED ORAL NIGHTLY
Status: DISCONTINUED | OUTPATIENT
Start: 2021-11-04 | End: 2021-11-08 | Stop reason: HOSPADM

## 2021-11-04 RX ORDER — OXYCODONE HYDROCHLORIDE AND ACETAMINOPHEN 5; 325 MG/1; MG/1
1 TABLET ORAL EVERY 6 HOURS PRN
Status: DISCONTINUED | OUTPATIENT
Start: 2021-11-04 | End: 2021-11-08 | Stop reason: HOSPADM

## 2021-11-04 RX ORDER — PROCHLORPERAZINE EDISYLATE 5 MG/ML
5 INJECTION INTRAMUSCULAR; INTRAVENOUS EVERY 6 HOURS PRN
Status: DISCONTINUED | OUTPATIENT
Start: 2021-11-04 | End: 2021-11-08 | Stop reason: HOSPADM

## 2021-11-04 RX ORDER — MORPHINE SULFATE 2 MG/ML
1 INJECTION, SOLUTION INTRAMUSCULAR; INTRAVENOUS EVERY 4 HOURS PRN
Status: DISCONTINUED | OUTPATIENT
Start: 2021-11-04 | End: 2021-11-08 | Stop reason: HOSPADM

## 2021-11-04 RX ADMIN — MORPHINE SULFATE 2 MG: 2 INJECTION, SOLUTION INTRAMUSCULAR; INTRAVENOUS at 13:15

## 2021-11-04 RX ADMIN — ARFORMOTEROL TARTRATE 15 MCG: 15 SOLUTION RESPIRATORY (INHALATION) at 21:31

## 2021-11-04 RX ADMIN — MORPHINE SULFATE 2 MG: 2 INJECTION, SOLUTION INTRAMUSCULAR; INTRAVENOUS at 09:19

## 2021-11-04 RX ADMIN — POTASSIUM CHLORIDE 20 MEQ: 20 TABLET, EXTENDED RELEASE ORAL at 16:50

## 2021-11-04 RX ADMIN — PANTOPRAZOLE SODIUM 40 MG: 40 TABLET, DELAYED RELEASE ORAL at 16:08

## 2021-11-04 RX ADMIN — IPRATROPIUM BROMIDE 0.5 MG: 0.5 SOLUTION RESPIRATORY (INHALATION) at 11:22

## 2021-11-04 RX ADMIN — MORPHINE SULFATE 2 MG: 2 INJECTION, SOLUTION INTRAMUSCULAR; INTRAVENOUS at 02:06

## 2021-11-04 RX ADMIN — SODIUM CHLORIDE, PRESERVATIVE FREE 10 ML: 5 INJECTION INTRAVENOUS at 17:28

## 2021-11-04 RX ADMIN — OXYCODONE AND ACETAMINOPHEN 1 TABLET: 5; 325 TABLET ORAL at 20:47

## 2021-11-04 RX ADMIN — ARFORMOTEROL TARTRATE 15 MCG: 15 SOLUTION RESPIRATORY (INHALATION) at 11:21

## 2021-11-04 RX ADMIN — ATORVASTATIN CALCIUM 60 MG: 20 TABLET, FILM COATED ORAL at 21:59

## 2021-11-04 RX ADMIN — Medication 10 ML: at 22:04

## 2021-11-04 RX ADMIN — SODIUM CHLORIDE, PRESERVATIVE FREE 10 ML: 5 INJECTION INTRAVENOUS at 13:15

## 2021-11-04 RX ADMIN — MORPHINE SULFATE 2 MG: 2 INJECTION, SOLUTION INTRAMUSCULAR; INTRAVENOUS at 21:59

## 2021-11-04 RX ADMIN — IPRATROPIUM BROMIDE 0.5 MG: 0.5 SOLUTION RESPIRATORY (INHALATION) at 21:31

## 2021-11-04 RX ADMIN — MORPHINE SULFATE 2 MG: 2 INJECTION, SOLUTION INTRAMUSCULAR; INTRAVENOUS at 17:28

## 2021-11-04 RX ADMIN — IPRATROPIUM BROMIDE 0.5 MG: 0.5 SOLUTION RESPIRATORY (INHALATION) at 17:01

## 2021-11-04 RX ADMIN — BUDESONIDE 1000 MCG: 0.5 SUSPENSION RESPIRATORY (INHALATION) at 21:30

## 2021-11-04 RX ADMIN — BUDESONIDE 1000 MCG: 0.5 SUSPENSION RESPIRATORY (INHALATION) at 11:21

## 2021-11-04 RX ADMIN — Medication 10 ML: at 09:19

## 2021-11-04 RX ADMIN — OXYCODONE AND ACETAMINOPHEN 1 TABLET: 5; 325 TABLET ORAL at 16:08

## 2021-11-04 ASSESSMENT — PAIN SCALES - GENERAL
PAINLEVEL_OUTOF10: 6
PAINLEVEL_OUTOF10: 7
PAINLEVEL_OUTOF10: 10
PAINLEVEL_OUTOF10: 6
PAINLEVEL_OUTOF10: 8
PAINLEVEL_OUTOF10: 10
PAINLEVEL_OUTOF10: 8
PAINLEVEL_OUTOF10: 5
PAINLEVEL_OUTOF10: 8
PAINLEVEL_OUTOF10: 10
PAINLEVEL_OUTOF10: 10
PAINLEVEL_OUTOF10: 8
PAINLEVEL_OUTOF10: 9

## 2021-11-04 ASSESSMENT — PAIN DESCRIPTION - PROGRESSION
CLINICAL_PROGRESSION: NOT CHANGED

## 2021-11-04 ASSESSMENT — PAIN DESCRIPTION - LOCATION
LOCATION: HIP;LEG

## 2021-11-04 ASSESSMENT — PAIN DESCRIPTION - DESCRIPTORS
DESCRIPTORS: ACHING;CONSTANT;SHARP;TENDER
DESCRIPTORS: SHOOTING;SHARP;STABBING

## 2021-11-04 ASSESSMENT — PAIN DESCRIPTION - ORIENTATION
ORIENTATION: RIGHT

## 2021-11-04 ASSESSMENT — PAIN DESCRIPTION - FREQUENCY
FREQUENCY: CONTINUOUS

## 2021-11-04 ASSESSMENT — PAIN DESCRIPTION - ONSET
ONSET: ON-GOING
ONSET: ON-GOING

## 2021-11-04 ASSESSMENT — PAIN - FUNCTIONAL ASSESSMENT: PAIN_FUNCTIONAL_ASSESSMENT: PREVENTS OR INTERFERES WITH ALL ACTIVE AND SOME PASSIVE ACTIVITIES

## 2021-11-04 ASSESSMENT — PAIN DESCRIPTION - PAIN TYPE
TYPE: ACUTE PAIN

## 2021-11-04 NOTE — ED NOTES
Pt's SpO2 dropped into the 72. Nasal canula was applied, brought SpO2 up to 78. Pt is currently on 15L NRB.      Oswald Ryan RN  11/03/21 2018

## 2021-11-04 NOTE — H&P
[address] [facility]  History and Physical      CHIEF COMPLAINT:    Chief Complaint   Patient presents with    Hip Pain     tripped and fell right hip and leg pain, denies hitting head, denies loc        Patient of Mely Galvan MD presents with: Intertrochanteric fracture of right femur, closed, initial encounter ([facility]    History of Present Illness:   Patient is a 72-year-old male with a past medical history of A. fib Coumadin patient, CAD, CHF, CKD, COPD hyperlipidemia, hypertension, and squamous cell carcinoma. Patient presented to the ED via EMS post mechanical fall at home. Patient states he was getting a box of his front porch and landed on his right hip. Patient was unable to stand up or move. Patient denies hitting head as he is a Coumadin patient. Patient admits he did not become dizzy or lightheaded he just fell on its own. Patient has a baseline of home O2 at 4 L as he continues to smoke. Patient rated pain 10/10. Patient is alert and oriented on examination. Patient denies any chest pain, fever, chills, headache, and abdominal pain. Patient does admit to right side rib pain lidocaine patch ordered. ER work-up reveals right hip fracture, left rib fracture, BUN/creatinine 30/1.5, and WBC of 11.7.,  Orthopedic surgery consulted for surgical intervention. Patient is admitted to the [place] [address] unit. REVIEW OF SYSTEMS:  Pertinent negatives are above in HPI. 10 point ROS otherwise negative. Past Medical History:   Diagnosis Date    Amblyopia 1961    corrected    Anticoagulated on Coumadin     Atrial fibrillation ([facility] 9/5/2018    Bilateral carotid artery stenosis 07/03/2014    CAD (coronary artery disease)     follows with / Tiburcio Sanches every 6 months    CHF (congestive heart failure) ([facility]     last episode 9/2018 ?     CKD (chronic kidney disease) stage 3, GFR 30-59 ml/min ([facility]) 7/30/2020    Colon polyp     COPD (chronic obstructive pulmonary disease) ([facility]) follows with DR. Andre    Diarrhea 7/30/2020    Emesis 7/30/2020    Emphysema of lung ([facility]     Erosive esophagitis 8/1/2020    Full dentures     H/O asbestosis     Hyperlipidemia     Hypertension     Hypokalemia     Hypoprothrombinemia (HCC)     Hypotension due to hypovolemia 7/30/2020    Myopia     Non-rheumatic aortic sclerosis     NSVT (nonsustained ventricular tachycardia) ([address] 2018    On home O2     2 Liters/NC at night and during day prn    NICHOLAS (obstructive sleep apnea)     no use cpap    Pulmonary hypertension ([address] 8/1/2020    To severe 2020    Squamous cell carcinoma, face     skin    Thrombocytopenia ([address] 7/30/2020    Tobacco dependence 7/3/2014    Unintentional weight loss of 10% body weight within 6 months 7/31/2020         Past Surgical History:   Procedure Laterality Date    APPENDECTOMY      BACK SURGERY  years ago    lumbar    CARDIAC CATHETERIZATION      heart cath x4, cabg twice    COLONOSCOPY     [address]      2000, 2002, 2005, 2009   total of 8 stents   [address]    two     EAR SURGERY Left 6/25/2019    I & D LEFT EAR ABSCESS performed by Dmitri Walker DO at [facility] EAR SURGERY Left 8/26/2019    INCISION AND DRAINAGE LEFT EAR WITH EXCISION OF FISTULA  AND RECONSTRUCTION performed by Dmitri Walker DO at [address]  / multiple surgeries    SKIN CANCER EXCISION      UPPER GASTROINTESTINAL ENDOSCOPY N/A 7/31/2020    BEDSIDE EGD ESOPHAGOGASTRODUODENOSCOPY performed by Ana Robison MD at [facility] ENDOSCOPY       Medications Prior to Admission:    Medications Prior to Admission: apixaban (ELIQUIS) 5 MG TABS tablet, apixaban ELIQUIS 5 MG TABS Takes 2 tablets daily by mouth 2020/08/26 APIXABAN [de-identified] Thu Lopez MD 08-  [facility] ([id number])  torsemide (DEMADEX) 20 MG tablet, torsemide TORSEMIDE 20 MG TABS 1 tablet twice daily 2020/07/10 TORSEMIDE [id number] Dinalykelsie Rash [facility]-  [place] ([id number])  pantoprazole (PROTONIX) 40 MG tablet, Take 1 tablet by mouth 2 times daily (before meals)  metoprolol succinate (TOPROL XL) 25 MG extended release tablet, Take 1 tablet by mouth 2 times daily  HYDROcodone-acetaminophen (NORCO) 7.5-325 MG per tablet, TAKE ONE TABLET BY MOUTH EVERY 8 HOURS AS NEEDED FOR PAIN. (DO NOT TAKE MORE THAN 3 TABLETS DAILY)  Revefenacin 175 MCG/3ML SOLN, Inhale 1 ampule into the lungs 2 times daily  fluticasone-salmeterol (ADVAIR DISKUS) 500-50 MCG/DOSE diskus inhaler, Inhale 1 puff into the lungs 2 times daily   potassium chloride (KLOR-CON M) 20 MEQ extended release tablet, Take 1 tablet by mouth 2 times daily (with meals)  albuterol (PROVENTIL) (5 MG/ML) 0.5% nebulizer solution, Take 1 mL by nebulization 3 times daily as needed for Wheezing  OXYGEN, Inhale 2 L/min into the lungs nightly Uses prn during day. States he is currently wearing 4L at home  clopidogrel (PLAVIX) 75 MG tablet, Take 75 mg by mouth daily LD 8/20/2019 per Dr. Gonzalez/Wes  atorvastatin (LIPITOR) 20 MG tablet, Take 60 mg by mouth daily   [DISCONTINUED] bumetanide (BUMEX) 1 MG tablet, Take 1 tablet by mouth 2 times daily  [DISCONTINUED] digoxin (LANOXIN) 125 MCG tablet, Take 1 tablet by mouth daily    Note that the patient's home medications were reviewed and the above list is accurate to the best of my knowledge at the time of the exam.    Allergies:    Iodine, Linezolid, Pcn [penicillins], Penicillin g, and Dye [iodides]    Social History:    reports that he has been smoking cigarettes. He started smoking about 66 years ago. He has a 62.00 pack-year smoking history. He has never used smokeless tobacco. He reports that he does not drink alcohol and does not use drugs.     Family History:   family history includes Cancer in his mother; Heart Disease in his brother and father; Other in his mother. PHYSICAL EXAM:    Vitals:  BP (!) 89/54   Pulse 62   Temp 97.7 °F (36.5 °C) (Temporal)   Resp 16   Ht 5' 8\" (1.727 m)   Wt 111 lb (50.3 kg)   SpO2 95%   BMI 16.88 kg/m²       General appearance: NAD, conversant, ill-appearing  Eyes: Sclerae anicteric, PERRLA,   HEENT: AT/NC, MMM  Neck: FROM, supple, no thyromegaly  Lymph: No cervical / supraclavicular lymphadenopathy  Lungs: Expiratory wheezes 4 L O2  CV: RRR, no MRGs, no lower extremity edema  Abdomen: Soft, non-tender; no masses or HSM, +BS  Extremities: FROM without synovitis. No clubbing or cyanosis of the hands. Right lower extremity in traction  Skin: no rash, induration, lesions, or ulcers  Psych: Calm and cooperative. Normal judgement and insight. Normal mood and affect. Neuro: Alert and interactive, face symmetric, speech fluent. LABS:  All labs reviewed. Of note:  CBC with Differential:    Lab Results   Component Value Date    WBC 17.5 11/04/2021    RBC 4.11 11/04/2021    HGB 10.2 11/04/2021    HCT 32.5 11/04/2021     11/04/2021    MCV 79.1 11/04/2021    MCH 24.8 11/04/2021    MCHC 31.4 11/04/2021    RDW 17.6 11/04/2021    LYMPHOPCT 3.8 11/04/2021    MONOPCT 5.2 11/04/2021    BASOPCT 0.1 11/04/2021    MONOSABS 0.88 11/04/2021    LYMPHSABS 0.00 11/04/2021    EOSABS 0.00 11/04/2021    BASOSABS 0.00 11/04/2021     CMP:    Lab Results   Component Value Date     11/04/2021    K 3.5 11/04/2021     11/04/2021    CO2 28 11/04/2021    BUN 28 11/04/2021    CREATININE 1.2 11/04/2021    GFRAA >60 11/04/2021    LABGLOM 58 11/04/2021    GLUCOSE 111 11/04/2021    PROT 6.3 11/03/2021    LABALBU 4.3 11/03/2021    CALCIUM 8.5 11/04/2021    BILITOT 0.3 11/03/2021    ALKPHOS 120 11/03/2021    AST 17 11/03/2021    ALT 21 11/03/2021       Imaging:  X-ray reviewed: Angulated comminuted intertrochanteric and proximal femur shaft fracture. CXR: Stable chronic changes in both lungs.   Suspect left 7th rib fracture. EKG:  Sinus rhythm PACs and PVCs    ASSESSMENT/PLAN:  Principal Problem:    Intertrochanteric fracture of right femur, closed, initial encounter ([facility]  Active Problems:    Tobacco dependence    Atrial fibrillation (HCC)    Hypertension    CKD (chronic kidney disease) stage 3, GFR 30-59 ml/min (HCC)    Fall    Rib fracture    Weight loss  Resolved Problems:    * No resolved hospital problems. *    68-year-old male with an extensive past medical history admitted to [place] [address] unit with    Right femur fracture/rib fracture  -Pain control  -Monitor labs  -Consult orthopedic surgery -possible surgical intervention  -Bowel regimen  -Hold all anticoagulation pending surgery  -Lidocaine patch for left rib fracture    COPD  -Supplement O2 to keep saturation greater than 93%-baseline 4 L  -ISP postsurgical intervention  -DuoNebs as needed  -Smoking cessation educated patient has no interest in quitting    Medication for other comorbidities continue as appropriate dose adjustment as necessary. DVT prophylaxis  PT OT  Discharge planning  Case discussed with attending and agreed upon plan of care. Medication for other comorbidities continue as appropriate dose adjustment as necessary. DVT prophylaxis  PT OT  Discharge planning  Case discussed with attending and agreed upon plan of care. Code status: Full  Requires inpatient level of care  Corrine Weller, APRN - CNP    11:46 AM  11/4/2021     Surgical intervention planned for tomorrow  Patient is low to moderate risk for cardiovascular event with orthopedic surgery, secondary to multiple comorbidities  No active chest pain, shortness of breath  Rule out pathologic fracture and rib fractures given history of facial squamous cell carcinoma    Above note edited to reflect my thoughts     I personally saw, examined and provided care for the patient. Radiographs, labs and medication list were reviewed by me independently.   The case was discussed in detail and plans for care were established. Review of [facility], documentation was conducted and revisions were made as appropriate directly by me. I agree with the above documented exam, problem list, and plan of care.      Edi Gaspar MD  1:34 PM  11/4/2021

## 2021-11-04 NOTE — PROGRESS NOTES
Case discussed with attending. Plan for operative fixation on 11/5/2021. Appreciate medical evaluation and work-up. Patient is okay for diet today from orthopedic surgery perspective. NPO effective at midnight tonight.

## 2021-11-04 NOTE — PROGRESS NOTES
Comprehensive Nutrition Assessment    Type and Reason for Visit:  Initial, Consult    Nutrition Recommendations/Plan: Continue current diet, Start Ensure BID    Nutrition Assessment:  Pt admit s/p fall w/ noted R femur/rib fx pending OR. Noted severe malnutrition w/ subjective 100# wt loss x past year, unable to verify per EMR review. Hx CHF, COPD, CKD. Will add ONS and continue to monitor. Malnutrition Assessment:  Malnutrition Status:  Severe malnutrition    Context:  Chronic Illness     Findings of the 6 clinical characteristics of malnutrition:  Energy Intake:  7 - 75% or less estimated energy requirements for 1 month or longer  Weight Loss:  Unable to assess (no actual wt hx on file)     Body Fat Loss:  7 - Severe body fat loss Orbital, Triceps, Fat Overlying Ribs, Buccal region   Muscle Mass Loss:  7 - Severe muscle mass loss Temples (temporalis), Clavicles (pectoralis & deltoids), Calf (gastrocnemius)  Fluid Accumulation:  No significant fluid accumulation     Strength:  Not Performed    Estimated Daily Nutrient Needs:  Energy (kcal):  MSJ 1261 x 1.2 SF = 3738-2417; Weight Used for Energy Requirements:  Current     Protein (g):  ; Weight Used for Protein Requirements:  Current (1.5-1.8)        Fluid (ml/day):  3447-9893; Method Used for Fluid Requirements:  1 ml/kcal      Nutrition Related Findings:  pt alert, cachectic, bucks traction, active BS, soft abd, trace edema, -I/Os      Wounds:  None       Current Nutrition Therapies:    Diet NPO  ADULT DIET; Regular  ADULT ORAL NUTRITION SUPPLEMENT; Dinner, Lunch; Standard High Calorie/High Protein Oral Supplement    Anthropometric Measures:  · Height: 5' 8\" (172.7 cm)  · Current Body Weight: 126 lb (57.2 kg) (11/4 bed - wt elevated per pt (subtracted wt for bucks traction))   · Admission Body Weight: 111 lb (50.3 kg) (stated wt)    · Usual Body Weight:  (UTO, no actual wt hx on file.  Pt stated 100# wt loss x 1 year)     · Ideal Body Weight: 154 lbs; % Ideal Body Weight 81.8 %   · BMI: 19.2  · BMI Categories: Underweight (BMI less than 22) age over 72       Nutrition Diagnosis:   · Severe malnutrition, In context of chronic illness related to catabolic illness as evidenced by poor intake prior to admission, severe loss of subcutaneous fat, severe muscle loss    Nutrition Interventions:   Food and/or Nutrient Delivery:  Continue Current Diet, Start Oral Nutrition Supplement (Ensure BID)  Nutrition Education/Counseling:  Education initiated (ONS options/preferences reviewed)   Coordination of Nutrition Care:  Continue to monitor while inpatient    Goals:  pt to consume >50% meals/ONS       Nutrition Monitoring and Evaluation:   Food/Nutrient Intake Outcomes:  Food and Nutrient Intake, Supplement Intake  Physical Signs/Symptoms Outcomes:  Biochemical Data, GI Status, Fluid Status or Edema, Nutrition Focused Physical Findings, Skin, Weight     Discharge Planning:     Too soon to determine     Electronically signed by Kristine Galdamez MS, RD, LD on 11/4/21 at 1:40 PM EDT    Contact: [phone]

## 2021-11-04 NOTE — ED NOTES
Notified Trenda Rival of pt's HR prior to administering dilaudid.       Ricrado Saucedo RN  11/03/21 2002

## 2021-11-04 NOTE — PROGRESS NOTES
Physical Therapy    PT orders received and appreciated. Per chart, pt for OR today. Will hold PT evaluation for now. Will continue to follow and evaluate post-op once cleared for PT.       Rosey Jett, PT, DPT  [id number]

## 2021-11-04 NOTE — PROGRESS NOTES
Message the doctor concerning patient's updated home medication list. Patient no long takes bumex or digoxin. Also, patient takes metoprolol 25 mg once a day in the morning. All other medications stay the same.

## 2021-11-04 NOTE — PROGRESS NOTES
Called Debbie Joel NP to inform her that the patient does not take Digoxin or Bumex. I also requested possible PO pain medication. BP running low currently. Christiano Odom stated she will be down shortly to unit. Patient also stated that he has a DNR status, listed FULL code (due to surgery???). Catarino Watson Awaiting changes.

## 2021-11-04 NOTE — CARE COORDINATION
PER MD note-presenting to the ED for right hip and upper leg pain, beginning less than an hour ago after falling. Xrays confirm-Right Comminuted and angulated intertrochanteric and proximal femoral shaft fracture. Evans traction to right leg. Met with patient to discuss transition of care. He just got  Sunday and was moving into his wifes 2 story house with no steps to enter but has 4 steps up from family room to kitchen. He owns  2 walkers, wheelchair and scooter. His PCP is Dr Silvia Londono and  Uses [facility] in [facility]". Discussed after surgery that PT/OT will evaluate and SNF list for [facility] left at bedside for his wife who is a nurse practionier to look at. CM/sw to follow up post op.  Electronically signed by Aliya Reid RN on 11/4/2021 at 10:51 AM

## 2021-11-04 NOTE — CONSULTS
Department of Orthopedic Surgery  Resident Consult Note          Reason for Consult: Right hip pain      HISTORY OF PRESENT ILLNESS:       Patient is a 80 y.o. male who presents with complaint of right hip pain. He states he was retrieving a box from his porch when he fell onto his right side. He had pain to the right hip region was unable to move. He also had superficial injury to the right elbow region but denies any significant pain to the other extremities. No numbness or tingling changes from baseline. No chest pain. He has shortness of breath at baseline for COPD. No nausea or vomiting. Patient typically ambulates without any assistive devices. He will use a Rollator for longer distances. He has significant medical history involving heart, lungs, and kidneys. History also notable for over 100 pound weight loss in the last year and a half. Patient has a 62-pack-year smoking history. Patient takes Eliquis and Plavix. No additional complaints at this time. Past Medical History:        Diagnosis Date    Amblyopia 1961    corrected    Anticoagulated on Coumadin     Atrial fibrillation ([facility] 9/5/2018    Bilateral carotid artery stenosis 07/03/2014    CAD (coronary artery disease)     follows with / Blanquita Jones every 6 months    CHF (congestive heart failure) ([facility]     last episode 9/2018 ?  CKD (chronic kidney disease) stage 3, GFR 30-59 ml/min ([facility]) 7/30/2020    Colon polyp     COPD (chronic obstructive pulmonary disease) ([facility])     follows with DR. 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)     Hypotension due to hypovolemia 7/30/2020    Myopia     Non-rheumatic aortic sclerosis     NSVT (nonsustained ventricular tachycardia) ([address] 2018    On home O2     2 Liters/NC at night and during day prn  NICHOLAS (obstructive sleep apnea)     no use cpap    Pulmonary hypertension ([facility] 8/1/2020    To severe 2020    Squamous cell carcinoma, face     skin    Thrombocytopenia ([facility] 7/30/2020    Tobacco dependence 7/3/2014    Unintentional weight loss of 10% body weight within 6 months 7/31/2020     Past Surgical History:        Procedure Laterality Date    APPENDECTOMY      BACK SURGERY  years ago    lumbar    CARDIAC CATHETERIZATION      heart cath x4, cabg twice    COLONOSCOPY     [address]      2000, 2002, 2005, 2009   total of 8 stents   [address]    two     EAR SURGERY Left 6/25/2019    I & D LEFT EAR ABSCESS performed by Rafael Morris DO at [facility] 22 EAR SURGERY Left 8/26/2019    INCISION AND DRAINAGE LEFT EAR WITH EXCISION OF FISTULA  AND RECONSTRUCTION performed by Rafael Morris DO at [address] [place] [address]    amblyoplia  / multiple surgeries    SKIN CANCER EXCISION      UPPER GASTROINTESTINAL ENDOSCOPY N/A 7/31/2020    BEDSIDE EGD ESOPHAGOGASTRODUODENOSCOPY performed by Abimbola Johnson MD at [facility] ENDOSCOPY     Current Medications:   Current Facility-Administered Medications: propofol 200 MG/20ML injection, , ,   Allergies:  Iodine, Linezolid, Pcn [penicillins], Penicillin g, and Dye [iodides]    Social History:   TOBACCO:   reports that he has been smoking cigarettes. He started smoking about 66 years ago. He has a 62.00 pack-year smoking history. He has never used smokeless tobacco.  ETOH:   reports no history of alcohol use. DRUGS:   reports no history of drug use. ACTIVITIES OF DAILY LIVING: Ambulates without assistive devices, Rollator for long distances  OCCUPATION: Retired  Family History:       Problem Relation Age of Onset    Cancer Mother         ?     Other Mother         Hemorrhage    Heart Disease Father     Heart Disease Brother        REVIEW OF SYSTEMS:  CONSTITUTIONAL:  negative for  fevers, chills  EYES:  negative for blurred vision, visual disturbance  HEENT:  negative for  hearing loss, voice change  RESPIRATORY:  negative for  dyspnea, wheezing  CARDIOVASCULAR:  negative for  chest pain, palpitations  GASTROINTESTINAL:  negative for nausea, vomiting  GENITOURINARY:  negative for frequency, urinary incontinence  HEMATOLOGIC/LYMPHATIC:  negative for bleeding and petechiae  MUSCULOSKELETAL: See HPI  NEUROLOGICAL:  negative for headaches, dizziness  BEHAVIOR/PSYCH:  negative for increased agitation and anxiety    PHYSICAL EXAM:    VITALS:  BP (!) 137/93   Pulse 70   Temp 96.8 °F (36 °C)   Resp 24   Wt 111 lb (50.3 kg)   SpO2 100%   BMI 16.88 kg/m²   CONSTITUTIONAL:  awake, alert, cooperative, no apparent distress, and appears stated age  EYES: Lids and lashes normal, pupils equal, round and reactive to light, extra ocular muscles intact, sclera clear, conjunctiva normal  ENT: Normocephalic, without obvious abnormality, atraumatic, external ears without lesions, oral pharynx with moist mucus membranes  NECK: Trachea midline, skin normal  LUNGS: No increased work of breathing, good air exchange  CARDIOVASCULAR: 2+ pulses throughout and capillary Refill less than 2 seconds  ABDOMEN: soft, non-distended, non-tender and no rebound tenderness or guarding  NEUROLOGIC: Awake, alert, oriented to name, place and time. MUSCULOSKELETAL:  Right lower Extremity:  · Shortened extremity, flexed at the hip and knee  · Patient does not tolerate any passive or active motion of the limb  · Subjectively decreased sensation in the foot at baseline  · Motor function intact EHL, FHL, tibialis anterior, gastrosoleus complex  · No tenderness to palpation about the knee, foot, or ankle.   Small superficial abrasion to the knee  · Dorsalis pedis and posterior tibial pulses are palpable  · Compartments soft and compressible, some swelling about the proximal    Secondary Exam:   · bilateralUE: Superficial skin tear to the right elbow region, no active bleeding-TTP to fingers, hand, wrist, forearm, elbow, humerus, shoulder or clavicle. -- Patient able to flex/extend fingers, wrist, elbow and shoulder with active and passive ROM without pain, +2/4 Radial pulse, cap refill <3sec, +AIN/PIN/Radial/Ulnar/Median N, distal sensation grossly intact to C4-T1 dermatomes, compartments soft and compressible. · leftLE: No obvious signs of trauma. -TTP to foot, ankle, leg, knee, thigh, hip.-- Patient able to flex/extend toes, ankle, knee and hip with active and passive ROM without pain,+2/4 DP & PT pulses, cap refill <3sec, +5/5 PF/DF/EHL, distal sensation grossly intact to L4-S1 dermatomes, compartments soft and compressible. · Pelvis: -TTP, -Log roll, -Heel strike     DATA:    CBC:   Lab Results   Component Value Date    WBC 11.7 11/03/2021    RBC 4.42 11/03/2021    HGB 11.0 11/03/2021    HCT 35.4 11/03/2021    MCV 80.1 11/03/2021    MCH 24.9 11/03/2021    MCHC 31.1 11/03/2021    RDW 17.8 11/03/2021     11/03/2021    MPV 9.7 11/03/2021     PT/INR:    Lab Results   Component Value Date    PROTIME 13.1 08/07/2020    INR 1.1 08/07/2020       Radiology Review:  X-ray imaging reviewed of the right hip. Images demonstrate a peritrochanteric femur fracture. Alignment in varus. Significant comminution. Suspicion for lesion at the region of fracture. Bone quality is poor overall. Full-length femur films reveal no additional fractures about the distal femur. IMPRESSION:  · Right, peritrochanteric femur fracture, concern for pathologic fracture    PLAN:  · Traction views were discussed with the patient for better delineation of the fracture pattern. Patient refused any motion to the leg or further imaging secondary to pain. After discussion with the emergency department attending, decision was made to sedate the patient for application of Dean's traction, imaging, and transfer to a hospital bed.   Sedation provided by emergency department. Patient placed in Dean's traction after transfer to hospital bed. Additional imaging taken at that time. · Nonweightbearing to right lower extremity  · NPO effective midnight tonight  · Treatment consent  · Hold anticoagulants pending surgery  · Preoperative labs and imaging  · Patient's fracture is suspicious for underlying pathology. Appreciate medical work-up. Appreciate medical evaluation and optimization prior to any surgical intervention. Surgical treatment anticipated within 24-48 hours pending medical evaluation. · Discuss with attending     Orthopaedic Trauma Attending    I have seen and evaluated the patient and agree with the above assessment. I have performed the key components of the history and physical examination and concur completely with the findings as documented. CC: Right hip pain    HPI: This is a 80 y.o. male who presents with complaint of right hip pain. He states he was retrieving a box from his porch when he fell onto his right side. He had pain to the right hip region was unable to move. He also had superficial injury to the right elbow region but denies any significant pain to the other extremities. No numbness or tingling changes from baseline. No chest pain. He has shortness of breath at baseline for COPD. No nausea or vomiting. Patient typically ambulates without any assistive devices. He will use a Rollator for longer distances. He has significant medical history involving heart, lungs, and kidneys. History also notable for weight loss in the last year and a half. Patient has a 62-pack-year smoking history. Wife state she had extensive w/u in past for cancers but was negative. Denies any pre-existing pain to the right hip or thigh. Patient takes Eliquis and Plavix. No additional complaints at this time.     ROS, medications, allergies, past medical/surgical/social/family histories reviewed and as above    PE:  BP (!) 92/52   Pulse 89 Temp 98.6 °F (37 °C) (Temporal)   Resp 20   Ht 5' 8\" (1.727 m)   Wt 126 lb 9.6 oz (57.4 kg)   SpO2 90%   BMI 19.25 kg/m²   As above    Radiographic Review:  Right femur comminuted peritrochanteric fracture with subtrochanteric involvement, large posterior butterfly fragment involving calcar segment with displacement    ASSESSMENT:  Right femur peritrochanteric fracture  Tobacco abuse    PLAN:  Had lengthy discussion with patient regarding their diagnosis, typical prognosis, and expected outcomes. We reviewed the possible complications from the injury itself despite treatment chosen. We also discussed treatment options including nonoperative managements versus surgical management, along with risks and benefits of each. Patient has elected for surgical management despite associated risks. Tobacco cessation discussed in detail although patient is resistant to cessation despite elevated risk of perioperative complications  Medical admit and work-up, medicine team for surgical optimization  We will plan for OR 11/5/21 for right femur CMN stabilization once medically cleared, we also obtain specimens from the fracture site due to the atypical pattern and suspicion for possible associated lesion  I have explained the risks and complications of the recommended surgery with the patient at length, as well as discussed potential treatment alternatives including nonoperative management. These risks include but are not limited to death or complication from anesthesia, continued pain, nerve tendon or vascular injury, infection, nonunion or malunion, symptomatic hardware or hardware failure, deep vein thrombosis or pulmonary embolism, screw cut out, unforeseen complications, and need for further surgery, etc.  Patient understood this, asked appropriate questions, which were all answered, and he has elected to proceed with the procedure.     Electronically signed by   Ashlee Mueller DO  11/5/2021

## 2021-11-05 ENCOUNTER — ANESTHESIA EVENT (OUTPATIENT)
Dept: OPERATING ROOM | Age: 81
DRG: 480 | End: 2021-11-05
Payer: MEDICARE

## 2021-11-05 ENCOUNTER — APPOINTMENT (OUTPATIENT)
Dept: GENERAL RADIOLOGY | Age: 81
DRG: 480 | End: 2021-11-05
Payer: MEDICARE

## 2021-11-05 ENCOUNTER — ANESTHESIA (OUTPATIENT)
Dept: OPERATING ROOM | Age: 81
DRG: 480 | End: 2021-11-05
Payer: MEDICARE

## 2021-11-05 VITALS
RESPIRATION RATE: 8 BRPM | TEMPERATURE: 98.2 F | OXYGEN SATURATION: 95 % | DIASTOLIC BLOOD PRESSURE: 50 MMHG | SYSTOLIC BLOOD PRESSURE: 108 MMHG

## 2021-11-05 LAB
ANION GAP SERPL CALCULATED.3IONS-SCNC: 9 MMOL/L (ref 7–16)
BUN BLDV-MCNC: 22 MG/DL (ref 6–23)
CALCIUM SERPL-MCNC: 8.3 MG/DL (ref 8.6–10.2)
CHLORIDE BLD-SCNC: 102 MMOL/L (ref 98–107)
CO2: 26 MMOL/L (ref 22–29)
CREAT SERPL-MCNC: 1.2 MG/DL (ref 0.7–1.2)
GFR AFRICAN AMERICAN: >60
GFR NON-AFRICAN AMERICAN: 58 ML/MIN/1.73
GLUCOSE BLD-MCNC: 106 MG/DL (ref 74–99)
HCT VFR BLD CALC: 31.2 % (ref 37–54)
HEMOGLOBIN: 9.6 G/DL (ref 12.5–16.5)
INR BLD: 1.3
MCH RBC QN AUTO: 24.7 PG (ref 26–35)
MCHC RBC AUTO-ENTMCNC: 30.8 % (ref 32–34.5)
MCV RBC AUTO: 80.4 FL (ref 80–99.9)
PDW BLD-RTO: 17.6 FL (ref 11.5–15)
PLATELET # BLD: 256 E9/L (ref 130–450)
PMV BLD AUTO: 10.5 FL (ref 7–12)
POTASSIUM SERPL-SCNC: 3.8 MMOL/L (ref 3.5–5)
PROTHROMBIN TIME: 14 SEC (ref 9.3–12.4)
RBC # BLD: 3.88 E12/L (ref 3.8–5.8)
SODIUM BLD-SCNC: 137 MMOL/L (ref 132–146)
WBC # BLD: 13.5 E9/L (ref 4.5–11.5)

## 2021-11-05 PROCEDURE — 2580000003 HC RX 258

## 2021-11-05 PROCEDURE — 3209999900 FLUORO FOR SURGICAL PROCEDURES

## 2021-11-05 PROCEDURE — 85027 COMPLETE CBC AUTOMATED: CPT

## 2021-11-05 PROCEDURE — 6360000002 HC RX W HCPCS

## 2021-11-05 PROCEDURE — 6370000000 HC RX 637 (ALT 250 FOR IP): Performed by: NURSE ANESTHETIST, CERTIFIED REGISTERED

## 2021-11-05 PROCEDURE — 73502 X-RAY EXAM HIP UNI 2-3 VIEWS: CPT

## 2021-11-05 PROCEDURE — 6370000000 HC RX 637 (ALT 250 FOR IP): Performed by: FAMILY MEDICINE

## 2021-11-05 PROCEDURE — 6360000002 HC RX W HCPCS: Performed by: ANESTHESIOLOGY

## 2021-11-05 PROCEDURE — 6370000000 HC RX 637 (ALT 250 FOR IP): Performed by: NURSE PRACTITIONER

## 2021-11-05 PROCEDURE — 36620 INSERTION CATHETER ARTERY: CPT | Performed by: ANESTHESIOLOGY

## 2021-11-05 PROCEDURE — 2500000003 HC RX 250 WO HCPCS: Performed by: ORTHOPAEDIC SURGERY

## 2021-11-05 PROCEDURE — 2700000000 HC OXYGEN THERAPY PER DAY

## 2021-11-05 PROCEDURE — 80048 BASIC METABOLIC PNL TOTAL CA: CPT

## 2021-11-05 PROCEDURE — 94640 AIRWAY INHALATION TREATMENT: CPT

## 2021-11-05 PROCEDURE — 2500000003 HC RX 250 WO HCPCS: Performed by: NURSE ANESTHETIST, CERTIFIED REGISTERED

## 2021-11-05 PROCEDURE — 7100000000 HC PACU RECOVERY - FIRST 15 MIN: Performed by: ORTHOPAEDIC SURGERY

## 2021-11-05 PROCEDURE — 1200000000 HC SEMI PRIVATE

## 2021-11-05 PROCEDURE — 2580000003 HC RX 258: Performed by: NURSE PRACTITIONER

## 2021-11-05 PROCEDURE — 85610 PROTHROMBIN TIME: CPT

## 2021-11-05 PROCEDURE — 3600000015 HC SURGERY LEVEL 5 ADDTL 15MIN: Performed by: ORTHOPAEDIC SURGERY

## 2021-11-05 PROCEDURE — 3600000005 HC SURGERY LEVEL 5 BASE: Performed by: ORTHOPAEDIC SURGERY

## 2021-11-05 PROCEDURE — 7100000001 HC PACU RECOVERY - ADDTL 15 MIN: Performed by: ORTHOPAEDIC SURGERY

## 2021-11-05 PROCEDURE — 6360000002 HC RX W HCPCS: Performed by: NURSE PRACTITIONER

## 2021-11-05 PROCEDURE — 73552 X-RAY EXAM OF FEMUR 2/>: CPT

## 2021-11-05 PROCEDURE — 0QS604Z REPOSITION RIGHT UPPER FEMUR WITH INTERNAL FIXATION DEVICE, OPEN APPROACH: ICD-10-PCS | Performed by: ORTHOPAEDIC SURGERY

## 2021-11-05 PROCEDURE — 2580000003 HC RX 258: Performed by: ORTHOPAEDIC SURGERY

## 2021-11-05 PROCEDURE — 6360000002 HC RX W HCPCS: Performed by: NURSE ANESTHETIST, CERTIFIED REGISTERED

## 2021-11-05 PROCEDURE — 3700000000 HC ANESTHESIA ATTENDED CARE: Performed by: ORTHOPAEDIC SURGERY

## 2021-11-05 PROCEDURE — 2709999900 HC NON-CHARGEABLE SUPPLY: Performed by: ORTHOPAEDIC SURGERY

## 2021-11-05 PROCEDURE — C1713 ANCHOR/SCREW BN/BN,TIS/BN: HCPCS | Performed by: ORTHOPAEDIC SURGERY

## 2021-11-05 PROCEDURE — 2500000003 HC RX 250 WO HCPCS

## 2021-11-05 PROCEDURE — 36415 COLL VENOUS BLD VENIPUNCTURE: CPT

## 2021-11-05 PROCEDURE — P9047 ALBUMIN (HUMAN), 25%, 50ML: HCPCS

## 2021-11-05 PROCEDURE — 3700000001 HC ADD 15 MINUTES (ANESTHESIA): Performed by: ORTHOPAEDIC SURGERY

## 2021-11-05 PROCEDURE — 27245 TREAT THIGH FRACTURE: CPT | Performed by: ORTHOPAEDIC SURGERY

## 2021-11-05 DEVICE — SCREW BNE L85MM DIA10.35MM G TI CANN PERF FOR PROX FEM: Type: IMPLANTABLE DEVICE | Site: HIP | Status: FUNCTIONAL

## 2021-11-05 DEVICE — SCREW BNE L42MM DIA5MM TIB LT GRN TI ST CANN LOK FULL THRD: Type: IMPLANTABLE DEVICE | Site: HIP | Status: FUNCTIONAL

## 2021-11-05 DEVICE — NAIL IM L380MM DIA12MM 125DEG LNG R PROX FEM GRN TI CANN: Type: IMPLANTABLE DEVICE | Site: HIP | Status: FUNCTIONAL

## 2021-11-05 DEVICE — SCREW BNE L40MM DIA5MM ST TIB LT GRN TI ST CANN LOK FULL: Type: IMPLANTABLE DEVICE | Site: HIP | Status: FUNCTIONAL

## 2021-11-05 RX ORDER — HYDRALAZINE HYDROCHLORIDE 20 MG/ML
5 INJECTION INTRAMUSCULAR; INTRAVENOUS EVERY 10 MIN PRN
Status: DISCONTINUED | OUTPATIENT
Start: 2021-11-05 | End: 2021-11-05 | Stop reason: HOSPADM

## 2021-11-05 RX ORDER — DEXAMETHASONE SODIUM PHOSPHATE 10 MG/ML
INJECTION, SOLUTION INTRAMUSCULAR; INTRAVENOUS PRN
Status: DISCONTINUED | OUTPATIENT
Start: 2021-11-05 | End: 2021-11-05 | Stop reason: SDUPTHER

## 2021-11-05 RX ORDER — ALBUTEROL SULFATE 90 UG/1
AEROSOL, METERED RESPIRATORY (INHALATION) PRN
Status: DISCONTINUED | OUTPATIENT
Start: 2021-11-05 | End: 2021-11-05 | Stop reason: SDUPTHER

## 2021-11-05 RX ORDER — PROPOFOL 10 MG/ML
INJECTION, EMULSION INTRAVENOUS PRN
Status: DISCONTINUED | OUTPATIENT
Start: 2021-11-05 | End: 2021-11-05 | Stop reason: SDUPTHER

## 2021-11-05 RX ORDER — GLYCOPYRROLATE 1 MG/5 ML
SYRINGE (ML) INTRAVENOUS PRN
Status: DISCONTINUED | OUTPATIENT
Start: 2021-11-05 | End: 2021-11-05 | Stop reason: SDUPTHER

## 2021-11-05 RX ORDER — MEPERIDINE HYDROCHLORIDE 25 MG/ML
12.5 INJECTION INTRAMUSCULAR; INTRAVENOUS; SUBCUTANEOUS EVERY 5 MIN PRN
Status: DISCONTINUED | OUTPATIENT
Start: 2021-11-05 | End: 2021-11-05 | Stop reason: HOSPADM

## 2021-11-05 RX ORDER — ALBUMIN (HUMAN) 12.5 G/50ML
SOLUTION INTRAVENOUS PRN
Status: DISCONTINUED | OUTPATIENT
Start: 2021-11-05 | End: 2021-11-05 | Stop reason: SDUPTHER

## 2021-11-05 RX ORDER — CLINDAMYCIN PHOSPHATE 600 MG/50ML
600 INJECTION INTRAVENOUS
Status: COMPLETED | OUTPATIENT
Start: 2021-11-05 | End: 2021-11-05

## 2021-11-05 RX ORDER — ROCURONIUM BROMIDE 10 MG/ML
INJECTION, SOLUTION INTRAVENOUS PRN
Status: DISCONTINUED | OUTPATIENT
Start: 2021-11-05 | End: 2021-11-05 | Stop reason: SDUPTHER

## 2021-11-05 RX ORDER — OXYCODONE HYDROCHLORIDE AND ACETAMINOPHEN 5; 325 MG/1; MG/1
1 TABLET ORAL EVERY 6 HOURS PRN
Qty: 28 TABLET | Refills: 0 | Status: SHIPPED | OUTPATIENT
Start: 2021-11-05 | End: 2021-11-12

## 2021-11-05 RX ORDER — OXYCODONE HYDROCHLORIDE AND ACETAMINOPHEN 5; 325 MG/1; MG/1
1 TABLET ORAL
Status: DISCONTINUED | OUTPATIENT
Start: 2021-11-05 | End: 2021-11-05 | Stop reason: HOSPADM

## 2021-11-05 RX ORDER — DEXAMETHASONE SODIUM PHOSPHATE 10 MG/ML
INJECTION INTRAMUSCULAR; INTRAVENOUS PRN
Status: DISCONTINUED | OUTPATIENT
Start: 2021-11-05 | End: 2021-11-05 | Stop reason: SDUPTHER

## 2021-11-05 RX ORDER — ASPIRIN 81 MG/1
81 TABLET ORAL 2 TIMES DAILY
Qty: 56 TABLET | Refills: 0 | Status: SHIPPED | OUTPATIENT
Start: 2021-11-05 | End: 2021-11-08 | Stop reason: HOSPADM

## 2021-11-05 RX ORDER — PROMETHAZINE HYDROCHLORIDE 25 MG/ML
6.25 INJECTION, SOLUTION INTRAMUSCULAR; INTRAVENOUS
Status: DISCONTINUED | OUTPATIENT
Start: 2021-11-05 | End: 2021-11-05 | Stop reason: HOSPADM

## 2021-11-05 RX ORDER — ONDANSETRON 2 MG/ML
INJECTION INTRAMUSCULAR; INTRAVENOUS PRN
Status: DISCONTINUED | OUTPATIENT
Start: 2021-11-05 | End: 2021-11-05 | Stop reason: SDUPTHER

## 2021-11-05 RX ORDER — EPHEDRINE SULFATE/0.9% NACL/PF 50 MG/5 ML
SYRINGE (ML) INTRAVENOUS PRN
Status: DISCONTINUED | OUTPATIENT
Start: 2021-11-05 | End: 2021-11-05 | Stop reason: SDUPTHER

## 2021-11-05 RX ORDER — FENTANYL CITRATE 50 UG/ML
INJECTION, SOLUTION INTRAMUSCULAR; INTRAVENOUS PRN
Status: DISCONTINUED | OUTPATIENT
Start: 2021-11-05 | End: 2021-11-05 | Stop reason: SDUPTHER

## 2021-11-05 RX ORDER — NEOSTIGMINE METHYLSULFATE 1 MG/ML
INJECTION, SOLUTION INTRAVENOUS PRN
Status: DISCONTINUED | OUTPATIENT
Start: 2021-11-05 | End: 2021-11-05 | Stop reason: SDUPTHER

## 2021-11-05 RX ORDER — LIDOCAINE HYDROCHLORIDE 20 MG/ML
INJECTION, SOLUTION INTRAVENOUS PRN
Status: DISCONTINUED | OUTPATIENT
Start: 2021-11-05 | End: 2021-11-05 | Stop reason: SDUPTHER

## 2021-11-05 RX ORDER — 0.9 % SODIUM CHLORIDE 0.9 %
500 INTRAVENOUS SOLUTION INTRAVENOUS ONCE
Status: COMPLETED | OUTPATIENT
Start: 2021-11-05 | End: 2021-11-05

## 2021-11-05 RX ORDER — SODIUM CHLORIDE 9 MG/ML
INJECTION, SOLUTION INTRAVENOUS CONTINUOUS PRN
Status: DISCONTINUED | OUTPATIENT
Start: 2021-11-05 | End: 2021-11-05 | Stop reason: SDUPTHER

## 2021-11-05 RX ORDER — LABETALOL HYDROCHLORIDE 5 MG/ML
5 INJECTION, SOLUTION INTRAVENOUS EVERY 10 MIN PRN
Status: DISCONTINUED | OUTPATIENT
Start: 2021-11-05 | End: 2021-11-05 | Stop reason: HOSPADM

## 2021-11-05 RX ADMIN — IPRATROPIUM BROMIDE 0.5 MG: 0.5 SOLUTION RESPIRATORY (INHALATION) at 16:16

## 2021-11-05 RX ADMIN — Medication 10 ML: at 21:18

## 2021-11-05 RX ADMIN — SODIUM CHLORIDE, PRESERVATIVE FREE 10 ML: 5 INJECTION INTRAVENOUS at 14:33

## 2021-11-05 RX ADMIN — PANTOPRAZOLE SODIUM 40 MG: 40 TABLET, DELAYED RELEASE ORAL at 16:58

## 2021-11-05 RX ADMIN — MORPHINE SULFATE 2 MG: 2 INJECTION, SOLUTION INTRAMUSCULAR; INTRAVENOUS at 05:04

## 2021-11-05 RX ADMIN — POTASSIUM CHLORIDE 20 MEQ: 20 TABLET, EXTENDED RELEASE ORAL at 16:58

## 2021-11-05 RX ADMIN — OXYCODONE AND ACETAMINOPHEN 1 TABLET: 5; 325 TABLET ORAL at 16:58

## 2021-11-05 RX ADMIN — MORPHINE SULFATE 2 MG: 2 INJECTION, SOLUTION INTRAMUSCULAR; INTRAVENOUS at 14:33

## 2021-11-05 RX ADMIN — OXYCODONE AND ACETAMINOPHEN 1 TABLET: 5; 325 TABLET ORAL at 02:04

## 2021-11-05 RX ADMIN — FENTANYL CITRATE 25 MCG: 50 INJECTION, SOLUTION INTRAMUSCULAR; INTRAVENOUS at 11:57

## 2021-11-05 RX ADMIN — FENTANYL CITRATE 50 MCG: 50 INJECTION, SOLUTION INTRAMUSCULAR; INTRAVENOUS at 10:28

## 2021-11-05 RX ADMIN — FENTANYL CITRATE 25 MCG: 50 INJECTION, SOLUTION INTRAMUSCULAR; INTRAVENOUS at 11:12

## 2021-11-05 RX ADMIN — ATORVASTATIN CALCIUM 60 MG: 20 TABLET, FILM COATED ORAL at 21:18

## 2021-11-05 RX ADMIN — HYDROMORPHONE HYDROCHLORIDE 0.5 MG: 1 INJECTION, SOLUTION INTRAMUSCULAR; INTRAVENOUS; SUBCUTANEOUS at 09:48

## 2021-11-05 RX ADMIN — HYDROMORPHONE HYDROCHLORIDE 0.5 MG: 1 INJECTION, SOLUTION INTRAMUSCULAR; INTRAVENOUS; SUBCUTANEOUS at 09:28

## 2021-11-05 ASSESSMENT — PULMONARY FUNCTION TESTS
PIF_VALUE: 18
PIF_VALUE: 21
PIF_VALUE: 22
PIF_VALUE: 21
PIF_VALUE: 0
PIF_VALUE: 14
PIF_VALUE: 21
PIF_VALUE: 18
PIF_VALUE: 0
PIF_VALUE: 20
PIF_VALUE: 18
PIF_VALUE: 17
PIF_VALUE: 18
PIF_VALUE: 17
PIF_VALUE: 18
PIF_VALUE: 17
PIF_VALUE: 18
PIF_VALUE: 2
PIF_VALUE: 15
PIF_VALUE: 17
PIF_VALUE: 2
PIF_VALUE: 18
PIF_VALUE: 20
PIF_VALUE: 18
PIF_VALUE: 22
PIF_VALUE: 1
PIF_VALUE: 21
PIF_VALUE: 21
PIF_VALUE: 18
PIF_VALUE: 21
PIF_VALUE: 18
PIF_VALUE: 19
PIF_VALUE: 18
PIF_VALUE: 18
PIF_VALUE: 21
PIF_VALUE: 10
PIF_VALUE: 19
PIF_VALUE: 1
PIF_VALUE: 19
PIF_VALUE: 21
PIF_VALUE: 18
PIF_VALUE: 20
PIF_VALUE: 18
PIF_VALUE: 18
PIF_VALUE: 22
PIF_VALUE: 3
PIF_VALUE: 17
PIF_VALUE: 21
PIF_VALUE: 18
PIF_VALUE: 21
PIF_VALUE: 18
PIF_VALUE: 21
PIF_VALUE: 17
PIF_VALUE: 18
PIF_VALUE: 20
PIF_VALUE: 18
PIF_VALUE: 2
PIF_VALUE: 21
PIF_VALUE: 18
PIF_VALUE: 18
PIF_VALUE: 21
PIF_VALUE: 18
PIF_VALUE: 18
PIF_VALUE: 0
PIF_VALUE: 21
PIF_VALUE: 18
PIF_VALUE: 21
PIF_VALUE: 11
PIF_VALUE: 21
PIF_VALUE: 18
PIF_VALUE: 6
PIF_VALUE: 21
PIF_VALUE: 15
PIF_VALUE: 22
PIF_VALUE: 17
PIF_VALUE: 2
PIF_VALUE: 18
PIF_VALUE: 1
PIF_VALUE: 18
PIF_VALUE: 2
PIF_VALUE: 18
PIF_VALUE: 2
PIF_VALUE: 18
PIF_VALUE: 21
PIF_VALUE: 20
PIF_VALUE: 19
PIF_VALUE: 18
PIF_VALUE: 21
PIF_VALUE: 18
PIF_VALUE: 21
PIF_VALUE: 21

## 2021-11-05 ASSESSMENT — PAIN DESCRIPTION - ONSET
ONSET: ON-GOING

## 2021-11-05 ASSESSMENT — PAIN DESCRIPTION - ORIENTATION
ORIENTATION: RIGHT

## 2021-11-05 ASSESSMENT — PAIN SCALES - GENERAL
PAINLEVEL_OUTOF10: 7
PAINLEVEL_OUTOF10: 10
PAINLEVEL_OUTOF10: 0
PAINLEVEL_OUTOF10: 8
PAINLEVEL_OUTOF10: 5
PAINLEVEL_OUTOF10: 10
PAINLEVEL_OUTOF10: 8
PAINLEVEL_OUTOF10: 0
PAINLEVEL_OUTOF10: 2
PAINLEVEL_OUTOF10: 0
PAINLEVEL_OUTOF10: 5
PAINLEVEL_OUTOF10: 10
PAINLEVEL_OUTOF10: 10
PAINLEVEL_OUTOF10: 8
PAINLEVEL_OUTOF10: 0
PAINLEVEL_OUTOF10: 8

## 2021-11-05 ASSESSMENT — PAIN DESCRIPTION - PAIN TYPE
TYPE: ACUTE PAIN

## 2021-11-05 ASSESSMENT — PAIN DESCRIPTION - DESCRIPTORS
DESCRIPTORS: ACHING;SHARP;DISCOMFORT
DESCRIPTORS: ACHING;CRAMPING
DESCRIPTORS: ACHING;SHARP;DISCOMFORT
DESCRIPTORS: ACHING;CRAMPING
DESCRIPTORS: ACHING;CONSTANT;SHARP;SHOOTING

## 2021-11-05 ASSESSMENT — PAIN DESCRIPTION - LOCATION
LOCATION: HIP
LOCATION: HIP;LEG
LOCATION: HIP
LOCATION: HIP;LEG
LOCATION: HIP;LEG

## 2021-11-05 ASSESSMENT — PAIN - FUNCTIONAL ASSESSMENT
PAIN_FUNCTIONAL_ASSESSMENT: PREVENTS OR INTERFERES SOME ACTIVE ACTIVITIES AND ADLS

## 2021-11-05 ASSESSMENT — PAIN DESCRIPTION - PROGRESSION: CLINICAL_PROGRESSION: NOT CHANGED

## 2021-11-05 ASSESSMENT — LIFESTYLE VARIABLES: SMOKING_STATUS: 1

## 2021-11-05 ASSESSMENT — PAIN DESCRIPTION - FREQUENCY
FREQUENCY: CONTINUOUS

## 2021-11-05 NOTE — ANESTHESIA PRE PROCEDURE
Department of Anesthesiology  Preprocedure Note       Name:  Kay Shin   Age:  80 y.o.  :  1940                                          MRN:  [id number]         Date:  2021      Surgeon: Pari Josue):  Jearl Rubinstein, DO    Procedure: Procedure(s):  BEDSIDE EGD ESOPHAGOGASTRODUODENOSCOPY    Medications prior to admission:   Prior to Admission medications    Medication Sig Start Date End Date Taking? Authorizing Provider   apixaban (ELIQUIS) 5 MG TABS tablet apixaban ELIQUIS 5 MG TABS Takes 2 tablets daily by mouth  APIXABAN [de-identified] Sivan Meadows MD 2020  [facility] ([id number]) 20   Historical Provider, MD   torsemide (DEMADEX) 20 MG tablet torsemide TORSEMIDE 20 MG TABS 1 tablet twice daily 2020/07/10 TORSEMIDE [id number] [address] RICHELLE [address]-  [facility] ([id number]) 7/10/20   Historical Provider, MD   pantoprazole (PROTONIX) 40 MG tablet Take 1 tablet by mouth 2 times daily (before meals) 20   Elliott Nguyễn DO   metoprolol succinate (TOPROL XL) 25 MG extended release tablet Take 1 tablet by mouth 2 times daily 20   Elliott Nguyễn DO   HYDROcodone-acetaminophen ([address]) 7.5-325 MG per tablet TAKE ONE TABLET BY MOUTH EVERY 8 HOURS AS NEEDED FOR PAIN.  (DO NOT TAKE MORE THAN 3 TABLETS DAILY) 3/19/20   Historical Provider, MD   Revefenacin 175 MCG/3ML SOLN Inhale 1 ampule into the lungs 2 times daily 19   Gilford Courser, MD   fluticasone-salmeterol (ADVAIR DISKUS) 500-50 MCG/DOSE diskus inhaler Inhale 1 puff into the lungs 2 times daily     Historical Provider, MD   potassium chloride (KLOR-CON M) 20 MEQ extended release tablet Take 1 tablet by mouth 2 times daily (with meals) 18   Ritchie Camarillo DO   albuterol (PROVENTIL) (5 MG/ML) 0.5% nebulizer solution Take 1 mL by nebulization 3 times daily as needed for Wheezing 18   Gilford Courser, MD   OXYGEN Inhale 2 L/min into the lungs nightly Uses prn during day. States he is currently wearing 4L at home    Historical Provider, MD   clopidogrel (PLAVIX) 75 MG tablet Take 75 mg by mouth daily LD 8/20/2019 per Dr. Keith James 6/11/14   Historical Provider, MD   atorvastatin (LIPITOR) 20 MG tablet Take 60 mg by mouth daily  6/11/14   Historical Provider, MD       Current medications:    No current facility-administered medications for this visit. No current outpatient medications on file.      Facility-Administered Medications Ordered in Other Visits   Medication Dose Route Frequency Provider Last Rate Last Admin    clindamycin (CLEOCIN) 600 mg in dextrose 5 % 50 mL IVPB  600 mg IntraVENous On Call to [address] [place] [address],[address],         HYDROmorphone (DILAUDID) injection 0.5 mg  0.5 mg IntraVENous Once PRN Amie Guzman MD        sodium chloride flush 0.9 % injection 5-40 mL  5-40 mL IntraVENous 2 times per day April Ocean-Isaak, APRN - CNP   10 mL at 11/04/21 2204    sodium chloride flush 0.9 % injection 5-40 mL  5-40 mL IntraVENous PRN April Corinne-Isaak, APRN - CNP   10 mL at 11/04/21 1728    0.9 % sodium chloride infusion  25 mL IntraVENous PRN April Corinne-Isaak, APRN - CNP        acetaminophen (TYLENOL) tablet 650 mg  650 mg Oral Q6H PRN April Corinne-Isaak, APRN - CNP        Or    acetaminophen (TYLENOL) suppository 650 mg  650 mg Rectal Q6H PRN April Ocean-[place], APRN - CNP        bisacodyl (DULCOLAX) suppository 10 mg  10 mg Rectal Daily PRN April Corinne-[place], APRN - CNP        prochlorperazine (COMPAZINE) injection 5 mg  5 mg IntraVENous Q6H PRN April Ocean-Isaak, APRN - CNP        albuterol (PROVENTIL) nebulizer solution 2.5 mg  2.5 mg Nebulization Q8H PRN April Corinne-Isaak, APRN - CNP        atorvastatin (LIPITOR) tablet 60 mg  60 mg Oral Nightly April Corinne-Isaak, APRN - CNP   60 mg at 11/04/21 0479    clopidogrel (PLAVIX) tablet 75 mg  75 mg Oral Daily April HADLEY JohnsonN - CNP        digoxin (LANOXIN) tablet 125 mcg  125 mcg Oral Daily April SEVERINO Johnson CNP        metoprolol succinate (TOPROL XL) extended release tablet 25 mg  25 mg Oral BID April SEVERINO Johnson - LINDA        pantoprazole (PROTONIX) tablet 40 mg  40 mg Oral BID [facility] [place] April HADLEY JohnsonN - CNP   40 mg at 11/04/21 1608    potassium chloride (KLOR-CON M) extended release tablet 20 mEq  20 mEq Oral BID  April HADLEY JohnsonN - CNP   20 mEq at 11/04/21 1650    morphine (PF) injection 1 mg  1 mg IntraVENous Q4H PRN April HADLEY JohnsonN - CNP        morphine (PF) injection 2 mg  2 mg IntraVENous Q4H PRN April HADLEY JohnsonN - CNP   2 mg at 11/05/21 0504    budesonide (PULMICORT) nebulizer suspension 1,000 mcg  1 mg Nebulization BID April HADLEY JohnsonN - CNP   1,000 mcg at 11/04/21 2130    And    Arformoterol Tartrate (BROVANA) nebulizer solution 15 mcg  15 mcg Nebulization BID April HADLEY JohnsonN - CNP   15 mcg at 11/04/21 2131    ipratropium (ATROVENT) 0.02 % nebulizer solution 0.5 mg  0.5 mg Nebulization 4x daily April SEVERINO Johnson - CNP   0.5 mg at 11/04/21 2131    oxyCODONE-acetaminophen (PERCOCET) 5-325 MG per tablet 1 tablet  1 tablet Oral Q6H PRN Khalif Macias APRN - CNP   1 tablet at 11/05/21 0204       Allergies:     Allergies   Allergen Reactions    Iodine     Linezolid Other (See Comments)     Thrombocytopenia, impactful diarrhea, weight loss    Pcn [Penicillins] Swelling    Penicillin G     Dye [Iodides]      Heart and kidney dye / reaction unknown       Problem List:    Patient Active Problem List   Diagnosis Code    Bilateral carotid artery stenosis I65.23    Tobacco dependence F17.200    Chronic obstructive pulmonary disease with acute exacerbation (HCC) J44.1    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    Hypoxia R09.02    Atrial fibrillation ([address] I48.91    Simple chronic bronchitis (HCC) J41.0    GI bleeding K92.2    On home O2 Z99.81    Hypertension I10    Hyperlipidemia E78.5    H/O asbestosis Z80.12    CAD (coronary artery disease) I25.10    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    CKD (chronic kidney disease) stage 3, GFR 30-59 ml/min ([facility] N18.30    Hypokalemia E87.6    Non-rheumatic aortic sclerosis I35.8    Hypotension due to hypovolemia I95.89, E86.1    Emesis R11.10    Diarrhea R19.7    Thrombocytopenia (HCC) D69.6    NSVT (nonsustained ventricular tachycardia) ([facility]) I47.2    Hypotension I95.9    Unintentional weight loss of 10% body weight within 6 months R63.4    Moderate protein-calorie malnutrition (HCC) E44.0    Pulmonary hypertension ([facility]) I27.20    Erosive esophagitis K22.10    Intertrochanteric fracture of right femur, closed, initial encounter [facility]) S72.141A    Fall W19. XXXA    Chronic anemia D64.9    Rib fracture S22.39XA    Weight loss R63.4    Severe protein-calorie malnutrition (HCC) E43       Past Medical History:        Diagnosis Date    Amblyopia 1961    corrected    Anticoagulated on Coumadin     Atrial fibrillation ([facility] 9/5/2018    Bilateral carotid artery stenosis 07/03/2014    CAD (coronary artery disease)     follows with / Corinna Cummings every 6 months    CHF (congestive heart failure) ([facility] [facility]     last episode 9/2018 ?  CKD (chronic kidney disease) stage 3, GFR 30-59 ml/min ([facility]) 7/30/2020    Colon polyp     COPD (chronic obstructive pulmonary disease) ([facility])     follows with DR. 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[facility])     Hypotension due to hypovolemia 7/30/2020    Myopia     Non-rheumatic aortic sclerosis     NSVT (nonsustained ventricular tachycardia) ([facility] 2018    On home O2     2 Liters/NC at night and during day prn    NICHOLAS (obstructive sleep apnea)     no use cpap    Pulmonary hypertension ([address] 8/1/2020    To severe 2020    Squamous cell carcinoma, face     skin    Thrombocytopenia ([facility] 7/30/2020    Tobacco dependence 7/3/2014    Unintentional weight loss of 10% body weight within 6 months 7/31/2020       Past Surgical History:        Procedure Laterality Date    APPENDECTOMY      BACK SURGERY  years ago    lumbar    CARDIAC CATHETERIZATION      heart cath x4, cabg twice    COLONOSCOPY     [address]      2000, 2002, 2005, 2009   total of 8 stents   [address]    two     EAR SURGERY Left 6/25/2019    I & D LEFT EAR ABSCESS performed by Darlene Alcantar DO at [facility] EAR SURGERY Left 8/26/2019    INCISION AND DRAINAGE LEFT EAR WITH EXCISION OF FISTULA  AND RECONSTRUCTION performed by Darlene Alcantar DO at [address]  / multiple surgeries    SKIN CANCER EXCISION      UPPER GASTROINTESTINAL ENDOSCOPY N/A 7/31/2020    BEDSIDE EGD ESOPHAGOGASTRODUODENOSCOPY performed by Barbi Du MD at [facility] History:    Social History     Tobacco Use    Smoking status: Current Every Day Smoker     Packs/day: 1.00     Years: 62.00     Pack years: 62.00     Types: Cigarettes     Start date: 9/5/1955    Smokeless tobacco: Never Used   Substance Use Topics    Alcohol use: No                                Ready to quit: Not Answered  Counseling given: Not Answered      Vital Signs (Current): There were no vitals filed for this visit.                                            BP Readings from Last 3 Encounters:   11/05/21 107/61   03/25/21 (!) 90/50   01/11/21 110/61       NPO Status:                                                                                 BMI:   Wt Readings from Last 3 Encounters:   11/04/21 126 lb 9.6 oz (57.4 kg)   03/25/21 144 lb (65.3 kg) 01/11/21 133 lb (60.3 kg)     There is no height or weight on file to calculate BMI.    CBC:   Lab Results   Component Value Date    WBC 13.5 11/05/2021    RBC 3.88 11/05/2021    HGB 9.6 11/05/2021    HCT 31.2 11/05/2021    MCV 80.4 11/05/2021    RDW 17.6 11/05/2021     11/05/2021       CMP:   Lab Results   Component Value Date     11/05/2021    K 3.8 11/05/2021    K 3.5 11/04/2021     11/05/2021    CO2 26 11/05/2021    BUN 22 11/05/2021    CREATININE 1.2 11/05/2021    GFRAA >60 11/05/2021    LABGLOM 58 11/05/2021    GLUCOSE 106 11/05/2021    PROT 6.3 11/03/2021    CALCIUM 8.3 11/05/2021    BILITOT 0.3 11/03/2021    ALKPHOS 120 11/03/2021    AST 17 11/03/2021    ALT 21 11/03/2021       POC Tests: No results for input(s): POCGLU, POCNA, POCK, POCCL, POCBUN, POCHEMO, POCHCT in the last 72 hours.     Coags:   Lab Results   Component Value Date    PROTIME 14.0 11/05/2021    INR 1.3 11/05/2021    APTT 55.9 10/08/2019       HCG (If Applicable): No results found for: PREGTESTUR, PREGSERUM, HCG, HCGQUANT     ABGs: No results found for: PHART, PO2ART, KGG6YXQ, SXY0YLD, BEART, F4XRDEIA     Type & Screen (If Applicable):  No results found for: LABABO, LABRH    Drug/Infectious Status (If Applicable):  No results found for: HIV, HEPCAB    COVID-19 Screening (If Applicable):   Lab Results   Component Value Date    COVID19 Not Detected 07/30/2020         Anesthesia Evaluation  Patient summary reviewed and Nursing notes reviewed no history of anesthetic complications:   Airway: Mallampati: II  TM distance: >3 FB   Neck ROM: full  Mouth opening: > = 3 FB Dental:    (+) edentulous      Pulmonary:   (+) COPD:  sleep apnea:  decreased breath sounds,  current smoker                          ROS comment: On home O2  Hx of asbestosis   Cardiovascular:  Exercise tolerance: poor (<4 METS),   (+) hypertension:, CAD:, CABG/stent:, CHF:, hyperlipidemia        Rhythm: irregular  Rate: normal  Echocardiogram reviewed

## 2021-11-05 NOTE — PLAN OF CARE
Problem: Pain:  Goal: Pain level will decrease  Description: Pain level will decrease  11/5/2021 1758 by Fredrick Noriega RN  Outcome: Met This Shift     Problem: Pain:  Goal: Control of acute pain  Description: Control of acute pain  11/5/2021 1758 by Fredrick Noriega RN  Outcome: Met This Shift  11/5/2021 1717 by Fredrick Noriega RN  Outcome: Met This Shift  11/5/2021 0420 by Yris Emanuel RN  Outcome: Ongoing

## 2021-11-05 NOTE — PROGRESS NOTES
Subjective: The patient is awake and alert. Lying in bed anxious for surgical procedure today. No acute events overnight. Denies chest pain, angina  Currently utilizing 5 liters O2     Objective:    BP (!) 103/55   Pulse 93   Temp 98.6 °F (37 °C) (Temporal)   Resp 18   Ht 5' 8\" (1.727 m)   Wt 126 lb 9.6 oz (57.4 kg)   SpO2 96%   BMI 19.25 kg/m²     In: 350 [P.O.:240; I.V.:10]  Out: 1600   In: 350   Out: 1600 [Urine:1600]    General appearance: NAD, conversant, pleasant  HEENT: AT/NC, MMM  Neck: FROM, supple  Lungs: Expiratory wheezes 5 liters O2  CV: RRR, no MRGs  Vasc: Radial pulses 2+  Abdomen: Soft, non-tender; no masses or HSM  Extremities: No peripheral edema or digital cyanosis, Right lower extremity in traction  Skin: no rash, lesions or ulcers  Psych: Alert and oriented to person, place and time  Neuro: Alert and interactive     Recent Labs     11/03/21 2010 11/04/21 0415 11/05/21  0613   WBC 11.7* 17.5* 13.5*   HGB 11.0* 10.2* 9.6*   HCT 35.4* 32.5* 31.2*    303 256       Recent Labs     11/03/21 2010 11/04/21 0415 11/05/21  0613    139 137   K 4.2 3.5 3.8    101 102   CO2 28 28 26   BUN 30* 28* 22   CREATININE 1.5* 1.2 1.2   CALCIUM 9.2 8.5* 8.3*       Assessment:    Principal Problem:    Intertrochanteric fracture of right femur, closed, initial encounter ([facility] [address]  Active Problems:    Tobacco dependence    Atrial fibrillation (HCC)    Hypertension    CKD (chronic kidney disease) stage 3, GFR 30-59 ml/min (HCC)    Fall    Rib fracture    Weight loss    Severe protein-calorie malnutrition (HCC)  Resolved Problems:    * No resolved hospital problems.  *      Plan:  70-year-old male with an extensive past medical history admitted to [address] with     Right femur fracture/rib fracture  -Pain control  -Monitor labs  -Consult orthopedic surgery -surgical intervention today.  -Bowel regimen  -Hold all anticoagulation pending surgery  -Lidocaine patch for left rib fracture     COPD  -Supplement O2 to keep saturation greater than 93%-baseline 4 L  -ISP postsurgical intervention  -DuoNebs as needed  -Smoking cessation educated patient has no interest in quitting     Medication for other comorbidities continue as appropriate dose adjustment as necessary. DVT Prophylaxis   PT/OT  Discharge planning       Corinne Coyer Learn, APRN - CNP  10:56 AM  11/5/2021     Above note edited to reflect my thoughts   monitor post op vitals and blood work   Transfuse as needed   BP meds with parameters - avoid hypotension <90 sbp  Resume oac post op     I personally saw, examined and provided care for the patient. Radiographs, labs and medication list were reviewed by me independently. The case was discussed in detail and plans for care were established. Review of [address] [place] [address], documentation was conducted and revisions were made as appropriate directly by me. I agree with the above documented exam, problem list, and plan of care.      Jj Little MD  10:50 PM  11/5/2021

## 2021-11-05 NOTE — CARE COORDINATION
Met with wife Nunu Mojica who is a NP in room Patient in surgery today with ortho operative fixation Right Comminuted and angulated intertrochanteric and proximal femoral shaft fracture. SNF list given to wife. Await pt/ot evals post op. CM/sw to follow. Electronically signed by Paul Torres RN on 11/5/2021 at 10:40 AM

## 2021-11-05 NOTE — OP NOTE
Operative Note      Patient: Jeff Gonzalez  YOB: 1940  MRN: [id number]    Date of Procedure: 11/5/2021    Pre-Op Diagnosis: Right peritrochanteric femur fracture    Post-Op Diagnosis: Same       Procedure(s):  RIGHT FEMUR OPEN REDUCTION INTERNAL FIXATION (SYNTHES)    Surgeon(s):  Denise Borden DO    Assistant:   Resident: Shawanda Bowens DO; Wilbur Levy DO    Anesthesia: General    Estimated Blood Loss (mL): 526     Complications: None    Specimens:   * No specimens in log *    Implants:  Implant Name Type Inv. Item Serial No.  Lot No. LRB No. Used Action   NAIL IM [id number] [id number] 125DEG LNG R PROX FEM GRN TI LORE  NAIL IM L380MM [id number] 125DEG LNG R PROX FEM GRN TI LORE  DEPUY SYNTHES [employer] [id number] Right 1 Implanted   SCREW BNE L85MM DIA10.35MM G TI LORE PERF FOR PROX FEM  SCREW BNE L85MM DIA10.35MM G TI LORE PERF FOR PROX FEM  DEPUY SYNTHES [employer]WD [id number] Right 1 Implanted   SCREW BNE L42MM DIA5MM TIB LT GRN TI ST LORE ARIES FULL THRD  SCREW BNE L42MM DIA5MM TIB LT GRN TI ST LORE ARIES FULL THRD  DEPUY SYNTHES [employer]WD [id number] Right 1 Implanted   SCREW BNE L40MM DIA5MM ST TIB LT GRN TI ST LORE ARIES FULL  SCREW BNE L40MM DIA5MM ST TIB LT GRN TI ST LORE ARIES FULL  DEPUY SYNTHES [employer]-WD [id number] Right 1 Implanted         Drains:   Urethral Catheter Coude (Active)   Catheter Indications Perioperative use for selected surgical procedures 11/05/21 0559   Site Assessment No urethral drainage 11/05/21 0559   Urine Color Kiki 11/05/21 0559   Urine Appearance Clear 11/05/21 0559   Output (mL) 350 mL 11/05/21 0559       Findings: comminuted proximal femur fracture    Detailed Description of Procedure: The patient was brought into the operating room in a supine position on a hospital room bed. The patient was then transferred to the fracture table, by multiple individuals, in a safe fashion with Anesthesia in control of the patient's C-spine and airway.  Once on the fracture table, a peroneal post was placed. All points of pressure were identified and well padded. Their right leg was placed in traction and the left leg was placed in a well-padded, well-leg smith. After being positioned, a time out was performed indicating the appropriate identification of the patient, the procedure to be performed, and the side to be performed upon. This was agreed upon by all individuals in the room. Traction and reduction maneuvers were applied to the right lower extremity. Fluoroscopy was brought in showing a near-anatomic reduction of the right peritrochanteric femur fracture. The right lower extremity was then sterilely prepped and draped in a standard orthopedic fashion. Next, a incision was marked out over the proximal portion of the greater trochanter. Sharp dissection was taken down through the skin with a 10 blade scalpel. Next Bovie cautery was used to dissect down through the IT band and the gluteal fascia to the greater trochanter. At this point in time, the guide pin for the TFN nail was then inserted under fluoroscopic guidance in the appropriate position in the proximal femur. This was checked under both the AP and lateral.     After this was in an appropriate position, the entry reamer was then used to access the proximal femur. A ball-tip guidewire was placed down to the knee. X-rays were taken to confirm placement down to the knee. Then the length of the nail was measured to be 380 mm; therefore, a 380-mm nail was chosen. Sequential reaming was carried out up to a 13.5-mm reamer; therefore, an 12-mm diameter nail was chosen. This nail was then impacted into position under fluoroscopic guidance. A ball-tipped spike was used to push the lateral cortex of the greater trochanter into the appropriate position and the hub for the lag screw was then placed. An incision was made laterally to allow access with the jig. The guidewire was then inserted under fluoroscopic guidance.  Once in an appropriate position, the length of the lag screw was then reamed appropriately. The lag screw was then placed. The top of the nail was then locked. All jigs were removed to allow x-rays of the proximal femur showing the screw in good position with an acceptable tip-to-apex distance. X-rays were also taken at the knee, both AP and lateral, that showed the nail was not abutting the anterior cortex and was of an appropriate length. Next perfect circles were obtained at the distal femur for placement of the interlocking screws. A proximal interlocking static screw was placed along with a 2nd screw at the proximal portion of the dynamic locking portion. These both achieved great fixation. Final x-rays were obtained of the and the knee showing good positioning of the nail as well as adequate reduction of the proximal comminuted femur fracture. The incisions were then copiously irrigated with sterile normal saline. The deep tissues were closed with #0 Vicryl in a watertight fashion. The subcutaneous tissues were closed with 2-0 Monocryl in an interrupted fashion. The skin was closed with 3-0 nylon in a simple interrupted fashion. The leg was then cleansed. Bacitracin and Adaptic dressings were then placed. The patient's compartments were soft and compressible at the end of this portion of the procedure. The patient then had her legs removed from all devices. The patient was moved safely back to a hospital room bed, by multiple  individuals. The patient was taken to the PACU in stable condition. It should be noted that Dr. Neva Mak was present for the entirety of the procedure. POSTOPERATIVE PLAN:   1.  50% weightbearing to the right lower extremity  2. Lovenox for DVT prophylaxis then transition to aspirin twice daily for the next 28 to 30 days.    3. Follow up in the office in 2 weeks for x-rays of the right hip and femur as well as for suture removal.      Electronically signed by Everette Lema DO on 11/5/2021     Electronically Signed By  Hill Cornejo D.O.  11/5/2021    NOTE: This report was transcribed using voice recognition software.  Every effort was made to ensure accuracy; however, inadvertent computerized transcription errors may be present

## 2021-11-05 NOTE — ANESTHESIA PRE PROCEDURE
Department of Anesthesiology  Preprocedure Note       Name:  Babar Gutierres   Age:  80 y.o.  :  1940                                          MRN:  [id number]         Date:  2021      Surgeon: Amparo Lazo):  Jayne Petersen DO    Procedure: Procedure(s):  RIGHT FEMUR OPEN REDUCTION INTERNAL FIXATION (SYNTHES)    Medications prior to admission:   Prior to Admission medications    Medication Sig Start Date End Date Taking? Authorizing Provider   apixaban (ELIQUIS) 5 MG TABS tablet apixaban ELIQUIS 5 MG TABS Takes 2 tablets daily by mouth  APIXABAN [de-identified] Yanna Metzger MD 2020  [facility] ([id number]) 20  Yes Historical Provider, MD   torsemide (DEMADEX) 20 MG tablet torsemide TORSEMIDE 20 MG TABS 1 tablet twice daily 2020/07/10 TORSEMIDE [id number] Elsa Koki [address]-  [facility] ([id number]) 7/10/20  Yes Historical Provider, MD   pantoprazole (PROTONIX) 40 MG tablet Take 1 tablet by mouth 2 times daily (before meals) 20  Yes Mary Nguyễn,    metoprolol succinate (TOPROL XL) 25 MG extended release tablet Take 1 tablet by mouth 2 times daily 20  Yes Kobi Nguyễn,    HYDROcodone-acetaminophen ([address]) 7.5-325 MG per tablet TAKE ONE TABLET BY MOUTH EVERY 8 HOURS AS NEEDED FOR PAIN.  (DO NOT TAKE MORE THAN 3 TABLETS DAILY) 3/19/20  Yes Historical Provider, MD   Revefenacin 175 MCG/3ML SOLN Inhale 1 ampule into the lungs 2 times daily 19  Yes Donnie Murrieta MD   fluticasone-salmeterol (ADVAIR DISKUS) 500-50 MCG/DOSE diskus inhaler Inhale 1 puff into the lungs 2 times daily    Yes Historical Provider, MD   potassium chloride (KLOR-CON M) 20 MEQ extended release tablet Take 1 tablet by mouth 2 times daily (with meals) 18  Yes Ritchie Camarillo,    albuterol (PROVENTIL) (5 MG/ML) 0.5% nebulizer solution Take 1 mL by nebulization 3 times daily as needed for Wheezing 18  Yes Donnie Murrieta, MD   OXYGEN Inhale 2 L/min into the lungs nightly Uses prn during day.  States he is currently wearing 4L at home   Yes Historical Provider, MD   clopidogrel (PLAVIX) 75 MG tablet Take 75 mg by mouth daily LD 8/20/2019 per Dr. Margaret Marshall 6/11/14  Yes Historical Provider, MD   atorvastatin (LIPITOR) 20 MG tablet Take 60 mg by mouth daily  6/11/14  Yes Historical Provider, MD       Current medications:    Current Facility-Administered Medications   Medication Dose Route Frequency Provider Last Rate Last Admin    clindamycin (CLEOCIN) 600 mg in dextrose 5 % 50 mL IVPB  600 mg IntraVENous On Call to [address] [place] [address],[address], DO        sodium chloride flush 0.9 % injection 5-40 mL  5-40 mL IntraVENous 2 times per day April SEVERINO Johnson CNP   10 mL at 11/04/21 2204    sodium chloride flush 0.9 % injection 5-40 mL  5-40 mL IntraVENous PRN April SEVERINO Johnson CNP   10 mL at 11/04/21 1728    0.9 % sodium chloride infusion  25 mL IntraVENous PRN April SEVERINO Johnson - LINDA        acetaminophen (TYLENOL) tablet 650 mg  650 mg Oral Q6H PRN April SEVERINO Johnson CNP        Or    acetaminophen (TYLENOL) suppository 650 mg  650 mg Rectal Q6H PRN April SEVERINO Johnson - CNP        bisacodyl (DULCOLAX) suppository 10 mg  10 mg Rectal Daily PRN April SEVERINO Johnson CNP        prochlorperazine (COMPAZINE) injection 5 mg  5 mg IntraVENous Q6H PRN April SEVERINO Johnson - CNP        albuterol (PROVENTIL) nebulizer solution 2.5 mg  2.5 mg Nebulization Q8H PRN April SEVERINO Johnson - LINDA        atorvastatin (LIPITOR) tablet 60 mg  60 mg Oral Nightly April SEVERINO Johnson CNP   60 mg at 11/04/21 2159    clopidogrel (PLAVIX) tablet 75 mg  75 mg Oral Daily April SEVERINO Johnson - CNP        digoxin (LANOXIN) tablet 125 mcg  125 mcg Oral Daily April SEVERINO Johnson CNP        metoprolol succinate (TOPROL XL) extended release tablet 25 mg  25 mg Oral BID April HADLEY JohnsonN - CNP        pantoprazole (PROTONIX) tablet 40 mg  40 mg Oral BID [facility] [place] April HADLEY JohnsonN - CNP   40 mg at 11/04/21 1608    potassium chloride (KLOR-CON M) extended release tablet 20 mEq  20 mEq Oral BID WC April Elizabeth APRN - CNP   20 mEq at 11/04/21 1650    morphine (PF) injection 1 mg  1 mg IntraVENous Q4H PRN April Elizabeth APRN - CNP        morphine (PF) injection 2 mg  2 mg IntraVENous Q4H PRN April Elizabeth APRN - CNP   2 mg at 11/05/21 0504    budesonide (PULMICORT) nebulizer suspension 1,000 mcg  1 mg Nebulization BID April Elizabeth APRN - CNP   1,000 mcg at 11/04/21 2130    And    Arformoterol Tartrate (BROVANA) nebulizer solution 15 mcg  15 mcg Nebulization BID April Elizabeth APRN - CNP   15 mcg at 11/04/21 2131    ipratropium (ATROVENT) 0.02 % nebulizer solution 0.5 mg  0.5 mg Nebulization 4x daily April HADLEY JohnsonN - CNP   0.5 mg at 11/04/21 2131    oxyCODONE-acetaminophen (PERCOCET) 5-325 MG per tablet 1 tablet  1 tablet Oral Q6H PRN Julianna Macias APRN - CNP   1 tablet at 11/05/21 0204       Allergies:     Allergies   Allergen Reactions    Iodine     Linezolid Other (See Comments)     Thrombocytopenia, impactful diarrhea, weight loss    Pcn [Penicillins] Swelling    Penicillin G     Dye [Iodides]      Heart and kidney dye / reaction unknown       Problem List:    Patient Active Problem List   Diagnosis Code    Bilateral carotid artery stenosis I65.23    Tobacco dependence F17.200    Chronic obstructive pulmonary disease with acute exacerbation (HCC) J44.1    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    Hypoxia R09.02    Atrial fibrillation (HCC) I48.91    Simple chronic bronchitis (HCC) J41.0    GI bleeding K92.2    On home O2 Z99.81    Hypertension I10    Hyperlipidemia E78.5    H/O asbestosis Z80.12    CAD (coronary artery ([address] 7/30/2020    Tobacco dependence 7/3/2014    Unintentional weight loss of 10% body weight within 6 months 7/31/2020       Past Surgical History:        Procedure Laterality Date    APPENDECTOMY      BACK SURGERY  years ago    lumbar    CARDIAC CATHETERIZATION      heart cath x4, cabg twice    COLONOSCOPY     Luca Leblanc      2000, 2002, 2005, 2009   total of 8 stents   [address]    two     EAR SURGERY Left 6/25/2019    I & D LEFT EAR ABSCESS performed by Laveta Soulier, DO at [facility] 22 EAR SURGERY Left 8/26/2019    INCISION AND DRAINAGE LEFT EAR WITH EXCISION OF FISTULA  AND RECONSTRUCTION performed by Laveta Soulier, DO at [address] [place] [address]    amblyoplia  / multiple surgeries    SKIN CANCER EXCISION      UPPER GASTROINTESTINAL ENDOSCOPY N/A 7/31/2020    BEDSIDE EGD ESOPHAGOGASTRODUODENOSCOPY performed by Eber Walker MD at [facility] ENDOSCOPY       Social History:    Social History     Tobacco Use    Smoking status: Current Every Day Smoker     Packs/day: 1.00     Years: 62.00     Pack years: 62.00     Types: Cigarettes     Start date: 9/5/1955    Smokeless tobacco: Never Used   Substance Use Topics    Alcohol use:  No                                Ready to quit: Not Answered  Counseling given: Not Answered      Vital Signs (Current):   Vitals:    11/05/21 0035 11/05/21 0450 11/05/21 0745 11/05/21 0930   BP: (!) 98/55 (!) 97/55 (!) 92/52 107/61   Pulse: 96 91 89 97   Resp: 16 16 20 21   Temp: 36.7 °C (98.1 °F) 36.9 °C (98.5 °F) 37 °C (98.6 °F)    TempSrc: Temporal Temporal Temporal    SpO2: 95% 94% 90% 95%   Weight:       Height:                                                  BP Readings from Last 3 Encounters:   11/05/21 107/61   03/25/21 (!) 90/50   01/11/21 110/61       NPO Status: Time of last liquid consumption: 0000                        Time of last solid consumption: 0000 Date of last liquid consumption: 11/05/21                        Date of last solid food consumption: 11/05/21     EKG 11/3/2021:  Sinus rhythm with premature supraventricular complexes and with occasional and consecutive premature ventricular complexes and fusion complexes with  ventricular escape complexes  Right bundle branch block  T wave abnormality, consider inferolateral ischemia  Abnormal ECG  When compared with ECG of 08-OCT-2019 21:39,  Significant changes have occurred  Confirmed by Melonie Young ([id number]) on 11/4/2021 4:21:23 PM    BMI:   Wt Readings from Last 3 Encounters:   11/04/21 126 lb 9.6 oz (57.4 kg)   03/25/21 144 lb (65.3 kg)   01/11/21 133 lb (60.3 kg)     Body mass index is 19.25 kg/m². CBC:   Lab Results   Component Value Date    WBC 13.5 11/05/2021    RBC 3.88 11/05/2021    HGB 9.6 11/05/2021    HCT 31.2 11/05/2021    MCV 80.4 11/05/2021    RDW 17.6 11/05/2021     11/05/2021       CMP:   Lab Results   Component Value Date     11/05/2021    K 3.8 11/05/2021    K 3.5 11/04/2021     11/05/2021    CO2 26 11/05/2021    BUN 22 11/05/2021    CREATININE 1.2 11/05/2021    GFRAA >60 11/05/2021    LABGLOM 58 11/05/2021    GLUCOSE 106 11/05/2021    PROT 6.3 11/03/2021    CALCIUM 8.3 11/05/2021    BILITOT 0.3 11/03/2021    ALKPHOS 120 11/03/2021    AST 17 11/03/2021    ALT 21 11/03/2021       POC Tests: No results for input(s): POCGLU, POCNA, POCK, POCCL, POCBUN, POCHEMO, POCHCT in the last 72 hours.     Coags:   Lab Results   Component Value Date    PROTIME 14.0 11/05/2021    INR 1.3 11/05/2021    APTT 55.9 10/08/2019       HCG (If Applicable): No results found for: PREGTESTUR, PREGSERUM, HCG, HCGQUANT     ABGs: No results found for: PHART, PO2ART, LOH1RDC, YEN2NDT, BEART, N0BGVMKU     Type & Screen (If Applicable):  No results found for: LABABO, LABRH    Drug/Infectious Status (If Applicable):  No results found for: HIV, HEPCAB    COVID-19 Screening (If Applicable):   Lab Results Component Value Date    COVID19 Not Detected 07/30/2020           Anesthesia Evaluation  Patient summary reviewed and Nursing notes reviewed no history of anesthetic complications:   Airway: Mallampati: III  TM distance: <3 FB   Neck ROM: full  Mouth opening: > = 3 FB Dental:    (+) upper dentures and lower dentures      Pulmonary: breath sounds clear to auscultation  (+) COPD:  sleep apnea ( He wears 2L o2 at night):                             Cardiovascular:  Exercise tolerance: poor (<4 METS),   (+) hypertension:, CAD:, CHF: systolic and diastolic, pulmonary hypertension:,       ECG reviewed      Echocardiogram reviewed                  Neuro/Psych:               GI/Hepatic/Renal:   (+) PUD,           Endo/Other:    (+) blood dyscrasia::., .                 Abdominal:             Vascular: negative vascular ROS. Other Findings:             Anesthesia Plan      general     ASA 4       Induction: intravenous. Anesthetic plan and risks discussed with patient. Use of blood products discussed with patient whom consented to blood products. Plan discussed with attending.                   Maureen Castillo RN   11/5/2021

## 2021-11-05 NOTE — PROGRESS NOTES
Monroe Houser CNP concerning pt low BP and being Npo for OR 11/5 while on multiple pain med. Pt asymptomatic and NAD noted. Pt w/ hx of CKD,CHF,AFIB. Instructed to monitor BP overninght. See orders and flow sheet.

## 2021-11-05 NOTE — ANESTHESIA PROCEDURE NOTES
Arterial Line:    An arterial line was placed using surface landmarks, in the procedure area for the following indication(s): continuous blood pressure monitoring and blood sampling needed. A 20 gauge (size), 1 and 3/4 inch (length), Arrow (type) catheter was placed, into the left radial artery, secured by tape and Tegaderm. Anesthesia type: Local  Local infiltration: Injection    Events:  patient tolerated procedure well with no complications and EBL < 5mL. 11/5/2021 10:05 AM11/5/2021 10:15 AM  Anesthesiologist: Franchesca Ritchie MD  Resident/CRNA: Reggie Miller, [address]  Other anesthesia staff:  Paula Nickerson RN  Performed: Resident/CRNA   Preanesthetic Checklist  Completed: patient identified, IV checked, site marked, risks and benefits discussed, surgical consent, monitors and equipment checked, pre-op evaluation, timeout performed, anesthesia consent given, oxygen available and patient being monitored

## 2021-11-05 NOTE — PROGRESS NOTES
Physical Therapy    Date: 2021       Patient Name: Kingsley Up  : 1940      MRN: [id number]    PT order received. Chart has been reviewed. PT evaluation will be on hold due to surgery 21. Will continue to follow and complete evaluation at later time.      [facility], PT

## 2021-11-05 NOTE — PROGRESS NOTES
Occupational Therapy  OT consult received. Chart reviewed. Will hold evaluation secondary to plan for OR 11/5/21. Will re-attempt OT evaluation as schedule permits when patient is appropriate.  Jono Che, OTR/L #[id number]

## 2021-11-05 NOTE — PLAN OF CARE
Problem: Pain:  Goal: Control of acute pain  Description: Control of acute pain  11/5/2021 1717 by Ryanne Lira RN  Outcome: Met This Shift     Problem: Pain:  Goal: Pain level will decrease  Description: Pain level will decrease  11/5/2021 1717 by Ryanne Lira RN  Outcome: Met This Shift

## 2021-11-06 LAB
HCT VFR BLD CALC: 24.2 % (ref 37–54)
HEMOGLOBIN: 7.3 G/DL (ref 12.5–16.5)

## 2021-11-06 PROCEDURE — 6360000002 HC RX W HCPCS: Performed by: STUDENT IN AN ORGANIZED HEALTH CARE EDUCATION/TRAINING PROGRAM

## 2021-11-06 PROCEDURE — 97165 OT EVAL LOW COMPLEX 30 MIN: CPT

## 2021-11-06 PROCEDURE — 1200000000 HC SEMI PRIVATE

## 2021-11-06 PROCEDURE — 85014 HEMATOCRIT: CPT

## 2021-11-06 PROCEDURE — 97161 PT EVAL LOW COMPLEX 20 MIN: CPT

## 2021-11-06 PROCEDURE — 94640 AIRWAY INHALATION TREATMENT: CPT

## 2021-11-06 PROCEDURE — 2580000003 HC RX 258: Performed by: STUDENT IN AN ORGANIZED HEALTH CARE EDUCATION/TRAINING PROGRAM

## 2021-11-06 PROCEDURE — 6360000002 HC RX W HCPCS: Performed by: NURSE PRACTITIONER

## 2021-11-06 PROCEDURE — 97530 THERAPEUTIC ACTIVITIES: CPT

## 2021-11-06 PROCEDURE — 36415 COLL VENOUS BLD VENIPUNCTURE: CPT

## 2021-11-06 PROCEDURE — 2700000000 HC OXYGEN THERAPY PER DAY

## 2021-11-06 PROCEDURE — 6370000000 HC RX 637 (ALT 250 FOR IP): Performed by: STUDENT IN AN ORGANIZED HEALTH CARE EDUCATION/TRAINING PROGRAM

## 2021-11-06 PROCEDURE — 97535 SELF CARE MNGMENT TRAINING: CPT

## 2021-11-06 PROCEDURE — 2500000003 HC RX 250 WO HCPCS: Performed by: PHYSICIAN ASSISTANT

## 2021-11-06 PROCEDURE — 85018 HEMOGLOBIN: CPT

## 2021-11-06 RX ORDER — CLINDAMYCIN PHOSPHATE 600 MG/50ML
600 INJECTION INTRAVENOUS EVERY 8 HOURS
Status: COMPLETED | OUTPATIENT
Start: 2021-11-06 | End: 2021-11-06

## 2021-11-06 RX ADMIN — PANTOPRAZOLE SODIUM 40 MG: 40 TABLET, DELAYED RELEASE ORAL at 15:56

## 2021-11-06 RX ADMIN — OXYCODONE AND ACETAMINOPHEN 1 TABLET: 5; 325 TABLET ORAL at 15:59

## 2021-11-06 RX ADMIN — POTASSIUM CHLORIDE 20 MEQ: 20 TABLET, EXTENDED RELEASE ORAL at 09:42

## 2021-11-06 RX ADMIN — CLINDAMYCIN PHOSPHATE 600 MG: 600 INJECTION, SOLUTION INTRAVENOUS at 09:41

## 2021-11-06 RX ADMIN — ARFORMOTEROL TARTRATE 15 MCG: 15 SOLUTION RESPIRATORY (INHALATION) at 09:20

## 2021-11-06 RX ADMIN — IPRATROPIUM BROMIDE 0.5 MG: 0.5 SOLUTION RESPIRATORY (INHALATION) at 09:19

## 2021-11-06 RX ADMIN — OXYCODONE AND ACETAMINOPHEN 1 TABLET: 5; 325 TABLET ORAL at 20:14

## 2021-11-06 RX ADMIN — POTASSIUM CHLORIDE 20 MEQ: 20 TABLET, EXTENDED RELEASE ORAL at 17:04

## 2021-11-06 RX ADMIN — MORPHINE SULFATE 2 MG: 2 INJECTION, SOLUTION INTRAMUSCULAR; INTRAVENOUS at 21:22

## 2021-11-06 RX ADMIN — MORPHINE SULFATE 2 MG: 2 INJECTION, SOLUTION INTRAMUSCULAR; INTRAVENOUS at 04:12

## 2021-11-06 RX ADMIN — DIGOXIN 125 MCG: 125 TABLET ORAL at 10:21

## 2021-11-06 RX ADMIN — ATORVASTATIN CALCIUM 60 MG: 20 TABLET, FILM COATED ORAL at 20:13

## 2021-11-06 RX ADMIN — METOPROLOL SUCCINATE 25 MG: 25 TABLET, EXTENDED RELEASE ORAL at 20:13

## 2021-11-06 RX ADMIN — IPRATROPIUM BROMIDE 0.5 MG: 0.5 SOLUTION RESPIRATORY (INHALATION) at 16:54

## 2021-11-06 RX ADMIN — CLOPIDOGREL BISULFATE 75 MG: 75 TABLET ORAL at 10:21

## 2021-11-06 RX ADMIN — METOPROLOL SUCCINATE 25 MG: 25 TABLET, EXTENDED RELEASE ORAL at 10:24

## 2021-11-06 RX ADMIN — ENOXAPARIN SODIUM 40 MG: 100 INJECTION SUBCUTANEOUS at 10:21

## 2021-11-06 RX ADMIN — PANTOPRAZOLE SODIUM 40 MG: 40 TABLET, DELAYED RELEASE ORAL at 09:41

## 2021-11-06 RX ADMIN — CLINDAMYCIN PHOSPHATE 600 MG: 600 INJECTION, SOLUTION INTRAVENOUS at 15:53

## 2021-11-06 RX ADMIN — SODIUM CHLORIDE, PRESERVATIVE FREE 10 ML: 5 INJECTION INTRAVENOUS at 21:22

## 2021-11-06 RX ADMIN — BUDESONIDE 1000 MCG: 0.5 SUSPENSION RESPIRATORY (INHALATION) at 09:19

## 2021-11-06 RX ADMIN — Medication 10 ML: at 20:17

## 2021-11-06 RX ADMIN — Medication 10 ML: at 10:21

## 2021-11-06 ASSESSMENT — PAIN DESCRIPTION - PAIN TYPE
TYPE: SURGICAL PAIN

## 2021-11-06 ASSESSMENT — PAIN DESCRIPTION - ONSET
ONSET: ON-GOING

## 2021-11-06 ASSESSMENT — PAIN DESCRIPTION - FREQUENCY
FREQUENCY: CONTINUOUS

## 2021-11-06 ASSESSMENT — PAIN DESCRIPTION - ORIENTATION
ORIENTATION: RIGHT

## 2021-11-06 ASSESSMENT — PAIN SCALES - GENERAL
PAINLEVEL_OUTOF10: 6
PAINLEVEL_OUTOF10: 8
PAINLEVEL_OUTOF10: 8
PAINLEVEL_OUTOF10: 7
PAINLEVEL_OUTOF10: 9
PAINLEVEL_OUTOF10: 7
PAINLEVEL_OUTOF10: 4
PAINLEVEL_OUTOF10: 4

## 2021-11-06 ASSESSMENT — PAIN DESCRIPTION - LOCATION
LOCATION: HIP

## 2021-11-06 ASSESSMENT — PAIN DESCRIPTION - DESCRIPTORS
DESCRIPTORS: ACHING;DISCOMFORT;SORE;TENDER
DESCRIPTORS: ACHING;DISCOMFORT;DULL
DESCRIPTORS: ACHING;CONSTANT;DISCOMFORT
DESCRIPTORS: ACHING;DISCOMFORT;SORE

## 2021-11-06 ASSESSMENT — PAIN - FUNCTIONAL ASSESSMENT
PAIN_FUNCTIONAL_ASSESSMENT: PREVENTS OR INTERFERES WITH ALL ACTIVE AND SOME PASSIVE ACTIVITIES
PAIN_FUNCTIONAL_ASSESSMENT: PREVENTS OR INTERFERES SOME ACTIVE ACTIVITIES AND ADLS
PAIN_FUNCTIONAL_ASSESSMENT: PREVENTS OR INTERFERES WITH ALL ACTIVE AND SOME PASSIVE ACTIVITIES

## 2021-11-06 ASSESSMENT — PAIN DESCRIPTION - PROGRESSION: CLINICAL_PROGRESSION: NOT CHANGED

## 2021-11-06 NOTE — PROGRESS NOTES
I called YADIEL Murray regarding his manual BP 88/46. He runs low and I gave him pain meds this afternoon which brought him down even lower. He is asymptomatic. Does she want me to give a bolus of NS? She said to keep an eye on his BP and he continues to run low to call her back.

## 2021-11-06 NOTE — PROGRESS NOTES
Department of Orthopedic Surgery  Resident Progress Note    Patient seen and examined. Pain currently controlled medications. No changes in sensation no changes in breathing. No complaints of chest pain. No nausea or vomiting. No acute events overnight. VITALS:  BP (!) 109/55   Pulse 89   Temp 98.1 °F (36.7 °C) (Temporal)   Resp 18   Ht 5' 8\" (1.727 m)   Wt 126 lb 9.6 oz (57.4 kg)   SpO2 93%   BMI 19.25 kg/m²     General: alert and oriented to person, place and time    MUSCULOSKELETAL:   right lower extremity:  · Aquacel dressings clean dry and intact, no sanguinous drainage  · Compartments soft and compressible  · +PF/DF/EHL  · +2/4 DP & PT pulses, Brisk Cap refill, Toes warm and perfused  · Distal sensation subjectively decreased, at baseline per patient    CBC:   Lab Results   Component Value Date    WBC 13.5 11/05/2021    HGB 9.6 11/05/2021    HCT 31.2 11/05/2021     11/05/2021     PT/INR:    Lab Results   Component Value Date    PROTIME 14.0 11/05/2021    INR 1.3 11/05/2021           ASSESSMENT  · S/P cephalomedullary nail placement to right femur on 11/5/2021    PLAN      · Continue physical therapy and protocol: PWB - 50%- RLE  · 24 hour abx coverage  · Deep venous thrombosis prophylaxis -Lovenox transition to aspirin, early mobilization  · PT/OT  · Pain Control: IV and PO  · Monitor H&H, 9.6  · D/C Plan: Appreciate PT/OT/SW. Patient states this morning that he is anxious to return home.   We will await PT evaluation and recommendations

## 2021-11-06 NOTE — PROGRESS NOTES
Physical Therapy Initial Evaluation    Name: Jeff Gonzalez  : 1940  MRN: [id number]      Date of Service: 2021    Evaluating PT:  Nahomi Jenkins, PT [id number]      Room #:  7423/0686-C  Diagnosis:  Fall, initial encounter [W19. XXXA]  Intertrochanteric fracture of right femur, closed, initial encounter ([facility] [S72.141A]  Physeal fracture of proximal end of right femur, unspecified physeal fracture configuration, initial encounter ([facility] [S79.001A]  PMHx/PSHx:     has a past medical history of Amblyopia, Anticoagulated on Coumadin, Atrial fibrillation ([facility]), Bilateral carotid artery stenosis, CAD (coronary artery disease), CHF (congestive heart failure) ([address] CKD (chronic kidney disease) stage 3, GFR 30-59 ml/min ([facility]), Colon polyp, COPD (chronic obstructive pulmonary disease) ([facility]), Diarrhea, Emesis, Emphysema of lung ([address] Erosive esophagitis, Full dentures, H/O asbestosis, Hyperlipidemia, Hypertension, Hypokalemia, Hypoprothrombinemia ([address] Hypotension due to hypovolemia, Myopia, Non-rheumatic aortic sclerosis, NSVT (nonsustained ventricular tachycardia) ([address] On home O2, NICHOLAS (obstructive sleep apnea), Pulmonary hypertension ([address] Squamous cell carcinoma, face, Thrombocytopenia ([address] Tobacco dependence, and Unintentional weight loss of 10% body weight within 6 months. has a past surgical history that includes Appendectomy; Eye surgery (); Coronary artery bypass graft (, ); Colonoscopy; Skin cancer excision; Cardiac catheterization; Coronary angioplasty with stent; ear surgery (Left, 2019); back surgery (years ago); ear surgery (Left, 2019); and Upper gastrointestinal endoscopy (N/A, 2020).    Procedure/Surgery:  S/P cephalomedullary nail placement to right femur on 2021    Precautions:  Falls,  PWB (partial weight bearing) 50%, O2  Equipment Needs: Patient has needed equipment ,    SUBJECTIVE:    Patient lives with spouse  in a two story home resides first  with 2 steps to enter with 1Rail  Bed is on 1 floor and bath is on 1 floor. Patient ambulated independently  PTA. Equipment owned: Wheelchair, Wheeled [employer], Rollator and Scooter,      OBJECTIVE:   Initial Evaluation  Date: 11/6/21 Treatment Short Term/ Long Term   Goals   AM-PAC 6 Clicks 26/56     Was pt agreeable to Eval/treatment? yes     Does pt have pain? Yes with spasms     Bed Mobility  Rolling: NT   Supine to sit:   Mod x 2   Sit to supine: Mod x 2   Scooting: Mod x 2   Rolling: Ind  Supine to sit: Ind  Sit to supine: Ind  Scooting: Ind   Transfers Sit to stand: Mod x 2 to fww  Stand to sit: Mod x 2  Stand pivot: Mod x 2 with fww with increased cues. Very slow rate. Sit to stand: Mod Ind  to fww  Stand to sit: Mod Ind    Stand pivot: Mod Ind  with fww   Ambulation    3 feet with fww Mod x 2 Limited by pain symptoms. 25 feet with Mod Ind  fww   Stair negotiation: ascended and descended  NT  2 steps with Min    ROM BUE: Defer to OT eval  BLE:  Decreased RLE. Strength BUE: Defer to OT eval  RLE:  Grossly 3-/5  LLE:  Grossly  4/5  4+/5   Balance Sitting EOB:  SBA  Dynamic Standing: Mod A x 2  Sitting EOB:  Ind  Dynamic Standing: Mod Ind     Patient is Alert & Oriented x person, place, time and situation and follows directions   Sensation:  Pt denies numbness and tingling to extremities  Edema:  none    Vitals:  Seated   Blood Pressure at rest -   Heart Rate at rest 127 bpm   SPO2 at rest 82% with 6 L RN made aware. RN assessing. Therapeutic Exercises:  Functional activity as stated above.      Patient education  Pt educated regarding role of PT evaluation, need for OOB activity and weight bearing precautions for RLE PWB    Patient response to education:   Pt verbalized understanding Pt demonstrated skill Pt requires further education in this area   yes yes Reminders     ASSESSMENT:    Conditions Requiring Skilled Therapeutic Intervention:    [x]Decreased strength     [x]Decreased ROM  [x]Decreased functional mobility  [x]Decreased balance   [x]Decreased endurance   [x]Decreased posture  []Decreased sensation  []Decreased coordination   []Decreased vision  [x]Decreased safety awareness   []Increased pain       Comments:    RN cleared patient for participation in therapy session. Patient was seen this date for PT evaluation. . Patient was agreeable to intervention. Results of the functional assessment are noted above. Upon entering the room patient was found supine in bed. Assisted to EOB with increased time required. Sat EOB x 10 minutes to increase dynamic sitting balance and activity tolerance. Transfer completed to bedside chair. O2 sat measured at 78% Returned to bed and RN then attending to patient. Placed in High Escobar's position. At end of session, patient in bed with  call light and phone within reach,  all lines and tubes intact, nursing notified. This patient can benefit from the continuation of skilled PT possibly in a rehab setting. to maximize functional level and return to PLOF. Treatment:  Patient practiced and was instructed in the following treatment:    · Bed mobility training - pt given verbal and tactile cues to facilitate proper sequencing and safety during rolling and supine>sit as well as provided with physical assistance to complete task    · Assistive device training - pt educated on using [employer] approximation/negotiation, and hand placement during sit<>stand to [employer]  · STS and transfer training - educated on hand/foot placement, safety, and sequencing during STS and pivot transfers using assistive device  o Education in [employer] precautions. Pt's/ family goals   1. yes    Prognosis is good  for reaching above PT goals.     Patient and or family understand(s) diagnosis, prognosis, and plan of care.  yes,     PHYSICAL THERAPY PLAN OF CARE:    PT POC is established based on physician order and patient diagnosis     Referring provider/PT Order:  PT Eval and Treat   11/04/21 0130  PT eval and treat              Gilbert Negrete DO       Diagnosis:  Fall, initial encounter [I69. XXXA]  Intertrochanteric fracture of right femur, closed, initial encounter ([facility] [S72.141A]  Physeal fracture of proximal end of right femur, unspecified physeal fracture configuration, initial encounter ([facility] [S79.001A]  Specific instructions for next treatment:  Sit EOB, postural exercises, Transfer to bedside chair and Increase ambulation distance  Current Treatment Recommendations:     [x] Strengthening to improve independence with functional mobility   [x] ROM to improve independence with functional mobility   [x] Balance Training to improve static/dynamic balance and to reduce fall risk  [x] Endurance Training to improve activity tolerance during functional mobility   [x] Transfer Training to improve safety and independence with all functional transfers   [x] Gait Training to improve gait mechanics, endurance and asses need for appropriate assistive device  [x] Stair Training in preparation for safe discharge home and/or into the community   [] Positioning to prevent skin breakdown and contractures  [x] Safety and Education Training   [] Patient/Caregiver Education   [] HEP  [] Other     PT long term treatment goals are located in above grid    Frequency of treatments: 2-5x/week x 1-2 weeks. Time in  0805  Time out  0845    Total Treatment Time  40 minutes     Evaluation Time includes thorough review of current medical information, gathering information on past medical history/social history and prior level of function, completion of standardized testing/informal observation of tasks, assessment of data and education on plan of care and goals.     CPT codes:  [x] Low Complexity PT evaluation 73586  [] Moderate Complexity PT evaluation 33515  [] High Complexity PT evaluation 12191  [] PT Re-evaluation 55869  [] Gait training 47487 - minutes  [] Manual therapy 94913 - minutes  [x] Therapeutic activities 74751 47 minutes  [] Therapeutic exercises 87097 - minutes  [] Neuromuscular reeducation 50458 - minutes     Dar Pepe, [address] [facility]

## 2021-11-06 NOTE — PROGRESS NOTES
Pt destated 78-82% on 5L working with PT/OT this morning. Got him back in bed still on rebounded to 84%, P=131. I called respiratory for treatment. Placed him on 7L High [place] for now. Rechecked O2 is 90% with pulse 107    Respiratory treatment. 94% on 10L [place].

## 2021-11-06 NOTE — PLAN OF CARE
Problem: Falls - Risk of:  Goal: Will remain free from falls  Description: Will remain free from falls  Outcome: Met This Shift  Goal: Absence of physical injury  Description: Absence of physical injury  Outcome: Met This Shift     Problem: Skin Integrity:  Goal: Will show no infection signs and symptoms  Description: Will show no infection signs and symptoms  Outcome: Met This Shift  Goal: Absence of new skin breakdown  Description: Absence of new skin breakdown  Outcome: Met This Shift     Problem: Pain:  Goal: Pain level will decrease  Description: Pain level will decrease  11/6/2021 0554 by Nasreen Cortes RN  Outcome: Ongoing     Problem: Pain:  Goal: Control of acute pain  Description: Control of acute pain  11/6/2021 0554 by Nasreen Cortes RN  Outcome: Ongoing     Problem: Pain:  Goal: Control of chronic pain  Description: Control of chronic pain  Outcome: Ongoing

## 2021-11-06 NOTE — PLAN OF CARE
Problem: Falls - Risk of:  Goal: Will remain free from falls  Description: Will remain free from falls  11/6/2021 1949 by Hakan Sheikh RN  Outcome: Met This Shift  11/6/2021 0554 by Esha Reaves RN  Outcome: Met This Shift  Goal: Absence of physical injury  Description: Absence of physical injury  11/6/2021 1949 by Hakan Sheikh RN  Outcome: Met This Shift  11/6/2021 0554 by Esha Reaves RN  Outcome: Met This Shift     Problem: Skin Integrity:  Goal: Will show no infection signs and symptoms  Description: Will show no infection signs and symptoms  11/6/2021 1949 by Hakan Sheikh RN  Outcome: Met This Shift  11/6/2021 0554 by Esha Reaves RN  Outcome: Met This Shift     Problem: Pain:  Goal: Pain level will decrease  Description: Pain level will decrease  11/6/2021 1949 by Hakan Sheikh RN  Outcome: Ongoing  11/6/2021 0554 by Esha Reaves RN  Outcome: Ongoing  Goal: Control of acute pain  Description: Control of acute pain  11/6/2021 1949 by Hakan Sheikh RN  Outcome: Ongoing  11/6/2021 0554 by Esha Reaves RN  Outcome: Ongoing  Goal: Control of chronic pain  Description: Control of chronic pain  11/6/2021 1949 by Hakan Sheikh RN  Outcome: Ongoing  11/6/2021 0554 by Esha Reaves RN  Outcome: Ongoing     Problem: Skin Integrity:  Goal: Absence of new skin breakdown  Description: Absence of new skin breakdown  11/6/2021 1949 by Hakan Sheikh RN  Outcome: Ongoing  11/6/2021 0554 by Esha Reaves RN  Outcome: Met This Shift

## 2021-11-06 NOTE — PROGRESS NOTES
Subjective:  Zenon Mahoney was seen resting in bed comfortably with nursing at bedside, supportive. He was joking around during the interview today and requesting to be placed home on discharge. Objective:    BP (!) 82/42   Pulse 85   Temp 100 °F (37.8 °C) (Temporal)   Resp 18   Ht 5' 8\" (1.727 m)   Wt 126 lb 9.6 oz (57.4 kg)   SpO2 95%   BMI 19.25 kg/m²     In: -   Out: 750   In: -   Out: 750 [Urine:750]    General Appearance:  awake, alert, oriented, in no acute distress  Head/face:  NCAT  Eyes:  No gross abnormalities. Lungs: Adequate effort bilaterally, mild wheezing appreciated. Heart:  Heart sounds are normal.  Regular rate and rhythm without murmur, gallop or rub. Abdomen:  Soft, non-tender, normal bowel sounds. No bruits, organomegaly or masses. Extremities: No edema  Neurologic: Grossly nonfocal     Recent Labs     11/03/21 2010 11/03/21 2010 11/04/21 0415 11/05/21  0613 11/06/21  0634   WBC 11.7*  --  17.5* 13.5*  --    HGB 11.0*   < > 10.2* 9.6* 7.3*   HCT 35.4*   < > 32.5* 31.2* 24.2*     --  303 256  --     < > = values in this interval not displayed. Recent Labs     11/03/21 2010 11/04/21 0415 11/05/21  0613    139 137   K 4.2 3.5 3.8    101 102   CO2 28 28 26   BUN 30* 28* 22   CREATININE 1.5* 1.2 1.2   CALCIUM 9.2 8.5* 8.3*       Assessment:    Principal Problem:    Intertrochanteric fracture of right femur, closed, initial encounter ([facility]  Active Problems:  COPD, not in exacerbation. Acute on chronic hypoxic respiratory failure, utilizes 4 L at home    Tobacco dependence, continued    Atrial fibrillation (HCC)    Hypertension    CKD (chronic kidney disease) stage 3, GFR 30-59 ml/min ([facility])    Fall    Rib fracture    Weight loss    Severe protein-calorie malnutrition ([facility]      Plan:    POD #1, Ortho team continues to follow and he continues to work with PT/OT.   He has made a note to multiple staff members that he would not like to go to rehab and instead would like to go home. We appreciate PTs continued assistance with guiding patient on best route towards improved health from an orthopedic standpoint. He was counseled regarding his tobacco usage and cessation however he is not interested. He is not in COPD exacerbation however did require a bit of increased oxygen throughout his hospitalization. We will continue to wean him down to his baseline and believe this is likely related to post operative hypoxic expiratory failure rather than COPD exacerbation. Mild wheeze noted on exam, no increased cough or sputum production. He continues on his inhalers and nebulizers, not on steroids. If he continues to require more than his baseline of oxygen tomorrow, will consider getting steroids at that time. I do note his pressures are a bit soft at the moment, he is clinically stable and not tachycardic, labs are normal thus not likely volume depleted. We will simply continue to monitor for now, encourage him to move around as much as possible and we can bolus him with as needed 500 cc normal saline. DVT Prophylaxis with Lovenox  PT/OT continues to work with him for rehab recommendations  Discharge planning likely to discharge home with home health and PT per patient's request, declining rehab.       Nigel Salazar MD  5:23 PM  11/6/2021

## 2021-11-06 NOTE — PROGRESS NOTES
[address]                                                  Patient Name: Kay Shin    MRN: [id number]    : 1940    Room: [address] [place] [address]      Evaluating OT: Brook Berg OTR/L License #  [id number]       Referring Angela Lynn DO     Specific Provider Orders/Date: OT evaluation & treatment       Diagnosis:  Right peritrochanteric femur fracture    Surgery: 21: RIGHT FEMUR OPEN REDUCTION INTERNAL FIXATION (SYNTHES)     Pertinent Medical History:  has a past medical history of Amblyopia, Anticoagulated on Coumadin, Atrial fibrillation (HCC), Bilateral carotid artery stenosis, CAD (coronary artery disease), CHF (congestive heart failure) ([address] CKD (chronic kidney disease) stage 3, GFR 30-59 ml/min ([address] Colon polyp, COPD (chronic obstructive pulmonary disease) ([address] Diarrhea, Emesis, Emphysema of lung ([address] Erosive esophagitis, Full dentures, H/O asbestosis, Hyperlipidemia, Hypertension, Hypokalemia, Hypoprothrombinemia ([address] Hypotension due to hypovolemia, Myopia, Non-rheumatic aortic sclerosis, NSVT (nonsustained ventricular tachycardia) ([address] On home O2, NIHCOLAS (obstructive sleep apnea), Pulmonary hypertension ([address] Squamous cell carcinoma, face, Thrombocytopenia ([address] Tobacco dependence, and Unintentional weight loss of 10% body weight within 6 months.        Past Surgical History:   Procedure Laterality Date    APPENDECTOMY      BACK SURGERY  years ago    lumbar    CARDIAC CATHETERIZATION      heart cath x4, cabg twice    COLONOSCOPY      CORONARY ANGIOPLASTY WITH STENT PLACEMENT      , , 2005, 2009   total of 8 stents   [address]    two     EAR SURGERY Left 2019    I & D LEFT EAR ABSCESS performed [facility]bi Kenney DO at [facility] OR    EAR SURGERY Left 2019 INCISION AND DRAINAGE LEFT EAR WITH EXCISION OF FISTULA  AND RECONSTRUCTION performed by Mckenzie Kat DO at [address]    Deny Chess  / multiple surgeries    SKIN CANCER EXCISION      UPPER GASTROINTESTINAL ENDOSCOPY N/A 7/31/2020    BEDSIDE EGD ESOPHAGOGASTRODUODENOSCOPY performed by Thom Roman MD at [facility] ENDOSCOPY      Precautions:  Fall Risk, R LE PWB 50%, O2 at 4-5 L     Assessment of current deficits    [] Functional mobility  [x]ADLs  [x] Strength               [x]Cognition    [x] Functional transfers   [x] IADLs         [x] Safety Awareness   [x]Endurance    [x] Fine Coordination              [x] Balance      [] Vision/perception   [x]Sensation     []Gross Motor Coordination  [] ROM  [] Delirium                   [] Motor Control     OT PLAN OF CARE   OT POC based on physician orders, patient diagnosis and results of clinical assessment    Frequency/Duration: 2-4 days/wk for 2 weeks PRN   Specific OT Treatment Interventions to include:    Instruction/training on adapted ADL techniques and AE recommendations to increase functional independence within precautions  Training on energy conservation strategies, correct breathing pattern and techniques to improve independence/tolerance for self-care routine  Functional transfer/mobility training/DME recommendations for increased independence, safety, and fall prevention  Patient/Family education to increase follow through with safety techniques and functional independence  Recommendation of environmental modifications for increased safety with functional transfers/mobility and ADLs  Cognitive retraining/development of therapeutic activities to improve problem solving, judgement, memory, and attention for increased safety/participation in ADL/IADL tasks  Therapeutic exercise to improve motor endurance, ROM, and functional strength for ADLs/functional transfers  Therapeutic activities to facilitate/challenge dynamic balance, stand tolerance for increased safety and independence with ADLs  Therapeutic activities to facilitate gross/fine motor skills for increased independence with ADLs  Positioning to improve skin integrity, interaction with environment and functional independence    Recommended Adaptive Equipment:  TBD     Home Living: Pt lives with wife in a 2 story with 2+2 steps to enter with L HR. B&B on main level. Bathroom setup: tub/shower   Equipment owned: shower bench, ww, rollator    Prior Level of Function: Ind. with ADLs , Ind. with IADLs; ambulated rollator for long distances  Driving: active  Occupation: retired from [employer]    Pain Level: min. at rest; 5-6/10 R LE with lt. ax. Cognition: A&O: 4/4; Follows 2 step directions   Memory:  Fair+   Sequencing:  fair   Problem solving:  fair   Judgement/safety:  fair     Functional Assessment:  AM-PAC Daily Activity Raw Score: 13/24   Initial Eval Status  Date: 11-6-21 Treatment Status  Date: STGs = LTGs  Time frame: 10-14 days   Feeding Set up  Mod I   Grooming Min A  Modified [place]    UB Dressing Min A  Modified [place]    LB Dressing Dep  Supervision    Bathing Max A   Supervision    Toileting Dep, dockery present  Supervision    Bed Mobility  Supine to sit: Mod A x2 with increased time  Sit to supine: Mod A x2 with increased time  Supine to sit: Modified [place]   Sit to supine: Modified [place]    Functional Transfers Mod A x2 with sit <> stand form EOB & chair using ww. Mod A  x2 with SPT with ww  Supervision    Functional Mobility Mod A x2 with few steps to & from EOB <> chair with ww  Supervision    Balance Sitting:     Static:  SBA    Dynamic:Min A  Standing: Mod A x2     Activity Tolerance Fair- lt. Ax. Fair with lt./mod. ax. Visual/  Perceptual Glasses: yes        Vitals /50  spO2 at rest low 90's on 4L  Pt. desats with lt. Ax. to low 80's, RN present to increase O2. HR increased to 130 bpm with lt. Ax. (67 bpm at rest).   Pt. Left in the care of RN, Resp. notified per RN. WFL     Hand Dominance R   AROM (PROM) Strength Additional Info:    RUE  WFL 4-/5 good  and wfl FMC/dexterity noted during ADL tasks     LUE WFL 4-/5 good  and wfl FMC/dexterity noted during ADL tasks       Hearing: WFL   Sensation:  No c/o numbness or tingling B UE  Tone: WFL   Edema: none noted B UE    Comments: Upon arrival patient supine, agreeable to OT, cleared by Nursing  Therapist facilitated bed mobility/ADLs/functional transfer/mobility training with focus on safety, technique & precautions. Pt. Instructed RE: safe transfers/mobility, ADLs, role of OT, treatment plan, recs. , prec. Vitals monitored throughout, breathing techniques instructed, pt. on supplemental O2 but desats with lt. ax. At end of session, patient assisted back to bed, all needs met, left in the care of Nursing d/t spO2 levels, with call light and phone within reach, all lines and tubes intact. Overall patient demonstrated decreased strength, balance, independence & safety during completion of ADL/functional transfer/mobility tasks. Pt would benefit from continued skilled OT to increase safety and independence with completion of ADL/IADL tasks for functional independence and quality of life.     Treatment: OT treatment provided this date includes:    Instruction/training on safety and adapted techniques for completion of ADLs: to increase [place] in self care    Instruction/training on safe functional mobility/transfer techniques: with focus on safety, technique & precautions    Instruction/training on energy conservation/work simplification for completion of ADLs: techniques to increase [place] with self care ADLs & iADLs, work simplification to improve endurance    Proper Positioning/Alignment: for optimal healing, skin integrity to prevent breakdown, decrease edema   Skilled monitoring of vitals: to include BP, spO2 & HR during session   Sitting/standing Balance/Tolerance- to increased balance & activity tolerance during ADLs as well as facilitate proper posture and/or positioning.  Therapeutic exercise- Instruction on B UE ROM exercises to improve strength/function for increased [place] with ADLs & iADLs    Rehab Potential: Good for established goals     Patient / Family Goal: to return home soon      Patient and/or family were instructed on functional diagnosis, prognosis/goals and OT plan of care. Demonstrated fair+ understanding. Eval Complexity: Low    Time In: 8:05  Time Out: 8:50  Total Treatment Time: 45    Min Units   OT Eval Low 64643  x     OT Eval Medium 59075      OT Eval High 86999      OT Re-Eval [id number]       Therapeutic Ex 84811       Therapeutic Activities 09476  15  1   ADL/Self Care 82304  15  1   Orthotic Management 98992       Manual [id number]     Neuro Re-Ed 00492       Non-Billable Time          Evaluation Time additionally includes thorough review of current medical information, gathering information on past medical history/social history and prior level of function, interpretation of standardized testing/informal observation of tasks, assessment of data and development of plan of care and goals. Emily Berg, OTR/L   License #  [id number]

## 2021-11-07 LAB
FERRITIN: 54 NG/ML
IRON SATURATION: 7 % (ref 20–55)
IRON: 17 MCG/DL (ref 59–158)
TOTAL IRON BINDING CAPACITY: 228 MCG/DL (ref 250–450)

## 2021-11-07 PROCEDURE — 82728 ASSAY OF FERRITIN: CPT

## 2021-11-07 PROCEDURE — 83550 IRON BINDING TEST: CPT

## 2021-11-07 PROCEDURE — 94640 AIRWAY INHALATION TREATMENT: CPT

## 2021-11-07 PROCEDURE — 1200000000 HC SEMI PRIVATE

## 2021-11-07 PROCEDURE — 2700000000 HC OXYGEN THERAPY PER DAY

## 2021-11-07 PROCEDURE — 83540 ASSAY OF IRON: CPT

## 2021-11-07 PROCEDURE — 6370000000 HC RX 637 (ALT 250 FOR IP): Performed by: STUDENT IN AN ORGANIZED HEALTH CARE EDUCATION/TRAINING PROGRAM

## 2021-11-07 PROCEDURE — 6360000002 HC RX W HCPCS: Performed by: STUDENT IN AN ORGANIZED HEALTH CARE EDUCATION/TRAINING PROGRAM

## 2021-11-07 PROCEDURE — 36415 COLL VENOUS BLD VENIPUNCTURE: CPT

## 2021-11-07 PROCEDURE — 2580000003 HC RX 258: Performed by: STUDENT IN AN ORGANIZED HEALTH CARE EDUCATION/TRAINING PROGRAM

## 2021-11-07 RX ORDER — SIMETHICONE 80 MG
80 TABLET,CHEWABLE ORAL EVERY 6 HOURS PRN
Status: DISCONTINUED | OUTPATIENT
Start: 2021-11-07 | End: 2021-11-08 | Stop reason: HOSPADM

## 2021-11-07 RX ORDER — METOPROLOL SUCCINATE 25 MG/1
25 TABLET, EXTENDED RELEASE ORAL DAILY
Status: DISCONTINUED | OUTPATIENT
Start: 2021-11-08 | End: 2021-11-08 | Stop reason: HOSPADM

## 2021-11-07 RX ADMIN — BUDESONIDE 1000 MCG: 0.5 SUSPENSION RESPIRATORY (INHALATION) at 20:27

## 2021-11-07 RX ADMIN — IPRATROPIUM BROMIDE 0.5 MG: 0.5 SOLUTION RESPIRATORY (INHALATION) at 11:46

## 2021-11-07 RX ADMIN — ARFORMOTEROL TARTRATE 15 MCG: 15 SOLUTION RESPIRATORY (INHALATION) at 20:27

## 2021-11-07 RX ADMIN — Medication 10 ML: at 10:08

## 2021-11-07 RX ADMIN — POTASSIUM CHLORIDE 20 MEQ: 20 TABLET, EXTENDED RELEASE ORAL at 17:16

## 2021-11-07 RX ADMIN — ENOXAPARIN SODIUM 40 MG: 100 INJECTION SUBCUTANEOUS at 10:08

## 2021-11-07 RX ADMIN — OXYCODONE AND ACETAMINOPHEN 1 TABLET: 5; 325 TABLET ORAL at 16:14

## 2021-11-07 RX ADMIN — IPRATROPIUM BROMIDE 0.5 MG: 0.5 SOLUTION RESPIRATORY (INHALATION) at 16:43

## 2021-11-07 RX ADMIN — CLOPIDOGREL BISULFATE 75 MG: 75 TABLET ORAL at 10:08

## 2021-11-07 RX ADMIN — IPRATROPIUM BROMIDE 0.5 MG: 0.5 SOLUTION RESPIRATORY (INHALATION) at 20:27

## 2021-11-07 RX ADMIN — ATORVASTATIN CALCIUM 60 MG: 20 TABLET, FILM COATED ORAL at 21:07

## 2021-11-07 RX ADMIN — ARFORMOTEROL TARTRATE 15 MCG: 15 SOLUTION RESPIRATORY (INHALATION) at 07:44

## 2021-11-07 RX ADMIN — Medication 10 ML: at 21:08

## 2021-11-07 RX ADMIN — POTASSIUM CHLORIDE 20 MEQ: 20 TABLET, EXTENDED RELEASE ORAL at 10:08

## 2021-11-07 RX ADMIN — PANTOPRAZOLE SODIUM 40 MG: 40 TABLET, DELAYED RELEASE ORAL at 16:10

## 2021-11-07 RX ADMIN — IPRATROPIUM BROMIDE 0.5 MG: 0.5 SOLUTION RESPIRATORY (INHALATION) at 07:44

## 2021-11-07 RX ADMIN — PANTOPRAZOLE SODIUM 40 MG: 40 TABLET, DELAYED RELEASE ORAL at 07:07

## 2021-11-07 RX ADMIN — OXYCODONE AND ACETAMINOPHEN 1 TABLET: 5; 325 TABLET ORAL at 23:58

## 2021-11-07 RX ADMIN — MORPHINE SULFATE 2 MG: 2 INJECTION, SOLUTION INTRAMUSCULAR; INTRAVENOUS at 21:13

## 2021-11-07 RX ADMIN — BUDESONIDE 1000 MCG: 0.5 SUSPENSION RESPIRATORY (INHALATION) at 07:44

## 2021-11-07 ASSESSMENT — PAIN DESCRIPTION - FREQUENCY
FREQUENCY: CONTINUOUS

## 2021-11-07 ASSESSMENT — PAIN - FUNCTIONAL ASSESSMENT
PAIN_FUNCTIONAL_ASSESSMENT: PREVENTS OR INTERFERES SOME ACTIVE ACTIVITIES AND ADLS
PAIN_FUNCTIONAL_ASSESSMENT: PREVENTS OR INTERFERES WITH ALL ACTIVE AND SOME PASSIVE ACTIVITIES
PAIN_FUNCTIONAL_ASSESSMENT: PREVENTS OR INTERFERES WITH ALL ACTIVE AND SOME PASSIVE ACTIVITIES

## 2021-11-07 ASSESSMENT — PAIN DESCRIPTION - DESCRIPTORS
DESCRIPTORS: ACHING;DISCOMFORT;SORE
DESCRIPTORS: ACHING;DISCOMFORT;SORE;TENDER
DESCRIPTORS: SORE;TENDER;DISCOMFORT;ACHING

## 2021-11-07 ASSESSMENT — PAIN DESCRIPTION - ONSET
ONSET: ON-GOING

## 2021-11-07 ASSESSMENT — PAIN SCALES - GENERAL
PAINLEVEL_OUTOF10: 6
PAINLEVEL_OUTOF10: 8
PAINLEVEL_OUTOF10: 8
PAINLEVEL_OUTOF10: 5
PAINLEVEL_OUTOF10: 8

## 2021-11-07 ASSESSMENT — PAIN DESCRIPTION - PAIN TYPE
TYPE: SURGICAL PAIN
TYPE: SURGICAL PAIN;ACUTE PAIN
TYPE: SURGICAL PAIN;ACUTE PAIN

## 2021-11-07 ASSESSMENT — PAIN DESCRIPTION - ORIENTATION
ORIENTATION: RIGHT

## 2021-11-07 ASSESSMENT — PAIN DESCRIPTION - LOCATION
LOCATION: HIP

## 2021-11-07 ASSESSMENT — PAIN DESCRIPTION - PROGRESSION: CLINICAL_PROGRESSION: NOT CHANGED

## 2021-11-07 NOTE — PLAN OF CARE
Problem: Pain:  Goal: Pain level will decrease  Description: Pain level will decrease  11/7/2021 0313 by Genny Donahue RN  Outcome: Met This Shift  11/6/2021 1949 by Emma Rondon RN  Outcome: Ongoing  Goal: Control of acute pain  Description: Control of acute pain  11/7/2021 0313 by Genny Donahue RN  Outcome: Met This Shift  11/6/2021 1949 by Emma Rondon RN  Outcome: Ongoing     Problem: Falls - Risk of:  Goal: Will remain free from falls  Description: Will remain free from falls  11/7/2021 0313 by Genny Donahue RN  Outcome: Met This Shift  11/6/2021 1949 by Emma Rondon RN  Outcome: Met This Shift  Goal: Absence of physical injury  Description: Absence of physical injury  11/7/2021 0313 by Genny Donahue RN  Outcome: Met This Shift  11/6/2021 1949 by Emma Rondon RN  Outcome: Met This Shift     Problem: Skin Integrity:  Goal: Will show no infection signs and symptoms  Description: Will show no infection signs and symptoms  11/7/2021 0313 by Genny Donahue RN  Outcome: Met This Shift  11/6/2021 1949 by Emma Rondon RN  Outcome: Met This Shift  Goal: Absence of new skin breakdown  Description: Absence of new skin breakdown  11/7/2021 0313 by Genny Donahue RN  Outcome: Met This Shift  11/6/2021 1949 by Emma Rondon RN  Outcome: Ongoing

## 2021-11-07 NOTE — PROGRESS NOTES
Sent Dr. Lili Wade the results of Iron Testing:  Iron: 17  Iron Saturation: 7  Ferritin: 54  TBIC: 228

## 2021-11-07 NOTE — PROGRESS NOTES
Department of Orthopedic Surgery  Resident Progress Note    Patient seen and examined. Pain well controlled. No changes in sensation. No difficulties with diet. He is admitted to flatus. No nausea or vomiting. No chest pain or shortness of breath. No acute events overnight. VITALS:  BP (!) 82/50   Pulse 109   Temp 99 °F (37.2 °C) (Temporal)   Resp 12   Ht 5' 8\" (1.727 m)   Wt 126 lb 9.6 oz (57.4 kg)   SpO2 93%   BMI 19.25 kg/m²     General: alert and oriented to person, place and time    MUSCULOSKELETAL:   right lower extremity:  · Aquacel dressings clean dry and intact, no sanguinous drainage  · Compartments soft and compressible  · +PF/DF/EHL  · +2/4 DP & PT pulses, Brisk Cap refill, Toes warm and perfused  · Distal sensation subjectively decreased, at baseline per patient    CBC:   Lab Results   Component Value Date    WBC 13.5 11/05/2021    HGB 7.3 11/06/2021    HCT 24.2 11/06/2021     11/05/2021     PT/INR:    Lab Results   Component Value Date    PROTIME 14.0 11/05/2021    INR 1.3 11/05/2021           ASSESSMENT  · S/P cephalomedullary nail placement to right femur on 11/5/2021    PLAN      · Continue physical therapy and protocol: PWB - 50%- RLE  · 24 hour abx coverage, completed  · Deep venous thrombosis prophylaxis -Lovenox transition to aspirin, early mobilization  · PT/OT  · Pain Control: IV and PO  · Monitor H&H, 7.3, patient not endorsing any symptoms at this time  · D/C Plan: Appreciate PT/OT/SW. Patient states this morning that he is anxious to return home. Appreciate PT/OT evaluations. We would like to see further progression with therapy prior to discharge to home. Patient would be appropriate to discharge to rehab if he so desired.

## 2021-11-07 NOTE — PROGRESS NOTES
Subjective:  Kenny Green was seen resting bed comfortably with eyes closed, easily opened this on my voice. He did report he would like to go home today if possible and avoid rehab. We did discuss why this would not be the best option but he still prefers this. Objective:    BP (!) 82/50   Pulse 109   Temp 99 °F (37.2 °C) (Temporal)   Resp 12   Ht 5' 8\" (1.727 m)   Wt 126 lb 9.6 oz (57.4 kg)   SpO2 93%   BMI 19.25 kg/m²     In: 68.6   Out: 900   In: 68.6   Out: 900 [Urine:900]    General Appearance:  awake, alert, oriented, in no acute distress  Head/face:  NCAT  Eyes:  No gross abnormalities. Lungs: Adequate effort bilaterally, mild wheezing appreciated. Heart:  Heart sounds are normal.  Regular rate and rhythm without murmur, gallop or rub. Abdomen:  Soft, non-tender, normal bowel sounds. No bruits, organomegaly or masses. Extremities: No edema  Neurologic: Grossly nonfocal     Recent Labs     11/05/21  0613 11/06/21  0634   WBC 13.5*  --    HGB 9.6* 7.3*   HCT 31.2* 24.2*     --        Recent Labs     11/05/21  0613      K 3.8      CO2 26   BUN 22   CREATININE 1.2   CALCIUM 8.3*       Assessment:    Principal Problem:    Intertrochanteric fracture of right femur, closed, initial encounter ([facility]  Active Problems:  Anemia, microcytic  COPD, not in exacerbation. Acute on chronic hypoxic respiratory failure, utilizes 4 L at home    Tobacco dependence, continued    Atrial fibrillation ([facility])    Hypertension    CKD (chronic kidney disease) stage 3, GFR 30-59 ml/min ([facility])    Fall    Rib fracture    Weight loss    Severe protein-calorie malnutrition ([facility]    Plan:    POD #2, Ortho team continues to follow and appreciate their recommendations. He continues to work with physical therapy as well however not too keen on the idea of going to rehab.   We will continue to reinforce that this is the best decision for him in his current condition however he is alert and oriented x3 and able to make his own decisions legally. His hemoglobin did drop today however not below 7 for transfusion. I did order an FOBT to evaluate for any blood loss in the stool and ordered an iron panel as he did have microcytosis prior to today. His blood pressures continue to be on the softer side, I have adjusted his blood pressure medication to reflect that at home: Metoprolol reduced from twice daily to daily, digoxin discontinued. We will continue to trend his vital signs, kidney function, electrolytes and blood counts daily. If he drops his hemoglobin of 7, will transfuse as needed. If his iron function panel reports back as iron deficient, I will start him on oral supplementation. DVT Prophylaxis with Lovenox held due to potential for GIB, can resume if FOBT negative. SCD until resuming pharmacological anticoagulation. PT/OT continues to work with him for rehab recommendations  Discharge planning likely to discharge home with home health and PT per patient's request, declining rehab.           Gael Esposito MD  12:45 PM  11/7/2021

## 2021-11-07 NOTE — PROGRESS NOTES
Called [place], [place] for Dr. Milo Saini to advise his H=7.3 as of 11/6 which is trending down from 9.6 on 11/5. She will advise Dr. Irvin Pittamn who is covering for Dr. Milo Saini while she is on vacation this week.

## 2021-11-08 VITALS
TEMPERATURE: 97.5 F | HEART RATE: 54 BPM | HEIGHT: 68 IN | RESPIRATION RATE: 16 BRPM | DIASTOLIC BLOOD PRESSURE: 55 MMHG | SYSTOLIC BLOOD PRESSURE: 102 MMHG | WEIGHT: 126.6 LBS | BODY MASS INDEX: 19.19 KG/M2 | OXYGEN SATURATION: 97 %

## 2021-11-08 LAB
BASOPHILS ABSOLUTE: 0.01 E9/L (ref 0–0.2)
BASOPHILS RELATIVE PERCENT: 0.1 % (ref 0–2)
EOSINOPHILS ABSOLUTE: 0.3 E9/L (ref 0.05–0.5)
EOSINOPHILS RELATIVE PERCENT: 2.5 % (ref 0–6)
HCT VFR BLD CALC: 26.3 % (ref 37–54)
HEMOGLOBIN: 7.8 G/DL (ref 12.5–16.5)
IMMATURE GRANULOCYTES #: 0.05 E9/L
IMMATURE GRANULOCYTES %: 0.4 % (ref 0–5)
LYMPHOCYTES ABSOLUTE: 0.87 E9/L (ref 1.5–4)
LYMPHOCYTES RELATIVE PERCENT: 7.2 % (ref 20–42)
MCH RBC QN AUTO: 23.7 PG (ref 26–35)
MCHC RBC AUTO-ENTMCNC: 29.7 % (ref 32–34.5)
MCV RBC AUTO: 79.9 FL (ref 80–99.9)
MONOCYTES ABSOLUTE: 1.01 E9/L (ref 0.1–0.95)
MONOCYTES RELATIVE PERCENT: 8.4 % (ref 2–12)
NEUTROPHILS ABSOLUTE: 9.79 E9/L (ref 1.8–7.3)
NEUTROPHILS RELATIVE PERCENT: 81.4 % (ref 43–80)
PDW BLD-RTO: 17.9 FL (ref 11.5–15)
PLATELET # BLD: 264 E9/L (ref 130–450)
PMV BLD AUTO: 10.3 FL (ref 7–12)
RBC # BLD: 3.29 E12/L (ref 3.8–5.8)
WBC # BLD: 12 E9/L (ref 4.5–11.5)

## 2021-11-08 PROCEDURE — 6360000002 HC RX W HCPCS: Performed by: STUDENT IN AN ORGANIZED HEALTH CARE EDUCATION/TRAINING PROGRAM

## 2021-11-08 PROCEDURE — 94640 AIRWAY INHALATION TREATMENT: CPT

## 2021-11-08 PROCEDURE — 6370000000 HC RX 637 (ALT 250 FOR IP): Performed by: STUDENT IN AN ORGANIZED HEALTH CARE EDUCATION/TRAINING PROGRAM

## 2021-11-08 PROCEDURE — 97530 THERAPEUTIC ACTIVITIES: CPT

## 2021-11-08 PROCEDURE — 2700000000 HC OXYGEN THERAPY PER DAY

## 2021-11-08 PROCEDURE — 6370000000 HC RX 637 (ALT 250 FOR IP): Performed by: INTERNAL MEDICINE

## 2021-11-08 PROCEDURE — 85025 COMPLETE CBC W/AUTO DIFF WBC: CPT

## 2021-11-08 PROCEDURE — 2580000003 HC RX 258: Performed by: STUDENT IN AN ORGANIZED HEALTH CARE EDUCATION/TRAINING PROGRAM

## 2021-11-08 PROCEDURE — 36415 COLL VENOUS BLD VENIPUNCTURE: CPT

## 2021-11-08 RX ORDER — CLOPIDOGREL BISULFATE 75 MG/1
75 TABLET ORAL DAILY
Qty: 30 TABLET | Refills: 3 | Status: SHIPPED | OUTPATIENT
Start: 2021-11-09

## 2021-11-08 RX ORDER — FERROUS SULFATE 325(65) MG
325 TABLET ORAL 2 TIMES DAILY WITH MEALS
Qty: 30 TABLET | Refills: 3 | Status: SHIPPED | OUTPATIENT
Start: 2021-11-08

## 2021-11-08 RX ORDER — FERROUS SULFATE 325(65) MG
325 TABLET ORAL 2 TIMES DAILY WITH MEALS
Status: DISCONTINUED | OUTPATIENT
Start: 2021-11-08 | End: 2021-11-08 | Stop reason: HOSPADM

## 2021-11-08 RX ADMIN — POTASSIUM CHLORIDE 20 MEQ: 20 TABLET, EXTENDED RELEASE ORAL at 09:45

## 2021-11-08 RX ADMIN — CLOPIDOGREL BISULFATE 75 MG: 75 TABLET ORAL at 09:45

## 2021-11-08 RX ADMIN — PANTOPRAZOLE SODIUM 40 MG: 40 TABLET, DELAYED RELEASE ORAL at 16:33

## 2021-11-08 RX ADMIN — Medication 10 ML: at 09:49

## 2021-11-08 RX ADMIN — OXYCODONE AND ACETAMINOPHEN 1 TABLET: 5; 325 TABLET ORAL at 06:08

## 2021-11-08 RX ADMIN — POTASSIUM CHLORIDE 20 MEQ: 20 TABLET, EXTENDED RELEASE ORAL at 16:33

## 2021-11-08 RX ADMIN — ARFORMOTEROL TARTRATE 15 MCG: 15 SOLUTION RESPIRATORY (INHALATION) at 10:01

## 2021-11-08 RX ADMIN — MORPHINE SULFATE 2 MG: 2 INJECTION, SOLUTION INTRAMUSCULAR; INTRAVENOUS at 09:49

## 2021-11-08 RX ADMIN — PANTOPRAZOLE SODIUM 40 MG: 40 TABLET, DELAYED RELEASE ORAL at 06:09

## 2021-11-08 RX ADMIN — FERROUS SULFATE TAB 325 MG (65 MG ELEMENTAL FE) 325 MG: 325 (65 FE) TAB at 16:33

## 2021-11-08 RX ADMIN — FERROUS SULFATE TAB 325 MG (65 MG ELEMENTAL FE) 325 MG: 325 (65 FE) TAB at 09:45

## 2021-11-08 RX ADMIN — BUDESONIDE 1000 MCG: 0.5 SUSPENSION RESPIRATORY (INHALATION) at 10:01

## 2021-11-08 RX ADMIN — IPRATROPIUM BROMIDE 0.5 MG: 0.5 SOLUTION RESPIRATORY (INHALATION) at 10:01

## 2021-11-08 ASSESSMENT — PAIN SCALES - GENERAL
PAINLEVEL_OUTOF10: 6
PAINLEVEL_OUTOF10: 8
PAINLEVEL_OUTOF10: 3
PAINLEVEL_OUTOF10: 6
PAINLEVEL_OUTOF10: 3

## 2021-11-08 ASSESSMENT — PAIN DESCRIPTION - ONSET: ONSET: ON-GOING

## 2021-11-08 ASSESSMENT — PAIN DESCRIPTION - LOCATION: LOCATION: HIP

## 2021-11-08 ASSESSMENT — PAIN DESCRIPTION - ORIENTATION: ORIENTATION: RIGHT

## 2021-11-08 ASSESSMENT — PAIN - FUNCTIONAL ASSESSMENT: PAIN_FUNCTIONAL_ASSESSMENT: PREVENTS OR INTERFERES SOME ACTIVE ACTIVITIES AND ADLS

## 2021-11-08 ASSESSMENT — PAIN DESCRIPTION - FREQUENCY: FREQUENCY: CONTINUOUS

## 2021-11-08 ASSESSMENT — PAIN DESCRIPTION - PAIN TYPE: TYPE: SURGICAL PAIN;ACUTE PAIN

## 2021-11-08 NOTE — CARE COORDINATION
Met again with patient to discuss discharge plan. Discussed PT/OT scores  17/ 13 respectively. Discussed rehab which he is refusing and also called his wife who is NP. She states that he is refusing  Offered choice for Moon Lovett -She is requesting [facility] for [facility]- referral to [facility]. notified bedside nurse notified of need for [facility]hc orders for discharge. Electronically signed by Tawana Hood RN on 11/8/2021 at 1:28 PM

## 2021-11-08 NOTE — PROGRESS NOTES
Physical Therapy    Name: Haley Beauchamp  : 1940  MRN: [id number]      Date of Service: 2021    Evaluating PT:  Severino Dianejason, PT [id number]      Room #:  8161/7241-G  Diagnosis:  Fall, initial encounter [W19. XXXA]  Intertrochanteric fracture of right femur, closed, initial encounter ([facility] [S72.141A]  Physeal fracture of proximal end of right femur, unspecified physeal fracture configuration, initial encounter ([facility] [S79.001A]  PMHx/PSHx:     has a past medical history of Amblyopia, Anticoagulated on Coumadin, Atrial fibrillation ([facility]), Bilateral carotid artery stenosis, CAD (coronary artery disease), CHF (congestive heart failure) ([address] CKD (chronic kidney disease) stage 3, GFR 30-59 ml/min ([facility]), Colon polyp, COPD (chronic obstructive pulmonary disease) ([facility]), Diarrhea, Emesis, Emphysema of lung ([address] Erosive esophagitis, Full dentures, H/O asbestosis, Hyperlipidemia, Hypertension, Hypokalemia, Hypoprothrombinemia ([address] Hypotension due to hypovolemia, Myopia, Non-rheumatic aortic sclerosis, NSVT (nonsustained ventricular tachycardia) ([address] On home O2, NICHOLAS (obstructive sleep apnea), Pulmonary hypertension ([address] Squamous cell carcinoma, face, Thrombocytopenia ([address] Tobacco dependence, and Unintentional weight loss of 10% body weight within 6 months. has a past surgical history that includes Appendectomy; Eye surgery (); Coronary artery bypass graft (, ); Colonoscopy; Skin cancer excision; Cardiac catheterization; Coronary angioplasty with stent; ear surgery (Left, 2019); back surgery (years ago); ear surgery (Left, 2019); Upper gastrointestinal endoscopy (N/A, 2020); and Femur fracture surgery (Right, 2021).    Procedure/Surgery:  S/P cephalomedullary nail placement to right femur on 2021    Precautions:  Falls,  PWB (partial weight bearing) 50%, O2  Equipment Needs: Patient has needed equipment ,    SUBJECTIVE:    Patient lives with spouse  in a two story home resides first  with 2 steps to enter with 1Rail  Bed is on 1 floor and bath is on 1 floor. Patient ambulated independently  PTA. Equipment owned: Wheelchair, Wheeled [address] and Scooter,      OBJECTIVE:   Initial Evaluation  Date: 11/6/21 Treatment 11/8/21 Short Term/ Long Term   Goals   AM-PAC 6 Clicks 92/03 62/90    Was pt agreeable to Eval/treatment? yes yes    Does pt have pain? Yes with spasms Yes R LE    Bed Mobility  Rolling: NT   Supine to sit:   Mod x 2   Sit to supine: Mod x 2   Scooting: Mod x 2  Supine to sit Charissa for R LE only  Scooting SBA Rolling: Ind  Supine to sit: Ind  Sit to supine: Ind  Scooting: Ind   Transfers Sit to stand: Mod x 2 to fww  Stand to sit: Mod x 2  Stand pivot: Mod x 2 with fww with increased cues. Very slow rate. Sit to stand SBA  Stand tos it SBA  Stand pivot SBA with ww Sit to stand: Mod Ind  to fww  Stand to sit: Mod Ind    Stand pivot: Mod Ind  with fww   Ambulation    3 feet with fww Mod x 2 Limited by pain symptoms. 50 feet x1 with ww SBA 50% WB R LE 25 feet with Mod Ind  fww   Stair negotiation: ascended and descended  NT N/T pt declined 2 steps with Min    ROM BUE: Defer to OT eval  BLE:  Decreased RLE. Strength BUE: Defer to OT eval  RLE:  Grossly 3-/5  LLE:  Grossly  4/5  4+/5   Balance Sitting EOB:  SBA  Dynamic Standing: Mod A x 2 Sitting EOB Supervision  Standing with ww SBA Sitting EOB:  Ind  Dynamic Standing: Mod Ind     Patient is Alert & Oriented x person, place, time and situation and follows directions   Sensation:  Pt denies numbness and tingling to extremities  Edema:  none    Vitals:  Seated   Blood Pressure at rest -   Heart Rate at rest 98 bpm   SPO2 at rest 97% with 6 L RN made aware. Therapeutic Exercises: ankle pumps 30x, quad sets 10x, glut sets 10x, LAQS AAROM R LE 10x. Patient education  Pt educated regarding increase OOB activities, 50% WB R LE.   Patient response to education:   Pt verbalized understanding Pt demonstrated skill Pt requires further education in this area   yes yes Reminders     ASSESSMENT:    Conditions Requiring Skilled Therapeutic Intervention:    [x]Decreased strength     [x]Decreased ROM  [x]Decreased functional mobility  [x]Decreased balance   [x]Decreased endurance   [x]Decreased posture  []Decreased sensation  []Decreased coordination   []Decreased vision  [x]Decreased safety awareness   []Increased pain       Comments:    RN cleared patient for participation in therapy session. Pt found in bed and agreed to tx. Pt performed ex. Pt required Charissa for R LE during supine to sit. Pt sat EOB and reviewed WB restrictions. Pt on 6 liters O2 throughout. Pt 's O2 at rest on 6 liters 97% and during amb. Pt did not require physical assistance for transfers. Pt amb with ww. Cues for WB restrictions and technique. Increased amb distance. Pt declined to perform steps as pt reports does not have steps to get into house from garage. Pt reports also he is doing a first floor set up at home. Pt reports having and rollator walker and a ww. Pt educated and instructed to use the ww. Pt agreed to sit up in chair. Pt given call light. This patient can benefit from the continuation of skilled PT possibly in a rehab setting. to maximize functional level and return to PLOF. Treatment:  Patient practiced and was instructed in the following treatment:    · Bed mobility training - pt given verbal and tactile cues to facilitate proper sequencing and safety during rolling and supine>sit as well as provided with physical assistance to complete task    · Assistive device training - pt educated on using [employer] approximation/negotiation, and hand placement during sit<>stand to [employer]  · STS and transfer training - educated on hand/foot placement, safety, and sequencing during STS and pivot transfers using assistive device  o Education in [employer] precautions.     Pt's/ family goals   1. yes    Prognosis is good  for reaching above PT goals. Patient and or family understand(s) diagnosis, prognosis, and plan of care.  yes,     PHYSICAL THERAPY PLAN OF CARE:    PT POC is established based on physician order and patient diagnosis     Referring provider/PT Order:  PT Eval and Treat   11/04/21 0130  PT eval and treat              Will [place], DO       Diagnosis:  Fall, initial encounter [W19. XXXA]  Intertrochanteric fracture of right femur, closed, initial encounter ([facility] [S72.141A]  Physeal fracture of proximal end of right femur, unspecified physeal fracture configuration, initial encounter ([address] [facility] [S79.001A]  Specific instructions for next treatment:  Sit EOB, postural exercises, Transfer to bedside chair and Increase ambulation distance  Current Treatment Recommendations:     [x] Strengthening to improve independence with functional mobility   [x] ROM to improve independence with functional mobility   [x] Balance Training to improve static/dynamic balance and to reduce fall risk  [x] Endurance Training to improve activity tolerance during functional mobility   [x] Transfer Training to improve safety and independence with all functional transfers   [x] Gait Training to improve gait mechanics, endurance and asses need for appropriate assistive device  [x] Stair Training in preparation for safe discharge home and/or into the community   [] Positioning to prevent skin breakdown and contractures  [x] Safety and Education Training   [] Patient/Caregiver Education   [] HEP  [] Other     PT long term treatment goals are located in above grid    Frequency of treatments: 2-5x/week x 1-2 weeks.     Time in 11:35  Time out  12:00    Total Treatment Time  25 minutes     Evaluation Time includes thorough review of current medical information, gathering information on past medical history/social history and prior level of function, completion of standardized testing/informal observation of tasks, assessment of data and education on plan of care and goals.    CPT codes:  [] Low Complexity PT evaluation 48684  [] Moderate Complexity PT evaluation 57077  [] High Complexity PT evaluation 64190  [] PT Re-evaluation 59872  [] Gait training 76967 - minutes  [] Manual therapy 75985 - minutes  [x] Therapeutic activities 23518 25 minutes  [] Therapeutic exercises 37474 - minutes  [] Neuromuscular reeducation 15053 - minutes     Damon Amaya [id number]

## 2021-11-08 NOTE — DISCHARGE SUMMARY
fracture     COPD  -Supplement O2 to keep saturation greater than 93%-baseline 4 L  -ISP postsurgical intervention  -DuoNebs as needed  -Smoking cessation educated patient has no interest in quitting    Patient is discharging home with Moon Lovett in stable condition. Patient scored poorly with PT and rehab was recommended however patient refused. Patients medications are listed below. Advised to follow up with PCP and Ortho. Patient's Eliquis and Plavis is currently on hold until cleared with orthopedic surgery. Patient does need a CBC in 5 days to follow up his Hgb. Recent Labs     11/06/21  0634 11/08/21  0854   WBC  --  12.0*   HGB 7.3* 7.8*   HCT 24.2* 26.3*   PLT  --  264       No results for input(s): NA, K, CL, CO2, BUN, CREATININE, CALCIUM in the last 72 hours. Invalid input(s): GLU    XR LUMBAR SPINE (2-3 VIEWS)    Result Date: 11/3/2021  EXAMINATION: THREE XRAY VIEWS OF THE LUMBAR SPINE 11/3/2021 8:52 pm COMPARISON: None. HISTORY: ORDERING SYSTEM PROVIDED HISTORY: fall TECHNOLOGIST PROVIDED HISTORY: Reason for exam:->fall What reading provider will be dictating this exam?->CRC FINDINGS: The bones are demineralized. There are no acute fractures or subluxations. The discs are relatively maintained in height. The pedicles are intact with no paraspinal soft tissue mass. Extensive vascular calcification. There is a large amount of retained stool. No evidence of acute bony pathology. XR HIP RIGHT (1 VIEW)    Result Date: 11/3/2021  EXAMINATION: ONE XRAY VIEW OF THE RIGHT HIP 11/3/2021 11:10 pm COMPARISON: None. HISTORY: ORDERING SYSTEM PROVIDED HISTORY: reduction TECHNOLOGIST PROVIDED HISTORY: Reason for exam:->reduction What reading provider will be dictating this exam?->CRC FINDINGS: Displaced right intertrochanteric fracture extending into the proximal shaft of the femur with improved alignment compared to pre reduction views. Grossly normal soft tissues.      Displaced right intertrochanteric fracture extending into the proximal shaft of the femur with improved alignment compared to pre reduction views. XR FEMUR RIGHT (MIN 2 VIEWS)    Result Date: 11/3/2021  EXAMINATION: XRAY VIEWS OF THE RIGHT FEMUR 11/3/2021 9:30 pm COMPARISON: None. HISTORY: ORDERING SYSTEM PROVIDED HISTORY: fall TECHNOLOGIST PROVIDED HISTORY: Reason for exam:->fall What reading provider will be dictating this exam?->CRC FINDINGS: There is comminuted and angulated fracture of the proximal femur and intertrochanteric region. Soft tissue swelling noted. Comminuted and angulated intertrochanteric and proximal femoral shaft fracture. CT Head WO Contrast    Result Date: 11/3/2021  EXAMINATION: CT OF THE HEAD WITHOUT CONTRAST  11/3/2021 9:25 pm TECHNIQUE: CT of the head was performed without the administration of intravenous contrast. Dose modulation, iterative reconstruction, and/or weight based adjustment of the mA/kV was utilized to reduce the radiation dose to as low as reasonably achievable. COMPARISON: None. HISTORY: ORDERING SYSTEM PROVIDED HISTORY: fall TECHNOLOGIST PROVIDED HISTORY: Reason for exam:->fall Has a \"code stroke\" or \"stroke alert\" been called? ->No Decision Support Exception - unselect if not a suspected or confirmed emergency medical condition->Emergency Medical Condition (MA) What reading provider will be dictating this exam?->CRC FINDINGS: BRAIN/VENTRICLES: There are age related cortical atrophy and periventricular white matter ischemic changes. There is no acute intracranial hemorrhage, mass effect or midline shift. No abnormal extra-axial fluid collection. The gray-white differentiation is maintained without evidence of an acute infarct. There is no evidence of hydrocephalus. ORBITS: The visualized portion of the orbits demonstrate no acute abnormality. SINUSES: The visualized paranasal sinuses and mastoid air cells demonstrate no acute abnormality.  SOFT TISSUES/SKULL:  No acute abnormality of the visualized skull or soft tissues. No acute intracranial abnormality. CT Cervical Spine WO Contrast    Result Date: 11/3/2021  EXAMINATION: CT OF THE CERVICAL SPINE WITHOUT CONTRAST 11/3/2021 9:25 pm TECHNIQUE: CT of the cervical spine was performed without the administration of intravenous contrast. Multiplanar reformatted images are provided for review. Dose modulation, iterative reconstruction, and/or weight based adjustment of the mA/kV was utilized to reduce the radiation dose to as low as reasonably achievable. COMPARISON: None. HISTORY: ORDERING SYSTEM PROVIDED HISTORY: fall TECHNOLOGIST PROVIDED HISTORY: Reason for exam:->fall Decision Support Exception - unselect if not a suspected or confirmed emergency medical condition->Emergency Medical Condition (MA) What reading provider will be dictating this exam?->CRC FINDINGS: BONES/ALIGNMENT: The bones are demineralized. There is no acute fracture or traumatic malalignment. There is mild chronic appearing loss of height of C5, C6 and T1 vertebral bodies. DEGENERATIVE CHANGES: Mild to moderate degenerative changes with disc space narrowing at C5-C6. Multilevel bilateral facet arthrosis noted. SOFT TISSUES: There is no prevertebral soft tissue swelling. Emphysematous changes in the visualized lung apices. No acute abnormality of the cervical spine. Mild loss of height of C5, C6 and T1 vertebral bodies, likely chronic. Degenerative changes C5-C6. XR CHEST PORTABLE    Result Date: 11/3/2021  EXAMINATION: ONE XRAY VIEW OF THE CHEST 11/3/2021 9:30 pm COMPARISON: None. HISTORY: ORDERING SYSTEM PROVIDED HISTORY: fall TECHNOLOGIST PROVIDED HISTORY: Reason for exam:->fall What reading provider will be dictating this exam?->CRC FINDINGS: There are stable chronic changes in both lungs. Heart is mildly enlarged. Status post CABG. No pneumothorax. The costophrenic angles are clear. Suspect left 7th rib fracture, indeterminate age.      Stable chronic changes in both lungs. Suspect left 7th rib fracture, indeterminate age. XR HIP 2-3 VW W PELVIS RIGHT    Result Date: 11/3/2021  EXAMINATION: ONE XRAY VIEW OF THE PELVIS AND TWO XRAY VIEWS RIGHT HIP 11/3/2021 9:30 pm COMPARISON: None. HISTORY: ORDERING SYSTEM PROVIDED HISTORY: fall, pain TECHNOLOGIST PROVIDED HISTORY: Reason for exam:->fall, pain What reading provider will be dictating this exam?->CRC FINDINGS: The bones are demineralized. There is angulated and displaced comminuted fracture through the intertrochanteric region and proximal femoral shaft. Adjacent soft tissue swelling noted. Angulated and comminuted intertrochanteric and proximal femoral shaft fracture. Discharge Exam:    HEENT: NCAT,  PERRLA, No JVD  Heart:  RRR, no murmurs, gallops, or rubs. Lungs:  CTA bilaterally, no wheeze, rales or rhonchi  Abd: bowel sounds present, nontender, nondistended, no masses  Extrem:  No clubbing, cyanosis, or edema    Disposition: home     Patient Condition at Discharge: Stable    Patient Instructions:      Medication List      START taking these medications    aspirin EC 81 MG EC tablet  Take 1 tablet by mouth 2 times daily for 28 days     ferrous sulfate 325 (65 Fe) MG tablet  Commonly known as: IRON 325  Take 1 tablet by mouth 2 times daily (with meals)     oxyCODONE-acetaminophen 5-325 MG per tablet  Commonly known as: Percocet  Take 1 tablet by mouth every 6 hours as needed for Pain for up to 7 days. Intended supply: 7 days.  Take lowest dose possible to manage pain        CONTINUE taking these medications    Advair Diskus 500-50 MCG/DOSE diskus inhaler  Generic drug: fluticasone-salmeterol     albuterol (5 MG/ML) 0.5% nebulizer solution  Commonly known as: PROVENTIL  Take 1 mL by nebulization 3 times daily as needed for Wheezing     atorvastatin 20 MG tablet  Commonly known as: LIPITOR     metoprolol succinate 25 MG extended release tablet  Commonly known as: TOPROL XL  Take 1 tablet by mouth 2 times daily     OXYGEN     pantoprazole 40 MG tablet  Commonly known as: PROTONIX  Take 1 tablet by mouth 2 times daily (before meals)     potassium chloride 20 MEQ extended release tablet  Commonly known as: KLOR-CON M  Take 1 tablet by mouth 2 times daily (with meals)     Revefenacin 175 MCG/3ML Soln  Inhale 1 ampule into the lungs 2 times daily     torsemide 20 MG tablet  Commonly known as: DEMADEX        STOP taking these medications    apixaban 5 MG Tabs tablet  Commonly known as: ELIQUIS     clopidogrel 75 MG tablet  Commonly known as: PLAVIX     HYDROcodone-acetaminophen 7.5-325 MG per tablet  Commonly known as: Cheryal Gomez           Where to Get Your Medications      These medications were sent to [facility], [address] [place] [address], [address] [place] [address]    Phone: [phone]   · ferrous sulfate 325 (65 Fe) MG tablet     You can get these medications from any pharmacy    Bring a paper prescription for each of these medications  · aspirin EC 81 MG EC tablet  · oxyCODONE-acetaminophen 5-325 MG per tablet       Activity: activity as tolerated  Diet: cardiac diet    Pt has been advised to: Follow-up with Tina Harrell MD in 1 week.   Follow-up with consultants as recommended by them    Note that over 30 minutes was spent in preparing discharge papers, discussing discharge with patient, medication review, etc.    Signed:  Neyda Escobar MD  11/8/2021  1:41 PM

## 2021-11-08 NOTE — PLAN OF CARE
Problem: Falls - Risk of:  Goal: Will remain free from falls  Description: Will remain free from falls  Outcome: Met This Shift  Goal: Absence of physical injury  Description: Absence of physical injury  Outcome: Met This Shift     Problem: Skin Integrity:  Goal: Will show no infection signs and symptoms  Description: Will show no infection signs and symptoms  Outcome: Met This Shift     Problem: Pain:  Goal: Pain level will decrease  Description: Pain level will decrease  Outcome: Ongoing  Goal: Control of acute pain  Description: Control of acute pain  Outcome: Ongoing  Goal: Control of chronic pain  Description: Control of chronic pain  Outcome: Ongoing     Problem: Skin Integrity:  Goal: Absence of new skin breakdown  Description: Absence of new skin breakdown  Outcome: Ongoing

## 2021-11-08 NOTE — PROGRESS NOTES
Messaged Dr Nori Marinelli to inquire on how long the patient is to stop the Plavix and Eliquis while taking the ASA prescribed by Ortho. Response: He can resume his eliquis and plavix at discharge.  If he didnt take aspirin at baseline we can DC that and stick with eliquis and plavix

## 2021-11-08 NOTE — PROGRESS NOTES
Department of Orthopedic Surgery  Resident Progress Note    Patient seen and examined. Pain well controlled. No changes in sensation. No difficulties with diet. He is admitted to flatus. No nausea or vomiting. No chest pain or shortness of breath. No acute events overnight. Wants to go home. Did discuss that rehab may be more appropriate for him but he is adamant that he wants to go home. VITALS:  BP (!) 96/52   Pulse 95   Temp 97.8 °F (36.6 °C) (Temporal)   Resp 16   Ht 5' 8\" (1.727 m)   Wt 126 lb 9.6 oz (57.4 kg)   SpO2 93%   BMI 19.25 kg/m²     General: alert and oriented to person, place and time    MUSCULOSKELETAL:   right lower extremity:  · Aquacel dressings clean dry and intact, no sanguinous drainage  · Compartments soft and compressible  · +PF/DF/EHL  · +2/4 DP & PT pulses, Brisk Cap refill, Toes warm and perfused  · Distal sensation subjectively decreased, at baseline per patient    CBC:   Lab Results   Component Value Date    WBC 13.5 11/05/2021    HGB 7.3 11/06/2021    HCT 24.2 11/06/2021     11/05/2021     PT/INR:    Lab Results   Component Value Date    PROTIME 14.0 11/05/2021    INR 1.3 11/05/2021           ASSESSMENT  · S/P cephalomedullary nail placement to right femur on 11/5/2021    PLAN      · Continue physical therapy and protocol: PWB - 50%- RLE  · 24 hour abx coverage, completed  · Deep venous thrombosis prophylaxis -Lovenox transition to aspirin, early mobilization  · PT/OT  · Pain Control: IV and PO  · Monitor H&H, 7.3, patient not endorsing any symptoms at this time  · D/C Plan: Appreciate PT/OT/SW. Patient states this morning that he is anxious to return home. Appreciate PT/OT evaluations. [address] for discharge from orthopedic standpoint. Did highly recommend rehab but he declined.

## 2021-11-08 NOTE — PROGRESS NOTES
Patient was offered bowel medications and suppository. He refuses to take anything here, but states \"I will do it at home. \" Explain the importance of having a bowel movement before leaving. This made him more upset and wanting to leave sooner. Patient will need bedside commode at home.

## 2021-11-19 DIAGNOSIS — S72.141A INTERTROCHANTERIC FRACTURE OF RIGHT FEMUR, CLOSED, INITIAL ENCOUNTER (HCC): Primary | ICD-10-CM

## 2021-11-22 ENCOUNTER — HOSPITAL ENCOUNTER (OUTPATIENT)
Dept: GENERAL RADIOLOGY | Age: 81
Discharge: HOME | End: 2021-11-24
Payer: MEDICARE

## 2021-11-22 ENCOUNTER — OFFICE VISIT (OUTPATIENT)
Dept: ORTHOPEDIC SURGERY | Age: 81
End: 2021-11-22
Payer: MEDICARE

## 2021-11-22 VITALS — BODY MASS INDEX: 17.43 KG/M2 | WEIGHT: 115 LBS | HEIGHT: 68 IN

## 2021-11-22 DIAGNOSIS — S72.141A INTERTROCHANTERIC FRACTURE OF RIGHT FEMUR, CLOSED, INITIAL ENCOUNTER (HCC): ICD-10-CM

## 2021-11-22 DIAGNOSIS — S72.141D CLOSED COMMINUTED INTERTROCHANTERIC FRACTURE OF PROXIMAL END OF FEMUR WITH ROUTINE HEALING, RIGHT: Primary | ICD-10-CM

## 2021-11-22 PROCEDURE — 73552 X-RAY EXAM OF FEMUR 2/>: CPT

## 2021-11-22 PROCEDURE — 99212 OFFICE O/P EST SF 10 MIN: CPT | Performed by: PHYSICIAN ASSISTANT

## 2021-11-22 PROCEDURE — 99024 POSTOP FOLLOW-UP VISIT: CPT | Performed by: PHYSICIAN ASSISTANT

## 2021-11-22 PROCEDURE — 72170 X-RAY EXAM OF PELVIS: CPT

## 2021-11-22 RX ORDER — METHOCARBAMOL 750 MG/1
750 TABLET, FILM COATED ORAL 4 TIMES DAILY PRN
Qty: 40 TABLET | Refills: 0 | Status: SHIPPED
Start: 2021-11-22 | End: 2022-07-21 | Stop reason: ALTCHOICE

## 2021-11-22 RX ORDER — GABAPENTIN 100 MG/1
100 CAPSULE ORAL 3 TIMES DAILY
Qty: 30 CAPSULE | Refills: 0 | Status: SHIPPED
Start: 2021-11-22 | End: 2022-04-28

## 2021-11-22 RX ORDER — HYDROCODONE BITARTRATE AND ACETAMINOPHEN 7.5; 325 MG/1; MG/1
TABLET ORAL
Status: ON HOLD | COMMUNITY
Start: 2021-11-12 | End: 2022-07-26 | Stop reason: SDUPTHER

## 2021-11-22 NOTE — PROGRESS NOTES
Patient here for a 2 week postop check right femur peritrochanteric fx. DOS 11/05/2021. Patient states that his pain is unbearable right now. Patient did take medication to help with his pain, but no relief. Patient states that he has all of his pain in the right buttocks/hip area.             Electronically signed by Brittni Sy MA on 11/22/2021 at 12:20 PM

## 2021-11-22 NOTE — PROGRESS NOTES
Chief Complaint   Patient presents with    Post-Op Check     right femur. OP:SURGEON: Dr. Sapphire Garrison DO  DATE OF PROCEDURE: 11/5/21  PROCEDURE:RIGHT FEMUR OPEN REDUCTION INTERNAL FIXATION    POD: 2 weeks    Subjective:  Chris Keller is following up from the above surgery. He is PWB on right lower extremity 50%. He ambulates with assistive device, walker or wheelchair. Pain to extremity is severe and is  taking prescribed pain medication, chronically on Norco 7.5mg with pain management, states significant increased pain due to increased activity as well as increasing muscle spasms. They denies numbness, tingling to the right lower extremity. Does endorse intermittent shooting burning pain. Denies calf pain, chest pain, or shortness of breath. Patient continues to use DVT prophylaxis, chronically on eliquis and plavix. Patient is  participating in therapy, home health care with [facility]. Patient feels he is making progress with home therapy, has been working on ascending/desending stairs while maintaining WB status. Review of Systems -  All pertinent positives/negative in HPI     Objective:    General: Alert and oriented X 3, normocephalic atraumatic, external ears and eye normal, sclera clear, no acute distress, respirations easy and unlabored with no audible wheezes, skin warm and dry, speech and dress appropriate for noted age, affect euthymic. Extremity:  Right Lower Extremity  Skin clean dry and intact, without signs of infection   Incisions healing well, no significant drainage, no dehiscence, no significant erythema   Moderate ecchymosis to the lateral hip and thigh.  TTP over the greater trochanter region  Sutures intact to incisions, healing well, no surrounding erythema/warmth/induration, ready for removal   mild edema noted  Compartments supple throughout thigh and leg  Calf supple and not tender  negative Homans  Demonstrates active ankle DF/PF, + EHL  Patients pain exacerbated with PROM of the R Hip  States sensation intact to touch in sural, deep peroneal, superficial peroneal, saphenous, posterior tibial  nerve distributions to foot/ankle. Palpable dorsalis pedis and posterior tibialis pulses, cap refill brisk in toes, foot warm/perfused. Ht 5' 8\" (1.727 m)   Wt 115 lb (52.2 kg)   BMI 17.49 kg/m²     XR:   AP pelvis and 3V of the R femur demonstrates CMN in place to peritrochanteric femur fracture, there is no evidence of hardware failure or loosening. No appreciable callus formation when compared to prior imaging. Assessment:   Diagnosis Orders   1. Closed comminuted intertrochanteric fracture of proximal end of femur with routine healing, right  methocarbamol (ROBAXIN-750) 750 MG tablet    gabapentin (NEURONTIN) 100 MG capsule       Plan:  Removed sutures  Steri strips should fall off in the shower at some point, if they do not fall off in 10 days, remove them  Dressing: Can remove dressing in 1-2 days then open to air  Can shower tomorrow over incision. NO Soaking or submerging incision in water until fully healed & all scabs are gone    WB:  50% weightbearing on right lower extremity     Therapy: Continue with home PT/OT    Recommend use of vitamin D supplement over the counter for bone healing      DVT: Continue with Continue with your Eliquis and Plavix as ordered  Pain control : Continue with Norco from pain management. Trail of Robaxin and Gabapentin for additional pain control sent to pharmacy- if pain improving we can refil. Recommend nicotine cessation    Continue with ice to the injured extremity 2-3 times per day for swelling  If able continue with elevation and compression    Follow up in 4 weeks with XR of the Right Femur    Controlled Substance Monitoring:    Acute and Chronic Pain Monitoring:   RX Monitoring 11/22/2021   Periodic Controlled Substance Monitoring No signs of potential drug abuse or diversion identified.             Electronically signed by Geo Johnson CRISTOBAL Razo on 11/22/2021 at 1:26 PM  Note: This report was completed using computerRemark voiced recognition software. Every effort has been made to ensure accuracy; however, inadvertent computerized transcription errors may be present.

## 2021-11-22 NOTE — PATIENT INSTRUCTIONS
Removed sutures  Steri strips should fall off in the shower at some point, if they do not fall off in 10 days, remove them  Dressing: Can remove dressing in 1-2 days then open to air  Can shower tomorrow over incision. NO Soaking or submerging incision in water until fully healed & all scabs are gone    WB:  50% weightbearing on right lower extremity     Therapy: Continue with home PT/OT    Recommend use of vitamin D supplement over the counter for bone healing      DVT: Continue with Continue with your Eliquis and Plavix as ordered  Pain control : Continue with Norco from pain management. Trail of Robaxin and Gabapentin for additional pain control sent to pharmacy- if pain improving we can refil. Continue with ice to the injured extremity 2-3 times per day for swelling  If able continue with elevation and compression    Follow up in 4 weeks with XR of the Right Femur    Please call the office at [phone] or send [employer] message to providers sooner with any questions or concerns  Strongly recommend all of our patients sign up for [employer] in order to have direct communication VIA [employer] JULIEN with our clinic staff. ORTHOPEDIC TRAUMA TEAM STRONGLY RECOMMENDS THAT YOU STOP ALL NICOTINE USE  Nicotine severely impairs your body's ability to heal surgical and traumatic wounds but also impairs bone healing. Wounds and bone heal by forming microscopic blood vessels (angiogenesis) and nicotine is a vasoconstrictor (essentially, shrinks blood vessels). Therefore, if vasoconstriction occurs to these microscopic blood vessels they essentially disappear and are unable to deliver necessary nutrients to the healing tissue. Nicotine increases the chance of your bone not healing and possibly needing further surgery. Nicotine increases risk of infection. Nicotine causes poor wound healing and delayed wound healing.     Quitting smoking or nicotine use is something that you can do to dramatically increase
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
## 2021-12-15 DIAGNOSIS — S72.141D CLOSED COMMINUTED INTERTROCHANTERIC FRACTURE OF PROXIMAL END OF FEMUR WITH ROUTINE HEALING, RIGHT: Primary | ICD-10-CM

## 2021-12-20 ENCOUNTER — OFFICE VISIT (OUTPATIENT)
Dept: ORTHOPEDIC SURGERY | Age: 81
End: 2021-12-20
Payer: MEDICARE

## 2021-12-20 ENCOUNTER — HOSPITAL ENCOUNTER (OUTPATIENT)
Dept: GENERAL RADIOLOGY | Age: 81
Discharge: HOME | End: 2021-12-22
Payer: MEDICARE

## 2021-12-20 VITALS — WEIGHT: 112 LBS | BODY MASS INDEX: 16.97 KG/M2 | HEIGHT: 68 IN

## 2021-12-20 DIAGNOSIS — S72.141D CLOSED COMMINUTED INTERTROCHANTERIC FRACTURE OF PROXIMAL END OF FEMUR WITH ROUTINE HEALING, RIGHT: Primary | ICD-10-CM

## 2021-12-20 DIAGNOSIS — S72.141D CLOSED COMMINUTED INTERTROCHANTERIC FRACTURE OF PROXIMAL END OF FEMUR WITH ROUTINE HEALING, RIGHT: ICD-10-CM

## 2021-12-20 PROCEDURE — 73552 X-RAY EXAM OF FEMUR 2/>: CPT

## 2021-12-20 PROCEDURE — 72170 X-RAY EXAM OF PELVIS: CPT

## 2021-12-20 PROCEDURE — 99212 OFFICE O/P EST SF 10 MIN: CPT | Performed by: PHYSICIAN ASSISTANT

## 2021-12-20 PROCEDURE — 99024 POSTOP FOLLOW-UP VISIT: CPT | Performed by: PHYSICIAN ASSISTANT

## 2021-12-20 NOTE — PROGRESS NOTES
Chief Complaint   Patient presents with    Post-Op Check     pelvis, right femur     OP:SURGEON: Dr. Sandra Dillard DO  DATE OF PROCEDURE: 11/5/21  PROCEDURE:RIGHT FEMUR OPEN REDUCTION INTERNAL FIXATION    Subjective:  Babar Gutierres is approximately 6 weeks up after surgery. He is 50% weightbearing to the right lower extremity and does use a walker. He is participating in physical therapy several times per week and compliant with HEP. States his pain has significantly improved. He does see pain management. States that pain management did not want him to take previously prescribed Robaxin and gabapentin per Ortho. He is chronically on Eliquis. No new complaints. Denies calf pain, CP, SOB, fever, chills, malaise, myalgias. Review of Systems -  all pertinent positives and negatives in HPI. Objective:    General: Alert and oriented X 3, normocephalic atraumatic, external ears and eye normal, sclera clear, no acute distress, respirations easy and unlabored with no audible wheezes, skin warm and dry, speech and dress appropriate for noted age, affect euthymic. Extremity:  Right Lower Extremity  Skin clean dry and intact, without signs of infection  No edema noted  Compartments supple throughout thigh and leg  Calf supple and nontender, Homans -  Demonstrates active knee flexion/extension, ankle plantar/dorsiflexion/great toe extension. States sensation intact to touch in sural/deep peroneal/superficial peroneal/saphenous/posterior tibial nerve distributions to foot/ankle. Palpable dorsalis pedis and posterior tibialis pulses, cap refill brisk in toes, foot warm/perfused.   Mild R hip tenderness with terminal passive ER at 50 degrees, nontender to passive IR. nontender to palpation about the hip, thigh, knee  Significant bilateral quad atrophy appreciated              Ht 5' 8\" (1.727 m)   Wt 112 lb (50.8 kg)   BMI 17.03 kg/m²     XR:   Narrative   EXAMINATION:   ONE XRAY VIEW OF THE PELVIS; 2 XRAY VIEWS OF THE RIGHT FEMUR       12/20/2021 7:22 am       COMPARISON:   22 November 2021       HISTORY:   ORDERING SYSTEM PROVIDED HISTORY: Closed comminuted intertrochanteric   fracture of proximal end of femur with routine healing, right   TECHNOLOGIST PROVIDED HISTORY:   Reason for exam:->fx   What reading provider will be dictating this exam?->CRC       FINDINGS:   Intact right femoral gamma nail there is less displacement of the underlying   fracture.  Stable osteoarthritis at the right hip.  No new callus formation.             Assessment:   Diagnosis Orders   1. Closed comminuted intertrochanteric fracture of proximal end of femur with routine healing, right  XR PELVIS (1-2 VIEWS)    XR FEMUR RIGHT (MIN 2 VIEWS)       Plan:   Reviewed x-rays with patient today in office    Can increase WB 25% per week as tolerated with AD until WBAT    Continue PT and HEP   Chronic eliquis per PCP   Continue PM for pain control    Ice, elevation, compression     Follow up in 4 weeks with XR of the R femur and pelvis     Electronically signed by Rocky Shelton PA-C on 12/20/2021 at 9:55 AM  Note: This report was completed using [employer] voiced recognition software. Every effort has been made to ensure accuracy; however, inadvertent computerized transcription errors may be present.

## 2021-12-20 NOTE — PROGRESS NOTES
Patient here for a 6 week postop check right femur peritochanteric fx, DOS 11/05/2021. Patient states that he still has some soreness, but pain is improving. Patient is getting home physical therapy twice weekly, and feels that it is helping. Patient does use a walker while at home to walk around, he states that the pain in minimal when pressure is applied. No concerns noted at this time.            Electronically signed by Alban Caro MA on 12/20/2021 at 8:15 AM

## 2022-01-06 ENCOUNTER — TELEPHONE (OUTPATIENT)
Dept: ORTHOPEDIC SURGERY | Age: 82
End: 2022-01-06

## 2022-01-06 NOTE — TELEPHONE ENCOUNTER
Helen Crandall from [facility] called office to advise patient was discharged from all home health care at this time. Patient declined going to outpatient therapy.     Future Appointments   Date Time Provider [place]   1/20/2022  8:45  [facility]

## 2022-01-20 ENCOUNTER — HOSPITAL ENCOUNTER (OUTPATIENT)
Dept: GENERAL RADIOLOGY | Age: 82
Discharge: HOME | End: 2022-01-22
Payer: MEDICARE

## 2022-01-20 ENCOUNTER — OFFICE VISIT (OUTPATIENT)
Dept: ORTHOPEDIC SURGERY | Age: 82
End: 2022-01-20
Payer: MEDICARE

## 2022-01-20 DIAGNOSIS — S72.141D CLOSED COMMINUTED INTERTROCHANTERIC FRACTURE OF PROXIMAL END OF FEMUR WITH ROUTINE HEALING, RIGHT: ICD-10-CM

## 2022-01-20 DIAGNOSIS — S72.141D CLOSED COMMINUTED INTERTROCHANTERIC FRACTURE OF PROXIMAL END OF FEMUR WITH ROUTINE HEALING, RIGHT: Primary | ICD-10-CM

## 2022-01-20 PROCEDURE — 73552 X-RAY EXAM OF FEMUR 2/>: CPT

## 2022-01-20 PROCEDURE — 72170 X-RAY EXAM OF PELVIS: CPT

## 2022-01-20 PROCEDURE — 99024 POSTOP FOLLOW-UP VISIT: CPT | Performed by: PHYSICIAN ASSISTANT

## 2022-01-20 PROCEDURE — 99212 OFFICE O/P EST SF 10 MIN: CPT | Performed by: ORTHOPAEDIC SURGERY

## 2022-01-20 NOTE — PATIENT INSTRUCTIONS
Full weightbearing on right lower extremity  Continue with activity as you tolerate  Continue with your home exercise program as outlined by therapy  Okay to use ice and heat to the right hip soreness particularly to the side of the hip    If continuing to have significant groin pain can consider corticosteroid injection under fluoroscopy or ultrasound    Plan for follow-up in 3 months if needed for repeat evaluation and x-rays.

## 2022-01-20 NOTE — PROGRESS NOTES
Vanna Coppola is a 80 y.o. male who presents for follow up of Pst-Op [facility] clinic with XR R Femur     SURGEON: Dr. Victor Hugo Gallegos DO  Date of Injury/Surgery: 11-5-2021  Date last seen in office: 12-    Symptoms: better  New complaints: Pt has 6/10 pain. Pt has decreased ADL's due increased pain in Right Hip causing loss of sleep daily. Femur: Pt has slight increased pain with AROM. Pt having numbness and tingling in the anterior aspect of the Right Lower Leg moving into the dorsum aspect of the Right Foot. Weightbearing: right lower Partial weight bearing      Assistive device Walker - standard and Small quad cane  Participating in therapy (location if yes)?  no    Refills Needed: None  Order/Referral Needed: N/A

## 2022-01-20 NOTE — PROGRESS NOTES
Chief Complaint   Patient presents with    Hip Pain     Pt has 6/10 pain. Pt has decreased ADL's due increased pain in Right Hip causing loss of sleep daily.  Hip Injury     Pt has slight increased pain with AROM.  Leg Pain     Pt having numbness and tingling in the anterior aspect of the Right Lower Leg moving into the dorsum aspect of the Right Foot. OP:SURGEON: Dr. Jannette Clifford, DO  DATE OF PROCEDURE: 11-5-2021  PROCEDURE: RIGHT FEMUR OPEN REDUCTION INTERNAL FIXATION    POD: 11 Weeks    Subjective:  Kandy Bhakta is following up from the above surgery. He is WBAT on right lower extremity. He ambulates with assistive device, walker. States he feels too shaky with use of a cane. Pain to extremity is waxing and waning and is  taking prescribed pain medication, Chronically on opioid pain medication with pain management. Patient endorses lateral hip pain at night as well as intermittent groin pain with standing and pivoting. He denies pain to the thigh or knee at this time. He does have baseline neuropathy with numbness from the knee down. Denies calf pain, chest pain, or shortness of breath. Patient is finished with formal therapy, continues home exercise program.  He is chronically on Eliquis. Presents today with family for 3-month postop check. Has no new issues or concerns. Review of Systems -  All pertinent positives/negative in HPI     Objective:    General: Alert and oriented X 3, normocephalic atraumatic, external ears and eye normal, sclera clear, no acute distress, respirations easy and unlabored with no audible wheezes, skin warm and dry, speech and dress appropriate for noted age, affect euthymic. Extremity:  Right Lower Extremity  Skin clean dry and intact, without signs of infection   Incision well-healed without hypertrophic or keloid changes. Nontender over incision  no edema noted  Bilateral lower extremity muscle atrophy.   Compartments supple throughout thigh and leg  Calf supple and not tender  negative Homans  Demonstrates active hip flexion/IR/ER, knee extension/flexion, baseline weakness with ankle and foot ROM  States sensation diminished diffusely to the foot and ankle  Palpable dorsalis pedis and posterior tibialis pulses, cap refill brisk in toes, foot warm/perfused. XR:   AP pelvis and 2 views of the right femur obtained. Status post cephalomedullary nail for subtrochanteric femur fracture, no evidence of hardware failure or loosening. There does appear to be continued interval healing with bridging callus noted. No evidence of hardware failure or loosening noted at hardware. Underlying hip and knee OA noted. Assessment:   Diagnosis Orders   1. Closed comminuted intertrochanteric fracture of proximal end of femur with routine healing, right         Plan:  Full weightbearing on right lower extremity  Continue with activity as you tolerate  Continue with your home exercise program as outlined by therapy  Okay to use ice and heat to the right hip soreness particularly to the side of the hip    If continuing to have significant groin pain can consider corticosteroid injection under fluoroscopy or ultrasound    Plan for follow-up in 3 months if needed for repeat evaluation and x-rays. Electronically signed by Rosmery Manriquez PA-C on 1/20/2022 at 9:26 AM  Note: This report was completed using computerBox Jump voiced recognition software. Every effort has been made to ensure accuracy; however, inadvertent computerized transcription errors may be present.

## 2022-04-27 ENCOUNTER — TELEPHONE (OUTPATIENT)
Dept: ORTHOPEDIC SURGERY | Age: 82
End: 2022-04-27

## 2022-04-27 DIAGNOSIS — S72.141D CLOSED COMMINUTED INTERTROCHANTERIC FRACTURE OF PROXIMAL END OF FEMUR WITH ROUTINE HEALING, RIGHT: Primary | ICD-10-CM

## 2022-04-28 ENCOUNTER — HOSPITAL ENCOUNTER (OUTPATIENT)
Dept: GENERAL RADIOLOGY | Age: 82
Discharge: HOME | End: 2022-04-30
Payer: MEDICARE

## 2022-04-28 ENCOUNTER — OFFICE VISIT (OUTPATIENT)
Dept: ORTHOPEDIC SURGERY | Age: 82
End: 2022-04-28
Payer: MEDICARE

## 2022-04-28 VITALS — TEMPERATURE: 98.4 F

## 2022-04-28 DIAGNOSIS — S72.141D CLOSED COMMINUTED INTERTROCHANTERIC FRACTURE OF PROXIMAL END OF FEMUR WITH ROUTINE HEALING, RIGHT: ICD-10-CM

## 2022-04-28 DIAGNOSIS — S72.141D CLOSED COMMINUTED INTERTROCHANTERIC FRACTURE OF PROXIMAL END OF FEMUR WITH ROUTINE HEALING, RIGHT: Primary | ICD-10-CM

## 2022-04-28 PROCEDURE — 99213 OFFICE O/P EST LOW 20 MIN: CPT | Performed by: ORTHOPAEDIC SURGERY

## 2022-04-28 PROCEDURE — 73552 X-RAY EXAM OF FEMUR 2/>: CPT

## 2022-04-28 PROCEDURE — G8427 DOCREV CUR MEDS BY ELIG CLIN: HCPCS | Performed by: ORTHOPAEDIC SURGERY

## 2022-04-28 PROCEDURE — 4040F PNEUMOC VAC/ADMIN/RCVD: CPT | Performed by: ORTHOPAEDIC SURGERY

## 2022-04-28 PROCEDURE — 4004F PT TOBACCO SCREEN RCVD TLK: CPT | Performed by: ORTHOPAEDIC SURGERY

## 2022-04-28 PROCEDURE — 72170 X-RAY EXAM OF PELVIS: CPT

## 2022-04-28 PROCEDURE — 1123F ACP DISCUSS/DSCN MKR DOCD: CPT | Performed by: ORTHOPAEDIC SURGERY

## 2022-04-28 PROCEDURE — G8419 CALC BMI OUT NRM PARAM NOF/U: HCPCS | Performed by: ORTHOPAEDIC SURGERY

## 2022-04-28 PROCEDURE — 99212 OFFICE O/P EST SF 10 MIN: CPT

## 2022-04-28 NOTE — PROGRESS NOTES
Amy Souza a 80year old male presents today for a follow up for the right femur ORIF on 11-5-2021 by Dr Ernie Pepe. Patient presents with his wife, Akiko Brunson. He is in a wheelchair for convenience. He is finished with therapy and utilizing a walker and cane when out of the house as needed. Full weight bearing within the home. Continues to have some discomfort, is still following with Pain Management. Denies new fall or injury.

## 2022-04-28 NOTE — PROGRESS NOTES
Chief Complaint   Patient presents with    Follow Up After Procedure     Right femur ORIF 11-5-2021        OP:SURGEON: Dr. Makenna Rosas DO  DATE OF PROCEDURE: 11-5-2021  PROCEDURE: RIGHT FEMUR OPEN REDUCTION INTERNAL FIXATION    Subjective:  Ludy Walker is following up from the above surgery. He is WBAT on right lower extremity. States is not really having any further pain at this point to the right thigh or hip. Still deconditioned from his initial mobilization and prior to recent injury. Is able to ambulate without assistive device but for prolonged distances does use assistive devices. He is companied by his wife today. States that he was able to cut the grass this past week and overall feels like he is doing well. Has no interval issues or concerns. He is on opioids chronically with pain management for low back issues. .     Review of Systems -  All pertinent positives/negative in HPI     Objective:    General: Alert and oriented X 3, normocephalic atraumatic, external ears and eye normal, sclera clear, no acute distress, respirations easy and unlabored with no audible wheezes, skin warm and dry, speech and dress appropriate for noted age, affect euthymic. Extremity:  Right Lower Extremity  Skin clean dry and intact, without signs of infection   Incision well-healed without hypertrophic or keloid changes. Nontender over incision  No significant pain with hip logroll, passive ROM 10/30 IR/ER  no edema noted  Bilateral lower extremity muscle atrophy. Compartments supple throughout thigh and leg  Calf supple and not tender  negative Homans  Demonstrates active hip flexion/IR/ER, knee extension/flexion, baseline weakness with ankle and foot ROM  States sensation diminished diffusely to the foot and ankle  Palpable dorsalis pedis and posterior tibialis pulses, cap refill brisk in toes, foot warm/perfused. XR:   AP pelvis and 2 views of the right femur obtained.   Status post cephalomedullary nail for subtrochanteric femur fracture, no evidence of hardware failure or loosening. There does appear to be continued interval healing with bridging callus noted. No evidence of hardware failure or loosening noted at hardware. Underlying hip and knee OA noted. Assessment:   Diagnosis Orders   1. Closed comminuted intertrochanteric fracture of proximal end of femur with routine healing, right         Plan:  Recommend continued HEP, overall conditioning  Healthy lifestyle  Patient to follow-up with us as needed    Electronically signed by Vahid Hancock DO on 4/28/2022     Note: This report was completed using [employer] voiced recognition software. Every effort has been made to ensure accuracy; however, inadvertent computerized transcription errors may be present.

## 2022-07-08 ENCOUNTER — INITIAL CONSULT (OUTPATIENT)
Dept: NEUROSURGERY | Age: 82
End: 2022-07-08
Payer: MEDICARE

## 2022-07-08 VITALS
OXYGEN SATURATION: 93 % | BODY MASS INDEX: 19.99 KG/M2 | SYSTOLIC BLOOD PRESSURE: 101 MMHG | RESPIRATION RATE: 24 BRPM | HEART RATE: 61 BPM | DIASTOLIC BLOOD PRESSURE: 54 MMHG | WEIGHT: 120 LBS | TEMPERATURE: 98.3 F | HEIGHT: 65 IN

## 2022-07-08 DIAGNOSIS — S22.050A CLOSED WEDGE COMPRESSION FRACTURE OF T6 VERTEBRA, INITIAL ENCOUNTER (HCC): Primary | ICD-10-CM

## 2022-07-08 DIAGNOSIS — S22.050A CLOSED WEDGE COMPRESSION FRACTURE OF T5 VERTEBRA, INITIAL ENCOUNTER (HCC): ICD-10-CM

## 2022-07-08 PROCEDURE — G8420 CALC BMI NORM PARAMETERS: HCPCS | Performed by: PHYSICIAN ASSISTANT

## 2022-07-08 PROCEDURE — 1123F ACP DISCUSS/DSCN MKR DOCD: CPT | Performed by: PHYSICIAN ASSISTANT

## 2022-07-08 PROCEDURE — G8427 DOCREV CUR MEDS BY ELIG CLIN: HCPCS | Performed by: PHYSICIAN ASSISTANT

## 2022-07-08 PROCEDURE — 4004F PT TOBACCO SCREEN RCVD TLK: CPT | Performed by: PHYSICIAN ASSISTANT

## 2022-07-08 PROCEDURE — 99214 OFFICE O/P EST MOD 30 MIN: CPT | Performed by: PHYSICIAN ASSISTANT

## 2022-07-08 NOTE — PROGRESS NOTES
Problem Focused Office Visit     Subjective: Tono Love is a 80 y.o.  male who is known to our services. He was previous seen in our office 2021 for mid back pain. MRI completed demonstrated chronic T5 and T6 compression fractures. PT was recommended but patient declined. He presents to the office today c/o increased mid back pain for the past several months. Denies injury or fall. Describes the pain as sharp and stabbing. Pain radiates into his right side. Any type of movement makes the pain worse. He takes Norco and robaxin with minimal relief. Pt states he has a TLSO brace that he does not wish to wear. Denies pain down the legs, numbness, tingling, loss of bowel or bladder, saddle anesthesia, fever, chills, SOB, or chest pain. Bone scan reviewed. Physical Exam:              WDWN, no apparent distress              Non-labored breathing               Vitals Stable              Alert and oriented x3              CN 3-12 intact              PERRL              EOMI              CORNELL well              Motor strength symmetric              Sensation to LT intact bilaterally   Tenderness to palpation of midline thoracic spine and right sided ribs                Imagin22 NM bone scan reviewed from an outside facility. Demonstrates uptake at T5 and T6 at known compression fractures. No metastatic disease. Assessment: This is a 80 y.o.  male presenting for evaluation of increased mid back pain. Plan:  -NM bone scan reviewed and discussed with patient and wife. Pt has known T5 and T6 compression fractures. -Recommend wearing TLSO brace and following up in 2 months. Pt declined. States he does not wish to wear the brace and wants an appointment with Dr. Sudhir Mo to discuss kyphoplasty. I explained the kyphoplasty may not give any significant back pain relief if it can even be performed. Pt states he would still like to discuss it.  Appointment made.   -Call or return to neurosurgery office sooner if symptoms worsen or if new issues arise in the interim.     Electronically signed by Ace Mcclendon PA-C on 7/8/2022 at 3:14 PM

## 2022-07-13 ENCOUNTER — OFFICE VISIT (OUTPATIENT)
Dept: NEUROSURGERY | Age: 82
End: 2022-07-13
Payer: MEDICARE

## 2022-07-13 VITALS
SYSTOLIC BLOOD PRESSURE: 97 MMHG | HEIGHT: 65 IN | WEIGHT: 120 LBS | BODY MASS INDEX: 19.99 KG/M2 | DIASTOLIC BLOOD PRESSURE: 59 MMHG | HEART RATE: 85 BPM | RESPIRATION RATE: 20 BRPM | TEMPERATURE: 98.3 F | OXYGEN SATURATION: 91 %

## 2022-07-13 DIAGNOSIS — S22.000A COMPRESSION FRACTURE OF THORACIC VERTEBRA, UNSPECIFIED THORACIC VERTEBRAL LEVEL, INITIAL ENCOUNTER (HCC): Primary | ICD-10-CM

## 2022-07-13 PROCEDURE — 1123F ACP DISCUSS/DSCN MKR DOCD: CPT | Performed by: NEUROLOGICAL SURGERY

## 2022-07-13 PROCEDURE — 99213 OFFICE O/P EST LOW 20 MIN: CPT | Performed by: NEUROLOGICAL SURGERY

## 2022-07-13 PROCEDURE — G8427 DOCREV CUR MEDS BY ELIG CLIN: HCPCS | Performed by: NEUROLOGICAL SURGERY

## 2022-07-13 PROCEDURE — 4004F PT TOBACCO SCREEN RCVD TLK: CPT | Performed by: NEUROLOGICAL SURGERY

## 2022-07-13 PROCEDURE — 99212 OFFICE O/P EST SF 10 MIN: CPT

## 2022-07-13 PROCEDURE — G8420 CALC BMI NORM PARAMETERS: HCPCS | Performed by: NEUROLOGICAL SURGERY

## 2022-07-14 ENCOUNTER — PREP FOR PROCEDURE (OUTPATIENT)
Dept: NEUROSURGERY | Age: 82
End: 2022-07-14

## 2022-07-14 RX ORDER — SODIUM CHLORIDE 0.9 % (FLUSH) 0.9 %
5-40 SYRINGE (ML) INJECTION PRN
Status: CANCELLED | OUTPATIENT
Start: 2022-07-14

## 2022-07-14 RX ORDER — SODIUM CHLORIDE 0.9 % (FLUSH) 0.9 %
5-40 SYRINGE (ML) INJECTION EVERY 12 HOURS SCHEDULED
Status: CANCELLED | OUTPATIENT
Start: 2022-07-14

## 2022-07-14 RX ORDER — SODIUM CHLORIDE 9 MG/ML
INJECTION, SOLUTION INTRAVENOUS PRN
Status: CANCELLED | OUTPATIENT
Start: 2022-07-14

## 2022-07-14 RX ORDER — SODIUM CHLORIDE 9 MG/ML
INJECTION, SOLUTION INTRAVENOUS CONTINUOUS
Status: CANCELLED | OUTPATIENT
Start: 2022-07-14

## 2022-07-20 ENCOUNTER — TELEPHONE (OUTPATIENT)
Dept: NEUROSURGERY | Age: 82
End: 2022-07-20

## 2022-07-21 NOTE — PROGRESS NOTES
[facility] PRE-ADMISSION TESTING GENERAL INSTRUCTIONS- [facility]-phone number:[phone]    GENERAL INSTRUCTIONS  [x] Antibacterial Soap shower Night before and/or AM of Surgery  [] Chema wipe instruction sheet and wipes given. [x] Nothing by mouth after midnight, including gum, candy, mints, or water. [x] You may brush your teeth, gargle, but do NOT swallow water. []Hibiclens shower  the night before and the morning of surgery. Do not use             Hibiclens on your face or head. [x]No smoking, chewing tobacco, illegal drugs, or alcohol within 24 hours of your surgery. [x] Jewelry, valuables or body piercing's should not be brought to the hospital. All body and/or tongue piercing's must be removed prior to arriving to hospital.  ALL hair pins must be removed. [x] Do not wear makeup, lotions, powders, deodorant. Nail polish as directed by the nurse. [x] Arrange transportation with a responsible adult  to and from the hospital. If you do not have a responsible adult  to transport you, you will need to make arrangements with a medical transportation company (i.e. [employer]. A Uber/taxi/bus is not appropriate unless you are accompanied by a responsible adult ). Arrange for someone to be with you for the remainder of the day and for 24 hours after your procedure due to having had anesthesia. Who will be your  for transportation?____tracy______________   Who will be staying with you for 24 hrs after your procedure?___________tracy_______  [x] Bring insurance card and photo ID.  [] Transfusion Bracelet: Please bring with you to hospital, day of surgery  [] Bring urine specimen day of surgery. Any small container is acceptable. [] Use inhalers the morning of surgery and bring with you to hospital.  [] Bring copy of living will or healthcare power of  papers to be placed in your electronic record.   [] CPAP/BI-PAP: Please bring your machine if pre-op area if you have concerns about your blood sugar [phone]. [] Use your inhalers the morning of surgery. Bring your emergency inhaler with you day of surgery. [x] Follow physician instructions regarding any blood thinners you may be taking. WHAT TO EXPECT:  [x] The day of surgery you will be greeted and checked in by the [employer].  In addition, you will be registered in the [facility] by a Patient Access Representative. Please bring your photo ID and insurance card. A nurse will greet you in accordance to the time you are needed in the pre-op area to prepare you for surgery. Please do not be discouraged if you are not greeted in the order you arrive as there are many variables that are involved in patient preparation. Your patience is greatly appreciated as you wait for your nurse. Please bring in items such as: books, magazines, newspapers, electronics, or any other items  to occupy your time in the waiting area. []  Delays may occur with surgery and staff will make a sincere effort to keep you informed of delays. If any delays occur with your procedure, we apologize ahead of time for your inconvenience as we recognize the value of your time.

## 2022-07-25 ENCOUNTER — ANESTHESIA EVENT (OUTPATIENT)
Dept: OPERATING ROOM | Age: 82
End: 2022-07-25
Payer: MEDICARE

## 2022-07-25 NOTE — H&P
Back Pain  This is a chronic problem. Episode onset: 18 MONTHS. The problem occurs daily. The problem is unchanged. The pain is present in the thoracic spine. The quality of the pain is described as aching. The pain is at a severity of 9/10. The symptoms are aggravated by twisting, standing and bending. Treatments tried: norco, BILLIE, The treatment provided mild relief. Past Medical History:   Diagnosis Date    Amblyopia 1961    corrected    Anticoagulated on Coumadin     Atrial fibrillation ([address] 9/5/2018    Bilateral carotid artery stenosis 07/03/2014    CAD (coronary artery disease)     follows with / Roberto Dunne every 6 months    CHF (congestive heart failure) ([address]     last episode 9/2018 ? CKD (chronic kidney disease) stage 3, GFR 30-59 ml/min ([facility]) 7/30/2020    Colon polyp     COPD (chronic obstructive pulmonary disease) ([facility])     follows with DR. 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)     Hypotension due to hypovolemia 7/30/2020    Myopia     Non-rheumatic aortic sclerosis     NSVT (nonsustained ventricular tachycardia) ([address] 2018    On home O2     2 Liters/NC at night and during day prn    NICHOLAS (obstructive sleep apnea)     no use cpap    Pulmonary hypertension ([address] 8/1/2020    To severe 2020    Squamous cell carcinoma, face     skin    Thrombocytopenia ([address] 7/30/2020    Tobacco dependence 7/3/2014    Unintentional weight loss of 10% body weight within 6 months 7/31/2020     Past Surgical History:   Procedure Laterality Date    APPENDECTOMY      BACK SURGERY  years ago    lumbar    CARDIAC CATHETERIZATION      heart cath x4, cabg twice    COLONOSCOPY      CORONARY ANGIOPLASTY WITH STENT PLACEMENT      2000, 2002, 2005, 2009   total of 8 stents    [address]    two     EAR SURGERY Left 6/25/2019    I & D LEFT EAR ABSCESS performed by Aram Whitfield DO at [address] [place] Left 8/26/2019    INCISION AND DRAINAGE LEFT EAR WITH EXCISION OF FISTULA  AND RECONSTRUCTION performed by Aram Whitfield DO at [facility]  / multiple surgeries    FEMUR FRACTURE SURGERY Right 11/5/2021    RIGHT FEMUR OPEN REDUCTION INTERNAL FIXATION (SYNTHES) performed by Philippe Arboleda DO at [place] [address] GASTROINTESTINAL ENDOSCOPY N/A 7/31/2020    BEDSIDE EGD ESOPHAGOGASTRODUODENOSCOPY performed by Anatoliy Mancini MD at [facility]     . soc  Family History   Problem Relation Age of Onset    Cancer Mother         ? Other Mother         Hemorrhage    Heart Disease Father     Heart Disease Brother      Scheduled Meds:  Continuous Infusions:  PRN Meds:. Allergies   Allergen Reactions    Iodine     Linezolid Other (See Comments)     Thrombocytopenia, impactful diarrhea, weight loss    Pcn [Penicillins] Swelling    Penicillin G     Dye [Iodides]      Heart and kidney dye / reaction unknown             Review of Systems  Constitutional: Negative. HENT: Negative. Eyes: Negative. Respiratory: Negative. Cardiovascular: Negative. Gastrointestinal: Negative. Endocrine: Negative. Genitourinary: Negative. Musculoskeletal: Positive for back pain. Skin: Negative. Allergic/Immunologic: Negative. Neurological: Negative. Hematological: Negative. Psychiatric/Behavioral: Negative. Objective:   Physical Exam  HENT:     Head: Normocephalic and atraumatic. Nose: Nose normal.   Eyes:     Pupils: Pupils are equal, round, and reactive to light. Pulmonary:     Effort: Pulmonary effort is normal.   Abdominal:      General: There is no distension. Skin:     General: Skin is warm and dry. Neurological:     Mental Status: He is alert. GCS: GCS eye subscore is 4. GCS verbal subscore is 5.  GCS motor subscore is 6. Cranial Nerves: Cranial nerves are intact. Sensory: Sensory deficit present. Motor: Motor function is intact. Gait: Gait is intact. Deep Tendon Reflexes:     Reflex Scores:       Tricep reflexes are 1+ on the right side and 1+ on the left side. Bicep reflexes are 1+ on the right side and 1+ on the left side. Brachioradialis reflexes are 1+ on the right side and 1+ on the left side. Patellar reflexes are 1+ on the right side and 1+ on the left side. Achilles reflexes are 1+ on the right side and 1+ on the left side. Comments: Decreased sensation to light touch in stocking distribution   Psychiatric:         Mood and Affect: Mood normal.           Assessment:   68year old male with mid back pain for that past 18 months. Thoracic MRI report reveals T5 and T6 compression fractures. Plan:   I am recommending a T6 kyphoplasty.   R/B/A of surgery have been discussed and he wishes to proceed

## 2022-07-26 ENCOUNTER — APPOINTMENT (OUTPATIENT)
Dept: GENERAL RADIOLOGY | Age: 82
End: 2022-07-26
Attending: NEUROLOGICAL SURGERY
Payer: MEDICARE

## 2022-07-26 ENCOUNTER — HOSPITAL ENCOUNTER (OUTPATIENT)
Age: 82
Setting detail: OUTPATIENT SURGERY
Discharge: HOME | End: 2022-07-26
Attending: NEUROLOGICAL SURGERY | Admitting: NEUROLOGICAL SURGERY
Payer: MEDICARE

## 2022-07-26 ENCOUNTER — ANESTHESIA (OUTPATIENT)
Dept: OPERATING ROOM | Age: 82
End: 2022-07-26
Payer: MEDICARE

## 2022-07-26 VITALS
WEIGHT: 120 LBS | RESPIRATION RATE: 16 BRPM | BODY MASS INDEX: 19.99 KG/M2 | SYSTOLIC BLOOD PRESSURE: 129 MMHG | HEART RATE: 93 BPM | DIASTOLIC BLOOD PRESSURE: 64 MMHG | HEIGHT: 65 IN | OXYGEN SATURATION: 93 % | TEMPERATURE: 97 F

## 2022-07-26 DIAGNOSIS — Z01.818 PRE-OP TESTING: Primary | ICD-10-CM

## 2022-07-26 DIAGNOSIS — S22.050D COMPRESSION FRACTURE OF T6 VERTEBRA WITH ROUTINE HEALING, SUBSEQUENT ENCOUNTER: ICD-10-CM

## 2022-07-26 DIAGNOSIS — M54.9 BACK PAIN, UNSPECIFIED BACK LOCATION, UNSPECIFIED BACK PAIN LATERALITY, UNSPECIFIED CHRONICITY: ICD-10-CM

## 2022-07-26 PROBLEM — S22.000D COMPRESSION FRACTURE OF THORACIC VERTEBRA WITH ROUTINE HEALING: Status: ACTIVE | Noted: 2022-07-26

## 2022-07-26 PROBLEM — S22.050A CLOSED WEDGE COMPRESSION FRACTURE OF T6 VERTEBRA (HCC): Status: ACTIVE | Noted: 2022-07-26

## 2022-07-26 LAB
ANION GAP SERPL CALCULATED.3IONS-SCNC: 14 MMOL/L (ref 7–16)
BUN BLDV-MCNC: 7 MG/DL (ref 6–23)
CALCIUM SERPL-MCNC: 9.1 MG/DL (ref 8.6–10.2)
CHLORIDE BLD-SCNC: 103 MMOL/L (ref 98–107)
CO2: 30 MMOL/L (ref 22–29)
CREAT SERPL-MCNC: 1.1 MG/DL (ref 0.7–1.2)
EKG ATRIAL RATE: 87 BPM
EKG P AXIS: 106 DEGREES
EKG P-R INTERVAL: 166 MS
EKG Q-T INTERVAL: 410 MS
EKG QRS DURATION: 136 MS
EKG QTC CALCULATION (BAZETT): 493 MS
EKG R AXIS: 110 DEGREES
EKG T AXIS: 2 DEGREES
EKG VENTRICULAR RATE: 87 BPM
GFR AFRICAN AMERICAN: >60
GFR NON-AFRICAN AMERICAN: >60 ML/MIN/1.73
GLUCOSE BLD-MCNC: 93 MG/DL (ref 74–99)
HCT VFR BLD CALC: 47.9 % (ref 37–54)
HEMOGLOBIN: 15.3 G/DL (ref 12.5–16.5)
MCH RBC QN AUTO: 30.4 PG (ref 26–35)
MCHC RBC AUTO-ENTMCNC: 31.9 % (ref 32–34.5)
MCV RBC AUTO: 95.2 FL (ref 80–99.9)
PDW BLD-RTO: 13.4 FL (ref 11.5–15)
PLATELET # BLD: 207 E9/L (ref 130–450)
PMV BLD AUTO: 10.4 FL (ref 7–12)
POTASSIUM SERPL-SCNC: 4.4 MMOL/L (ref 3.5–5)
RBC # BLD: 5.03 E12/L (ref 3.8–5.8)
SODIUM BLD-SCNC: 147 MMOL/L (ref 132–146)
WBC # BLD: 9.2 E9/L (ref 4.5–11.5)

## 2022-07-26 PROCEDURE — C1713 ANCHOR/SCREW BN/BN,TIS/BN: HCPCS | Performed by: NEUROLOGICAL SURGERY

## 2022-07-26 PROCEDURE — C1894 INTRO/SHEATH, NON-LASER: HCPCS | Performed by: NEUROLOGICAL SURGERY

## 2022-07-26 PROCEDURE — 2580000003 HC RX 258

## 2022-07-26 PROCEDURE — 3700000001 HC ADD 15 MINUTES (ANESTHESIA): Performed by: NEUROLOGICAL SURGERY

## 2022-07-26 PROCEDURE — 6360000002 HC RX W HCPCS

## 2022-07-26 PROCEDURE — 80048 BASIC METABOLIC PNL TOTAL CA: CPT

## 2022-07-26 PROCEDURE — 7100000010 HC PHASE II RECOVERY - FIRST 15 MIN: Performed by: NEUROLOGICAL SURGERY

## 2022-07-26 PROCEDURE — 7100000011 HC PHASE II RECOVERY - ADDTL 15 MIN: Performed by: NEUROLOGICAL SURGERY

## 2022-07-26 PROCEDURE — 6360000002 HC RX W HCPCS: Performed by: STUDENT IN AN ORGANIZED HEALTH CARE EDUCATION/TRAINING PROGRAM

## 2022-07-26 PROCEDURE — 88311 DECALCIFY TISSUE: CPT

## 2022-07-26 PROCEDURE — 2500000003 HC RX 250 WO HCPCS: Performed by: NEUROLOGICAL SURGERY

## 2022-07-26 PROCEDURE — 2720000010 HC SURG SUPPLY STERILE: Performed by: NEUROLOGICAL SURGERY

## 2022-07-26 PROCEDURE — 93005 ELECTROCARDIOGRAM TRACING: CPT | Performed by: ANESTHESIOLOGY

## 2022-07-26 PROCEDURE — 2500000003 HC RX 250 WO HCPCS

## 2022-07-26 PROCEDURE — 3600000014 HC SURGERY LEVEL 4 ADDTL 15MIN: Performed by: NEUROLOGICAL SURGERY

## 2022-07-26 PROCEDURE — 88307 TISSUE EXAM BY PATHOLOGIST: CPT

## 2022-07-26 PROCEDURE — 2580000003 HC RX 258: Performed by: STUDENT IN AN ORGANIZED HEALTH CARE EDUCATION/TRAINING PROGRAM

## 2022-07-26 PROCEDURE — 36415 COLL VENOUS BLD VENIPUNCTURE: CPT

## 2022-07-26 PROCEDURE — 7100000000 HC PACU RECOVERY - FIRST 15 MIN: Performed by: NEUROLOGICAL SURGERY

## 2022-07-26 PROCEDURE — 3600000004 HC SURGERY LEVEL 4 BASE: Performed by: NEUROLOGICAL SURGERY

## 2022-07-26 PROCEDURE — 3209999900 FLUORO FOR SURGICAL PROCEDURES

## 2022-07-26 PROCEDURE — 3700000000 HC ANESTHESIA ATTENDED CARE: Performed by: NEUROLOGICAL SURGERY

## 2022-07-26 PROCEDURE — 2709999900 HC NON-CHARGEABLE SUPPLY: Performed by: NEUROLOGICAL SURGERY

## 2022-07-26 PROCEDURE — 22513 PERQ VERTEBRAL AUGMENTATION: CPT | Performed by: NEUROLOGICAL SURGERY

## 2022-07-26 PROCEDURE — 85027 COMPLETE CBC AUTOMATED: CPT

## 2022-07-26 DEVICE — BONE CEMENT C01B HV-R WITH MIXER  US
Type: IMPLANTABLE DEVICE | Site: BACK | Status: FUNCTIONAL
Brand: KYPHON® HV-R® BONE CEMENT AND KYPHON® MIXER PACK

## 2022-07-26 RX ORDER — PROPOFOL 10 MG/ML
INJECTION, EMULSION INTRAVENOUS CONTINUOUS PRN
Status: DISCONTINUED | OUTPATIENT
Start: 2022-07-26 | End: 2022-07-26 | Stop reason: SDUPTHER

## 2022-07-26 RX ORDER — SODIUM CHLORIDE 0.9 % (FLUSH) 0.9 %
5-40 SYRINGE (ML) INJECTION PRN
Status: DISCONTINUED | OUTPATIENT
Start: 2022-07-26 | End: 2022-07-26 | Stop reason: HOSPADM

## 2022-07-26 RX ORDER — BUPIVACAINE HYDROCHLORIDE 2.5 MG/ML
INJECTION, SOLUTION EPIDURAL; INFILTRATION; INTRACAUDAL PRN
Status: DISCONTINUED | OUTPATIENT
Start: 2022-07-26 | End: 2022-07-26 | Stop reason: ALTCHOICE

## 2022-07-26 RX ORDER — SODIUM CHLORIDE 9 MG/ML
INJECTION, SOLUTION INTRAVENOUS CONTINUOUS
Status: DISCONTINUED | OUTPATIENT
Start: 2022-07-26 | End: 2022-07-26 | Stop reason: HOSPADM

## 2022-07-26 RX ORDER — SODIUM CHLORIDE 0.9 % (FLUSH) 0.9 %
5-40 SYRINGE (ML) INJECTION EVERY 12 HOURS SCHEDULED
Status: DISCONTINUED | OUTPATIENT
Start: 2022-07-26 | End: 2022-07-26 | Stop reason: HOSPADM

## 2022-07-26 RX ORDER — MIDAZOLAM HYDROCHLORIDE 1 MG/ML
INJECTION INTRAMUSCULAR; INTRAVENOUS PRN
Status: DISCONTINUED | OUTPATIENT
Start: 2022-07-26 | End: 2022-07-26 | Stop reason: SDUPTHER

## 2022-07-26 RX ORDER — HYDROCODONE BITARTRATE AND ACETAMINOPHEN 7.5; 325 MG/1; MG/1
1 TABLET ORAL EVERY 6 HOURS PRN
Qty: 28 TABLET | Refills: 0 | Status: SHIPPED | OUTPATIENT
Start: 2022-07-26 | End: 2022-08-02

## 2022-07-26 RX ORDER — LIDOCAINE HYDROCHLORIDE AND EPINEPHRINE 10; 10 MG/ML; UG/ML
INJECTION, SOLUTION INFILTRATION; PERINEURAL PRN
Status: DISCONTINUED | OUTPATIENT
Start: 2022-07-26 | End: 2022-07-26 | Stop reason: ALTCHOICE

## 2022-07-26 RX ORDER — GLYCOPYRROLATE 1 MG/5 ML
SYRINGE (ML) INTRAVENOUS PRN
Status: DISCONTINUED | OUTPATIENT
Start: 2022-07-26 | End: 2022-07-26 | Stop reason: SDUPTHER

## 2022-07-26 RX ORDER — FENTANYL CITRATE 50 UG/ML
INJECTION, SOLUTION INTRAMUSCULAR; INTRAVENOUS PRN
Status: DISCONTINUED | OUTPATIENT
Start: 2022-07-26 | End: 2022-07-26 | Stop reason: SDUPTHER

## 2022-07-26 RX ORDER — SODIUM CHLORIDE 9 MG/ML
INJECTION, SOLUTION INTRAVENOUS PRN
Status: DISCONTINUED | OUTPATIENT
Start: 2022-07-26 | End: 2022-07-26 | Stop reason: HOSPADM

## 2022-07-26 RX ORDER — SODIUM CHLORIDE 9 MG/ML
INJECTION, SOLUTION INTRAVENOUS CONTINUOUS PRN
Status: DISCONTINUED | OUTPATIENT
Start: 2022-07-26 | End: 2022-07-26 | Stop reason: SDUPTHER

## 2022-07-26 RX ADMIN — PHENYLEPHRINE HYDROCHLORIDE 100 MCG: 10 INJECTION INTRAVENOUS at 12:50

## 2022-07-26 RX ADMIN — FENTANYL CITRATE 100 MCG: 50 INJECTION, SOLUTION INTRAMUSCULAR; INTRAVENOUS at 12:25

## 2022-07-26 RX ADMIN — PHENYLEPHRINE HYDROCHLORIDE 200 MCG: 10 INJECTION INTRAVENOUS at 12:32

## 2022-07-26 RX ADMIN — PROPOFOL 100 MCG/KG/MIN: 10 INJECTION, EMULSION INTRAVENOUS at 12:25

## 2022-07-26 RX ADMIN — SODIUM CHLORIDE: 9 INJECTION, SOLUTION INTRAVENOUS at 12:25

## 2022-07-26 RX ADMIN — SODIUM CHLORIDE: 9 INJECTION, SOLUTION INTRAVENOUS at 10:09

## 2022-07-26 RX ADMIN — VANCOMYCIN HYDROCHLORIDE 1000 MG: 1 INJECTION, POWDER, LYOPHILIZED, FOR SOLUTION INTRAVENOUS at 10:10

## 2022-07-26 RX ADMIN — MIDAZOLAM 1 MG: 1 INJECTION INTRAMUSCULAR; INTRAVENOUS at 12:25

## 2022-07-26 RX ADMIN — Medication 0.2 MG: at 12:29

## 2022-07-26 RX ADMIN — PHENYLEPHRINE HYDROCHLORIDE 200 MCG: 10 INJECTION INTRAVENOUS at 12:34

## 2022-07-26 ASSESSMENT — LIFESTYLE VARIABLES: SMOKING_STATUS: 1

## 2022-07-26 ASSESSMENT — PAIN - FUNCTIONAL ASSESSMENT: PAIN_FUNCTIONAL_ASSESSMENT: 0-10

## 2022-07-26 ASSESSMENT — ENCOUNTER SYMPTOMS: SHORTNESS OF BREATH: 1

## 2022-07-26 NOTE — ANESTHESIA PRE PROCEDURE
Department of Anesthesiology  Preprocedure Note       Name:  Andie Cam   Age:  80 y.o.  :  1940                                          MRN:  [id number]         Date:  2022      Surgeon: Sophy Schuler):  German Lott MD    Procedure: Procedure(s):  T6 KYPHOPLASTY-NEED C-ARM X2, DUONG TABLE , MEDTRONIC    Medications prior to admission:   Prior to Admission medications    Medication Sig Start Date End Date Taking?  Authorizing Provider   HYDROcodone-acetaminophen ([facility]) 7.5-325 MG per tablet TAKE 1 TABLET BY MOUTH EVERY 6 HOURS FOR 7 DAYS FOR POST OP PAIN THEN RETURN TO 1 TABLET EVERY 8 HOURS 21   Historical Provider, MD   ferrous sulfate (IRON 325) 325 (65 Fe) MG tablet Take 1 tablet by mouth 2 times daily (with meals) 21   SEVERINO Galindo CNP   apixaban (ELIQUIS) 5 MG TABS tablet Take 1 tablet by mouth 2 times daily 21   SEVERINO Galindo CNP   clopidogrel (PLAVIX) 75 MG tablet Take 1 tablet by mouth daily 21   SEVERINO Galindo CNP   torsemide (DEMADEX) 20 MG tablet torsemide TORSEMIDE 20 MG TABS 1 tablet twice daily 2020/07/10 TORSEMIDE [phone] Caleb Nawaf [address]-  [facility] [place] ([id number]) 7/10/20   Historical Provider, MD   pantoprazole (PROTONIX) 40 MG tablet Take 1 tablet by mouth 2 times daily (before meals) 20   Joshua Nguyễn DO   metoprolol succinate (TOPROL XL) 25 MG extended release tablet Take 1 tablet by mouth 2 times daily 20   Joshua Nguyễn DO   Revefenacin 175 MCG/3ML SOLN Inhale 1 ampule into the lungs 2 times daily  Patient taking differently: Inhale 1 ampule into the lungs as needed 19   Barbi Souza MD   fluticasone-salmeterol (ADVAIR) 500-50 MCG/DOSE diskus inhaler Inhale 1 puff into the lungs 2 times daily     Historical Provider, MD   potassium chloride (KLOR-CON M) 20 MEQ extended release tablet Take 1 tablet by mouth 2 times daily (with meals) 18   Ritchie [place], ml/min ([facility]) N18.30    Hypokalemia E87.6    Non-rheumatic aortic sclerosis I35.8    Hypotension due to hypovolemia I95.89, E86.1    Emesis R11.10    Diarrhea R19.7    Thrombocytopenia (HCC) D69.6    NSVT (nonsustained ventricular tachycardia) ([facility]) I47.2    Hypotension I95.9    Unintentional weight loss of 10% body weight within 6 months R63.4    Moderate protein-calorie malnutrition (HCC) E44.0    Pulmonary hypertension ([facility]) I27.20    Erosive esophagitis K22.10    Intertrochanteric fracture of right femur, closed, initial encounter [facility] S72.141A    Chronic anemia D64.9    Rib fracture S22.39XA    Weight loss R63.4    Severe protein-calorie malnutrition (HCC) E43    Closed comminuted intertrochanteric fracture of proximal end of femur with routine healing, right S72.141D       Past Medical History:        Diagnosis Date    Amblyopia 1961    corrected    Anticoagulated on Coumadin     Atrial fibrillation ([facility] 9/5/2018    Bilateral carotid artery stenosis 07/03/2014    CAD (coronary artery disease)     follows with / Shima Mae every 6 months    CHF (congestive heart failure) ([facility]     last episode 9/2018 ?  CKD (chronic kidney disease) stage 3, GFR 30-59 ml/min ([facility]) 7/30/2020    Colon polyp     COPD (chronic obstructive pulmonary disease) ([facility])     follows with DR. 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[facility])     Hypotension due to hypovolemia 7/30/2020    Myopia     Non-rheumatic aortic sclerosis     NSVT (nonsustained ventricular tachycardia) ([address] 2018    On home O2     2 Liters/NC at night and during day prn    NICHOLAS (obstructive sleep apnea)     no use cpap    Pulmonary hypertension ([address] 8/1/2020    To severe 2020    Squamous cell carcinoma, face     skin    Thrombocytopenia ([address] 7/30/2020    Tobacco dependence 7/3/2014    Unintentional weight loss of 10% body weight within 6 months 7/31/2020       Past Surgical History:        Procedure Laterality Date    APPENDECTOMY      BACK SURGERY  years ago    lumbar    CARDIAC CATHETERIZATION      heart cath x4, cabg twice    COLONOSCOPY     [address]      2000, 2002, 2005, 2009   total of 8 stents   [address]    two     EAR SURGERY Left 6/25/2019    I & D LEFT EAR ABSCESS performed by Aidan Romero DO at [facility] EAR SURGERY Left 8/26/2019    INCISION AND DRAINAGE LEFT EAR WITH EXCISION OF FISTULA  AND RECONSTRUCTION performed by Aidan Romero DO at [address] [address] Distance  / multiple surgeries    FEMUR FRACTURE SURGERY Right 11/5/2021    RIGHT FEMUR OPEN REDUCTION INTERNAL FIXATION (SYNTHES) performed by Veronique Haines DO at [place] [address] UPPER GASTROINTESTINAL ENDOSCOPY N/A 7/31/2020    BEDSIDE EGD ESOPHAGOGASTRODUODENOSCOPY performed by Abelino Dunlap MD at [facility] ENDOSCOPY       Social History:    Social History     Tobacco Use    Smoking status: Every Day     Packs/day: 1.00     Years: 62.00     Pack years: 62.00     Types: Cigarettes     Start date: 9/5/1955    Smokeless tobacco: Never   Substance Use Topics    Alcohol use:  No                                Ready to quit: Not Answered  Counseling given: Not Answered      Vital Signs (Current):   Vitals:    07/21/22 1240 07/26/22 0959   BP:  (!) 90/55   Pulse:  90   Resp:  18   Temp:  36.6 °C (97.8 °F)   TempSrc:  Temporal   SpO2:  93%   Weight: 120 lb (54.4 kg) 120 lb (54.4 kg)   Height: 5' 5\" (1.651 m) 5' 5\" (1.651 m)                                              BP Readings from Last 3 Encounters:   07/26/22 (!) 90/55   07/18/22 102/64   07/13/22 (!) 97/59       NPO Status: Time of last liquid consumption: 2100                        Time Nursing notes reviewed no history of anesthetic complications:   Airway: Mallampati: I  TM distance: >3 FB   Neck ROM: full  Mouth opening: > = 3 FB   Dental:    (+) upper dentures and lower dentures      Pulmonary:   (+) COPD (Chronic obstructive pulmonary disease with acute exacerbation ):  shortness of breath:  sleep apnea:  current smoker                          ROS comment: On home O2    EMPHYSEMA    PULMONARY HTN   Cardiovascular:    (+) hypertension:, CAD:, dysrhythmias: atrial fibrillation, CHF: systolic and diastolic, hyperlipidemia                  Neuro/Psych:               GI/Hepatic/Renal:   (+) PUD, renal disease: CRI,           Endo/Other:    (+) blood dyscrasia: anticoagulation therapy:., .                 Abdominal:             Vascular:   + PVD, aortic or cerebral (Bilateral carotid artery stenosis), . Other Findings:           Anesthesia Plan      MAC     ASA 4       Induction: intravenous. Anesthetic plan and risks discussed with patient. Use of blood products discussed with patient whom. Plan discussed with attending. Karlee Sung RN   7/26/2022      Pt seen, examined, chart reviewed, plan discussed.   Francisca Taylor MD  7/26/2022  2:34 PM

## 2022-07-26 NOTE — DISCHARGE INSTRUCTIONS
Discharge Instructions:    1. No lifting more than 10 pounds. 2. Refrain from bending, twisting, or turning at the waist.   3. No brace is needed to be worn. 4. Can remove dressing and leave open to air one (1) day after surgery . 5. All stitches are under the skin and will dissolve in time. 6. Patient may shower. DO NOT soak or scrub at incision site. 7. Do not drive while taking narcotic pain medications. 8. Take medications as prescribed. Continue taking stool softener while taking narcotic pain medications. 9. Follow up in the Neurosurgery clinic in 4-6 weeks. No films necessary. 10. Okay to restart Eliquis and Plavix tomorrow 7/27         Kyphoplasty: What to Expect at [address] [place] [address]  After kyphoplasty to relieve pain from compression fractures, your back may feel sore where the hollow needle (trocar) went into your back. This should go away in a few days. Most people are able to return to their daily activitieswithin a day. This care sheet gives you a general idea about how long it will take for you to recover. But each person recovers at a different pace. Follow the steps belowto get better as quickly as possible. How can you care for yourself at home? Activity    Take it easy for the first 24 hours. Rest when you feel tired. Getting enough sleep will help you recover. For the first day after the procedure, avoid lifting anything that would make you strain. This may include heavy grocery bags and milk containers, a heavy briefcase or backpack, cat litter or dog food bags, a vacuum , or a child. Diet    You can eat your normal diet. If your stomach is upset, try bland, low-fat foods like plain rice, broiled chicken, toast, and yogurt. Medicines    Your doctor will tell you if and when you can restart your medicines. He or she will also give you instructions about taking any new medicines.      If you take aspirin or some other blood thinner, ask your doctor if and when to start incision. A fever. Watch closely for any changes in your health, and be sure to contact yourdoctor if:    You do not get better as expected. Where can you learn more? Go to [email or web address] and sign in to your [employer] account. Enter D785 in the [facility] box to learn more about \"Kyphoplasty: What to Expect at Home. \"     If you do not have an account, please click on the \"Sign Up Now\" link. Current as of: March 9, 2022               Content Version: 13.3  © 3964-9215 Healthwise, Incorporated. Care instructions adapted under license by [facility] ([facility]). If you have questions about a medical condition or this instruction, always ask your healthcare professional. [address] any warranty or liability for your use of this information.

## 2022-07-26 NOTE — PROGRESS NOTES
Patient tolerating oral intake     Person waiting for patient called to bedside    Discharge instructions provided to patient, written and verbal, with significant other at bedside, patient verbalized understanding. Iv site removed. Wife Assisted patient in getting dressed. Transport requested via wheelchair.

## 2022-07-26 NOTE — PROGRESS NOTES
Spoke with Dr. Ashok Rubio for discharge orders  Messaged KATELYN Rogers PA with Dr. Ashok Rubio   For discharge orders

## 2022-07-26 NOTE — BRIEF OP NOTE
Brief Postoperative Note      Patient: Haley Beauchamp  YOB: 1940  MRN: [id number]    Date of Procedure: 7/26/2022    Pre-Op Diagnosis: T6 ACUTE OSTEOPATHIC COMPRESSION FRACTURE    Post-Op Diagnosis: Same       Procedure(s):  T6 KYPHOPLASTY-NEED C-ARM X2, DUONG TABLE , MEDTRONIC    Surgeon(s):  Arielle Mi MD    Assistant:  * No surgical staff found *    Anesthesia: General    Estimated Blood Loss (mL): Minimal    Complications: None    Specimens:   ID Type Source Tests Collected by Time Destination   A : T6 BONE BIOPSY Bone Biopsy SURGICAL PATHOLOGY Arielle Mi MD 7/26/2022 1226        Implants:  Implant Name Type Inv.  Item Serial No.  Lot No. LRB No. Used Action   KIT CEMENT BONE COMBO KYPHON HV-R - [id number]  KIT CEMENT BONE COMBO KYPHON HV-R  MEDTRONIC SOFAMOR DANEK-WD   1 Implanted         Drains: * No LDAs found *    Findings: see dictated op note    Electronically signed by Luis F Chang MD on 7/26/2022 at 1:18 PM

## 2022-07-26 NOTE — ANESTHESIA POSTPROCEDURE EVALUATION
Department of Anesthesiology  Postprocedure Note    Patient: Tamie Kaiser  MRN: [id number]  YOB: 1940  Date of evaluation: 7/26/2022      Procedure Summary     Date: 07/26/22 Room / Location: [place] [address] / [facility]    Anesthesia Start: 1210 Anesthesia Stop: 1300    Procedure: T6 KYPHOPLASTY-NEED C-ARM X2, DUONG TABLE , MEDTRONIC (Bilateral: Back) Diagnosis:       Compression fracture of T6 vertebra with routine healing, subsequent encounter      (T6 ACUTE OSTEOPATHIC COMPRESSION FRACTURE)    Surgeons: Violet Kim MD Responsible Provider: Aba Pineda MD    Anesthesia Type: MAC ASA Status: 4          Anesthesia Type: No value filed.     Madhu Phase I: Madhu Score: 10    Madhu Phase II: Madhu Score: 10      Anesthesia Post Evaluation    Patient location during evaluation: PACU  Patient participation: complete - patient participated  Level of consciousness: awake  Pain score: 3  Airway patency: patent  Nausea & Vomiting: no nausea and no vomiting  Complications: no  Cardiovascular status: blood pressure returned to baseline  Respiratory status: acceptable  Hydration status: euvolemic

## 2022-07-27 NOTE — OP NOTE
[address]                  [address] [facility]  [facility]                                OPERATIVE REPORT    PATIENT NAME: Selina Spaulding                    :        1940  MED REC NO:   [id number]                            ROOM:  ACCOUNT NO:   [de-identified]                           ADMIT DATE: 2022  PROVIDER:     Naveen Cano MD    DATE OF PROCEDURE:  2022    PREOPERATIVE DIAGNOSIS:  Acute T6 osteoporotic compression fracture. POSTOPERATIVE DIAGNOSIS:  Acute T6 osteoporotic compression fracture. OPERATIONS PERFORMED:  1.  Left-sided unilateral percutaneous transpedicular approach to T6  vertebral body for T6 vertebral body bone biopsy and T6 vertebral body  balloon kyphoplasty with use of Medtronic Kyphon balloon and polymethyl  methacrylate. 2.  Use of biplanar fluoroscopy interpreted by myself, the surgeon. ANESTHESIA:  Monitored anesthesia care. SURGEON:  Naveen Cano MD    ASSISTANT:  None. COMPLICATIONS:  None. ESTIMATED BLOOD LOSS:  Minimal.    SPECIMEN:  Bone. OPERATIVE INDICATIONS:  The patient is a 72-year-old gentleman who  presented to the office with excruciating back pain. He was found to  have an acute T6 osteoporotic compression fracture. He had failed  conservative therapy and after risks, benefits, and alternatives were  discussed with the patient, it was determined that he would undergo the  above-listed procedure. OPERATIVE PROCEDURE:  The patient was brought into the operating room. A timeout was performed where he was identified by his name, medical  record number, and the operative procedure which he was about to  undergo. Next, induction of monitored anesthesia care was then  commenced. Upon completion of induction of monitored anesthesia care,  he received preoperative antibiotics. He was then flipped into prone  position on a Oli table. All pressure points were padded. His  thoracic region was prepped and draped in the usual sterile fashion. After this was done using biplanar fluoroscopy, I marked the entry point  to the T6 pedicle on the patient's left side. I infiltrated the skin  with lidocaine with epinephrine 1:200,000. I used a 15 blade to make a  stab incision. I docked the [employer] One-Step Osteo introducer on the  facet, advanced through the pedicle into the vertebral body. I was able  to get across midline. I removed the inner stylet, left the outer  working cannula in place. I inserted a bone biopsy tool. After  obtaining a biopsy, I inserted Medtronic Kyphon balloon that was  inflated to 200 psi. I deflated the balloon, injected a total of 3 mL  of polymethyl methacrylate into the vertebral body. After this was  done, I removed the outer working cannula, obtained a final AP and  lateral fluoroscopic image. There was no evidence of extravasation of  cement. The skin was closed with Dermabond. The patient was then  flipped into supine position on his hospital bed and transported to the  postanesthesia care unit in stable condition. There were no  complications. Counts were correct. I was present for the entire case.         Dinah Dutton MD    D: 07/26/2022 21:38:08       T: 07/26/2022 21:40:26     [email or web address]  Job#: [id number]     Doc#: [id number]    CC:

## 2022-08-08 ENCOUNTER — HOSPITAL ENCOUNTER (OUTPATIENT)
Dept: PULMONOLOGY | Age: 82
Discharge: HOME | End: 2022-08-08
Payer: MEDICARE

## 2022-08-08 DIAGNOSIS — R09.02 HYPOXIA: ICD-10-CM

## 2022-08-08 DIAGNOSIS — J41.0 SIMPLE CHRONIC BRONCHITIS (HCC): ICD-10-CM

## 2022-08-08 PROCEDURE — 94060 EVALUATION OF WHEEZING: CPT

## 2022-08-08 PROCEDURE — 94726 PLETHYSMOGRAPHY LUNG VOLUMES: CPT

## 2022-08-08 PROCEDURE — 94729 DIFFUSING CAPACITY: CPT

## 2022-08-10 ENCOUNTER — HOSPITAL ENCOUNTER (OUTPATIENT)
Age: 82
Discharge: HOME | End: 2022-08-12

## 2022-08-10 PROCEDURE — 88305 TISSUE EXAM BY PATHOLOGIST: CPT

## 2022-08-13 NOTE — PROCEDURES
[address] [address]                               PULMONARY FUNCTION    PATIENT NAME: Nilda Mckenzie                    :        1940  MED REC NO:   [id number]                            ROOM:  ACCOUNT NO:   [de-identified]                           ADMIT DATE: 2022  PROVIDER:     Alex Gibson MD    DATE OF PROCEDURE:  2022    INDICATIONS:  A 71-year-old male, 65 inches height, 120 pounds. FINDINGS:  FVC is 2.02, 62% of predicted prebronchodilator and 2.42, 74%  of predicted postbronchodilator. FEV1 is 0.95, 38% of predicted  prebronchodilator and 1.04, 42% of predicted postbronchodilator. IVD09-23% is 0.34, 19% of predicted prebronchodilator and 0.49, 27% of  predicted postbronchodilator. Ratio is 47, 61% of predicted  prebronchodilator and 43, 56% of predicted postbronchodilator. MVV is  33, 33% of predicted. SVC is 2.14, 65% of predicted prebronchodilator  and 2.46, 75% of predicted postbronchodilator. RV is 2.66, 111% of  predicted prebronchodilator and 3.24, 136% of predicted  postbronchodilator. TLC is 4.8, 79% of predicted prebronchodilator and  5.7, 94% of predicted postbronchodilator. Ratio is 55, 142% of  predicted prebronchodilator and 57, 145% of predicted  postbronchodilator. DLCO is 6.21, 24% of predicted and ratio of 1.56,  36% of predicted. IMPRESSION:  Very severe obstructive lung disease with air trapping and  a significant bronchodilator response. There is a severe reduction in  DLCO which may indicate parenchymal or vascular damage. Please  correlate clinically.         Zeferino Vazquez MD    D: 2022 15:10:12       T: 2022 23:39:18     [email or web address]  Job#: [id number]     Doc#: [id number]    CC:

## 2022-08-23 ENCOUNTER — OFFICE VISIT (OUTPATIENT)
Dept: NEUROSURGERY | Age: 82
End: 2022-08-23
Payer: MEDICARE

## 2022-08-23 ENCOUNTER — HOSPITAL ENCOUNTER (OUTPATIENT)
Age: 82
Discharge: HOME | End: 2022-08-25
Payer: MEDICARE

## 2022-08-23 ENCOUNTER — TELEPHONE (OUTPATIENT)
Dept: NEUROSURGERY | Age: 82
End: 2022-08-23

## 2022-08-23 ENCOUNTER — HOSPITAL ENCOUNTER (OUTPATIENT)
Dept: GENERAL RADIOLOGY | Age: 82
Discharge: HOME | End: 2022-08-25
Payer: MEDICARE

## 2022-08-23 VITALS
RESPIRATION RATE: 24 BRPM | BODY MASS INDEX: 19.99 KG/M2 | OXYGEN SATURATION: 92 % | HEART RATE: 80 BPM | DIASTOLIC BLOOD PRESSURE: 62 MMHG | HEIGHT: 65 IN | SYSTOLIC BLOOD PRESSURE: 105 MMHG | WEIGHT: 120 LBS | TEMPERATURE: 98.1 F

## 2022-08-23 DIAGNOSIS — M54.40 ACUTE RIGHT-SIDED LOW BACK PAIN WITH SCIATICA, SCIATICA LATERALITY UNSPECIFIED: Primary | ICD-10-CM

## 2022-08-23 DIAGNOSIS — S22.050D CLOSED WEDGE COMPRESSION FRACTURE OF T6 VERTEBRA WITH ROUTINE HEALING, SUBSEQUENT ENCOUNTER: ICD-10-CM

## 2022-08-23 DIAGNOSIS — M54.40 ACUTE RIGHT-SIDED LOW BACK PAIN WITH SCIATICA, SCIATICA LATERALITY UNSPECIFIED: ICD-10-CM

## 2022-08-23 PROCEDURE — 72100 X-RAY EXAM L-S SPINE 2/3 VWS: CPT

## 2022-08-23 PROCEDURE — 99212 OFFICE O/P EST SF 10 MIN: CPT

## 2022-08-23 PROCEDURE — 72070 X-RAY EXAM THORAC SPINE 2VWS: CPT

## 2022-08-23 PROCEDURE — 99024 POSTOP FOLLOW-UP VISIT: CPT | Performed by: PHYSICIAN ASSISTANT

## 2022-08-23 RX ORDER — METHYLPREDNISOLONE 4 MG/1
TABLET ORAL
Qty: 1 KIT | Refills: 0 | Status: SHIPPED | OUTPATIENT
Start: 2022-08-23

## 2022-08-23 NOTE — PROGRESS NOTES
Post Operative Follow-up    Patient is status post:  kyphoplasty one month ago. The pre op back pain is gone, but now he is having severe mid back pain in a new area. No radicular symptoms    Physical Exam  Alert and Oriented X 3  PERRLA, EOMI  CORNELL 5/5  Wound: C/D/I    A/P: patient is s/p T6 kyphoplasty with new back pain around the thoracolumbar junctions. Will order new T and L spine x-rays.

## 2023-01-17 NOTE — TELEPHONE ENCOUNTER
Jey Trujillo from Dr Gustavo Díaz office called for patient that he is to hold warfarin until after procedure and patient to resume med the same day post surgery. Patient is to continue ASA per Dr Christine Garay. Office staff said patient aware of instructions. Dr Ady Fernandez to be notified. 18

## 2023-02-06 ENCOUNTER — HOSPITAL ENCOUNTER (OUTPATIENT)
Age: 83
Discharge: HOME | End: 2023-02-08

## 2023-02-06 PROCEDURE — 88305 TISSUE EXAM BY PATHOLOGIST: CPT

## 2023-06-25 ENCOUNTER — HOSPITAL ENCOUNTER (EMERGENCY)
Age: 83
Discharge: HOME | End: 2023-06-25
Attending: EMERGENCY MEDICINE
Payer: MEDICARE

## 2023-06-25 ENCOUNTER — APPOINTMENT (OUTPATIENT)
Dept: CT IMAGING | Age: 83
End: 2023-06-25
Payer: MEDICARE

## 2023-06-25 VITALS
WEIGHT: 115 LBS | HEIGHT: 66 IN | RESPIRATION RATE: 18 BRPM | BODY MASS INDEX: 18.48 KG/M2 | SYSTOLIC BLOOD PRESSURE: 103 MMHG | OXYGEN SATURATION: 91 % | DIASTOLIC BLOOD PRESSURE: 61 MMHG | TEMPERATURE: 98.1 F | HEART RATE: 74 BPM

## 2023-06-25 DIAGNOSIS — R31.9 HEMATURIA, UNSPECIFIED TYPE: Primary | ICD-10-CM

## 2023-06-25 DIAGNOSIS — N18.9 CHRONIC RENAL IMPAIRMENT, UNSPECIFIED CKD STAGE: ICD-10-CM

## 2023-06-25 DIAGNOSIS — Z79.01 ANTICOAGULATED: ICD-10-CM

## 2023-06-25 LAB
ALBUMIN SERPL-MCNC: 3.7 G/DL (ref 3.5–5.2)
ALP SERPL-CCNC: 175 U/L (ref 40–129)
ALT SERPL-CCNC: 17 U/L (ref 0–40)
ANION GAP SERPL CALCULATED.3IONS-SCNC: 9 MMOL/L (ref 7–16)
AST SERPL-CCNC: 19 U/L (ref 0–39)
BACTERIA URNS QL MICRO: NORMAL /HPF
BASOPHILS # BLD: 0.05 E9/L (ref 0–0.2)
BASOPHILS NFR BLD: 0.5 % (ref 0–2)
BILIRUB SERPL-MCNC: 0.4 MG/DL (ref 0–1.2)
BILIRUB UR QL STRIP: NEGATIVE
BUN SERPL-MCNC: 14 MG/DL (ref 6–23)
CALCIUM SERPL-MCNC: 9 MG/DL (ref 8.6–10.2)
CHLORIDE SERPL-SCNC: 102 MMOL/L (ref 98–107)
CLARITY UR: CLEAR
CO2 SERPL-SCNC: 29 MMOL/L (ref 22–29)
COLOR UR: YELLOW
CREAT SERPL-MCNC: 1.5 MG/DL (ref 0.7–1.2)
EOSINOPHIL # BLD: 0.16 E9/L (ref 0.05–0.5)
EOSINOPHIL NFR BLD: 1.7 % (ref 0–6)
ERYTHROCYTE [DISTWIDTH] IN BLOOD BY AUTOMATED COUNT: 14.1 FL (ref 11.5–15)
GLUCOSE SERPL-MCNC: 98 MG/DL (ref 74–99)
GLUCOSE UR STRIP-MCNC: NEGATIVE MG/DL
HCT VFR BLD AUTO: 35.9 % (ref 37–54)
HCT VFR BLD AUTO: 40.4 % (ref 37–54)
HGB BLD-MCNC: 11.7 G/DL (ref 12.5–16.5)
HGB BLD-MCNC: 12.9 G/DL (ref 12.5–16.5)
HGB UR QL STRIP: ABNORMAL
IMM GRANULOCYTES # BLD: 0.03 E9/L
IMM GRANULOCYTES NFR BLD: 0.3 % (ref 0–5)
KETONES UR STRIP-MCNC: NEGATIVE MG/DL
LACTATE BLDV-SCNC: 1 MMOL/L (ref 0.5–2.2)
LEUKOCYTE ESTERASE UR QL STRIP: NEGATIVE
LIPASE: 20 U/L (ref 13–60)
LYMPHOCYTES # BLD: 1.11 E9/L (ref 1.5–4)
LYMPHOCYTES NFR BLD: 11.6 % (ref 20–42)
MCH RBC QN AUTO: 30.8 PG (ref 26–35)
MCHC RBC AUTO-ENTMCNC: 31.9 % (ref 32–34.5)
MCV RBC AUTO: 96.4 FL (ref 80–99.9)
MONOCYTES # BLD: 0.76 E9/L (ref 0.1–0.95)
MONOCYTES NFR BLD: 8 % (ref 2–12)
NEUTROPHILS # BLD: 7.43 E9/L (ref 1.8–7.3)
NEUTS SEG NFR BLD: 77.9 % (ref 43–80)
NITRITE UR QL STRIP: NEGATIVE
PH UR STRIP: 5 [PH] (ref 5–9)
PLATELET # BLD AUTO: 206 E9/L (ref 130–450)
PMV BLD AUTO: 9.7 FL (ref 7–12)
POTASSIUM SERPL-SCNC: 4.2 MMOL/L (ref 3.5–5)
PROT SERPL-MCNC: 6.1 G/DL (ref 6.4–8.3)
PROT UR STRIP-MCNC: ABNORMAL MG/DL
RBC # BLD AUTO: 4.19 E12/L (ref 3.8–5.8)
RBC #/AREA URNS HPF: >20 /HPF (ref 0–2)
SODIUM SERPL-SCNC: 140 MMOL/L (ref 132–146)
SP GR UR STRIP: 1.01 (ref 1–1.03)
UROBILINOGEN UR STRIP-ACNC: 0.2 E.U./DL
WBC # BLD: 9.5 E9/L (ref 4.5–11.5)
WBC #/AREA URNS HPF: NORMAL /HPF (ref 0–5)

## 2023-06-25 PROCEDURE — 85018 HEMOGLOBIN: CPT

## 2023-06-25 PROCEDURE — 83690 ASSAY OF LIPASE: CPT

## 2023-06-25 PROCEDURE — 87088 URINE BACTERIA CULTURE: CPT

## 2023-06-25 PROCEDURE — 81001 URINALYSIS AUTO W/SCOPE: CPT

## 2023-06-25 PROCEDURE — 36415 COLL VENOUS BLD VENIPUNCTURE: CPT

## 2023-06-25 PROCEDURE — 6360000002 HC RX W HCPCS: Performed by: EMERGENCY MEDICINE

## 2023-06-25 PROCEDURE — 2580000003 HC RX 258: Performed by: EMERGENCY MEDICINE

## 2023-06-25 PROCEDURE — 93005 ELECTROCARDIOGRAM TRACING: CPT | Performed by: EMERGENCY MEDICINE

## 2023-06-25 PROCEDURE — 83605 ASSAY OF LACTIC ACID: CPT

## 2023-06-25 PROCEDURE — 74176 CT ABD & PELVIS W/O CONTRAST: CPT

## 2023-06-25 PROCEDURE — 85014 HEMATOCRIT: CPT

## 2023-06-25 PROCEDURE — 80053 COMPREHEN METABOLIC PANEL: CPT

## 2023-06-25 PROCEDURE — 96374 THER/PROPH/DIAG INJ IV PUSH: CPT

## 2023-06-25 PROCEDURE — 99284 EMERGENCY DEPT VISIT MOD MDM: CPT

## 2023-06-25 PROCEDURE — 85025 COMPLETE CBC W/AUTO DIFF WBC: CPT

## 2023-06-25 RX ORDER — 0.9 % SODIUM CHLORIDE 0.9 %
1000 INTRAVENOUS SOLUTION INTRAVENOUS ONCE
Status: COMPLETED | OUTPATIENT
Start: 2023-06-25 | End: 2023-06-25

## 2023-06-25 RX ORDER — CEFDINIR 300 MG/1
300 CAPSULE ORAL DAILY
Qty: 10 CAPSULE | Refills: 0 | Status: SHIPPED | OUTPATIENT
Start: 2023-06-25 | End: 2023-07-05

## 2023-06-25 RX ORDER — 0.9 % SODIUM CHLORIDE 0.9 %
1000 INTRAVENOUS SOLUTION INTRAVENOUS ONCE
Status: DISCONTINUED | OUTPATIENT
Start: 2023-06-25 | End: 2023-06-25

## 2023-06-25 RX ORDER — 0.9 % SODIUM CHLORIDE 0.9 %
500 INTRAVENOUS SOLUTION INTRAVENOUS ONCE
Status: COMPLETED | OUTPATIENT
Start: 2023-06-25 | End: 2023-06-25

## 2023-06-25 RX ADMIN — SODIUM CHLORIDE 1000 ML: 9 INJECTION, SOLUTION INTRAVENOUS at 16:36

## 2023-06-25 RX ADMIN — SODIUM CHLORIDE 500 ML: 9 INJECTION, SOLUTION INTRAVENOUS at 15:34

## 2023-06-25 RX ADMIN — WATER 1000 MG: 1 INJECTION INTRAMUSCULAR; INTRAVENOUS; SUBCUTANEOUS at 16:38

## 2023-06-25 ASSESSMENT — PAIN - FUNCTIONAL ASSESSMENT
PAIN_FUNCTIONAL_ASSESSMENT: NONE - DENIES PAIN

## 2023-06-25 ASSESSMENT — LIFESTYLE VARIABLES
HOW OFTEN DO YOU HAVE A DRINK CONTAINING ALCOHOL: NEVER
HOW MANY STANDARD DRINKS CONTAINING ALCOHOL DO YOU HAVE ON A TYPICAL DAY: PATIENT DOES NOT DRINK

## 2023-06-26 LAB
EKG ATRIAL RATE: 33 BPM
EKG Q-T INTERVAL: 418 MS
EKG QRS DURATION: 130 MS
EKG QTC CALCULATION (BAZETT): 457 MS
EKG R AXIS: 133 DEGREES
EKG T AXIS: -29 DEGREES
EKG VENTRICULAR RATE: 72 BPM

## 2023-06-26 PROCEDURE — 93010 ELECTROCARDIOGRAM REPORT: CPT | Performed by: INTERNAL MEDICINE

## 2023-06-28 LAB — BACTERIA UR CULT: NORMAL

## 2023-08-04 DIAGNOSIS — Z51.5 HOSPICE CARE PATIENT: Primary | ICD-10-CM

## 2023-08-04 DIAGNOSIS — G89.3 NEOPLASM RELATED PAIN: ICD-10-CM

## 2023-08-04 RX ORDER — HYDROCODONE BITARTRATE AND ACETAMINOPHEN 10; 325 MG/1; MG/1
1 TABLET ORAL EVERY 4 HOURS PRN
Qty: 90 TABLET | Refills: 0 | Status: SHIPPED | OUTPATIENT
Start: 2023-08-04 | End: 2023-09-03

## 2023-08-04 RX ORDER — GLYCOPYRROLATE 2 MG/1
2 TABLET ORAL 3 TIMES DAILY PRN
Qty: 5 TABLET | Refills: 0 | Status: SHIPPED | OUTPATIENT
Start: 2023-08-04

## 2023-08-04 RX ORDER — MORPHINE SULFATE 100 MG/5ML
5 SOLUTION ORAL EVERY 6 HOURS PRN
Qty: 30 ML | Refills: 0 | Status: SHIPPED | OUTPATIENT
Start: 2023-08-04 | End: 2023-09-03

## 2023-08-04 RX ORDER — LORAZEPAM 1 MG/1
1 TABLET ORAL EVERY 6 HOURS PRN
Qty: 5 TABLET | Refills: 0 | Status: SHIPPED | OUTPATIENT
Start: 2023-08-04 | End: 2023-09-03

## 2023-08-04 RX ORDER — ACETAMINOPHEN 650 MG/1
650 SUPPOSITORY RECTAL EVERY 4 HOURS PRN
Qty: 3 SUPPOSITORY | Refills: 0 | Status: SHIPPED | OUTPATIENT
Start: 2023-08-04

## 2023-08-04 RX ORDER — HALOPERIDOL 1 MG/1
1 TABLET ORAL EVERY 6 HOURS PRN
Qty: 5 TABLET | Refills: 0 | Status: SHIPPED | OUTPATIENT
Start: 2023-08-04

## 2023-08-04 RX ORDER — OLANZAPINE 5 MG/1
5 TABLET, ORALLY DISINTEGRATING ORAL EVERY 12 HOURS PRN
Qty: 5 TABLET | Refills: 0 | Status: SHIPPED | OUTPATIENT
Start: 2023-08-04

## 2023-08-08 DIAGNOSIS — Z51.5 HOSPICE CARE PATIENT: Primary | ICD-10-CM

## 2023-08-08 DIAGNOSIS — R52 PAIN: ICD-10-CM

## 2023-08-08 RX ORDER — OXYCODONE AND ACETAMINOPHEN 7.5; 325 MG/1; MG/1
1 TABLET ORAL EVERY 4 HOURS PRN
Qty: 60 TABLET | Refills: 0 | Status: SHIPPED | OUTPATIENT
Start: 2023-08-08 | End: 2023-09-07

## 2023-08-08 RX ORDER — ZOLPIDEM TARTRATE 10 MG/1
10 TABLET ORAL NIGHTLY PRN
Qty: 30 TABLET | Refills: 1 | Status: SHIPPED | OUTPATIENT
Start: 2023-08-08 | End: 2023-10-07

## 2023-08-17 DIAGNOSIS — R52 PAIN: ICD-10-CM

## 2023-08-17 DIAGNOSIS — Z51.5 HOSPICE CARE PATIENT: ICD-10-CM

## 2023-08-17 RX ORDER — OXYCODONE AND ACETAMINOPHEN 7.5; 325 MG/1; MG/1
1 TABLET ORAL EVERY 4 HOURS PRN
Qty: 60 TABLET | Refills: 0 | Status: SHIPPED | OUTPATIENT
Start: 2023-08-17 | End: 2023-09-16

## 2023-08-23 DIAGNOSIS — Z51.5 HOSPICE CARE PATIENT: ICD-10-CM

## 2023-08-23 DIAGNOSIS — R52 PAIN: ICD-10-CM

## 2023-08-23 DIAGNOSIS — C67.9 MALIGNANT NEOPLASM OF URINARY BLADDER, UNSPECIFIED SITE (HCC): Primary | ICD-10-CM

## 2023-08-23 RX ORDER — OXYCODONE AND ACETAMINOPHEN 7.5; 325 MG/1; MG/1
1 TABLET ORAL EVERY 4 HOURS PRN
Qty: 120 TABLET | Refills: 0 | Status: SHIPPED | OUTPATIENT
Start: 2023-08-23 | End: 2023-08-24 | Stop reason: SDUPTHER

## 2023-08-23 NOTE — TELEPHONE ENCOUNTER
Patient in need of refill of oxycodone-APAP. Refill sent to hospice pharmacy.     Matt Alvares MD  8/23/2023

## 2023-08-24 DIAGNOSIS — Z51.5 HOSPICE CARE PATIENT: ICD-10-CM

## 2023-08-24 DIAGNOSIS — C67.9 MALIGNANT NEOPLASM OF URINARY BLADDER, UNSPECIFIED SITE (HCC): ICD-10-CM

## 2023-08-24 DIAGNOSIS — R52 PAIN: ICD-10-CM

## 2023-08-24 RX ORDER — OXYCODONE AND ACETAMINOPHEN 7.5; 325 MG/1; MG/1
1 TABLET ORAL
Qty: 120 TABLET | Refills: 0 | Status: SHIPPED | OUTPATIENT
Start: 2023-08-24 | End: 2023-09-23

## 2023-09-01 DIAGNOSIS — Z51.5 HOSPICE CARE PATIENT: Primary | ICD-10-CM

## 2023-09-01 DIAGNOSIS — G89.3 NEOPLASM RELATED PAIN: ICD-10-CM

## 2023-09-01 RX ORDER — MORPHINE SULFATE 15 MG/1
15 TABLET, FILM COATED, EXTENDED RELEASE ORAL 2 TIMES DAILY
Qty: 60 TABLET | Refills: 0 | Status: SHIPPED | OUTPATIENT
Start: 2023-09-01 | End: 2023-10-01

## 2023-09-06 DIAGNOSIS — R52 PAIN: ICD-10-CM

## 2023-09-06 DIAGNOSIS — C67.9 MALIGNANT NEOPLASM OF URINARY BLADDER, UNSPECIFIED SITE (HCC): ICD-10-CM

## 2023-09-06 DIAGNOSIS — Z51.5 HOSPICE CARE PATIENT: ICD-10-CM

## 2023-09-06 RX ORDER — OXYCODONE AND ACETAMINOPHEN 7.5; 325 MG/1; MG/1
1 TABLET ORAL
Qty: 120 TABLET | Refills: 0 | Status: SHIPPED | OUTPATIENT
Start: 2023-09-06 | End: 2023-10-06

## 2023-09-11 DIAGNOSIS — Z51.5 HOSPICE CARE PATIENT: ICD-10-CM

## 2023-09-11 DIAGNOSIS — G89.3 NEOPLASM RELATED PAIN: ICD-10-CM

## 2023-09-11 RX ORDER — MORPHINE SULFATE 15 MG/1
15 TABLET, FILM COATED, EXTENDED RELEASE ORAL 2 TIMES DAILY
Qty: 60 TABLET | Refills: 0 | Status: SHIPPED | OUTPATIENT
Start: 2023-09-11 | End: 2023-10-11

## 2023-09-13 DIAGNOSIS — Z51.5 HOSPICE CARE PATIENT: ICD-10-CM

## 2023-09-13 RX ORDER — ZOLPIDEM TARTRATE 10 MG/1
10 TABLET ORAL NIGHTLY PRN
Qty: 30 TABLET | Refills: 1 | Status: SHIPPED | OUTPATIENT
Start: 2023-09-13 | End: 2023-09-15

## 2023-09-15 RX ORDER — ZOLPIDEM TARTRATE 10 MG/1
10 TABLET ORAL NIGHTLY PRN
Qty: 30 TABLET | Refills: 1 | Status: SHIPPED | OUTPATIENT
Start: 2023-09-15 | End: 2023-11-14

## 2023-09-20 DIAGNOSIS — C67.9 MALIGNANT NEOPLASM OF URINARY BLADDER, UNSPECIFIED SITE (HCC): ICD-10-CM

## 2023-09-20 DIAGNOSIS — R52 PAIN: ICD-10-CM

## 2023-09-20 DIAGNOSIS — Z51.5 HOSPICE CARE PATIENT: ICD-10-CM

## 2023-09-20 RX ORDER — OXYCODONE AND ACETAMINOPHEN 7.5; 325 MG/1; MG/1
1 TABLET ORAL
Qty: 120 TABLET | Refills: 0 | Status: SHIPPED | OUTPATIENT
Start: 2023-09-20 | End: 2023-10-20

## 2023-10-06 DIAGNOSIS — G89.3 NEOPLASM RELATED PAIN: ICD-10-CM

## 2023-10-06 DIAGNOSIS — Z51.5 HOSPICE CARE PATIENT: ICD-10-CM

## 2023-10-06 DIAGNOSIS — C67.9 MALIGNANT NEOPLASM OF URINARY BLADDER, UNSPECIFIED SITE (HCC): ICD-10-CM

## 2023-10-06 DIAGNOSIS — R52 PAIN: ICD-10-CM

## 2023-10-06 RX ORDER — ZOLPIDEM TARTRATE 10 MG/1
10 TABLET ORAL NIGHTLY PRN
Qty: 30 TABLET | Refills: 1 | Status: SHIPPED | OUTPATIENT
Start: 2023-10-06 | End: 2023-12-05

## 2023-10-07 RX ORDER — MORPHINE SULFATE 15 MG/1
15 TABLET, FILM COATED, EXTENDED RELEASE ORAL 2 TIMES DAILY
Qty: 60 TABLET | Refills: 0 | Status: SHIPPED | OUTPATIENT
Start: 2023-10-07 | End: 2023-11-06

## 2023-10-07 RX ORDER — OXYCODONE AND ACETAMINOPHEN 7.5; 325 MG/1; MG/1
1 TABLET ORAL
Qty: 120 TABLET | Refills: 0 | Status: SHIPPED | OUTPATIENT
Start: 2023-10-07 | End: 2023-11-06

## 2023-10-12 ENCOUNTER — TELEPHONE (OUTPATIENT)
Dept: HOSPICE | Age: 83
End: 2023-10-12

## 2023-10-12 DIAGNOSIS — R09.89 AIR HUNGER: ICD-10-CM

## 2023-10-12 DIAGNOSIS — F41.9 ANXIETY: ICD-10-CM

## 2023-10-12 DIAGNOSIS — R52 PAIN: ICD-10-CM

## 2023-10-12 DIAGNOSIS — Z51.5 HOSPICE CARE PATIENT: Primary | ICD-10-CM

## 2023-10-12 RX ORDER — LORAZEPAM 0.5 MG/1
0.5 TABLET ORAL EVERY 4 HOURS PRN
Qty: 30 TABLET | Refills: 5 | Status: SHIPPED | OUTPATIENT
Start: 2023-10-12 | End: 2023-11-11

## 2023-10-12 RX ORDER — MORPHINE SULFATE 100 MG/5ML
SOLUTION ORAL
Qty: 30 ML | Refills: 0 | Status: SHIPPED | OUTPATIENT
Start: 2023-10-12 | End: 2023-11-11

## 2023-10-12 NOTE — TELEPHONE ENCOUNTER
Call received from hospice RN that patient has had a change in condition. New orders for comfort medications provided. Prescription sent to hospice pharmacy.

## (undated) DEVICE — GLOVE SURG 8.5 PF POLYMER WHT STRL SIGN LTX ESSENTIAL LTX

## (undated) DEVICE — STANDARD HYPODERMIC NEEDLE,POLYPROPYLENE HUB: Brand: MONOJECT

## (undated) DEVICE — SYRINGE 20ML LL S/C 50

## (undated) DEVICE — 3M™ IOBAN™ 2 ANTIMICROBIAL INCISE DRAPE 6650EZ: Brand: IOBAN™ 2

## (undated) DEVICE — TOWEL,OR,DSP,ST,BLUE,STD,6/PK,12PK/CS: Brand: MEDLINE

## (undated) DEVICE — MEDIA CONTRAST RX ISOVUE-300 61% 30ML VIALS

## (undated) DEVICE — SUTURE MCRYL SZ 5-0 L18IN ABSRB VLT P-3 L13MM 3/8 CIR REV Y463G

## (undated) DEVICE — C-ARM: Brand: UNBRANDED

## (undated) DEVICE — CONTAINER VACUTAINER ANAER CULTURE SWAB

## (undated) DEVICE — BONE BIOPSY DEVICE F05A BBD SIZE 3: Brand: MEDTRONIC REUSABLE INSTRUMENTS

## (undated) DEVICE — TUBING SUCT 12FR MAL ALUM SHFT FN CAP VENT UNIV CONN W/ OBT

## (undated) DEVICE — DOUBLE BASIN SET: Brand: MEDLINE INDUSTRIES, INC.

## (undated) DEVICE — PADDING CAST W6INXL4YD COT LO LINTING WYTEX

## (undated) DEVICE — SURGICAL PROCEDURE PACK EENT CUST

## (undated) DEVICE — COVER HNDL LT DISP

## (undated) DEVICE — INTENDED FOR TISSUE SEPARATION, AND OTHER PROCEDURES THAT REQUIRE A SHARP SURGICAL BLADE TO PUNCTURE OR CUT.: Brand: BARD-PARKER ® STAINLESS STEEL BLADES

## (undated) DEVICE — HYPODERMIC SAFETY NEEDLE: Brand: MAGELLAN

## (undated) DEVICE — GLOVE ORANGE PI 8   MSG9080

## (undated) DEVICE — GOWN,SIRUS,FABRNF,L,20/CS: Brand: MEDLINE

## (undated) DEVICE — MICRODISSECTION NEEDLE STRAIGHT SLEEVE: Brand: COLORADO

## (undated) DEVICE — MARKER,SKIN,WI/RULER AND LABELS: Brand: MEDLINE

## (undated) DEVICE — SET ORTHO STD STORTSTD2

## (undated) DEVICE — GOWN,SIRUS,FABRNF,XL,20/CS: Brand: MEDLINE

## (undated) DEVICE — GRADUATE TRIANG MEASURE 1000ML BLK PRNT

## (undated) DEVICE — SOLUTION IV IRRIG POUR BRL 0.9% SODIUM CHL 2F7124

## (undated) DEVICE — DRESSING,GAUZE,XEROFORM,CURAD,5"X9",ST: Brand: CURAD

## (undated) DEVICE — KIT KPT1504 KYPAK TRY 15/3 ADDL FRACTURE: Brand: KYPHOPAK™ TRAY

## (undated) DEVICE — INTRODUCER T15K ONE STEP OID BEVEL: Brand: KYPHON® ONE-STEP™ OSTEO INTRODUCER™ SYSTEM

## (undated) DEVICE — ZIMMER® STERILE DISPOSABLE TOURNIQUET CUFF WITH PROTECTIVE SLEEVE AND PLC, DUAL PORT, SINGLE BLADDER, 24 IN. (61 CM)

## (undated) DEVICE — LOWER EXT HIP DRAPE: Brand: MEDLINE INDUSTRIES, INC.

## (undated) DEVICE — TELFA ADHESIVE ISLAND DRESSING: Brand: TELFA

## (undated) DEVICE — HANDPIECE SET WITH BONE CLEANING TIP AND SUCTION TUBE: Brand: INTERPULSE

## (undated) DEVICE — SET ORTHO STD STORTSTD1

## (undated) DEVICE — 4-PORT MANIFOLD: Brand: NEPTUNE 2

## (undated) DEVICE — SPONGE GZ W4XL4IN RAYON POLY FILL CVR W/ NONWOVEN FAB

## (undated) DEVICE — BASIC SINGLE BASIN 1-LF: Brand: MEDLINE INDUSTRIES, INC.

## (undated) DEVICE — DRESSING EAR AD 5.5IN GLSCOCK

## (undated) DEVICE — Device

## (undated) DEVICE — COUNTER NDL 30 COUNT DBL MAG

## (undated) DEVICE — ADHESIVE SKIN CLOSURE TOP 36 CC HI VISC DERMBND MINI

## (undated) DEVICE — CONTROL SYRINGE LUER-LOCK TIP: Brand: MONOJECT

## (undated) DEVICE — SWAB SPEC COLL SHFT L5.25IN POLYUR FOAM TIP SFT DBL MEDIA

## (undated) DEVICE — ELECTRODE PT RET AD L9FT HI MOIST COND ADH HYDRGEL CORDED

## (undated) DEVICE — SUTURE PROL SZ 6-0 L18IN NONABSORBABLE BLU L36MM P-1 3/8 8697G

## (undated) DEVICE — SOLUTION IRRIG 3000ML 0.9% SOD CHL USP UROMATIC PLAS CONT

## (undated) DEVICE — PATIENT RETURN ELECTRODE, SINGLE-USE, CONTACT QUALITY MONITORING, ADULT, WITH 9FT CORD, FOR PATIENTS WEIGING OVER 33LBS. (15KG): Brand: MEGADYNE

## (undated) DEVICE — COVER,LIGHT HANDLE,FLX,2/PK: Brand: MEDLINE INDUSTRIES, INC.

## (undated) DEVICE — SYRINGE,EAR/ULCER, 2 OZ, STERILE: Brand: MEDLINE

## (undated) DEVICE — REAGENT TEST UREASE RAPD CLOTEST F/

## (undated) DEVICE — GAUZE,SPONGE,4"X4",16PLY,XRAY,STRL,LF: Brand: MEDLINE

## (undated) DEVICE — PACK PROCEDURE SURG GEN CUST

## (undated) DEVICE — BAG SPECIMEN BIOHAZARD 6IN X 9IN

## (undated) DEVICE — E-Z CLEAN, NON-STICK, PTFE COATED, ELECTROSURGICAL NEEDLE ELECTRODE, MODIFIED EXTENDED INSULATION, 2.75 INCH (7 CM): Brand: MEGADYNE

## (undated) DEVICE — DRILL SYSTEM 7

## (undated) DEVICE — SPONGE,LAP,4"X18",XR,ST,5/PK,40PK/CS: Brand: MEDLINE INDUSTRIES, INC.

## (undated) DEVICE — READY WET SKIN SCRUB TRAY-LF: Brand: MEDLINE INDUSTRIES, INC.

## (undated) DEVICE — JACKSON TABLE POSITIONER KIT: Brand: MEDLINE INDUSTRIES, INC.

## (undated) DEVICE — TRAP SPEC MUCUS FOR SUCT

## (undated) DEVICE — ELECTROSURGICAL PENCIL BUTTON SWITCH E-Z CLEAN COATED BLADE ELECTRODE 10 FT (3 M) CORD HOLSTER: Brand: MEGADYNE

## (undated) DEVICE — NEEDLE FLTR 18GA L1.5IN MEM THK5UM BLNT DISP

## (undated) DEVICE — SYRINGE A08E-ATR INFLATION HP: Brand: SYRINGE ATR INFLATION HP

## (undated) DEVICE — GLOVE ORTHO 8   MSG9480

## (undated) DEVICE — GAUZE,SPONGE,4"X4",8PLY,STRL,LF,10/TRAY: Brand: MEDLINE